# Patient Record
Sex: FEMALE | Race: WHITE | Employment: OTHER | URBAN - NONMETROPOLITAN AREA
[De-identification: names, ages, dates, MRNs, and addresses within clinical notes are randomized per-mention and may not be internally consistent; named-entity substitution may affect disease eponyms.]

---

## 2017-01-11 ENCOUNTER — TELEPHONE (OUTPATIENT)
Dept: CARDIOLOGY | Age: 47
End: 2017-01-11

## 2017-08-29 ENCOUNTER — HOSPITAL ENCOUNTER (OUTPATIENT)
Dept: PHYSICAL THERAPY | Age: 47
Setting detail: THERAPIES SERIES
Discharge: HOME | End: 2017-08-29
Payer: MEDICARE

## 2018-10-16 ENCOUNTER — HOSPITAL ENCOUNTER (OUTPATIENT)
Dept: OCCUPATIONAL THERAPY | Age: 48
Setting detail: THERAPIES SERIES
Discharge: HOME | End: 2018-10-16
Payer: MEDICARE

## 2018-10-23 ENCOUNTER — HOSPITAL ENCOUNTER (OUTPATIENT)
Dept: OCCUPATIONAL THERAPY | Age: 48
Setting detail: THERAPIES SERIES
Discharge: HOME | End: 2018-10-23
Payer: MEDICARE

## 2018-10-23 PROCEDURE — 97165 OT EVAL LOW COMPLEX 30 MIN: CPT

## 2018-10-23 PROCEDURE — G8978 MOBILITY CURRENT STATUS: HCPCS

## 2018-10-23 PROCEDURE — G8979 MOBILITY GOAL STATUS: HCPCS

## 2018-10-23 PROCEDURE — G8980 MOBILITY D/C STATUS: HCPCS

## 2018-10-23 PROCEDURE — 97542 WHEELCHAIR MNGMENT TRAINING: CPT

## 2018-10-23 ASSESSMENT — PAIN DESCRIPTION - LOCATION: LOCATION: BACK;KNEE

## 2018-10-23 ASSESSMENT — PAIN DESCRIPTION - PAIN TYPE: TYPE: CHRONIC PAIN

## 2018-10-23 ASSESSMENT — PAIN SCALES - GENERAL: PAINLEVEL_OUTOF10: 7

## 2018-10-23 ASSESSMENT — PAIN DESCRIPTION - ORIENTATION: ORIENTATION: RIGHT

## 2018-10-23 NOTE — PROGRESS NOTES
limit the users ability to safely participate in one or more MRADL's?   no    Can the patient be accommodated / compensated for to allow use of a mobility assistive device to participate in [address]? Yes - pt. Gianni's functional perception, reasoning, and safety    Does the user demonstrate the ability or potential ability and willingness to safely use the mobility assistive device? yes  - pt. Wants to be more independent with mobility in her home to complete self care and cooking and laundry    Can the mobility deficit be sufficiently resolved with only the use of a cane or walker?        no - pt. Has not been able to tolerate wearing a left LE prosthesis and demo's fair to poor UE strength and endurance    Does the user's environment support the use of the type of wheelchair being recommended? SEE VENDOR'S NOTE. If a manual wheelchair is recommended, does the user have sufficient function/abilities to use the recommended equipment?   n/a    If a POV is recommended, does the user have sufficient stability and upper extremity function to operate it? N'/a - Pt's home is not accessible indoors for a POV    If a power wheelchair is recommended, does the user have sufficient function/abilities to use the recommended equipment? Yes - pt. Gianni's functional hand/dexterity skills to operate a joystick and functional cognition       RECOMMENDATION/GOALS     TYPE OF WHEELCHAIR RECOMMENDED:  Power Wheelchair - Group 2  POSITIONING SYSTEMS RECOMMENDED: None    SEATING RECOMMENDED:    Standard captain chair    OT G-codes  Functional Assessment Tool Used: Patient specific functional scale  Score: ability to move around in manual wheelchair - 3  Functional Limitation: Mobility: Walking and moving around  Mobility: Walking and Moving Around Current Status (): At least 60 percent but less than 80 percent impaired, limited or restricted  Mobility: Walking and Moving Around Goal Status ():  At least 60 percent

## 2021-12-23 ENCOUNTER — HOSPITAL ENCOUNTER (EMERGENCY)
Age: 51
Discharge: HOME | End: 2021-12-23
Payer: MEDICARE

## 2021-12-23 VITALS
RESPIRATION RATE: 18 BRPM | HEART RATE: 90 BPM | DIASTOLIC BLOOD PRESSURE: 78 MMHG | BODY MASS INDEX: 33.04 KG/M2 | HEIGHT: 61 IN | WEIGHT: 175 LBS | TEMPERATURE: 98.6 F | SYSTOLIC BLOOD PRESSURE: 130 MMHG | OXYGEN SATURATION: 98 %

## 2021-12-23 DIAGNOSIS — J20.8 ACUTE BRONCHITIS DUE TO OTHER SPECIFIED ORGANISMS: Primary | ICD-10-CM

## 2021-12-23 DIAGNOSIS — Z20.822 LAB TEST NEGATIVE FOR COVID-19 VIRUS: ICD-10-CM

## 2021-12-23 DIAGNOSIS — R51.9 NONINTRACTABLE EPISODIC HEADACHE, UNSPECIFIED HEADACHE TYPE: ICD-10-CM

## 2021-12-23 LAB — SARS-COV-2, NAA: NOT  DETECTED

## 2021-12-23 PROCEDURE — 87635 SARS-COV-2 COVID-19 AMP PRB: CPT

## 2021-12-23 PROCEDURE — 99202 OFFICE O/P NEW SF 15 MIN: CPT | Performed by: NURSE PRACTITIONER

## 2021-12-23 PROCEDURE — 99203 OFFICE O/P NEW LOW 30 MIN: CPT

## 2021-12-23 RX ORDER — PREDNISONE 20 MG/1
20 TABLET ORAL 2 TIMES DAILY
Qty: 10 TABLET | Refills: 0 | Status: SHIPPED | OUTPATIENT
Start: 2021-12-23 | End: 2021-12-28

## 2021-12-23 RX ORDER — AZITHROMYCIN 250 MG/1
TABLET, FILM COATED ORAL
Qty: 6 TABLET | Refills: 0 | Status: SHIPPED | OUTPATIENT
Start: 2021-12-23 | End: 2022-10-21

## 2021-12-23 ASSESSMENT — ENCOUNTER SYMPTOMS
TROUBLE SWALLOWING: 0
SORE THROAT: 1
VOMITING: 0
NAUSEA: 0
COUGH: 1
SINUS PRESSURE: 0
RHINORRHEA: 0
DIARRHEA: 0
SHORTNESS OF BREATH: 1
BACK PAIN: 0

## 2021-12-23 NOTE — ED PROVIDER NOTES
[place]  Urgent Care Encounter       CHIEF COMPLAINT       Chief Complaint   Patient presents with    Cough    Headache    Covid Testing       Nurses Notes reviewed and I agree except as noted in the HPI. HISTORY OF PRESENT ILLNESS   Nelson Truner is a 49 y.o. female who presents to the urgent care center complaining of headache nonproductive cough for the past 5 days. Patient was exposed to Covid and she stated that she wanted to make sure she did not have Covid before the holidays. Due to her headache. The patient has had the vaccine. She stated they were going to be people coming over for the holidays. At present time patient is awake alert does not appear to be in any acute distress and denies any pain. (See HPI template)    The history is provided by the patient. No  was used. Cough  Cough characteristics:  Non-productive  Severity:  Mild  Onset quality:  Gradual  Duration:  5 days  Timing:  Intermittent  Progression:  Unchanged  Chronicity:  New  Smoker: no    Relieved by:  None tried  Worsened by:  Nothing  Ineffective treatments:  None tried  Associated symptoms: chills, headaches, shortness of breath and sore throat    Associated symptoms: no chest pain, no ear pain, no fever, no rash and no rhinorrhea    Headaches:     Severity:  Moderate    Onset quality:  Sudden    Duration:  5 days    Timing:  Constant    Progression:  Unchanged    Chronicity:  New  Shortness of breath:     Severity:  Mild    Onset quality:  Sudden    Duration:  1 day    Timing:  Intermittent    Progression:  Waxing and waning  Sore throat:     Severity:  Mild    Onset quality:  Sudden    Duration:  5 days    Timing:  Intermittent    Progression:  Waxing and waning      REVIEW OF SYSTEMS     Review of Systems   Constitutional: Positive for chills and fatigue. Negative for activity change, appetite change and fever. HENT: Positive for sore throat.  Negative for congestion, ear pain, rhinorrhea, sinus pressure and trouble swallowing. Respiratory: Positive for cough and shortness of breath. Cardiovascular: Negative for chest pain. Gastrointestinal: Negative for diarrhea, nausea and vomiting. Musculoskeletal: Negative for back pain and neck stiffness. Skin: Negative for rash. Allergic/Immunologic: Negative for environmental allergies. Neurological: Positive for headaches. Negative for dizziness and light-headedness. Hematological: Negative for adenopathy. PAST MEDICAL HISTORY         Diagnosis Date    Above knee amputation of left lower extremity ([facility] [address]     Blood circulation, collateral     CAD (coronary artery disease)     Diabetes mellitus ([facility] [address]     Hyperlipidemia     Hypertension     MDRO (multiple drug resistant organisms) resistance     MRSA wound     MI, old     Obesity (BMI 30-39. 9)     Status post skin graft 01/15/2014    [facility] - Dr Love Jefferson     Patient  has a past surgical history that includes  section (1998); Coronary angioplasty with stent (); Coronary angioplasty with stent; angioplasty (Left, 2014); vascular surgery; other surgical history (2015); other surgical history (Left, 2016); and Coronary angioplasty with stent (2016). CURRENT MEDICATIONS       Previous Medications    ALBUTEROL (PROVENTIL HFA) 108 (90 BASE) MCG/ACT INHALER    Inhale 2 puffs into the lungs every 4 hours as needed for Wheezing or Shortness of Breath (Space out to every 6 hours as symptoms improve). Space out to every 6 hours as symptoms improve.     ALPRAZOLAM (XANAX) 0.5 MG TABLET    Take 0.5 mg by mouth 2 times daily as needed    AMITRIPTYLINE (ELAVIL) 100 MG TABLET    Take 100 mg by mouth nightly    AMLODIPINE (NORVASC) 5 MG TABLET    Take 5 mg by mouth daily    ASPIRIN 81 MG TABLET    Take 81 mg by mouth daily    ATORVASTATIN (LIPITOR) 20 MG TABLET    Take 2 tablets by mouth nightly BUDESONIDE-FORMOTEROL (SYMBICORT) 160-4.5 MCG/ACT AERO    Inhale 2 puffs into the lungs 2 times daily    ISOSORBIDE DINITRATE (ISORDIL) 20 MG TABLET    Take 30 mg by mouth daily    LISINOPRIL (PRINIVIL;ZESTRIL) 2.5 MG TABLET    Take 1 tablet by mouth daily    METFORMIN (GLUCOPHAGE) 500 MG TABLET    Take 2 tablets by mouth 2 times daily Resume 8/30    METOPROLOL TARTRATE (LOPRESSOR) 25 MG TABLET    Take 1 tablet by mouth 2 times daily    NITROGLYCERIN (NITROSTAT) 0.4 MG SL TABLET    Place 1 tablet under the tongue every 5 minutes as needed for Chest pain Take 1 tablet for chest pain and repeat after 5 min if no relief, call 911 and take another dose 5 min later. Max of 3 doses in 15 min. OXYCODONE-ACETAMINOPHEN (PERCOCET) 5-325 MG PER TABLET    Take 2 tablets by mouth every 6 hours as needed for Pain    PIOGLITAZONE (ACTOS) 30 MG TABLET    Take 1 tablet by mouth daily    PREGABALIN (LYRICA) 150 MG CAPSULE    Take 150 mg by mouth 2 times daily       ALLERGIES     Patient is is allergic to shellfish-derived products and shrimp flavor. Patients   There is no immunization history on file for this patient. FAMILY HISTORY     Patient's family history includes Heart Disease in her maternal grandfather; High Blood Pressure in her maternal grandfather. SOCIAL HISTORY     Patient  reports that she quit smoking about 7 years ago. She has a 13.50 pack-year smoking history. She has never used smokeless tobacco. She reports that she does not drink alcohol and does not use drugs. PHYSICAL EXAM     ED TRIAGE VITALS  BP: 130/78, Temp: 98.6 °F (37 °C), Pulse: 90, Resp: 18, SpO2: 98 %,Estimated body mass index is 33.07 kg/m² as calculated from the following:    Height as of this encounter: 5' 1\" (1.549 m). Weight as of this encounter: 175 lb (79.4 kg). ,Patient's last menstrual period was 10/23/2021. Physical Exam  Vitals and nursing note reviewed. Constitutional:       General: She is not in acute distress. Appearance: Normal appearance. She is well-developed and well-groomed. She is not ill-appearing, toxic-appearing or diaphoretic. HENT:      Head: Normocephalic. Right Ear: Hearing, tympanic membrane, ear canal and external ear normal. No drainage, swelling or tenderness. No mastoid tenderness. Tympanic membrane is not erythematous. Left Ear: Hearing, tympanic membrane, ear canal and external ear normal. No drainage, swelling or tenderness. No mastoid tenderness. Tympanic membrane is not erythematous. Nose: Congestion and rhinorrhea present. Rhinorrhea is clear. Right Sinus: No maxillary sinus tenderness or frontal sinus tenderness. Left Sinus: No maxillary sinus tenderness or frontal sinus tenderness. Mouth/Throat:      Lips: Pink. Mouth: Mucous membranes are moist.      Pharynx: Uvula midline. No posterior oropharyngeal erythema or uvula swelling. Tonsils: No tonsillar exudate or tonsillar abscesses. Eyes:      Conjunctiva/sclera: Conjunctivae normal.      Pupils: Pupils are equal, round, and reactive to light. Cardiovascular:      Rate and Rhythm: Normal rate and regular rhythm. Heart sounds: Normal heart sounds. Pulmonary:      Effort: Pulmonary effort is normal. No accessory muscle usage. Breath sounds: Normal breath sounds. No decreased breath sounds, wheezing, rhonchi or rales. Chest:   Breasts:      Right: No supraclavicular adenopathy. Left: No supraclavicular adenopathy. Abdominal:      General: Bowel sounds are normal.      Palpations: Abdomen is soft. Tenderness: There is no abdominal tenderness. There is no right CVA tenderness, left CVA tenderness or guarding. Negative signs include Ayala's sign. Musculoskeletal:      Cervical back: Full passive range of motion without pain and normal range of motion. No rigidity. No muscular tenderness. Normal range of motion.    Lymphadenopathy:      Head:      Right side of head: No submental, submandibular, tonsillar, preauricular, posterior auricular or occipital adenopathy. Left side of head: No submental, submandibular, tonsillar, preauricular, posterior auricular or occipital adenopathy. Cervical: No cervical adenopathy. Right cervical: No superficial, deep or posterior cervical adenopathy. Left cervical: No superficial, deep or posterior cervical adenopathy. Upper Body:      Right upper body: No supraclavicular adenopathy. Left upper body: No supraclavicular adenopathy. Skin:     General: Skin is warm and dry. Capillary Refill: Capillary refill takes less than 2 seconds. Findings: No rash. Neurological:      Mental Status: She is alert and oriented to person, place, and time. Psychiatric:         Mood and Affect: Mood normal.         Behavior: Behavior normal. Behavior is cooperative. DIAGNOSTIC RESULTS     Labs:  Results for orders placed or performed during the hospital encounter of 12/23/21   COVID-19, Rapid   Result Value Ref Range    SARS-CoV-2, GLENN NOT  DETECTED NOT DETECTED       IMAGING:    No orders to display         EKG:      URGENT CARE COURSE:     Vitals:    12/23/21 1358   BP: 130/78   Pulse: 90   Resp: 18   Temp: 98.6 °F (37 °C)   TempSrc: Temporal   SpO2: 98%   Weight: 175 lb (79.4 kg)   Height: 5' 1\" (1.549 m)       Medications - No data to display         PROCEDURES:  None    FINAL IMPRESSION      1. Acute bronchitis due to other specified organisms    2. Nonintractable episodic headache, unspecified headache type    3. Lab test negative for COVID-19 virus          DISPOSITION/ PLAN      The patient/Patient representative was advised to rest, drink lots of fluids, take Motrin and Tylenol for any fever, chills generalized body aches. The patient was also advised to monitor urine output for signs of dehydration.   If the patient develops any chest pain, shortness of breath, neck pain or stiffness or abdominal pain or any other concerns, the patient is to call 911 or go to the emergency department for further evaluation. If the patient does not experience any of the above symptoms he is to follow-up with his primary care provider in 2-3 days for reevaluation. The patient/Patient representative are agreeable to the treatment plan at this time. The patient left the urgent care center in stable condition. PATIENT REFERRED TO:  Yessi Spring MD  [address] / [facility] [address]      DISCHARGE MEDICATIONS:  New Prescriptions    AZITHROMYCIN (ZITHROMAX Z-ONEL) 250 MG TABLET    2 tablets day 1 then1 tablet days 2 - 5.     PREDNISONE (DELTASONE) 20 MG TABLET    Take 1 tablet by mouth 2 times daily for 5 days       Discontinued Medications    CANAGLIFLOZIN (INVOKANA) 300 MG TABS TABLET    Take 300 mg by mouth daily    CILOSTAZOL (PLETAL) 50 MG TABLET    Take 50 mg by mouth daily     ESCITALOPRAM (LEXAPRO) 20 MG TABLET    Take 20 mg by mouth daily    EZETIMIBE (ZETIA) 10 MG TABLET    Take 10 mg by mouth daily    FUROSEMIDE (LASIX) 40 MG TABLET    Take 1 tablet by mouth daily Resume 8/30    INSULIN GLARGINE (LANTUS) 100 UNIT/ML INJECTION VIAL    Inject 65 Units into the skin 2 times daily Takes at bedtime and in am    TICAGRELOR (BRILINTA) 90 MG TABS TABLET    Take 1 tablet by mouth 2 times daily       Current Discharge Medication List          Raymond Pitcher, APRN - CNP    (Please note that portions of this note were completed with a voice recognition program. Efforts were made to edit the dictations but occasionally words are mis-transcribed.)           EVELIO Hu CNP  12/23/21 4039

## 2021-12-23 NOTE — ED TRIAGE NOTES
Pt to [facility] in [facility] with a cough, headache, and wanting a COVID-19 test.  This started on Saturday.

## 2022-10-17 ENCOUNTER — HOSPITAL ENCOUNTER (OUTPATIENT)
Dept: CT IMAGING | Age: 52
Discharge: HOME | End: 2022-10-17

## 2022-10-17 ENCOUNTER — HOSPITAL ENCOUNTER (OUTPATIENT)
Dept: GENERAL RADIOLOGY | Age: 52
Discharge: HOME | End: 2022-10-17

## 2022-10-17 DIAGNOSIS — Z00.6 ENCOUNTER FOR EXAMINATION FOR NORMAL COMPARISON AND CONTROL IN CLINICAL RESEARCH PROGRAM: ICD-10-CM

## 2022-10-21 ENCOUNTER — CLINICAL DOCUMENTATION (OUTPATIENT)
Dept: CASE MANAGEMENT | Age: 52
End: 2022-10-21

## 2022-10-21 ENCOUNTER — OFFICE VISIT (OUTPATIENT)
Dept: ONCOLOGY | Age: 52
End: 2022-10-21
Payer: MEDICARE

## 2022-10-21 ENCOUNTER — HOSPITAL ENCOUNTER (OUTPATIENT)
Dept: INFUSION THERAPY | Age: 52
Discharge: HOME | End: 2022-10-21
Payer: MEDICARE

## 2022-10-21 VITALS
SYSTOLIC BLOOD PRESSURE: 108 MMHG | DIASTOLIC BLOOD PRESSURE: 70 MMHG | TEMPERATURE: 97.9 F | RESPIRATION RATE: 16 BRPM | HEART RATE: 92 BPM

## 2022-10-21 VITALS
SYSTOLIC BLOOD PRESSURE: 108 MMHG | RESPIRATION RATE: 16 BRPM | TEMPERATURE: 97.9 F | DIASTOLIC BLOOD PRESSURE: 70 MMHG | BODY MASS INDEX: 33.07 KG/M2 | OXYGEN SATURATION: 95 % | HEIGHT: 61 IN | HEART RATE: 92 BPM

## 2022-10-21 DIAGNOSIS — C76.0 HEAD AND NECK CANCER (HCC): Primary | ICD-10-CM

## 2022-10-21 PROCEDURE — 1036F TOBACCO NON-USER: CPT | Performed by: INTERNAL MEDICINE

## 2022-10-21 PROCEDURE — G8417 CALC BMI ABV UP PARAM F/U: HCPCS | Performed by: INTERNAL MEDICINE

## 2022-10-21 PROCEDURE — 3017F COLORECTAL CA SCREEN DOC REV: CPT | Performed by: INTERNAL MEDICINE

## 2022-10-21 PROCEDURE — G8427 DOCREV CUR MEDS BY ELIG CLIN: HCPCS | Performed by: INTERNAL MEDICINE

## 2022-10-21 PROCEDURE — 99211 OFF/OP EST MAY X REQ PHY/QHP: CPT

## 2022-10-21 PROCEDURE — G8484 FLU IMMUNIZE NO ADMIN: HCPCS | Performed by: INTERNAL MEDICINE

## 2022-10-21 PROCEDURE — 99205 OFFICE O/P NEW HI 60 MIN: CPT | Performed by: INTERNAL MEDICINE

## 2022-10-21 RX ORDER — INSULIN LISPRO 100 [IU]/ML
INJECTION, SOLUTION INTRAVENOUS; SUBCUTANEOUS
COMMUNITY
Start: 2022-10-14

## 2022-10-21 RX ORDER — DULAGLUTIDE 1.5 MG/.5ML
1.5 INJECTION, SOLUTION SUBCUTANEOUS WEEKLY
COMMUNITY
Start: 2022-07-31

## 2022-10-21 RX ORDER — CLOPIDOGREL BISULFATE 75 MG/1
1 TABLET ORAL DAILY
COMMUNITY
Start: 2022-07-28

## 2022-10-21 RX ORDER — EMPAGLIFLOZIN 25 MG/1
1 TABLET, FILM COATED ORAL DAILY
COMMUNITY
Start: 2022-10-13

## 2022-10-21 RX ORDER — FENTANYL 37.5 UG/H
1 PATCH, EXTENDED RELEASE TRANSDERMAL
COMMUNITY
Start: 2022-10-06

## 2022-10-21 NOTE — PATIENT INSTRUCTIONS
Reviewed pathology and scan reports. 2.  Has an appt with Radiation on Wednesday 10/26/22  3. Discussed treatment options including chemotherapy to take with radiation for better control of her cancer. 4. MD will discuss chemotherapy options with Dr. Rivera Vegas  5.  Return to see MD 2 weeks

## 2022-10-21 NOTE — PROGRESS NOTES
Name: Willi Pennington  : 1970  MRN: [id number]    Oncology Navigation- Initial Note:    Intake-  Contact Type: Medical Oncology    Diagnosis:  Squamous Cell cancer of Supraglottis    Home Disposition: Lives with other who is able to assist  -Pt amputee- Hx poorly controlled DM  - Uses motorized W/C  -Sig Other helps as needed  -Reduced ETOH intake  -Dysphagia , throat pain, +nausea started approx 1 year ago.    + Fatigue started approx 2 months ago.  -100# weight loss over past 1 year. ENT consult completed with Dr Amie Elise in [facility]. DX approx 1 week ago. -Pt nervous about dx. Patient needs and barriers to care: Coordination of Care, Knowledge deficit, and Symptom Management     Referral Source: Outpatient    Receptive to Advanced Care Planning/ Palliative Care:  deferred    Interventions-   General Interventions: [facility] program discussed; welcome folder reviewed, including contact info. Education/Screenings:  yes -   ONC POC:  -Discussed pathology & guidelines for tx  - Staging needing completed. -PET scheduled 11/3  -Rad ONC appt on 10/26  -Dental appt on 10/27 for dental clearance; will require some extractions. Pt will request dentist office to call [facility] to assist with Dental Clinic coordination.  -Encouraged to control Bld Sugars  -ONC discussed Feeding tube; pt refusing at this time, stating she wants to lose more weight. ONC discussed XRT being the tx pt needs, with possible chemotherapy as radio-sensitizer. -ONC approved of steroid therapy IF pt develops any breathing difficulty, but educated pt of need to check BS 3-4x per day to monitor & cover sugars accordingly. -ONC will confer with Rad ONC about best POC for pt  - RETURN in 2 weeks     Referrals: Supportive Therapies +     Continuum of Care: Diagnosis/Active Treatment    Notes: [facility] following to assist & support as needed.     Electronically signed by Asim Beth RN on 10/21/2022 at 2:30 PM

## 2022-10-21 NOTE — PROGRESS NOTES
[address] CANCER [address] [place] [address]  Dept: [phone]  Loc: [facility]  1970   MD Cee Laws MD Vondahlia Andrews is a 47 y.o. 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 CHIEF COMPLAINT    She is here today to establish care with medical oncology. HISTORY OF PRESENT ILLNESS    1/31/2022. CT scan of soft tissues of the neck without contrast done for right-sided neck pain showed no discrete neck mass fluid collection or acute phonatory change there are multiple nonspecific cervical lymph nodes at levels 1 through 5 measuring less than 1 cm in short axis no pathologically enlarged lymph nodes were seen. She initially presented with sore throat, difficulty swallowing and progressive weight loss. She was treated with multiple courses of antibiotics, steroids and antifungal medication with limited improvement. 2/10/2022. Chest x-ray done for shortness of breath and sore throat showed no focal acute lung lesions or significant chronic lung findings. 9/20/2022. Referred  to ENT and seen by Heena Farah PA-C and had flexible laryngoscopy demonstrating an edematous epiglottis. She was given another round of oral steroids. He said that she was here for thrush and mouth and ear soreness for a year. The pain will get to be 10 out of 10. The pain was constant but would fluctuate in severity. There is no drainage or history of hearing loss or tinnitus. She said that she had a constant sore throat making it difficult for her to swallow and she has lost a lot of weight. She says that the pain is worse on the right and would extend into her ear. She is also finding it hard to breathe. Weight was estimated to be 65 kg. Examination showed multiple broken teeth and cavities.   Salivary gland exam was normal.  There is mild lymphadenopathy with tender lymph nodes on the right side. Flexible laryngoscopy showed abnormally enlarged epiglottis obstructing the view of the larynx worse on the right than on the left. She increased her steroids that she was already on and had her come back in a week and told the patient to go to the ED if her airway became compromised. CT scan of the soft tissues of the neck with contrast was ordered. 9/21/2022. CAT scan of the neck showed nodular thickening of the area epiglottic folds associated with abnormal soft tissue thickening of the right side of the hypopharynx extending into the right posterolateral wall of the hypopharynx and flattening deformity of the right vallecula and piriform sinus raising the question of squamous cell carcinoma. There was no significant lymphadenopathy seen. A significant thyroid mass was not identified. The upper esophagus was unremarkable and a foreign body was not identified. There was heavy bilateral carotid bulb calcification seen and multiple dental caries were noted. PET CT scan was recommended. 9/28/2022. Follow-up in the ENT office. Patient said that her throat felt better but she had a bump on the roof of her mouth that was painful and she still complained of trouble swallowing and a sore throat. Flexible laryngoscopy was performed again hypopharynx was markedly abnormal with white speckles but most notably significant thickening of the epiglottis extending into the arytenoid space and false cords worse on the right. There was a possible necrotic area in the posterior face of the epiglottis. The larynx was abnormal with white speckles indicative of fungal infection. Dr. Varun Gibbons reviewed the case and discussed with the patient the benefit of biopsy. The possibility of tracheostomy was discussed to protect her airway. She was treated with fluconazole for her thrush. Steroids were held. 10/3/2022.   Chest x-ray that the cardiac silhouette was within normal limits. Coronary stent was present. There is strandy interstitial opacities in the perihilar distribution along with peribronchial thickening and no focal areas of consolidation, no pneumothorax or pleural effusion. No acute bony changes were seen. 10/6/2022. 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 The epiglottis was edematous and had granular tissue involving the laryngeal surface of the left epiglottis. There was a separate lesion involving the right false vocal cord with a papillomatous appearance. Both true vocal cords seem to be spared. There is no involvement of the arytenoid complex or postcricoid region. The lingual surface of the epiglottis did not have any mucosal erosions but was edematous. The vallecula and base the tongue was unremarkable. The entire cervical esophagus was normal.  She is a former smoker. The tentative stage is T3, N1 versus T3, N2c    Pathology report showed squamous cell carcinoma, poorly differentiated, of the left epiglottis, p16 negative. 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 10/13/2022. Follow-up in the office. Since her biopsy her sore throat had been somewhat tolerable but she continued to have pain with swallowing. She denied choking and coughing episodes with swallowing. She had no problems with shortness of breath. She did complain of facial pain postnasal drip sore throat dysphagia cough with sputum production. He said that she had a 1.5 cm right level 3 lymph node. PET CT scan has been ordered and will be performed on November 3, 2022. The possible need for a gastrostomy tube for nutrition was discussed.   Given that she already had baseline epiglottic edema which might worsen during treatment resulting in airway obstruction, the possibility of requiring a tracheostomy tube was also reviewed. Options for treatment were discussed including primary surgery versus chemo rads. She was not felt to be an optimal surgical candidate. The patient opted for chemoradiation therapy and was referred to radiation therapy. She is here today to establish with medical oncology. Good control of her diabetes was stressed as well. She was also to have dental caries and was encouraged to go see a dentist.    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 Her above-the-knee amputation on the left side was on 2/26/2016. Has undergone cardiac catheterization with percutaneous stenting in August 2016 and an September 2016. Her diabetes requires insulin. She is on a fentanyl patch. INTERVAL NOTE    She is here today to establish medical oncology follow-up. She has an appointment to see Dr. Leda Mosley on 10/26. She says that she does not have much of an appetite and she continues to lose weight. She used to weigh 240 pounds and has lost 100 pounds in the past year and a half. Continues to have throat pain, dysphagia and odynophagia. She had been a smoker of a pack of cigarettes a day from age 13 through 2014 and then tried to cut down her smoking. She says that she is vaping and smoking as little as possible. She says that she does have a cough but no hemoptysis. She has been a diabetic for many years. She had gestational diabetes and did not check her sugars for a long time. She felt bad and went to a local ED and was found to have a sugar of 590. She says that she checks her sugars once a day. She says that her body tells her when her sugar is high. She usually checks her sugar sometime between 3 and 6 PM.  He has had an above-the-knee amputation of her left leg. Says that she has had no problems with her stump.   She says that she has a prosthesis but it does not fit anymore because she has lost so much weight. She says that she has no neuropathy. She uses a scooter. She has not had a recent eye examination. She said that she \"just did not go\" feels that her renal function is fine. She says that she has no known anemia. The only bleeding that she has experienced was a little bit after she had her biopsy. She did have the above-mentioned thrush and that was sore. She knows she has bad teeth and she is supposed to go to the dentist on October 27. We discussed this today in detail and gave her some assistance in dealing with this. She has fallen in the past fracturing her nose and breaking her finger and a tooth fell out which she has remedied herself. She has had myocardial infarction in 2007 and in 2016. She has had 4 stents placed. She has been on Repatha. She also says that she has had a valvular heart problem. She says that she has had nausea and occasional vomiting. She denies diarrhea and constipation. She says that she has never had a stroke. She has not had skin cancer. MONITORING PARAMETERS    ENT examination, MRI, PET CT scan    PAST MEDICAL HISTORY  Past Medical History:   Diagnosis Date    Above knee amputation of left lower extremity (HCC)     Blood circulation, collateral     CAD (coronary artery disease)     Diabetes mellitus ([address]     Hyperlipidemia     Hypertension     MDRO (multiple drug resistant organisms) resistance     MRSA wound 2013    MI, old 01/01/2007    Obesity (BMI 30-39. 9)     Status post skin graft 01/15/2014    Amniox - Dr Keyonna Martini cancer [facility]) 10/2022    squamous cell carcinoma of the epiglottis        REVIEW OF SYSTEMS  Review of Systems   Constitutional:  Positive for appetite change and fatigue. Negative for chills, diaphoresis and fever. HENT:  Positive for dental problem, mouth sores and trouble swallowing. Eyes:  Positive for visual disturbance. Respiratory:  Negative for cough (occasional) and shortness of breath. Cardiovascular:  Negative for chest pain and leg swelling. Gastrointestinal:  Negative for abdominal pain, blood in stool, constipation, diarrhea, nausea and vomiting. Genitourinary:  Negative for dysuria, hematuria, urgency and vaginal discharge. Musculoskeletal:  Positive for arthralgias, myalgias and neck pain. Skin:  Positive for pallor. Neurological:  Positive for weakness. Negative for seizures, syncope, light-headedness, numbness and headaches. Hematological:  Negative for adenopathy. Does not bruise/bleed easily. Psychiatric/Behavioral:  Negative for agitation, dysphoric mood, hallucinations, self-injury, sleep disturbance and suicidal ideas. The patient is nervous/anxious. FAMILY HISTORY  Family History   Problem Relation Age of Onset    Heart Disease Maternal Grandfather     High Blood Pressure Maternal Grandfather     Asthma Neg Hx     Cancer Neg Hx     Diabetes Neg Hx     Kidney Disease Neg Hx     Stroke Neg Hx         SOCIAL HISTORY  Social History     Socioeconomic History    Marital status:       Spouse name: Not on file    Number of children: 1    Years of education: Not on file    Highest education level: Not on file   Occupational History    Not on file   Tobacco Use    Smoking status: Former     Packs/day: 0.50     Years: 27.00     Pack years: 13.50     Types: Cigarettes     Quit date: 2014     Years since quittin.0    Smokeless tobacco: Never    Tobacco comments:     e cigs daily use   Vaping Use    Vaping Use: Some days   Substance and Sexual Activity    Alcohol use: No    Drug use: No    Sexual activity: Not on file   Other Topics Concern    Not on file   Social History Narrative    Not on file     Social Determinants of Health     Financial Resource Strain: Not on file   Food Insecurity: Not on file   Transportation Needs: Not on file   Physical Activity: Not on file   Stress: Not on file   Social Connections: Not on file   Intimate Partner Violence: Not on file   Housing Stability: Not on file        CURRENT MEDICATIONS  Current Outpatient Medications   Medication Sig Dispense Refill    clopidogrel (PLAVIX) 75 MG tablet Take 1 tablet by mouth daily      TRULICITY 1.5 UB/6.4MS SC injection Inject 1.5 mg into the skin once a week      HUMALOG KWIKPEN 100 UNIT/ML SOPN       fentaNYL 37.5 MCG/HR PT72 Apply 1 patch topically. JARDIANCE 25 MG tablet Take 1 tablet by mouth daily      metFORMIN (GLUCOPHAGE) 500 MG tablet Take 2 tablets by mouth 2 times daily Resume 8/30 60 tablet 3    pioglitazone (ACTOS) 30 MG tablet Take 1 tablet by mouth daily 30 tablet 3    atorvastatin (LIPITOR) 20 MG tablet Take 2 tablets by mouth nightly 30 tablet 3    lisinopril (PRINIVIL;ZESTRIL) 2.5 MG tablet Take 1 tablet by mouth daily 30 tablet 3    metoprolol tartrate (LOPRESSOR) 25 MG tablet Take 1 tablet by mouth 2 times daily 60 tablet 3    isosorbide dinitrate (ISORDIL) 20 MG tablet Take 30 mg by mouth daily      amitriptyline (ELAVIL) 100 MG tablet Take 100 mg by mouth nightly      amLODIPine (NORVASC) 5 MG tablet Take 5 mg by mouth daily      budesonide-formoterol (SYMBICORT) 160-4.5 MCG/ACT AERO Inhale 2 puffs into the lungs 2 times daily      pregabalin (LYRICA) 150 MG capsule Take 150 mg by mouth 2 times daily      ALPRAZolam (XANAX) 0.5 MG tablet Take 0.5 mg by mouth 2 times daily as needed      aspirin 81 MG tablet Take 81 mg by mouth daily      albuterol (PROVENTIL HFA) 108 (90 BASE) MCG/ACT inhaler Inhale 2 puffs into the lungs every 4 hours as needed for Wheezing or Shortness of Breath (Space out to every 6 hours as symptoms improve). Space out to every 6 hours as symptoms improve.  1 Inhaler 10    nitroGLYCERIN (NITROSTAT) 0.4 MG SL tablet Place 1 tablet under the tongue every 5 minutes as needed for Chest pain Take 1 tablet for chest pain and repeat after 5 min if no relief, call 911 and take another dose 5 min later. Max of 3 doses in 15 min. (Patient not taking: Reported on 10/21/2022) 25 tablet 3     No current facility-administered medications for this visit. OARRS    PDMP Monitoring: Review of this document shows that she is on fentanyl, zolpidem, Percocet, alprazolam and had been on Phentermine in July of 2022. Last PDMP Shannan Samayoa as Reviewed [facility]):    Last PDMP Myrnadenise Yao as Reviewed:  Review User Review Instant Review Result   Keny Echavarria 10/23/2022  7:08 PM Reviewed PDMP [1]       Urine Drug Screenings (1 yr)    No resulted procedures found. Medication Contract and Consent for Opioid Use Documents Filed        No documents found                    PHYSICAL EXAM    Physical Exam  Vitals and nursing note reviewed. Exam conducted with a chaperone present. Constitutional:       General: She is not in acute distress. Appearance: Normal appearance. She is normal weight. She is ill-appearing. She is not toxic-appearing or diaphoretic. Comments: Corrie Oswald is a chronically ill-appearing somewhat anxious, depressed  female who is here with her significant other whose name is Ladarius. HENT:      Head: Normocephalic and atraumatic. Nose: Nose normal.      Mouth/Throat:      Pharynx: Oropharyngeal exudate present. No posterior oropharyngeal erythema. Comments: Multiple carious teeth. I see no evidence of thrush at this time. Her tongue is slightly coated. I cannot see any lesions. Eyes:      General: No scleral icterus. Extraocular Movements: Extraocular movements intact. Conjunctiva/sclera: Conjunctivae normal.      Pupils: Pupils are equal, round, and reactive to light. Cardiovascular:      Rate and Rhythm: Normal rate and regular rhythm. Heart sounds: Murmur (2 out of 6 ejection murmur) heard. Pulmonary:      Effort: Pulmonary effort is normal. No respiratory distress. Breath sounds: Normal breath sounds. No stridor. No wheezing, rhonchi or rales. Chest:      Chest wall: No tenderness. Abdominal:      General: Abdomen is flat. Bowel sounds are normal.      Tenderness: There is no abdominal tenderness. There is no guarding. Hernia: No hernia is present. Comments: Examined in the seated position with no gross organomegaly masses or ascites. Musculoskeletal:         General: Normal range of motion. Cervical back: Normal range of motion and neck supple. Tenderness present. No rigidity. Right lower leg: No edema. Left lower leg: Edema (left lower leg is absent) present. Comments: Amputation of the left leg above the knee. I did not visualize the stump. Lymphadenopathy:      Cervical: No cervical adenopathy. Skin:     General: Skin is warm and dry. Coloration: Skin is not jaundiced or pale. Findings: No bruising, erythema or lesion. Neurological:      General: No focal deficit present. Mental Status: She is alert and oriented to person, place, and time. Mental status is at baseline. Cranial Nerves: No cranial nerve deficit. Gait: Gait abnormal.   Psychiatric:         Mood and Affect: Mood normal.         Behavior: Behavior normal.         Thought Content: Thought content normal.         Judgment: Judgment normal.   Using an electric scooter    LABS/IMAGING    No laboratory data was obtained today    PATHOLOGY/GENETICS    Squamous cell carcinoma of the epiglottis p16 negative    ASSESSMENT and PLAN    1. Squamous cell carcinoma of the epiglottis. She is p16 negative giving her a worse prognosis. She is not felt to be a good surgical candidate. Chemotherapy and radiation therapy concurrently have been discussed with her. She is resistant to the thought of a tracheostomy and feeding tube. She has an appointment to see Dr. Leda Mosley next week. She will have a PET CT scan. She has a dental appointment coming up. She will need a lot of support and help.   She knows that should she develop worsening trouble breathing or swallowing she needs to get to the emergency department. 2.  Uncontrolled diabetes. She has already had problems with thrush from steroids. She is resistant to checking her sugars more than once a day. She says that her body tells her when her sugar is high. Good control of her diabetes will aid in her therapy and healing. 3.  Pain control. She is currently on fentanyl and has Percocet for breakthrough pain. Pain management is necessary. 4.  Anxiety and depression. She is on Xanax, Elavil and Lyrica. 5.  Coronary artery disease, hyperlipidemia and atherosclerosis. She has had several myocardial infarctions. She will continue follow-up with her radiology and primary care teams. Her blood pressure today was 108/70. She is on Prinivil, Lopressor and Isordil. We will need to monitor her blood pressure closely. She knows that she can call for any change in her status, questions or concerns. She will come back to see us in 2 weeks time.     Francisco Johnson MD 10/23/2022 7:08 PM

## 2022-10-22 ASSESSMENT — ENCOUNTER SYMPTOMS
DIARRHEA: 0
ABDOMINAL PAIN: 0
CONSTIPATION: 0
NAUSEA: 0
COUGH: 0
VOMITING: 0
BLOOD IN STOOL: 0
SHORTNESS OF BREATH: 0
TROUBLE SWALLOWING: 1

## 2022-10-24 ENCOUNTER — TELEPHONE (OUTPATIENT)
Dept: ONCOLOGY | Age: 52
End: 2022-10-24

## 2022-10-24 PROBLEM — C32.1 MALIGNANT NEOPLASM OF EPIGLOTTIS (HCC): Status: ACTIVE | Noted: 2022-10-24

## 2022-10-24 NOTE — TELEPHONE ENCOUNTER
Pt left voicemail stating her dentist cancelled her appt and is going to be on medical leave. Called pt back and left voicemail giving [address] [place] [address] dental clinic phone number for dental needs. Our office number was given if she has any questions or concerns.

## 2022-10-25 ENCOUNTER — TELEPHONE (OUTPATIENT)
Dept: CASE MANAGEMENT | Age: 52
End: 2022-10-25

## 2022-10-25 NOTE — TELEPHONE ENCOUNTER
Natanael received call from pt. Her dentist appt that was scheduled for Thurs 10/27, has been cancelled, the dentist is off on medical leave. Pt requesting Natanael assistance with coordinating a dental appt. Natanael phoned [phone] Dental Clinic: [phone]. Pt scheduled for dental evaluation tomorrow at 2pm.  Staff requested pt be notified to bring medical card, photo ID & list of medications. Natanael explained urgent need for any dental extractions needed to be coordinated urgently so tx can be initiated for pt's cancer diagnosis. Natanael also requested dentist phone Natanael with an update after the evaluation to provide info on what pt needs for dental clearance to proceed with cancer tx. Natanael phoned pt & updated her on the above.

## 2022-10-26 ENCOUNTER — HOSPITAL ENCOUNTER (OUTPATIENT)
Dept: RADIATION ONCOLOGY | Age: 52
Discharge: HOME | End: 2022-10-26
Payer: MEDICARE

## 2022-10-26 VITALS
HEART RATE: 76 BPM | HEIGHT: 61 IN | SYSTOLIC BLOOD PRESSURE: 130 MMHG | TEMPERATURE: 97.6 F | DIASTOLIC BLOOD PRESSURE: 73 MMHG | BODY MASS INDEX: 33.08 KG/M2 | RESPIRATION RATE: 18 BRPM | OXYGEN SATURATION: 95 %

## 2022-10-26 DIAGNOSIS — C32.1 MALIGNANT NEOPLASM OF EPIGLOTTIS (HCC): Primary | ICD-10-CM

## 2022-10-26 DIAGNOSIS — C32.1 MALIGNANT NEOPLASM OF EPIGLOTTIS (HCC): ICD-10-CM

## 2022-10-26 PROCEDURE — 99205 OFFICE O/P NEW HI 60 MIN: CPT | Performed by: RADIOLOGY

## 2022-10-26 PROCEDURE — 99202 OFFICE O/P NEW SF 15 MIN: CPT | Performed by: RADIOLOGY

## 2022-10-26 RX ORDER — ALPRAZOLAM 1 MG/1
1 TABLET ORAL PRN
COMMUNITY
Start: 2022-10-17

## 2022-10-26 RX ORDER — OXYCODONE HYDROCHLORIDE AND ACETAMINOPHEN 5; 325 MG/1; MG/1
2 TABLET ORAL EVERY 8 HOURS PRN
COMMUNITY

## 2022-10-26 ASSESSMENT — ENCOUNTER SYMPTOMS
SHORTNESS OF BREATH: 0
SORE THROAT: 1
COUGH: 1
SINUS PAIN: 0
RECTAL PAIN: 0
BACK PAIN: 1
FACIAL SWELLING: 0
TROUBLE SWALLOWING: 1
ABDOMINAL PAIN: 0
SINUS PRESSURE: 0
VOICE CHANGE: 1
BLOOD IN STOOL: 0
NAUSEA: 1

## 2022-10-26 ASSESSMENT — PAIN DESCRIPTION - LOCATION: LOCATION: THROAT

## 2022-10-26 ASSESSMENT — PAIN SCALES - GENERAL: PAINLEVEL_OUTOF10: 10

## 2022-10-26 NOTE — PROGRESS NOTES
[address] [facility]           [facility], [address]        [id number] [facility]        Amber Juner: [phone]        F: [phone]       [email or web address]            INITIAL CONSULTATION    Date of Service: 10/26/2022  Patient ID: Ori Aguirre   : 1970  MRN: [id number]   Acct Number: [de-identified]         Requesting Provider:  Dr. Karlie Gentile (ENT)  Reason for request: Evaluation for the potential role of definitive radiation therapy. CONSULTANT: Mihaela Steward MD MS    CHIEF COMPLAINT: Evaluation for the potential role of definitive radiation therapy. 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 Possible long-term side effects discussed included hyperpigmentation of the skin, increased firmness of the tissues of the neck, permanent dryness of the mouth, permanent change in taste, damage to the nerves (causing numbness, weakness, or paralysis), damage to the brain, damage to the bone (causing necrosis), decreased thyroid function, and scarring of the lung (causing shortness of breath/cough. 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 Today we discussed her treatment options which include definitive surgical management versus definitive chemoradiation therapy, pros and cons to both approaches as well as potential acute and long-term side effects. Patient has recently been seen by medical oncology with recommendation to move forward with organ preservation definitive chemoradiation therapy.   We discussed in detail the above-mentioned potential acute and long-term side effects. Patient is agreeable to move forward with definitive chemoradiation therapy at this time. Patient still requires dental evaluation as well as PET/CT imaging to complete staging. Patient will likely also require port placement for chemotherapy administration, as per [facility]. We will also refer the patient to be evaluated by speech-language pathology prior to initiation of treatment. Patient will be seen by nutrition services after she begins therapy. Of note patient has been maintained on prescription pain medicine for chronic pain for quite some time. We will have to consider pain as she goes through treatment and appropriate medical management going forward. We will plan to see her in clinic for brief follow-up for consent prior to CT simulation for treatment planning after PET/CT imaging has been performed as well as dental exam performed and Chemo-Port placement. The patient will return to clinic in 2-3 weeks following PET/CT, Dental evaluation and chemoport placement as per [facility] or sooner if clinically indicated. They have our clinic number to call with any questions or concerns if needed. Thank you for allowing us to be a part of their care. Thank you for allowing my assistance in the care of your patient. HISTORY OF PRESENT ILLNESS:  Oncology History Overview Note   Nickie Real is a 47 y.o. female      HISTORY:    10/21/22 As per Dr. Ivette Bundy (medical oncology) note,                  IMAGIN/31/22 CT ST NECK WO CON        22 CT ST NECK W CON        PET/CT PENDING         Malignant neoplasm of epiglottis ([facility] [address]   10/6/2022 Surgery             10/24/2022 Initial Diagnosis    Malignant neoplasm of epiglottis ([facility] [address]         Ms. Nickie Real is a 47 y.o. female with above mentioned oncologic history presenting today for initial consultation regarding the potential role for radiation therapy.     Review of Systems Constitutional:  Positive for appetite change and fatigue. Negative for activity change and unexpected weight change. HENT:  Positive for ear pain, sore throat, trouble swallowing and voice change. Negative for congestion, facial swelling (per pt report), hearing loss, nosebleeds, sinus pressure, sinus pain and tinnitus. Eyes:  Negative for visual disturbance. Respiratory:  Positive for cough. Negative for shortness of breath. Cardiovascular:  Negative for chest pain. Gastrointestinal:  Positive for nausea. Negative for abdominal pain, blood in stool and rectal pain. Genitourinary:  Negative for dysuria, frequency and urgency. Musculoskeletal:  Positive for back pain. Negative for arthralgias, neck pain and neck stiffness. Neurological:  Positive for numbness (legs, especially stump) and headaches. Negative for dizziness, facial asymmetry, speech difficulty, weakness and light-headedness. Hematological:  Negative for adenopathy. Psychiatric/Behavioral:  Negative for confusion. A complete review of systems was performed and found to be negative except as presented above. Advance Directives       Power of  Living Will ACP-Advance Directive ACP-Power of     Not on File Not on File Not on File Not on File            Chaperone: No    Mediport: no    Pacemaker/ICD: no    Previous XRT: no    PAIN: 10/10    ADDITIONAL COMMENTS: Above the knee amputee. PHYSICAL EXAMINATION:     VITAL SIGNS: /73   Pulse 76   Temp 97.6 °F (36.4 °C) (Infrared)   Resp 18   Ht 5' 0.98\" (1.549 m)   SpO2 95%   BMI 33.08 kg/m²     ECO - Symptomatic, <50% in bed during the day (Ambulatory and capable of all self care but unable to carry out any work activities. Up and about more than 50% of waking hours)    Physical Exam  Constitutional:       General: She is not in acute distress. Appearance: Normal appearance. HENT:      Head: Normocephalic and atraumatic.       Mouth/Throat: Mouth: Mucous membranes are moist.      Dentition: Abnormal dentition. Tongue: No lesions. Palate: No mass and lesions. Cardiovascular:      Rate and Rhythm: Normal rate and regular rhythm. Pulmonary:      Effort: Pulmonary effort is normal. No respiratory distress. Abdominal:      General: Abdomen is flat. There is no distension. Palpations: Abdomen is soft. Musculoskeletal:      Right lower leg: No edema. Comments: Left leg amputation   Lymphadenopathy:      Cervical: No cervical adenopathy. Neurological:      General: No focal deficit present. Mental Status: She is alert and oriented to person, place, and time. Past Medical History:   Diagnosis Date    Above knee amputation of left lower extremity (HCC)     Blood circulation, collateral     CAD (coronary artery disease)     Diabetes mellitus ([facility] [address]     Hyperlipidemia     Hypertension     MDRO (multiple drug resistant organisms) resistance     MRSA wound     MI, old 2007    Obesity (BMI 30-39. 9)     Status post skin graft 01/15/2014    [facility] - [facility]     Throat cancer [facility]) 10/2022    squamous cell carcinoma of the epiglottis       Past Surgical History:   Procedure Laterality Date    ANGIOPLASTY Left 2014    left leg     SECTION  1998    CORONARY ANGIOPLASTY WITH STENT PLACEMENT      [facility]    CORONARY ANGIOPLASTY WITH STENT PLACEMENT      3 stents in [facility]  2016    Durga Joseph, 1 stent to RCA    ESOPHAGOGASTRODUODENOSCOPY  10/06/2022    [facility]    OTHER SURGICAL HISTORY  2015    Left Femoral to Peroneal Bypass with Composite Vein Graft and Propaten Graft     OTHER SURGICAL HISTORY Left 2016    Left Leg Above Knee Amputation    VASCULAR SURGERY         Family History   Problem Relation Age of Onset    Heart Disease Maternal Grandfather     High Blood Pressure Maternal Grandfather     Asthma Neg Hx     Cancer Neg Hx Diabetes Neg Hx     Kidney Disease Neg Hx     Stroke Neg Hx        Social History     Socioeconomic History    Marital status:      Spouse name: Not on file    Number of children: 1    Years of education: Not on file    Highest education level: Not on file   Occupational History    Not on file   Tobacco Use    Smoking status: Former     Packs/day: 0.50     Years: 27.00     Pack years: 13.50     Types: Cigarettes     Quit date: 2014     Years since quittin.1    Smokeless tobacco: Never    Tobacco comments:     e cigs daily use   Vaping Use    Vaping Use: Some days    Substances: Nicotine   Substance and Sexual Activity    Alcohol use: No    Drug use: No    Sexual activity: Not on file   Other Topics Concern    Not on file   Social History Narrative    Not on file     Social Determinants of Health     Financial Resource Strain: Not on file   Food Insecurity: Not on file   Transportation Needs: Not on file   Physical Activity: Not on file   Stress: Not on file   Social Connections: Not on file   Intimate Partner Violence: Not on file   Housing Stability: Not on file       Allergies   Allergen Reactions    Shellfish-Derived Products Anaphylaxis     \"Throat closes up\"    Shrimp Flavor Swelling        Current Outpatient Medications   Medication Sig Dispense Refill    oxyCODONE-acetaminophen (PERCOCET) 5-325 MG per tablet Take 2 tablets by mouth every 8 hours as needed for Pain. ALPRAZolam (XANAX) 1 MG tablet Take 1 mg by mouth as needed. clopidogrel (PLAVIX) 75 MG tablet Take 1 tablet by mouth daily      TRULICITY 1.5 CS/6.2MD SC injection Inject 1.5 mg into the skin once a week      HUMALOG KWIKPEN 100 UNIT/ML SOPN       fentaNYL 37.5 MCG/HR PT72 Apply 1 patch topically.       JARDIANCE 25 MG tablet Take 1 tablet by mouth daily      nitroGLYCERIN (NITROSTAT) 0.4 MG SL tablet Place 1 tablet under the tongue every 5 minutes as needed for Chest pain Take 1 tablet for chest pain and repeat after 5 min if no relief, call 911 and take another dose 5 min later. Max of 3 doses in 15 min. (Patient not taking: Reported on 10/21/2022) 25 tablet 3    metFORMIN (GLUCOPHAGE) 500 MG tablet Take 2 tablets by mouth 2 times daily Resume 8/30 60 tablet 3    pioglitazone (ACTOS) 30 MG tablet Take 1 tablet by mouth daily 30 tablet 3    atorvastatin (LIPITOR) 20 MG tablet Take 2 tablets by mouth nightly 30 tablet 3    lisinopril (PRINIVIL;ZESTRIL) 2.5 MG tablet Take 1 tablet by mouth daily 30 tablet 3    metoprolol tartrate (LOPRESSOR) 25 MG tablet Take 1 tablet by mouth 2 times daily 60 tablet 3    isosorbide dinitrate (ISORDIL) 20 MG tablet Take 30 mg by mouth daily      amitriptyline (ELAVIL) 100 MG tablet Take 100 mg by mouth nightly      amLODIPine (NORVASC) 5 MG tablet Take 5 mg by mouth daily      budesonide-formoterol (SYMBICORT) 160-4.5 MCG/ACT AERO Inhale 2 puffs into the lungs 2 times daily      pregabalin (LYRICA) 150 MG capsule Take 150 mg by mouth 2 times daily      ALPRAZolam (XANAX) 0.5 MG tablet Take 0.5 mg by mouth 2 times daily as needed      aspirin 81 MG tablet Take 81 mg by mouth daily      albuterol (PROVENTIL HFA) 108 (90 BASE) MCG/ACT inhaler Inhale 2 puffs into the lungs every 4 hours as needed for Wheezing or Shortness of Breath (Space out to every 6 hours as symptoms improve). Space out to every 6 hours as symptoms improve. 1 Inhaler 10     No current facility-administered medications for this encounter. Outpatient Medications Marked as Taking for the 10/26/22 encounter [facility] Encounter) with Emily Nash MD   Medication Sig Dispense Refill    oxyCODONE-acetaminophen (PERCOCET) 5-325 MG per tablet Take 2 tablets by mouth every 8 hours as needed for Pain.          LABORATORY STUDIES:   Onc labs: No results found for: PSA, CEA, LDH, AFP    Lab Results   Component Value Date    CREATININE 0.7 09/06/2016     Lab Results   Component Value Date    BUN 16 09/06/2016       PATHOLOGY: As per HPI above. RADIOLOGIC STUDIES: As per HPI above. Electronically signed by Tristan Bone MD MS on 10/26/22 at 9:29 AM EDT     ATTESTATION: 60 minutes (52741) minutes were spent with the patient at today's visit. reviewing pertinent information related to their oncologic diagnosis, including any recent labs, imaging, follow ups and plan of care going forward.     CC:Dr. Flora Braxton [facility])    Dr. Iram Rojo (ENT), Dr. Ce Butts (PCP)  ACC:[facility]

## 2022-10-27 ENCOUNTER — TELEPHONE (OUTPATIENT)
Dept: CASE MANAGEMENT | Age: 52
End: 2022-10-27

## 2022-10-27 NOTE — TELEPHONE ENCOUNTER
Natanael received phone call from pt sharing she went to [facility] [address] for the dental consultation. Xray revealed pt needs full mouth extraction. Pt has phone calls out to Jesus Dickey to her her insurance for locations in network with pt's insurance to proceed with the teeth extractions. Natanael updated JOELLE Dobbins RN, radiation oncology to update Rad Onc accordingly.

## 2022-10-28 ENCOUNTER — TELEPHONE (OUTPATIENT)
Dept: CASE MANAGEMENT | Age: 52
End: 2022-10-28

## 2022-10-28 ENCOUNTER — TELEPHONE (OUTPATIENT)
Dept: ONCOLOGY | Age: 52
End: 2022-10-28

## 2022-10-28 NOTE — TELEPHONE ENCOUNTER
Natanael received call from pt today. She is scheduled at [facility] on 11-1 at 245p. She is needing full mouth extraction before cancer tx can be initiated. Pt shares financial worry about OOP costs after her insurance. Natanael updated [address], [address] of the above. She was going to connect with pt to determine what assistance could be obtained. Natanael also updated, Richardson Gaytan ONC RN on the above. She planned to contact [facility] to communicate need to expediate the dental extraction, & fax the dental clearance form so cancer tx could be initiated.

## 2022-10-28 NOTE — ONCOLOGY
Oncology Social Work    Date: 10/28/2022  Time: 12:15PM  Name: Martir Fuchs  MRN: [id number]     Contact Type: Telephone    Note:    made phone call to the patient to discuss financial assistance concerns regarding her possible bill for dental work. Patient stated that she heard from someone through [facility] that her tooth extraction should be covered at 100% but she isn't sure if that is just for one tooth or for all of her teeth that she needs extracted. Jayla Metz stated that she does have Medicaid, but [facility] does not take Medicaid and the places that do could not get in her soon enough, as her provider would like her to start treatment before the end of November. Patient to call [facility] to get a price quote in order for  to research potential financial assistance options based on the cost. Patient will call back once she has more information.  will continue to follow up as needed.       TAMIKA Rossi, [place]  Oncology Social Worker

## 2022-10-28 NOTE — TELEPHONE ENCOUNTER
Oncology Social Work     Date: 10/28/2022  Time: 12:15PM  Name: Jim Toro  MRN: [id number]      Contact Type: Telephone     Note:    made phone call to the patient to discuss financial assistance concerns regarding her possible bill for dental work. Patient stated that she heard from someone through [facility] that her tooth extraction should be covered at 100% but she isn't sure if that is just for one tooth or for all of her teeth that she needs extracted. Samantha Kanilor stated that she does have Medicaid, but [facility]  does not take Medicaid and the places that do could not get in her soon enough, as her provider would like her to start treatment before the end of November. Patient to call [place] [phone] to get a price quote in order for  to research potential financial assistance options based on the cost. Patient will call back once she has more information.  will continue to follow up as needed.        TAMIKA Mcleod, [facility]  Oncology Social Worker

## 2022-10-31 ENCOUNTER — TELEPHONE (OUTPATIENT)
Dept: RADIATION ONCOLOGY | Age: 52
End: 2022-10-31

## 2022-10-31 NOTE — TELEPHONE ENCOUNTER
Oncology Social Work    Date: 10/31/2022  Time: 9:29 AM  Name: Stephanie Haji  MRN: [id number]     Contact Type: Telephone    Note:    attempting to reach patient to discuss estimate for dental extractions and to see if patient received it. There was no answer,  left a brief message requesting a phone call back. Date: 10/31/22  Time: 9:32AM     contacted [facility] ([phone]) to ask about an estimate for patient's tooth extractions. The worker stated that Ed Kimball is scheduled just for an exam and x-rays on 11/1/22 and then they would schedule her out for her tooth extractions for a different date. At patient's first appointment, they should give her an estimate and detailed treatment plan that should let patient know how much she will owe. The worker stated that BEFORE insurance, the extraction price per tooth can vary between $269-$618.  will continue to follow up as needed.       TAMIKA Ontiveros, [facility]  Oncology Social Worker

## 2022-11-01 ENCOUNTER — TELEPHONE (OUTPATIENT)
Dept: RADIATION ONCOLOGY | Age: 52
End: 2022-11-01

## 2022-11-01 NOTE — TELEPHONE ENCOUNTER
Oncology Social Work    Date: 11/1/2022  Name: Silvia Armenta  MRN: [id number]     Contact Type: Telephone    Note:    spoke with [facility] Office Staff. Office Staff stated that Liban Mccormick was still back with the doctor but that once the DrSunshine Is done they will be able to see what her options are and whether she needs to see an oral surgeon or if the general dentist can perform the extractions. They stated that the surgeon is booking out by a month, as he is only in the office one day a month, but they could reach out to the surgeon to see if he'd be willing to come into the office an extra day to assist with her case. [facility] was explained the urgency of the patient's dental work needing to be completed for her cancer tx. They stated that the patient could get in sooner depending on if the general dentist can assist as well. [place] [phone] to call SW once the patient has seen the doctor with options, and will fax over the cost estimates if the patient gives permission.     ------------------------------------------------------------------------------------------------------------------------------------------------------------------------------------     received call from [facility].     Patients out of pocket cost for oral surgery would be approximately $5,417 and out of pocket costs for the general surgeon to do the extractions would be $2,573. They stated that the patient would get in around November 14th with the general surgeon, but the balance must be paid before the surgery can happen. [facility] stated that the patient could look into reimbursement forms through Medicaid, that may allow her to be reimbursed by Medicaid for the costs. Patient would need to speak to Medicaid about this.      [facility] also stated that they can help the patient locate local options that accept Medicaid, and they will give the patient an oral surgeon referral that she could bring to one of those options. Patient will need financial assistance for either surgical option provided by [facility].  to continue to look for financial assistance options, and local Medicaid Oral Surgeon Options.  will continue to follow up.     TAMIKA Raya, [facility]  Oncology Social Worker

## 2022-11-02 ENCOUNTER — TELEPHONE (OUTPATIENT)
Dept: RADIATION ONCOLOGY | Age: 52
End: 2022-11-02

## 2022-11-02 ENCOUNTER — TELEPHONE (OUTPATIENT)
Dept: CASE MANAGEMENT | Age: 52
End: 2022-11-02

## 2022-11-02 NOTE — TELEPHONE ENCOUNTER
Oncology Social Work    Date: 11/2/2022  Time: 9:58AM  Name: Su Newman  MRN: [id number]     Contact Type: Telephone    Note:    attempted to reach the patient to discuss Social Workers phone call with Medicaid and to discuss what patient was told by [facility].     There was no answer.  will attempt to reach patient again to continue assisting with locating affordable dental care in a timely manner.      TAMIKA Dugan, [place]  Oncology Social Worker

## 2022-11-02 NOTE — TELEPHONE ENCOUNTER
Natanael notified by SW that [facility] informed no openings until next April. Call placed to [facility], spoke with Samreen Godinez, interim manager. Natanael explained pt's cancer dx & urgent need for full mouth extraction so pt can proceed with chemo/xrt for cancer treatment. Progress notes from [address]  faxed to: [phone], attn Dale Klein. Samreen Godinez informs she will speak with Dr Fabricio Kate, dentist re: urgent dental need for this cancer patient & update Natanael accordingly. John Lantigua looking for financial support options for the dental extractions. Natanael phoned pt today. Pt jina contreras in conversation. Natanael updated pt on the above. Natanael shared info would be communicated when a plan was defined. Pt voiced understanding & appreciation.

## 2022-11-02 NOTE — TELEPHONE ENCOUNTER
Oncology Social Work    Date: 11/2/2022  Name: Sarah Beth Grove  MRN: [id number]     Contact Type: Telephone     Note:     2:17PM-  attempted phone call to the patient at [phone] (home) . There was no answer. 3:27PM-  attempted phone call to the patient at [phone] (home) . There was no answer, Voicemail Left. 3:30PM-  received phone call from Dameon Stephens.  asked if the patient is interested in  applying for the [address] [place] and patient is interested. Pt stated that she her annual income is approximately $11,000/yr, ($937/ month SSDI) and her household size is 1.  asked if patient was willing to travel outside of [facility] if another office had an opening. Patient stated that she does not have anyone to take her and she does not do well driving so she would prefer to stay in [facility] if at all possible. Patient also stated that she would prefer if at all possible to be put under for the tooth extractions.  will continue to follow up as needed and continue to assist with finding assistance.      TAMIKA Barksdale, [facility]  Oncology Social Worker

## 2022-11-02 NOTE — TELEPHONE ENCOUNTER
Oncology Social Work    Date: 11/2/2022  Time: 9:42AM  Name: Lottie Ruiz  MRN: [id number]     Contact Type: Telephone    Note:    attempted to speak with [place] [employer] regarding patient's coverage, and to discuss the potential of the patient being reimbursed for dental coverage's. The representative of [place] Medicaid would not share any information as  is not an \"authorized representative\" and the patient was not present to provide permission for information to be granted. The representative suggested that the patient call [place] Medicaid themself, to verify dental coverage and to discuss in more detail about offices that accept Medicaid. The representative was not aware of the patient being able to be reimbursed for any payments or \"self-billing\" and recommended that the patient go to a Medicaid Provider only.  discussed the circumstances and the urgency and why the patient may need to use a non-medicaid provider. The representative stated that there was still nothing that she could do.  will continue to help assist the patient with locating a Medicaid Provider.      TAMIKA Mcfarlane, [place]  Oncology Social Worker

## 2022-11-03 ENCOUNTER — HOSPITAL ENCOUNTER (OUTPATIENT)
Dept: PET IMAGING | Age: 52
Discharge: HOME | End: 2022-11-03
Payer: MEDICARE

## 2022-11-03 DIAGNOSIS — C32.1 SQUAMOUS CELL CARCINOMA OF SUPRAGLOTTIS (HCC): ICD-10-CM

## 2022-11-03 PROCEDURE — 78815 PET IMAGE W/CT SKULL-THIGH: CPT

## 2022-11-03 PROCEDURE — A9552 F18 FDG: HCPCS | Performed by: STUDENT IN AN ORGANIZED HEALTH CARE EDUCATION/TRAINING PROGRAM

## 2022-11-03 PROCEDURE — 3430000000 HC RX DIAGNOSTIC RADIOPHARMACEUTICAL: Performed by: STUDENT IN AN ORGANIZED HEALTH CARE EDUCATION/TRAINING PROGRAM

## 2022-11-03 RX ORDER — FLUDEOXYGLUCOSE F 18 200 MCI/ML
13.8 INJECTION, SOLUTION INTRAVENOUS
Status: COMPLETED | OUTPATIENT
Start: 2022-11-03 | End: 2022-11-03

## 2022-11-03 RX ADMIN — FLUDEOXYGLUCOSE F 18 13.8 MILLICURIE: 200 INJECTION, SOLUTION INTRAVENOUS at 12:26

## 2022-11-04 ENCOUNTER — TELEPHONE (OUTPATIENT)
Dept: CASE MANAGEMENT | Age: 52
End: 2022-11-04

## 2022-11-04 NOTE — TELEPHONE ENCOUNTER
Natanael received VM message from Abbie Hyatt at [facility] requesting Natanael return call to their office to speak with Abbie Hyatt or Francisco Ruth, to discuss pt's case. Multiple calls placed to: [phone] & [phone], without success; repeated busy signal.      Natanael finally faxed note to the office with request to call [facility], in lieu of the difficulty encountered trying to reach them. No call to present.

## 2022-11-07 ENCOUNTER — CLINICAL DOCUMENTATION (OUTPATIENT)
Dept: CASE MANAGEMENT | Age: 52
End: 2022-11-07

## 2022-11-07 NOTE — PROGRESS NOTES
Name: Ori Aguirre  : 1970  MRN: [id number]    Oncology Navigation Follow-Up Note    Contact Type:  Telephone    Notes:   Covering for Digna, RN Navigator  Received a call from [address] [place] [address] call. They do not feel they can accommodate Curvin Ly due to complicated medical history. They would not be able to get her in for extractions-any they only due a couple at a time-until  or 2nd week of December. They only have one dentist at moment. The feel she would be better served at [facility], if able. Spoke with Armida Johnson. They cannot take her insurance. [facility] has been working diligently on a stanley which could off set cost of extraction. Giuliana Murphy has a dentist willing to come in on day off to do extractions-but they don't take her insurance either. Therefore, [facility] is working on Buffer. They should be meeting to discuss Curvin Ly this week. [facility] will keep me posted in Digna's absence. [facility] already said no-read Maryse's note-and she also gave the scenario of urgency. Digna called them also after [facility] already did, and they have not returned call. Digna's phone is forwarded to me. Updated Dr. Adelaida Davis. Willing to wait this week if we hear re: Clayton Leyva and since [employer] has agreed if we get the stanley and that Dentist would come in the month of November. If we can't get coverage for extractions, Dr. Adelaida Davis plans on going ahead with chemotherapy as it is urgent in her case.     Electronically signed by Verena Swain RN on 2022 at 3:43 PM

## 2022-11-09 ENCOUNTER — HOSPITAL ENCOUNTER (OUTPATIENT)
Dept: INFUSION THERAPY | Age: 52
Discharge: HOME | End: 2022-11-09
Payer: MEDICARE

## 2022-11-09 ENCOUNTER — OFFICE VISIT (OUTPATIENT)
Dept: ONCOLOGY | Age: 52
End: 2022-11-09
Payer: MEDICARE

## 2022-11-09 VITALS
HEIGHT: 61 IN | TEMPERATURE: 98.1 F | SYSTOLIC BLOOD PRESSURE: 117 MMHG | RESPIRATION RATE: 18 BRPM | OXYGEN SATURATION: 96 % | BODY MASS INDEX: 27.38 KG/M2 | HEART RATE: 82 BPM | DIASTOLIC BLOOD PRESSURE: 70 MMHG | WEIGHT: 145 LBS

## 2022-11-09 VITALS
OXYGEN SATURATION: 96 % | DIASTOLIC BLOOD PRESSURE: 70 MMHG | TEMPERATURE: 98.1 F | WEIGHT: 145 LBS | RESPIRATION RATE: 18 BRPM | SYSTOLIC BLOOD PRESSURE: 117 MMHG | HEIGHT: 61 IN | HEART RATE: 82 BPM | BODY MASS INDEX: 27.38 KG/M2

## 2022-11-09 DIAGNOSIS — C32.1 MALIGNANT NEOPLASM OF EPIGLOTTIS (HCC): Primary | ICD-10-CM

## 2022-11-09 DIAGNOSIS — R91.8 LUNG MASS: ICD-10-CM

## 2022-11-09 PROCEDURE — 99215 OFFICE O/P EST HI 40 MIN: CPT | Performed by: INTERNAL MEDICINE

## 2022-11-09 PROCEDURE — G8427 DOCREV CUR MEDS BY ELIG CLIN: HCPCS | Performed by: INTERNAL MEDICINE

## 2022-11-09 PROCEDURE — 1036F TOBACCO NON-USER: CPT | Performed by: INTERNAL MEDICINE

## 2022-11-09 PROCEDURE — G8417 CALC BMI ABV UP PARAM F/U: HCPCS | Performed by: INTERNAL MEDICINE

## 2022-11-09 PROCEDURE — 3074F SYST BP LT 130 MM HG: CPT | Performed by: INTERNAL MEDICINE

## 2022-11-09 PROCEDURE — 99211 OFF/OP EST MAY X REQ PHY/QHP: CPT

## 2022-11-09 PROCEDURE — 3078F DIAST BP <80 MM HG: CPT | Performed by: INTERNAL MEDICINE

## 2022-11-09 PROCEDURE — G8484 FLU IMMUNIZE NO ADMIN: HCPCS | Performed by: INTERNAL MEDICINE

## 2022-11-09 PROCEDURE — 3017F COLORECTAL CA SCREEN DOC REV: CPT | Performed by: INTERNAL MEDICINE

## 2022-11-09 NOTE — PROGRESS NOTES
[address] CANCER [address] [place] [address]  Dept: [phone]  Loc: Ronal Valderrama  1970   No ref. provider found   Tootie Amanda MD       Nickie Real is a 47 y.o.  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 CHIEF COMPLAINT  Chief Complaint   Patient presents with    Follow-up     Head and neck cancer ([address]          HISTORY OF PRESENT ILLNESS    1/31/2022. CT scan of soft tissues of the neck without contrast done for right-sided neck pain showed no discrete neck mass fluid collection or acute phonatory change there are multiple nonspecific cervical lymph nodes at levels 1 through 5 measuring less than 1 cm in short axis no pathologically enlarged lymph nodes were seen. She initially presented with sore throat, difficulty swallowing and progressive weight loss. She was treated with multiple courses of antibiotics, steroids and antifungal medication with limited improvement. 2/10/2022. Chest x-ray done for shortness of breath and sore throat showed no focal acute lung lesions or significant chronic lung findings. 9/20/2022. Referred  to ENT and seen by Jaren Cotton PA-C and had flexible laryngoscopy demonstrating an edematous epiglottis. She was given another round of oral steroids. He said that she was here for thrush and mouth and ear soreness for a year. The pain will get to be 10 out of 10. The pain was constant but would fluctuate in severity. There is no drainage or history of hearing loss or tinnitus. She said that she had a constant sore throat making it difficult for her to swallow and she has lost a lot of weight. She says that the pain is worse on the right and would extend into her ear. She is also finding it hard to breathe. Weight was estimated to be 65 kg. Examination showed multiple broken teeth and cavities.   Salivary gland exam was normal.  There is mild lymphadenopathy with tender lymph nodes on the right side. Flexible laryngoscopy showed abnormally enlarged epiglottis obstructing the view of the larynx worse on the right than on the left. She increased her steroids that she was already on and had her come back in a week and told the patient to go to the ED if her airway became compromised. CT scan of the soft tissues of the neck with contrast was ordered. 9/21/2022. CAT scan of the neck showed nodular thickening of the area epiglottic folds associated with abnormal soft tissue thickening of the right side of the hypopharynx extending into the right posterolateral wall of the hypopharynx and flattening deformity of the right vallecula and piriform sinus raising the question of squamous cell carcinoma. There was no significant lymphadenopathy seen. A significant thyroid mass was not identified. The upper esophagus was unremarkable and a foreign body was not identified. There was heavy bilateral carotid bulb calcification seen and multiple dental caries were noted. PET CT scan was recommended. 9/28/2022. Follow-up in the ENT office. Patient said that her throat felt better but she had a bump on the roof of her mouth that was painful and she still complained of trouble swallowing and a sore throat. Flexible laryngoscopy was performed again hypopharynx was markedly abnormal with white speckles but most notably significant thickening of the epiglottis extending into the arytenoid space and false cords worse on the right. There was a possible necrotic area in the posterior face of the epiglottis. The larynx was abnormal with white speckles indicative of fungal infection. Dr. Tomás Marie reviewed the case and discussed with the patient the benefit of biopsy. The possibility of tracheostomy was discussed to protect her airway. She was treated with fluconazole for her thrush. Steroids were held. 10/3/2022.   Chest x-ray that the cardiac silhouette was within normal limits. Coronary stent was present. There is strandy interstitial opacities in the perihilar distribution along with peribronchial thickening and no focal areas of consolidation, no pneumothorax or pleural effusion. No acute bony changes were seen. 10/6/2022. 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 The epiglottis was edematous and had granular tissue involving the laryngeal surface of the left epiglottis. There was a separate lesion involving the right false vocal cord with a papillomatous appearance. Both true vocal cords seem to be spared. There is no involvement of the arytenoid complex or postcricoid region. The lingual surface of the epiglottis did not have any mucosal erosions but was edematous. The vallecula and base the tongue was unremarkable. The entire cervical esophagus was normal.  She is a former smoker. The tentative stage is T3, N1 versus T3, N2c    Pathology report showed squamous cell carcinoma, poorly differentiated, of the left epiglottis, p16 negative. 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 10/13/2022. Follow-up in the office. Since her biopsy her sore throat had been somewhat tolerable but she continued to have pain with swallowing. She denied choking and coughing episodes with swallowing. She had no problems with shortness of breath. She did complain of facial pain postnasal drip sore throat dysphagia cough with sputum production. He said that she had a 1.5 cm right level 3 lymph node. PET CT scan has been ordered and will be performed on November 3, 2022. The possible need for a gastrostomy tube for nutrition was discussed.   Given that she already had baseline epiglottic edema which might worsen during treatment resulting in airway obstruction, the possibility of requiring a tracheostomy tube was also reviewed. Options for treatment were discussed including primary surgery versus chemo rads. She was not felt to be an optimal surgical candidate. The patient opted for chemoradiation therapy and was referred to radiation therapy. She is here today to establish with medical oncology. Good control of her diabetes was stressed as well. She was also to have dental caries and was encouraged to go see a dentist.    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 Her above-the-knee amputation on the left side was on 2/26/2016. Has undergone cardiac catheterization with percutaneous stenting in August 2016 and an September 2016. Her diabetes requires insulin. She is on a fentanyl patch. She is here today to establish medical oncology follow-up. She has an appointment to see Dr. Steven Marsh on 10/26. She says that she does not have much of an appetite and she continues to lose weight. She used to weigh 240 pounds and has lost 100 pounds in the past year and a half. Continues to have throat pain, dysphagia and odynophagia. She had been a smoker of a pack of cigarettes a day from age 13 through 2014 and then tried to cut down her smoking. She says that she is vaping and smoking as little as possible. She says that she does have a cough but no hemoptysis. She has been a diabetic for many years. She had gestational diabetes and did not check her sugars for a long time. She felt bad and went to a local ED and was found to have a sugar of 590. She says that she checks her sugars once a day. She says that her body tells her when her sugar is high. She usually checks her sugar sometime between 3 and 6 PM.  He has had an above-the-knee amputation of her left leg.   Says that she has had no problems with her stump. She says that she has a prosthesis but it does not fit anymore because she has lost so much weight. She says that she has no neuropathy. She uses a scooter. She has not had a recent eye examination. She said that she \"just did not go\" feels that her renal function is fine. She says that she has no known anemia. The only bleeding that she has experienced was a little bit after she had her biopsy. She did have the above-mentioned thrush and that was sore. She knows she has bad teeth and she is supposed to go to the dentist on October 27. We discussed this today in detail and gave her some assistance in dealing with this. She has fallen in the past fracturing her nose and breaking her finger and a tooth fell out which she has remedied herself. She has had myocardial infarction in 2007 and in 2016. She has had 4 stents placed. She has been on Repatha. She also says that she has had a valvular heart problem. She says that she has had nausea and occasional vomiting. She denies diarrhea and constipation. She says that she has never had a stroke. She has not had skin cancer. INTERVAL NOTE    11/9/2022. Mrs. Shelby Cardenas is here today to discuss the results of her PET scan. Unfortunately this shows a lesion in the periphery of the lung and hilar adenopathy which looks very much like a new primary lung cancer. I have discussed this with her and have asked her if we can go forward with a biopsy. The lesion is very peripheral and may be able to be biopsied by interventional radiology. That would circumvent the need to possibly pass an endoscope past her epiglottic tumor. I have discussed her case with Dr. Ryan Beebe. We think that a combination chemotherapy/ radiation therapy approach with her epiglottic and also her lung tumor is the best and I think that highly emetogenic chemotherapy is probably not in her best interest since it may compromise her airway if she vomits.   We will give her weekly CarboTaxol along with her radiation therapy. She will need to have a port placed. She has decided to go forward with a PEG tube and trach if needed. She will contact her ENT doctor. MONITORING PARAMETERS    CAT scans PET scans, physical examination    PAST MEDICAL HISTORY  Past Medical History:   Diagnosis Date    Above knee amputation of left lower extremity (HCC)     Blood circulation, collateral     CAD (coronary artery disease)     Diabetes mellitus ([address]     Hyperlipidemia     Hypertension     MDRO (multiple drug resistant organisms) resistance     MRSA wound 2013    MI, old 01/01/2007    Obesity (BMI 30-39. 9)     Status post skin graft 01/15/2014    Amniox - Dr Elen Rubio cancer [facility]) 10/2022    squamous cell carcinoma of the epiglottis        REVIEW OF SYSTEMS  Review of Systems   Constitutional:  Positive for appetite change and fatigue. Negative for chills, diaphoresis and fever. HENT:  Positive for dental problem, sore throat and trouble swallowing. Negative for sinus pressure and sinus pain. Eyes:  Negative for visual disturbance. Respiratory:  Positive for cough. Negative for shortness of breath. Cardiovascular:  Negative for chest pain and leg swelling. Gastrointestinal:  Positive for constipation. Negative for abdominal pain, blood in stool, diarrhea, nausea and vomiting. Endocrine: Negative for polyuria. Genitourinary:  Negative for dysuria, hematuria and urgency. Musculoskeletal:  Positive for arthralgias, myalgias and neck pain. Skin:  Negative for pallor and rash. Neurological:  Positive for headaches (not new). Negative for tremors, seizures, syncope and light-headedness. Hematological:  Negative for adenopathy. Bruises/bleeds easily. Psychiatric/Behavioral:  Negative for dysphoric mood, self-injury, sleep disturbance and suicidal ideas. The patient is not nervous/anxious and is not hyperactive.        FAMILY HISTORY  Family History   Problem Relation Age of Onset    Heart Disease Maternal Grandfather     High Blood Pressure Maternal Grandfather     Asthma Neg Hx     Cancer Neg Hx     Diabetes Neg Hx     Kidney Disease Neg Hx     Stroke Neg Hx         SOCIAL HISTORY  Social History     Socioeconomic History    Marital status:      Spouse name: Not on file    Number of children: 1    Years of education: Not on file    Highest education level: Not on file   Occupational History    Not on file   Tobacco Use    Smoking status: Former     Packs/day: 0.50     Years: 27.00     Pack years: 13.50     Types: Cigarettes     Quit date: 2014     Years since quittin.1    Smokeless tobacco: Never    Tobacco comments:     e cigs daily use   Vaping Use    Vaping Use: Some days    Substances: Nicotine   Substance and Sexual Activity    Alcohol use: No    Drug use: No    Sexual activity: Not on file   Other Topics Concern    Not on file   Social History Narrative    Not on file     Social Determinants of Health     Financial Resource Strain: Not on file   Food Insecurity: Not on file   Transportation Needs: Not on file   Physical Activity: Not on file   Stress: Not on file   Social Connections: Not on file   Intimate Partner Violence: Not on file   Housing Stability: Not on file        CURRENT MEDICATIONS  Current Outpatient Medications   Medication Sig Dispense Refill    ALPRAZolam (XANAX) 1 MG tablet Take 1 mg by mouth as needed. oxyCODONE-acetaminophen (PERCOCET) 5-325 MG per tablet Take 2 tablets by mouth every 8 hours as needed for Pain. clopidogrel (PLAVIX) 75 MG tablet Take 1 tablet by mouth daily      TRULICITY 1.5 JM/1.4NI SC injection Inject 1.5 mg into the skin once a week      HUMALOG KWIKPEN 100 UNIT/ML SOPN       fentaNYL 37.5 MCG/HR PT72 Apply 1 patch topically.       JARDIANCE 25 MG tablet Take 1 tablet by mouth daily      metFORMIN (GLUCOPHAGE) 500 MG tablet Take 2 tablets by mouth 2 times daily Resume  60 tablet 3 pioglitazone (ACTOS) 30 MG tablet Take 1 tablet by mouth daily 30 tablet 3    atorvastatin (LIPITOR) 20 MG tablet Take 2 tablets by mouth nightly 30 tablet 3    lisinopril (PRINIVIL;ZESTRIL) 2.5 MG tablet Take 1 tablet by mouth daily 30 tablet 3    metoprolol tartrate (LOPRESSOR) 25 MG tablet Take 1 tablet by mouth 2 times daily 60 tablet 3    isosorbide dinitrate (ISORDIL) 20 MG tablet Take 30 mg by mouth daily      amitriptyline (ELAVIL) 100 MG tablet Take 100 mg by mouth nightly      amLODIPine (NORVASC) 5 MG tablet Take 5 mg by mouth daily      budesonide-formoterol (SYMBICORT) 160-4.5 MCG/ACT AERO Inhale 2 puffs into the lungs 2 times daily      pregabalin (LYRICA) 150 MG capsule Take 150 mg by mouth 2 times daily      ALPRAZolam (XANAX) 0.5 MG tablet Take 0.5 mg by mouth 2 times daily as needed      aspirin 81 MG tablet Take 81 mg by mouth daily      albuterol (PROVENTIL HFA) 108 (90 BASE) MCG/ACT inhaler Inhale 2 puffs into the lungs every 4 hours as needed for Wheezing or Shortness of Breath (Space out to every 6 hours as symptoms improve). Space out to every 6 hours as symptoms improve. 1 Inhaler 10    nitroGLYCERIN (NITROSTAT) 0.4 MG SL tablet Place 1 tablet under the tongue every 5 minutes as needed for Chest pain Take 1 tablet for chest pain and repeat after 5 min if no relief, call 911 and take another dose 5 min later. Max of 3 doses in 15 min. (Patient not taking: No sig reported) 25 tablet 3     No current facility-administered medications for this visit. OARRS    PDMP Monitoring:    Last PDMP Bautista Serrato as Reviewed [facility]):  Review User Review Instant Review Result   Laynekrys Dunne 10/23/2022  7:08 PM Reviewed PDMP [1]       Urine Drug Screenings (1 yr)    No resulted procedures found. Medication Contract and Consent for Opioid Use Documents Filed        No documents found                     PHYSICAL EXAM    Physical Exam  Vitals and nursing note reviewed. Exam conducted with a chaperone present. Constitutional:       General: She is not in acute distress. Appearance: She is ill-appearing. She is not toxic-appearing or diaphoretic. Comments: Joy Esposito is a well-developed well-nourished somewhat chronically ill  female who is in a wheelchair status post AKA amputation of her left leg. She is accompanied by her son. HENT:      Head: Normocephalic and atraumatic. Nose: Nose normal.   Eyes:      General: No scleral icterus. Extraocular Movements: Extraocular movements intact. Pupils: Pupils are equal, round, and reactive to light. Pulmonary:      Effort: Pulmonary effort is normal. No respiratory distress. Skin:     Coloration: Skin is not jaundiced. Neurological:      Mental Status: She is alert. Psychiatric:         Mood and Affect: Mood normal.         Behavior: Behavior normal.         Thought Content: Thought content normal.         Judgment: Judgment normal.      Comments: Appropriately saddened with this news        LABS/IMAGING  PET CT SKULL BASE TO MID THIGH    Result Date: 11/3/2022  1. FDG avid right hypopharyngeal lesion corresponding to known malignancy. 2. FDG avid left lung densities and nodules highly suspicious for malignancy. 3. FDG avid left hilar lymphadenopathy highly suspicious for metastatic disease. Final report electronically signed by Dr. Stefan Mejia on 11/3/2022 2:31 PM         PATHOLOGY/GENETICS    Squamous cell carcinoma of the epiglottis p16 negative and possibly a second primary lung cancer pending biopsy. ASSESSMENT and PLAN    1. Squamous cell carcinoma of the epiglottis, p16 negative. CAT scan showed not only her epiglottic tumor but also FDG avid left lung densities and nodules highly suspicious for malignancy with left hilar adenopathy we had a long layton discussion today about whether this was yet a new primary or metastatic disease from her epiglottic tumor.   She understands that the treatment is going to be a combined chemotherapy radiation therapy approach. We would like to get a biopsy of the lesion in her lung and I think interventional radiology is the best way to go since it would not require her to be intubated. She has agreed to talk to her ENT physician  about a tracheostomy. She will probably need to have a direct placement of the tube into her stomach and not by the PEG route she will also need to have a port. Referrals have been made. She has already seen Dr. Dipti Ochoa and the patient does not have funds to have her multiple carious teeth removed. We were waiting for a stanley approval to have an oral surgeon perform this at one setting. We do not know if this will be approved or when it might happen. We do not want this to delay her treatment any longer. Dr. Dipti Ochoa noted that the patient has been on medication for chronic pain for some time. With this treatment she will probably not be able to swallow very well. This was discussed with her as an important reason for her to get access to her GI tract through the tube placement. She will thus be seen by ENT, general surgery for eventual radiology and radiation oncology. She needs laboratory data. She might benefit from a hospitalization to do all these things at once. 2.  Diabetes mellitus. 3.  Pain control. 4.  Anxiety and depression    5. Coronary artery disease hyperlipidemia and atherosclerosis. The patient has had several myocardial infarctions. Many medications. Having a G-tube can facilitate keeping these other comorbidities under control while she goes through treatment. She will return to the office in 2 weeks or sooner. She knows to call for any change in her status, questions or concerns.         Roz Alas MD 11/9/2022 3:51 PM

## 2022-11-09 NOTE — PATIENT INSTRUCTIONS
Reviewed recent PET/CT scans and the new findings. Discussed treatment options including Radiation and chemotherapy including Taxol/Carboplatin weekly. Discussed the possibility of a tracheostomy and a Gastric feeding tube to help patient with treatment. Discussed a port for IV access for chemotherapy. Also needs a gastric feeding tube placed percutaneous. Referral to general surgeon placed. She will call to ENT, Dr. Sherrie Lemons about placing a tracheostomy  Chemotherapy plan placed  Will need chemotherapy teaching. Return to see MD in 2 weeks to start treatment.

## 2022-11-10 ENCOUNTER — TELEPHONE (OUTPATIENT)
Dept: SURGERY | Age: 52
End: 2022-11-10

## 2022-11-10 ENCOUNTER — TELEPHONE (OUTPATIENT)
Dept: RADIATION ONCOLOGY | Age: 52
End: 2022-11-10

## 2022-11-10 NOTE — TELEPHONE ENCOUNTER
Attempted to reach pt again, no answer, left another message asking her to contact office to schedule appointment

## 2022-11-10 NOTE — TELEPHONE ENCOUNTER
Received referral from DR. Colleen Villeda, pt in need of Mediport right side and G-tube insertion. Chemo potentially starting 11/23.     Called pt, LM asking her to return my call to schedule appointment with Dr. Aniket Judd 11/15

## 2022-11-10 NOTE — TELEPHONE ENCOUNTER
DENTAL DARLENE DEER [facility]) UPDATES:     11/3/2022: The oral surgeon from [place] [address] has agreed to come in an extra day in the month of November if funding is secured. The appointment cannot be scheduled until a deposit is made. 11/07/2022: [employer] waiting to hear back from funding source about the darlene. Would like to explore patient's Medicaid as an option before approving the darlene. The darlene was approved by [employer], but they cannot move forward unless the funding source ([address] [place], [place] [address] [[facility]]) agrees to provide the funding. Currently waiting on [facility] to move forward. 11/09/2022- Update from Jonn Rapp ([facility]) - [facility] has approved the darlene, PENDING a background check on the oral surgeon (Dr. Nevaeh Gleason, [facility]AL[facility]). 11/10/2022:The oral surgeon from [place] [address], Dr. Nevaeh Gleason DDS, has requested patient's most recent H&P/Labs so they have the information if the patient is moving forward with the procedure there. JUDY faxed and emailed this to [place] [address] ([phone]) on 11/10/22.     11/11/2022: Faby Sykes. Waiting on a response from [facility] on what date would be available for the oral surgeon. Point Of Contact is [email or web address]. [phone], opt. 2.     11/11/22: Per Dr. Allan Collado [employer] can get patient's surgery completed within the next two weeks, then he wants to continue with that option. If not, have patient move forward with Chemotherapy. (Dr. Lo Lorenzo in agreement with moving forward with Chemotherapy).  did not hear back from [employer] by End of Day 11/11/22, will reach out again Monday 11/14/22.     11/14/22- Spoke with Carmel Schirmer is awaiting a phone call back from the Oral Surgeon on what day he can come in to do the oral surgery, she is hoping it can be within the next week. She will call back once she hears back. foundation would expect that the patient use that time to schedule with a Medicaid approved provider that way the insurance would cover the costs; as their organization does not normally provide stanley funding for services covered by insurance. Héctor Delgado stated that the patient could try to re-apply but more than likely it would not be approved as she would then have time to schedule with a dental office that accepts Medicaid. Héctor Delgado stated that the patient could reapply for assistance for other areas, if this funding is not utilized. 11/18/22:  Patient is all set for Oral Surgery 11/23/22 at 10am with Dr. Nj Russell, [address] [place] [address] ([address]), with [address] [employer] paying for the surgery. Patient aware of appointment. [address] - payment will be made 11/18/22 or 11/21/22 but will be paid before Wednesday for sure. [address] and Patient Aware.

## 2022-11-13 ASSESSMENT — ENCOUNTER SYMPTOMS
SINUS PAIN: 0
VOMITING: 0
SORE THROAT: 1
BLOOD IN STOOL: 0
COUGH: 1
CONSTIPATION: 1
SHORTNESS OF BREATH: 0
TROUBLE SWALLOWING: 1
ABDOMINAL PAIN: 0
NAUSEA: 0
SINUS PRESSURE: 0
DIARRHEA: 0

## 2022-11-14 ENCOUNTER — TELEPHONE (OUTPATIENT)
Dept: CASE MANAGEMENT | Age: 52
End: 2022-11-14

## 2022-11-14 DIAGNOSIS — C32.1 MALIGNANT NEOPLASM OF EPIGLOTTIS (HCC): Primary | ICD-10-CM

## 2022-11-14 NOTE — TELEPHONE ENCOUNTER
Updated Dr. Elana Martin that pt was qualified for a stanley for teeth extraction at [facility], but unclear when Priya Bruno can get her scheduled. ONC informs tx delay is not best for pt. Pt is scheduled for port & feeding tube placement tomorrow 11/15. Chemo teaching 11/17. Pt is scheduled for IR bx lung lesion on 11/22. Natanael phoned IR & left message with inquiry about BX being done sooner; await return phone call.

## 2022-11-14 NOTE — PROGRESS NOTES
New chemotherapy validation note:    Diagnosis for chemotherapy: Squamous Cell Carcinoma of epiglottis (p16 negative)     Regimen ordered: Taxol/Carbo w/ XRT       Reference or literature used for validation: NCCN      Date literature or guideline last updated 11/2020     Deviation from literature or guideline used: N/A    Summary of any verbal or telephone information obtained: LORENZO Chamberlain [facility], PharmD, BCPS  Clinical Pharmacist   11/14/2022 9:01 AM

## 2022-11-15 ENCOUNTER — TELEPHONE (OUTPATIENT)
Dept: SURGERY | Age: 52
End: 2022-11-15

## 2022-11-15 ENCOUNTER — TELEPHONE (OUTPATIENT)
Dept: CASE MANAGEMENT | Age: 52
End: 2022-11-15

## 2022-11-15 NOTE — TELEPHONE ENCOUNTER
Left a message for the patient to return my call. We need to see if we can get her in to the office on 11- with Dr. Erik Gutierrez.

## 2022-11-15 NOTE — TELEPHONE ENCOUNTER
Patient scheduled for surgery with Dr. Arlette Caballero on 11- at 1:30pm with an arrival of 12:00pm.  Preop instructions given to patient. Patient advised that she had EKG and lab work done through [facility] in [facility]. Surgery consent signed. Antibacterial soap given.

## 2022-11-15 NOTE — TELEPHONE ENCOUNTER
Natanael phoned pt multiple times to inquire if pt receptive to dental extraction. Avril Joey spoke with pt & pt verbalized she is receptive to the dental extraction. Ishan Boone informs, Elizabeth advised physician phone Dr Torres Montalvo @ [phone] to discuss urgent need for the dental extractions for pt to proceed with her cancer tx with chemo/XRT. [address] had informed Edgardomarvin Quiñones that the dentist may be able to work pt in for the extraction on Thursday 11/17, pending the conversation with physician. Message to both Mj Scott re: the above. Rad ONC, Dr Lisa Delong verbalized he would phone Dr Torres Montalvo.

## 2022-11-15 NOTE — TELEPHONE ENCOUNTER
[address] [facility]    Phone * [phone]   Surgical Scheduling Direct line Phone * [phone]  Fax * Monica      1970    female    [address]   Marital Status:      Home Phone: [phone]   Cell Phone:   Telephone Information:   Mobile [phone]              Surgeon: Dr. Filiberto Ambrocio  Surgery Date:11-   Time: 1:30pm    Procedure: Insertion single lumen mediport and EGD with peg tube placement with possible G-tube placement Outpatient   Diagnosis: Squamous cell carcinoma of Epiglottis    Important Medical History: In Epic    Special Inst/Equip:     CPT Codes: 65834, 86155    Latex Allergy:   no Cardiac Device:  no    Anesthesia Type: General    Case Location:  Main OR     Preadmission Testing: Phone Call      PAT Date and Time: ________________________________    PAT Confirmation #: _________________________________    Post Op Visit:  ______________________________________    Need Preop Cardiac Clearance:   no    Does patient have Cardiologist/physician?  No      Surgery Conformation #:  ______________________________________________    : __________________________________ Date:____________________        [employer] Name:  [facility]

## 2022-11-16 ENCOUNTER — OFFICE VISIT (OUTPATIENT)
Dept: SURGERY | Age: 52
End: 2022-11-16
Payer: MEDICARE

## 2022-11-16 ENCOUNTER — TELEPHONE (OUTPATIENT)
Dept: SURGERY | Age: 52
End: 2022-11-16

## 2022-11-16 ENCOUNTER — CLINICAL DOCUMENTATION (OUTPATIENT)
Dept: NUTRITION | Age: 52
End: 2022-11-16

## 2022-11-16 ENCOUNTER — TELEPHONE (OUTPATIENT)
Dept: RADIATION ONCOLOGY | Age: 52
End: 2022-11-16

## 2022-11-16 VITALS
HEIGHT: 61 IN | DIASTOLIC BLOOD PRESSURE: 80 MMHG | HEART RATE: 75 BPM | RESPIRATION RATE: 18 BRPM | SYSTOLIC BLOOD PRESSURE: 120 MMHG | OXYGEN SATURATION: 98 % | BODY MASS INDEX: 27.38 KG/M2 | WEIGHT: 145 LBS | TEMPERATURE: 97.5 F

## 2022-11-16 DIAGNOSIS — C32.1 SQUAMOUS CELL CANCER OF EPIGLOTTIS (HCC): Primary | ICD-10-CM

## 2022-11-16 PROCEDURE — 3017F COLORECTAL CA SCREEN DOC REV: CPT | Performed by: SURGERY

## 2022-11-16 PROCEDURE — G8417 CALC BMI ABV UP PARAM F/U: HCPCS | Performed by: SURGERY

## 2022-11-16 PROCEDURE — 99202 OFFICE O/P NEW SF 15 MIN: CPT | Performed by: SURGERY

## 2022-11-16 PROCEDURE — 1036F TOBACCO NON-USER: CPT | Performed by: SURGERY

## 2022-11-16 PROCEDURE — 3074F SYST BP LT 130 MM HG: CPT | Performed by: SURGERY

## 2022-11-16 PROCEDURE — G8427 DOCREV CUR MEDS BY ELIG CLIN: HCPCS | Performed by: SURGERY

## 2022-11-16 PROCEDURE — G8484 FLU IMMUNIZE NO ADMIN: HCPCS | Performed by: SURGERY

## 2022-11-16 PROCEDURE — 3078F DIAST BP <80 MM HG: CPT | Performed by: SURGERY

## 2022-11-16 NOTE — PROGRESS NOTES
Nutrition Assessment    Reason for Visit:   11/16/22 - referral from Maia Maravilla, Nurse Navigator regarding patient getting PEG placement due to cancer of epiglottis. Nutrition Recommendations:   *Once PEG placed and when OK to use recommend a TF goal of Glucerna 1.5, bolus of 6 cartons per day to provide: 1422ml, 2136 kcals, 189 grams CHO, 118 grams protein, and 1080 ml water. *Recommend an additional 920ml water (~31oz) daily. *Diet per SLP or as tolerated to maintain swallow function. Malnutrition Assessment:  Malnutrition Status: unable to assess at this time: pt not a cancer center today, pt did not answer phone call  Context:  Findings of the 6 clinical characteristics of malnutrition (minimum of 2 out of 6 clinical characteristics is required to make the dx of moderate or severe Protein Calorie Malnutrition based on AND/ASPEN Guidelines):   1. Energy Intake:   2. Weight Loss:   3. Fat Loss:   4. Muscle Loss:   5. Fluid Accumulation:   6.  Strength:    Nutrition Diagnosis:   Problem: suspected altered GI function  Etiology: cancer to epiglottis with planned treatment  Signs and Symptoms: PEG placement planned for nutrition support    Nutrition Assessment:   History: CAD, PVD, DM, gangrene of foot, lower leg wounds. Subjective:   11/16/22 - referral received from Maia Maravilla, nurse navigator stating that patient is to have PEG placed next week (possibly on Mon 11/21). Note patient has appointment with surgery today for consultation. Also note that teeth extraction is being planned and per  this may happen next week (Wed 11/23). Attempted to reach patient today via phone. No answer to call, a voice message was left including reason for call and RD return number.   Current Nutrition:   Oral Diet:    await call back from patient to verify   Oral Diet Intake:    n/a    Oral Nutrition Supplement (ONS): n/a   ONS intake:    n/a  Anthropometric Measures:   Ht:   5'1\"   Current Weight:   145# (11/16/22 per EMR)    Usual Weight:   175# (12/23/21 per EMR)   Ideal Weight:   ~105#   BMI: 27.40  Comparative Standards:      Estimated calorie needs:   ~2000 kcals (~30 kcals/kg current weight of 66kg)   Estimated protein needs:    ~99 grams (~1.5 grams/kg current weight of 66kg)   Estimated fluids needs: ~2000 ml (~1ml/kcal)    Nutrition Interventions:     *Attempted to reach patient via phone today; no answer;voice message left with RD return call number and encouraged patient to call back.  *When PEG placed and OK to use recommend start Glucerna 1.5, 1 carton and increase as able/needed to a goal of 6 cartons per day  *Recommend an additional 920ml water per day when at TF goal.  *Recommend diet as per SLP or as tolerated to maintain swallow function.  *RD contact number left on patient voice mail and encouraged to call RD back.    Nutrition Evaluation:          Evaluation: Goals set       Goals: Patient will tolerate 80% or greater of estimated nutritional needs and maintain weight throughout treatment.         Monitoring: Oral intake, diet tolerance, swallowing function, weight, PEG placement and when OK to use start TF regimen, hydration    Signed: Electronically signed by Laurence Soliz RD, LD on 11/16/2022 at 1:20 PM    Contact number: [phone]

## 2022-11-16 NOTE — TELEPHONE ENCOUNTER
Per [facility]    Authorization/Referral Not Required  Member ID  [id number]  [address] [place] [address]  Date of Service  2022-11-21  Message  Referrals are not required for Participating Providers. Procedure Code 1  Jancie Cole [address] Record Number  [id number]  [address] has determined that for this request, no submission to [facility] is required. This code will not be included on the submission to [address] [place] [address]    Procedure Code 2  [address] Record Number  [id number]  [address] has determined that for this request, no submission to [facility] is required.  This code will not be included on the submission to [address] [place] [address] Muscle Hinge Flap Text: The defect edges were debeveled with a #15 scalpel blade.  Given the size, depth and location of the defect and the proximity to free margins a muscle hinge flap was deemed most appropriate.  Using a sterile surgical marker, an appropriate hinge flap was drawn incorporating the defect. The area thus outlined was incised with a #15 scalpel blade.  The skin margins were undermined to an appropriate distance in all directions utilizing iris scissors.

## 2022-11-16 NOTE — TELEPHONE ENCOUNTER
Oncology Social Work    Date: 11/16/2022  Time: 3:33PM    Contact Type: Telephone    **UPDATE**     - called [address] to discuss stanley funding and if there is a certain time-limit that the money must be used, just so the information is known for if the patient moves forward with chemotherapy before having the extractions done-     spoke with Pawan Bernstein, ([address] Joshua Trammell stated that the dental stanley funding was only approved because of the emergent nature of the patient's situation. If it will be a couple of months until the extractions can be done (due to potential of patient starting chemo first), then the foundation would expect that the patient use that time to schedule with a Medicaid approved provider that way the insurance would cover the costs; as their organization does not normally provide stanley funding for services covered by insurance. Joshua Trammell stated that the patient could try to re-apply but more than likely it would not be approved as she would then have time to schedule with a dental office that accepts Medicaid. Joshua Trammell stated that the patient could reapply for assistance for other areas, if this funding is not utilized. TAMIKA Lima, LSW  Oncology Social Worker    Electronically signed by Juany Ferreira.  TAMIKA Vyas, [facility] on 11/16/2022 at 4:27 PM

## 2022-11-17 ENCOUNTER — HOSPITAL ENCOUNTER (OUTPATIENT)
Dept: INFUSION THERAPY | Age: 52
Discharge: HOME | End: 2022-11-17
Payer: MEDICARE

## 2022-11-17 ENCOUNTER — TELEPHONE (OUTPATIENT)
Dept: CASE MANAGEMENT | Age: 52
End: 2022-11-17

## 2022-11-17 ENCOUNTER — PREP FOR PROCEDURE (OUTPATIENT)
Dept: SURGERY | Age: 52
End: 2022-11-17

## 2022-11-17 PROCEDURE — 99212 OFFICE O/P EST SF 10 MIN: CPT

## 2022-11-17 RX ORDER — SODIUM CHLORIDE 9 MG/ML
INJECTION, SOLUTION INTRAVENOUS CONTINUOUS
Status: CANCELLED | OUTPATIENT
Start: 2022-11-17

## 2022-11-17 NOTE — TELEPHONE ENCOUNTER
[employer] phoned asking if a plan is defined re: teeth extraction. Natanael updated both Dr Krysten Aguirre of the inquiry. Natanael spoke with Della Varela, she shares that [facility] currently has pt tentatively scheduled for teeth extraction on 11/23. JUDY informs the stanley money for the extraction will be released to [facility] once it is confirmed pt will be proceeding with the dental extraction on 11/23. The above info was shared with Rad ONC who will discuss with Med Onc, & then update pt accordingly for the defined POC. Pt scheduled for chemo teaching today. Pt is aware of the appt at 3p & will be keeping the appt. Natanael updated Ladarius Garcia the defined POC will be communicated by a provider today. Ladarius voiced understanding.

## 2022-11-17 NOTE — PLAN OF CARE
Care plan reviewed with patient. Patient  verbalized understanding of the plan of care and contribute to goal setting. Problem: Chronic Conditions and Co-morbidities  Goal: Patient's chronic conditions and co-morbidity symptoms are monitored and maintained or improved  Outcome: Adequate for Discharge  Flowsheets (Taken 11/17/2022 1712)  Care Plan - Patient's Chronic Conditions and Co-Morbidity Symptoms are Monitored and Maintained or Improved:   Monitor and assess patient's chronic conditions and comorbid symptoms for stability, deterioration, or improvement   Collaborate with multidisciplinary team to address chronic and comorbid conditions and prevent exacerbation or deterioration  Note: Patient verbalizes understanding to verbal information given on taxol and carboplatin,action and possible side effects. Aware to call MD if develop complications. Problem: Safety - Adult  Goal: Free from fall injury  Outcome: Adequate for Discharge  Flowsheets (Taken 11/17/2022 1712)  Free From Fall Injury:   Instruct family/caregiver on patient safety   Based on caregiver fall risk screen, instruct family/caregiver to ask for assistance with transferring infant if caregiver noted to have fall risk factors  Note: Free from falls while in infusion center.  Verbalized understanding of fall prevention and to ask for assistance with ambulation      Problem: Discharge Planning  Goal: Discharge to home or other facility with appropriate resources  Outcome: Adequate for Discharge  Flowsheets (Taken 11/17/2022 1712)  Discharge to home or other facility with appropriate resources:   Identify barriers to discharge with patient and caregiver   Identify discharge learning needs (meds, wound care, etc)   Arrange for needed discharge resources and transportation as appropriate  Note: Verbalized understanding of discharge instructions, follow ups and when to call doctor

## 2022-11-17 NOTE — DISCHARGE INSTRUCTIONS
Please contact your Oncologist if you have any questions regarding the chemotherapy teaching that you received today.

## 2022-11-17 NOTE — PROGRESS NOTES
Chemotherapy/Immunotherapy Teaching Checklist    Treatment Plan: taxol carboplatin with RT  Frequency: weekly  Patient accompanied by  Chalino Chamberlain son      Day of chemo instructions:  [x] Must have    [x] Eat light breakfast  [x] Bring pain medications/other routine medications scheduled during appointment time  [] Hold blood pressure medications morning of treatment    Treatment process:  [x] Flow of appointment  [x] IV access Peripheral start  or  PORT access process [x] EMLA cream  [x] Premedication/ Hydration  [x] Length of treatment  [x] Tour of clinic    Diet and hydration:  [x] Discuss [place] diet    [x] Eating Hints book provided  [x] Importance of hydration 48- 64 ounces daily   [x] Hydration handout provided     Side Effects:  [x] Chemotherapy and you book provided  [x] Side effects and management discussed   [x] Diarrhea[x]Nausea/Vomiting []home antiemetic [x]hair loss[x]Neuropathy[x] Constipation[x] Fatigue[x]Mouth sores[x] Dehydration[x] Skin/Nail Changes []Pneumonitis []Colitis [] Hepatitis []Thyroid changes  []Fertility issues (birth control, sperm/egg banking issues)    Labs:  [x]BMP- renal function and electrolytes discussed  [x] CBC- RBC, WBC with ANC, platelet counts discussed  [x]Understanding your Blood hand out given   [x]Neutropenia handout/Reduce infection handout [x]Thrombocytopenia handout   [x]Aguilar discussed    Complications that require immediate attention from physician:  [x]Fever 100.3 [x]signs of infection- chills, fever, burning with urination, worsening cough   [x]Uncontrolled vomiting [x]Uncontrolled diarrhea/constipation   [x]Unable to drink 48 ounces [x]Uncontrolled pain  [x] When to notify provider handout given  [x] After hours contact process discussed    Support Services:  [] Financial navigator   []RN navigator explained  []Local cancer society  []     Patient and family verbalized understanding and all  questions answered. Encouraged to call for any concerns. Approximately 60 minutes spent with patient and family. Discharged in satisfactory condition. Off unit per self in motorized wheelchair. Sent to checkout to change appt date.

## 2022-11-18 ENCOUNTER — CLINICAL DOCUMENTATION (OUTPATIENT)
Dept: NUTRITION | Age: 52
End: 2022-11-18

## 2022-11-18 ENCOUNTER — TELEPHONE (OUTPATIENT)
Dept: RADIATION ONCOLOGY | Age: 52
End: 2022-11-18

## 2022-11-18 NOTE — PROGRESS NOTES
Nutrition Assessment    Reason for Call:   11/18/22 - pt returned my call from earlier today. PEG planned for Monday 11/21/22, patient with cancer of epiglottis. Teeth extraction planned for Wednesday 11/23/22. Nutrition Recommendations:   Continue diet as tolerated. Trial ONS   Soft, moist protein foods post-teeth extraction   High calorie, high protein easy to chew and swallow foods post-teeth extraction. Start EN once PEG place and OK to use, and as patient unable to tolerant solids. Malnutrition Assessment:   Malnutrition Status: unable to assess, phone conversation. Nutrition Diagnosis:   Problem: suspected altered GI function  Etiology: cancer to epiglottis with planned treatment  Signs and Symptoms: PEG placement planned for nutrition support    Nutrition Assessment:   History: CAD, PVD, DM, gangrene of foot, lower leg wounds. Subjective: Patient returned call from earlier today. Patient states that she is tolerating liquids and solids at this time. Patient does admit to weight loss over the last year due to \"starving\" herself to loss weight and because her throat hurt really bad. Patient says that she is maintaining her weight at this time. Patient admits that she refused PEG before and she has agreed to have one placed now. Patient will have PEG placed on Monday 11/21/11. Patient says that she is stubborn and will do her best not to have to use it. Patient will have teeth extracted on Wednesday 11/23/22.  Patient aware she will need time to heal.   Current Nutrition:   Oral Diet:    general, pt reports tolerating solids and liquids at this time   Oral Diet Intake:    normal    Oral Nutrition Supplement (ONS): none yet   ONS intake:    pt agrees to try  Anthropometric Measures:   Ht:   5'1\"   Current Weight:   145# (11/16/22 per EMR)      Usual Weight:   175# (12/23/21 per EMR)   Ideal Weight:   ~105#   BMI: 27.40  Comparative Standards:        Estimated calorie needs:   ~2000 kcals (~30 kcals/kg current weight of 66kg)              Estimated protein needs:    ~99 grams (~1.5 grams/kg current weight of 66kg)              Estimated fluids needs: ~2000 ml (~1ml/kcal)      Nutrition Interventions:     *Encouraged PO at best efforts. *Encouraged protein for healing (post-extraction). *Soft and Moist Protein Food list left at  for patient to . *Encourage ONS use   *Boost High Protein and Ensure Complete samples and coupons left at  for patient to . *Acknowledged patient's wishes regarding TF, provided support and encouragement that it can be used as needed when swallowing becomes more difficult or with noted weight loss/undernutrition. *Patient aware this RD is available prn and will provide TF instruction when needed. *Once PEG placed and when OK to use recommend a TF goal of Glucerna 1.5, bolus of 6 cartons per day to provide: 1422ml, 2136 kcals, 189 grams CHO, 118 grams protein, and 1080 ml water. *Recommend an additional 920ml water (~31oz) daily. *Diet per SLP or as tolerated to maintain swallow function. *RD contact information left at  for patient to     Nutrition Evaluation:          Evaluation: Goals set       Goals: Patient will consume 75% or greater of normal intake and maintain weight throughout treatment. Monitoring: Oral intake, diet tolerance, weight, swallowing function, PEG placement, healing from teeth extraction, hydration.      Signed: Electronically signed by David Swift RD, DEMETRIUS on 11/18/2022 at 2:08 PM    Contact number: [phone]

## 2022-11-18 NOTE — PROGRESS NOTES
Nutrition Note:  Second attempt to reach patient today via phone: no answer to call, voice message left with reason for call (PEG placement planned to need to set up a time to discuss TF formula and provide instruction on use). RD return number left and encouraged patient to call back. Nutrition assessment and TF recommendations are available in RD note from 11/16/22.   Electronically signed by Elida Ledesma RD, LD on 11/18/2022 at 9:33 AM

## 2022-11-18 NOTE — TELEPHONE ENCOUNTER
Oncology Social Work    Date: 11/18/2022  Time: 11:36AM  Name: Stephanie Haji  MRN: [id number]     Contact Type: Telephone    Note:    called and spoke with Ladarius to make sure Ed Kimball is aware of Dental Appointment on 11/23/22. Ladarius stated that Ed Kimball is aware of the appointment.  explained that the stanley will be taken care of by Wednesday so Ed Kimball is all set for the appointment. Ladarius thanked  for all of the help with everything. Ladarius stated that Ed Kimball may need transportation help for getting to chemotherapy appointments.  explained [employer] as that may be an option, and also since Ed Kimball has Medicaid she could call the Medicaid Number to ask if they will provide transportation as well.  provided the consumer hotline number for Ladarius to call to see if Medicaid transportation is an option.  reminded Ladarius to have Ed Edgars let  or nurse navigator know at least the day before if a ride is needed.  will continue to follow up as needed. TAMIKA Ontiveros, W  Oncology Social Worker      Electronically signed by Faviola Burk.  TAMIKA Vyas, [place] on 11/18/2022 at 12:54 PM

## 2022-11-21 ENCOUNTER — ANESTHESIA (OUTPATIENT)
Dept: OPERATING ROOM | Age: 52
End: 2022-11-21
Payer: MEDICARE

## 2022-11-21 ENCOUNTER — APPOINTMENT (OUTPATIENT)
Dept: GENERAL RADIOLOGY | Age: 52
End: 2022-11-21
Attending: SURGERY
Payer: MEDICARE

## 2022-11-21 ENCOUNTER — HOSPITAL ENCOUNTER (OUTPATIENT)
Age: 52
Setting detail: OUTPATIENT SURGERY
Discharge: HOME | End: 2022-11-21
Attending: SURGERY | Admitting: SURGERY
Payer: MEDICARE

## 2022-11-21 ENCOUNTER — TELEPHONE (OUTPATIENT)
Dept: CASE MANAGEMENT | Age: 52
End: 2022-11-21

## 2022-11-21 ENCOUNTER — ANESTHESIA EVENT (OUTPATIENT)
Dept: OPERATING ROOM | Age: 52
End: 2022-11-21
Payer: MEDICARE

## 2022-11-21 ENCOUNTER — TELEPHONE (OUTPATIENT)
Dept: RADIATION ONCOLOGY | Age: 52
End: 2022-11-21

## 2022-11-21 VITALS
SYSTOLIC BLOOD PRESSURE: 135 MMHG | DIASTOLIC BLOOD PRESSURE: 78 MMHG | WEIGHT: 145 LBS | RESPIRATION RATE: 18 BRPM | BODY MASS INDEX: 27.38 KG/M2 | HEIGHT: 61 IN | TEMPERATURE: 97.3 F | OXYGEN SATURATION: 95 % | HEART RATE: 85 BPM

## 2022-11-21 DIAGNOSIS — C32.1 SQUAMOUS CELL CANCER OF EPIGLOTTIS (HCC): Primary | ICD-10-CM

## 2022-11-21 DIAGNOSIS — C32.1 CANCER, EPIGLOTTIS (HCC): Primary | ICD-10-CM

## 2022-11-21 LAB — POTASSIUM SERPL-SCNC: 5 MEQ/L (ref 3.5–5.2)

## 2022-11-21 PROCEDURE — 6360000002 HC RX W HCPCS: Performed by: SURGERY

## 2022-11-21 PROCEDURE — 7100000000 HC PACU RECOVERY - FIRST 15 MIN: Performed by: SURGERY

## 2022-11-21 PROCEDURE — 7100000011 HC PHASE II RECOVERY - ADDTL 15 MIN: Performed by: SURGERY

## 2022-11-21 PROCEDURE — 84132 ASSAY OF SERUM POTASSIUM: CPT

## 2022-11-21 PROCEDURE — 43246 EGD PLACE GASTROSTOMY TUBE: CPT | Performed by: SURGERY

## 2022-11-21 PROCEDURE — 6360000002 HC RX W HCPCS

## 2022-11-21 PROCEDURE — 3700000000 HC ANESTHESIA ATTENDED CARE: Performed by: SURGERY

## 2022-11-21 PROCEDURE — 3600000002 HC SURGERY LEVEL 2 BASE: Performed by: SURGERY

## 2022-11-21 PROCEDURE — 2500000003 HC RX 250 WO HCPCS: Performed by: ANESTHESIOLOGY

## 2022-11-21 PROCEDURE — 6360000002 HC RX W HCPCS: Performed by: ANESTHESIOLOGY

## 2022-11-21 PROCEDURE — 2709999900 HC NON-CHARGEABLE SUPPLY: Performed by: SURGERY

## 2022-11-21 PROCEDURE — 2580000003 HC RX 258

## 2022-11-21 PROCEDURE — 77001 FLUOROGUIDE FOR VEIN DEVICE: CPT | Performed by: SURGERY

## 2022-11-21 PROCEDURE — 71045 X-RAY EXAM CHEST 1 VIEW: CPT

## 2022-11-21 PROCEDURE — 3600000012 HC SURGERY LEVEL 2 ADDTL 15MIN: Performed by: SURGERY

## 2022-11-21 PROCEDURE — 3700000001 HC ADD 15 MINUTES (ANESTHESIA): Performed by: SURGERY

## 2022-11-21 PROCEDURE — 3209999900 FLUORO FOR SURGICAL PROCEDURES

## 2022-11-21 PROCEDURE — 7100000010 HC PHASE II RECOVERY - FIRST 15 MIN: Performed by: SURGERY

## 2022-11-21 PROCEDURE — 36415 COLL VENOUS BLD VENIPUNCTURE: CPT

## 2022-11-21 PROCEDURE — 36561 INSERT TUNNELED CV CATH: CPT | Performed by: SURGERY

## 2022-11-21 PROCEDURE — 7100000001 HC PACU RECOVERY - ADDTL 15 MIN: Performed by: SURGERY

## 2022-11-21 PROCEDURE — C1788 PORT, INDWELLING, IMP: HCPCS | Performed by: SURGERY

## 2022-11-21 DEVICE — PORT INFUS SGL LUMN ATTCH POLYUR OPN END CATH 8FR POWERPRT: Type: IMPLANTABLE DEVICE | Site: CHEST | Status: FUNCTIONAL

## 2022-11-21 RX ORDER — FENTANYL CITRATE 50 UG/ML
25 INJECTION, SOLUTION INTRAMUSCULAR; INTRAVENOUS EVERY 5 MIN PRN
Status: DISCONTINUED | OUTPATIENT
Start: 2022-11-21 | End: 2022-11-21 | Stop reason: HOSPADM

## 2022-11-21 RX ORDER — PROPOFOL 10 MG/ML
INJECTION, EMULSION INTRAVENOUS PRN
Status: DISCONTINUED | OUTPATIENT
Start: 2022-11-21 | End: 2022-11-21 | Stop reason: SDUPTHER

## 2022-11-21 RX ORDER — FENTANYL CITRATE 50 UG/ML
INJECTION, SOLUTION INTRAMUSCULAR; INTRAVENOUS PRN
Status: DISCONTINUED | OUTPATIENT
Start: 2022-11-21 | End: 2022-11-21 | Stop reason: SDUPTHER

## 2022-11-21 RX ORDER — OXYCODONE HYDROCHLORIDE AND ACETAMINOPHEN 5; 325 MG/1; MG/1
1 TABLET ORAL EVERY 6 HOURS PRN
Qty: 12 TABLET | Refills: 0 | Status: SHIPPED | OUTPATIENT
Start: 2022-11-21 | End: 2022-12-03

## 2022-11-21 RX ORDER — SUCCINYLCHOLINE/SOD CL,ISO/PF 200MG/10ML
SYRINGE (ML) INTRAVENOUS PRN
Status: DISCONTINUED | OUTPATIENT
Start: 2022-11-21 | End: 2022-11-21 | Stop reason: SDUPTHER

## 2022-11-21 RX ORDER — ONDANSETRON 2 MG/ML
INJECTION INTRAMUSCULAR; INTRAVENOUS PRN
Status: DISCONTINUED | OUTPATIENT
Start: 2022-11-21 | End: 2022-11-21 | Stop reason: SDUPTHER

## 2022-11-21 RX ORDER — SODIUM CHLORIDE 0.9 % (FLUSH) 0.9 %
5-40 SYRINGE (ML) INJECTION EVERY 12 HOURS SCHEDULED
Status: DISCONTINUED | OUTPATIENT
Start: 2022-11-21 | End: 2022-11-21 | Stop reason: HOSPADM

## 2022-11-21 RX ORDER — DROPERIDOL 2.5 MG/ML
0.62 INJECTION, SOLUTION INTRAMUSCULAR; INTRAVENOUS
Status: DISCONTINUED | OUTPATIENT
Start: 2022-11-21 | End: 2022-11-21 | Stop reason: HOSPADM

## 2022-11-21 RX ORDER — FENTANYL CITRATE 50 UG/ML
INJECTION, SOLUTION INTRAMUSCULAR; INTRAVENOUS
Status: COMPLETED
Start: 2022-11-21 | End: 2022-11-21

## 2022-11-21 RX ORDER — OXYCODONE HYDROCHLORIDE 5 MG/1
5 TABLET ORAL PRN
Status: DISCONTINUED | OUTPATIENT
Start: 2022-11-21 | End: 2022-11-21 | Stop reason: HOSPADM

## 2022-11-21 RX ORDER — ONDANSETRON 2 MG/ML
4 INJECTION INTRAMUSCULAR; INTRAVENOUS
Status: DISCONTINUED | OUTPATIENT
Start: 2022-11-21 | End: 2022-11-21 | Stop reason: HOSPADM

## 2022-11-21 RX ORDER — DIPHENHYDRAMINE HYDROCHLORIDE 50 MG/ML
12.5 INJECTION INTRAMUSCULAR; INTRAVENOUS
Status: DISCONTINUED | OUTPATIENT
Start: 2022-11-21 | End: 2022-11-21 | Stop reason: HOSPADM

## 2022-11-21 RX ORDER — IPRATROPIUM BROMIDE AND ALBUTEROL SULFATE 2.5; .5 MG/3ML; MG/3ML
1 SOLUTION RESPIRATORY (INHALATION)
Status: DISCONTINUED | OUTPATIENT
Start: 2022-11-21 | End: 2022-11-21 | Stop reason: HOSPADM

## 2022-11-21 RX ORDER — HEPARIN SODIUM (PORCINE) LOCK FLUSH IV SOLN 100 UNIT/ML 100 UNIT/ML
SOLUTION INTRAVENOUS PRN
Status: DISCONTINUED | OUTPATIENT
Start: 2022-11-21 | End: 2022-11-21 | Stop reason: ALTCHOICE

## 2022-11-21 RX ORDER — KETOROLAC TROMETHAMINE 30 MG/ML
INJECTION, SOLUTION INTRAMUSCULAR; INTRAVENOUS PRN
Status: DISCONTINUED | OUTPATIENT
Start: 2022-11-21 | End: 2022-11-21 | Stop reason: SDUPTHER

## 2022-11-21 RX ORDER — LABETALOL HYDROCHLORIDE 5 MG/ML
10 INJECTION, SOLUTION INTRAVENOUS
Status: DISCONTINUED | OUTPATIENT
Start: 2022-11-21 | End: 2022-11-21 | Stop reason: HOSPADM

## 2022-11-21 RX ORDER — DEXAMETHASONE SODIUM PHOSPHATE 10 MG/ML
INJECTION, EMULSION INTRAMUSCULAR; INTRAVENOUS PRN
Status: DISCONTINUED | OUTPATIENT
Start: 2022-11-21 | End: 2022-11-21 | Stop reason: SDUPTHER

## 2022-11-21 RX ORDER — ACETAMINOPHEN 325 MG/1
650 TABLET ORAL ONCE
Status: DISCONTINUED | OUTPATIENT
Start: 2022-11-21 | End: 2022-11-21 | Stop reason: HOSPADM

## 2022-11-21 RX ORDER — FENTANYL CITRATE 50 UG/ML
50 INJECTION, SOLUTION INTRAMUSCULAR; INTRAVENOUS EVERY 5 MIN PRN
Status: COMPLETED | OUTPATIENT
Start: 2022-11-21 | End: 2022-11-21

## 2022-11-21 RX ORDER — SODIUM CHLORIDE 9 MG/ML
INJECTION, SOLUTION INTRAVENOUS CONTINUOUS
Status: DISCONTINUED | OUTPATIENT
Start: 2022-11-21 | End: 2022-11-21 | Stop reason: HOSPADM

## 2022-11-21 RX ORDER — OXYCODONE HYDROCHLORIDE 5 MG/1
10 TABLET ORAL PRN
Status: DISCONTINUED | OUTPATIENT
Start: 2022-11-21 | End: 2022-11-21 | Stop reason: HOSPADM

## 2022-11-21 RX ORDER — SODIUM CHLORIDE 9 MG/ML
INJECTION, SOLUTION INTRAVENOUS PRN
Status: DISCONTINUED | OUTPATIENT
Start: 2022-11-21 | End: 2022-11-21 | Stop reason: HOSPADM

## 2022-11-21 RX ORDER — LORAZEPAM 2 MG/ML
0.5 INJECTION INTRAMUSCULAR
Status: DISCONTINUED | OUTPATIENT
Start: 2022-11-21 | End: 2022-11-21 | Stop reason: HOSPADM

## 2022-11-21 RX ORDER — SODIUM CHLORIDE 0.9 % (FLUSH) 0.9 %
5-40 SYRINGE (ML) INJECTION PRN
Status: DISCONTINUED | OUTPATIENT
Start: 2022-11-21 | End: 2022-11-21 | Stop reason: HOSPADM

## 2022-11-21 RX ORDER — LIDOCAINE HYDROCHLORIDE 20 MG/ML
INJECTION, SOLUTION EPIDURAL; INFILTRATION; INTRACAUDAL; PERINEURAL PRN
Status: DISCONTINUED | OUTPATIENT
Start: 2022-11-21 | End: 2022-11-21 | Stop reason: SDUPTHER

## 2022-11-21 RX ADMIN — SODIUM CHLORIDE: 9 INJECTION, SOLUTION INTRAVENOUS at 13:12

## 2022-11-21 RX ADMIN — FENTANYL CITRATE 100 MCG: 50 INJECTION, SOLUTION INTRAMUSCULAR; INTRAVENOUS at 14:06

## 2022-11-21 RX ADMIN — DEXAMETHASONE SODIUM PHOSPHATE 10 MG: 10 INJECTION, EMULSION INTRAMUSCULAR; INTRAVENOUS at 14:12

## 2022-11-21 RX ADMIN — Medication 100 MG: at 14:06

## 2022-11-21 RX ADMIN — FENTANYL CITRATE 50 MCG: 0.05 INJECTION, SOLUTION INTRAMUSCULAR; INTRAVENOUS at 15:20

## 2022-11-21 RX ADMIN — FENTANYL CITRATE 50 MCG: 0.05 INJECTION, SOLUTION INTRAMUSCULAR; INTRAVENOUS at 15:25

## 2022-11-21 RX ADMIN — HYDROMORPHONE HYDROCHLORIDE 0.5 MG: 1 INJECTION, SOLUTION INTRAMUSCULAR; INTRAVENOUS; SUBCUTANEOUS at 15:40

## 2022-11-21 RX ADMIN — HYDROMORPHONE HYDROCHLORIDE 0.5 MG: 1 INJECTION, SOLUTION INTRAMUSCULAR; INTRAVENOUS; SUBCUTANEOUS at 15:45

## 2022-11-21 RX ADMIN — FENTANYL CITRATE 50 MCG: 0.05 INJECTION, SOLUTION INTRAMUSCULAR; INTRAVENOUS at 15:30

## 2022-11-21 RX ADMIN — ONDANSETRON 4 MG: 2 INJECTION INTRAMUSCULAR; INTRAVENOUS at 14:12

## 2022-11-21 RX ADMIN — HYDROMORPHONE HYDROCHLORIDE 0.5 MG: 1 INJECTION, SOLUTION INTRAMUSCULAR; INTRAVENOUS; SUBCUTANEOUS at 15:50

## 2022-11-21 RX ADMIN — LIDOCAINE HYDROCHLORIDE 5 ML: 20 INJECTION, SOLUTION EPIDURAL; INFILTRATION; INTRACAUDAL; PERINEURAL at 14:06

## 2022-11-21 RX ADMIN — CEFAZOLIN 2000 MG: 10 INJECTION, POWDER, FOR SOLUTION INTRAVENOUS at 14:07

## 2022-11-21 RX ADMIN — KETOROLAC TROMETHAMINE 30 MG: 30 INJECTION, SOLUTION INTRAMUSCULAR; INTRAVENOUS at 15:02

## 2022-11-21 RX ADMIN — PROPOFOL 150 MG: 10 INJECTION, EMULSION INTRAVENOUS at 14:06

## 2022-11-21 RX ADMIN — HYDROMORPHONE HYDROCHLORIDE 0.5 MG: 1 INJECTION, SOLUTION INTRAMUSCULAR; INTRAVENOUS; SUBCUTANEOUS at 15:55

## 2022-11-21 RX ADMIN — FENTANYL CITRATE 50 MCG: 0.05 INJECTION, SOLUTION INTRAMUSCULAR; INTRAVENOUS at 15:35

## 2022-11-21 ASSESSMENT — PAIN SCALES - GENERAL
PAINLEVEL_OUTOF10: 5
PAINLEVEL_OUTOF10: 8
PAINLEVEL_OUTOF10: 6
PAINLEVEL_OUTOF10: 7
PAINLEVEL_OUTOF10: 5
PAINLEVEL_OUTOF10: 10
PAINLEVEL_OUTOF10: 8
PAINLEVEL_OUTOF10: 9

## 2022-11-21 ASSESSMENT — PAIN DESCRIPTION - PAIN TYPE: TYPE: SURGICAL PAIN

## 2022-11-21 ASSESSMENT — PAIN DESCRIPTION - FREQUENCY: FREQUENCY: CONTINUOUS

## 2022-11-21 ASSESSMENT — PAIN - FUNCTIONAL ASSESSMENT: PAIN_FUNCTIONAL_ASSESSMENT: 0-10

## 2022-11-21 ASSESSMENT — PAIN DESCRIPTION - DESCRIPTORS
DESCRIPTORS: ACHING;DISCOMFORT
DESCRIPTORS: ACHING;DISCOMFORT

## 2022-11-21 ASSESSMENT — PAIN DESCRIPTION - LOCATION
LOCATION: ABDOMEN;CHEST
LOCATION: ABDOMEN;CHEST

## 2022-11-21 ASSESSMENT — PAIN DESCRIPTION - ONSET: ONSET: ON-GOING

## 2022-11-21 ASSESSMENT — PAIN DESCRIPTION - ORIENTATION
ORIENTATION: MID
ORIENTATION: MID

## 2022-11-21 NOTE — PROGRESS NOTES
Pt returned to [facility] room 10. Vitals and assessment as charted. 0.9 infusing  from PACU. Pt has crackers and water. Family at the bedside. Pt and family verbalized understanding of discharge criteria and call light use. Call light in reach.

## 2022-11-21 NOTE — TELEPHONE ENCOUNTER
Oncology Social Work    Date: 11/20/22  Time: 5:55PM  Name: Sarah Beth Grove  MRN: [id number]     Contact Type: Telephone    Note:   Dameon Stephens contacted  and left a voicemail. Dameon Stephens stated that she does not know if she will have transportation for her cancer treatments, as her friends work first shift, and her partner Gene, she stated has numbness in his legs and \"can't see very well\" when driving. Dameon Stephens stated that her son gets off work at [facility] [employer] [facility] so the earliest she could have her treatments would be 2pm. Dameon Stephens asked \"can we please work something out so I can get up there for my cancer treatments. \"     Dameon Stephens also stated that she is scheduled to get her feeding tube Monday, and she is not sure how she is supposed to use it and to eat with once her teeth are removed. Dameon Stephens stated that she knows that the dietitian is out of all of this week so she isn't sure how to get her supplies and how to use her feeding tube.

## 2022-11-21 NOTE — ANESTHESIA PRE PROCEDURE
Department of Anesthesiology  Preprocedure Note       Name:  Zhang Mayberry   Age:  47 y.o.  :  1970                                          MRN:  [id number]         Date:  2022      Surgeon: Dasia Helton):  Stephie Sarmiento MD    Procedure: Procedure(s): Insertion Single Lumen Mediport  EGD Peg Tube Placement possible Open G Tube    Medications prior to admission:   Prior to Admission medications    Medication Sig Start Date End Date Taking? Authorizing Provider   ALPRAZolam Terrea Batsheva) 1 MG tablet Take 1 mg by mouth as needed. 10/17/22   Historical Provider, MD   oxyCODONE-acetaminophen (PERCOCET) 5-325 MG per tablet Take 2 tablets by mouth every 8 hours as needed for Pain. Historical Provider, MD   clopidogrel (PLAVIX) 75 MG tablet Take 1 tablet by mouth daily 22   Historical Provider, MD ABDIITY 1.5 GN/2.8QL SC injection Inject 1.5 mg into the skin once a week 22   Historical Provider, MD   HUMALOG KWIKPEN 100 UNIT/ML SOPN  10/14/22   Historical Provider, MD   fentaNYL 37.5 MCG/HR PT72 Apply 1 patch topically. 10/6/22   Historical Provider, MD   JARDIANCE 25 MG tablet Take 1 tablet by mouth daily 10/13/22   Historical Provider, MD   nitroGLYCERIN (NITROSTAT) 0.4 MG SL tablet Place 1 tablet under the tongue every 5 minutes as needed for Chest pain Take 1 tablet for chest pain and repeat after 5 min if no relief, call 911 and take another dose 5 min later. Max of 3 doses in 15 min.  16   Kary Scott MD   metFORMIN (GLUCOPHAGE) 500 MG tablet Take 2 tablets by mouth 2 times daily Resume 16   Dorothy Closs, MD   pioglitazone (ACTOS) 30 MG tablet Take 1 tablet by mouth daily 16   Dorothy Closs, MD   atorvastatin (LIPITOR) 20 MG tablet Take 2 tablets by mouth nightly 16   Dorothy Closs, MD   lisinopril (PRINIVIL;ZESTRIL) 2.5 MG tablet Take 1 tablet by mouth daily 16   Dorothy Closs, MD   metoprolol tartrate (LOPRESSOR) 25 MG tablet Take 1 tablet by mouth 2 times daily 8/29/16   Durga Lemus MD   isosorbide dinitrate (ISORDIL) 20 MG tablet Take 30 mg by mouth daily    Historical Provider, MD   amitriptyline (ELAVIL) 100 MG tablet Take 100 mg by mouth nightly    Historical Provider, MD   amLODIPine (NORVASC) 5 MG tablet Take 5 mg by mouth daily    Historical Provider, MD   budesonide-formoterol (SYMBICORT) 160-4.5 MCG/ACT AERO Inhale 2 puffs into the lungs 2 times daily    Historical Provider, MD   pregabalin (LYRICA) 150 MG capsule Take 150 mg by mouth 2 times daily    Historical Provider, MD   ALPRAZolam (XANAX) 0.5 MG tablet Take 0.5 mg by mouth 2 times daily as needed    Historical Provider, MD   aspirin 81 MG tablet Take 81 mg by mouth daily    Historical Provider, MD   albuterol (PROVENTIL HFA) 108 (90 BASE) MCG/ACT inhaler Inhale 2 puffs into the lungs every 4 hours as needed for Wheezing or Shortness of Breath (Space out to every 6 hours as symptoms improve). Space out to every 6 hours as symptoms improve. 7/29/14   Dayday Lovelace MD       Current medications:    Current Facility-Administered Medications   Medication Dose Route Frequency Provider Last Rate Last Admin    0.9 % sodium chloride infusion   IntraVENous Continuous Kristina Goddard LPN 10 mL/hr at 51/72/54 1312 New Bag at 11/21/22 1312    ceFAZolin (ANCEF) 2000 mg in dextrose 5 % 50 mL IVPB  2,000 mg IntraVENous 30 Min Pre-Op Kristina Goddard LPN           Allergies: Allergies   Allergen Reactions    Shellfish-Derived Products Anaphylaxis     \"Throat closes up\"    Shrimp Flavor Swelling       Problem List:    Patient Active Problem List   Diagnosis Code    Cellulitis of leg, left L03. 116    Gangrene of foot ([facility] I96    Heel ulcer due to DM ([facility] E11.621, L97.409    DM type 2 (diabetes mellitus, type 2) ([facility]) E11.9    Cellulitis and abscess of leg, except foot L03.119, L02.419    Coronary artery disease involving native coronary artery of native heart with angina pectoris ([facility] [facility] I25.119  Peripheral vascular disease ([facility] - planned above knee amputation of left leg I73.9    Essential hypertension I10    Multiple open wounds of lower leg  left lower leg S81.809A    Cellulitis of left lower extremity  seroma or abscess left medial thigh L03. 80    Coronary artery disease involving native coronary artery without angina pectoris  triple vessel severe  stenosis I25.10    Obesity (BMI 30-39. 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       Past Surgical History:        Procedure Laterality Date    ANGIOPLASTY Left 2014    left leg     SECTION  1998    CORONARY ANGIOPLASTY WITH STENT PLACEMENT      [facility]    CORONARY ANGIOPLASTY WITH STENT PLACEMENT      3 stents in [address]  2016    Dalton Cyr, 1 stent to RCA    ESOPHAGOGASTRODUODENOSCOPY  10/06/2022    [facility]    OTHER SURGICAL HISTORY  2015    Left Femoral to Peroneal Bypass with Composite Vein Graft and Propaten Graft     OTHER SURGICAL HISTORY Left 2016    Left Leg Above Knee Amputation    VASCULAR SURGERY         Social History:    Social History     Tobacco Use    Smoking status: Former     Packs/day: 0.50     Years: 27.00     Pack years: 13.50     Types: Cigarettes     Quit date: 2014     Years since quittin.1    Smokeless tobacco: Never    Tobacco comments:     e cigs daily use 11/17/22   Substance Use Topics    Alcohol use: No                                Counseling given: Not Answered  Tobacco comments: e cigs daily use 11/17/22      Vital Signs (Current):   Vitals:    11/21/22 1251 11/21/22 1253   BP: 113/67    Pulse: 83    Resp: 20    Temp: (!) 96.7 °F (35.9 °C)    TempSrc: Temporal    SpO2: 96%    Weight:  145 lb (65.8 kg)   Height:  5' 1\" (1.549 m)                                              BP Readings from Last 3 Encounters:   11/21/22 113/67   11/16/22 120/80   11/09/22 117/70       NPO Status: Time of last liquid consumption: 0830                        Time of last solid consumption: 1930                        Date of last liquid consumption: 11/20/22                        Date of last solid food consumption: 11/20/22    BMI:   Wt Readings from Last 3 Encounters:   11/21/22 145 lb (65.8 kg)   11/16/22 145 lb (65.8 kg)   11/09/22 145 lb (65.8 kg)     Body mass index is 27.4 kg/m². CBC:   Lab Results   Component Value Date/Time    WBC 13.7 09/06/2016 07:14 PM    RBC 4.78 09/06/2016 07:14 PM    HGB 13.8 09/06/2016 07:14 PM    HCT 42.3 09/06/2016 07:14 PM    MCV 88.4 09/06/2016 07:14 PM    RDW 17.6 09/06/2016 07:14 PM     09/06/2016 07:14 PM       CMP:   Lab Results   Component Value Date/Time     09/06/2016 12:28 PM    K 4.0 09/06/2016 12:28 PM    CL 99 09/06/2016 12:28 PM    CO2 20 09/06/2016 12:28 PM    BUN 16 09/06/2016 12:28 PM    CREATININE 0.7 09/06/2016 12:28 PM    LABGLOM >90 09/06/2016 12:28 PM    GLUCOSE 417 09/06/2016 12:28 PM    PROT 7.3 02/24/2016 08:33 PM    CALCIUM 8.3 09/06/2016 12:28 PM    BILITOT 0.2 02/24/2016 08:33 PM    ALKPHOS 114 02/24/2016 08:33 PM    AST 11 02/24/2016 08:33 PM    ALT 11 02/24/2016 08:33 PM       POC Tests: No results for input(s): POCGLU, POCNA, POCK, POCCL, POCBUN, POCHEMO, POCHCT in the last 72 hours.     Coags:   Lab Results   Component Value Date/Time    INR 1.03 09/06/2016 12:29 PM    APTT 30.3 09/06/2016 12:29 PM       HCG (If Applicable):   Lab Results   Component Value Date    PREGTESTUR NEGATIVE 11/19/2015    PREGSERUM NEGATIVE 09/06/2016        ABGs: No results found for: PHART, PO2ART, ATI5ZEU, YRA7RMC, BEART, D6JHTIVC     Type & Screen (If Applicable):  Lab Results   Component Value Date    LABRH NEG 09/06/2016       Drug/Infectious Status (If Applicable):  Lab Results   Component Value Date/Time    HEPCAB Negative 11/06/2014 11:15 AM       COVID-19 Screening (If Applicable):   Lab Results   Component Value Date/Time    COVID19 NOT  DETECTED 12/23/2021 02:09 PM           Anesthesia Evaluation  Patient summary reviewed and Nursing notes reviewed no history of anesthetic complications:   Airway: Mallampati: II          Dental:          Pulmonary: breath sounds clear to auscultation                             Cardiovascular:    (+) hypertension:, angina:, past MI:, CAD:,         Rhythm: regular  Rate: normal                    Neuro/Psych:               GI/Hepatic/Renal:             Endo/Other:    (+) Diabetes, . Abdominal:             Vascular:   + PVD, aortic or cerebral, . Other Findings:           Anesthesia Plan      general     ASA 3       Induction: intravenous. MIPS: Postoperative opioids intended and Prophylactic antiemetics administered. Anesthetic plan and risks discussed with patient. Plan discussed with CRNA.                     Genny Osorio DO   11/21/2022

## 2022-11-21 NOTE — TELEPHONE ENCOUNTER
Natanael phoned Dr. De Souza Screen office to update that the nutritional eval was completed last week by our dietician at the [facility], so the TF recommendations are in the EMR. Natanael spoke with GENTRY Acevedo & she will coordinate the Home [facility] referral for TF supplies/formula.

## 2022-11-21 NOTE — ANESTHESIA POSTPROCEDURE EVALUATION
Department of Anesthesiology  Postprocedure Note    Patient: Heather Form  MRN: [id number]  YOB: 1970  Date of evaluation: 11/21/2022      Procedure Summary     Date: 11/21/22 Room / Location: [address] AGUST[facility]    Anesthesia Start: 1401 Anesthesia Stop: 5302    Procedures:       Insertion Single Lumen Mediport (Chest)      EGD Peg Tube Placement possible Open G Tube Diagnosis:       Squamous cell carcinoma of epiglottis (HCC)      (Squamous cell carcinoma of epiglottis ([facility] [address] [C32.1])    Surgeons: Tanja Mcclellan MD Responsible Provider: Ricardo Ayala DO    Anesthesia Type: general ASA Status: 3          Anesthesia Type: No value filed.     Sena Phase I: Sena Score: 9    Sena Phase II:        Anesthesia Post Evaluation    Patient location during evaluation: PACU  Patient participation: complete - patient participated  Level of consciousness: awake  Airway patency: patent  Nausea & Vomiting: no nausea  Complications: no  Cardiovascular status: hemodynamically stable  Respiratory status: acceptable  Hydration status: stable

## 2022-11-21 NOTE — BRIEF OP NOTE
Brief Postoperative Note      Patient: Clara Hodgkins  YOB: 1970  MRN: [id number]    Date of Procedure: 11/21/2022    Pre-Op Diagnosis: Squamous cell carcinoma of epiglottis     Post-Op Diagnosis: same       Procedure  R SCV Mediport  EGD/Peg    Surgeon(s):  Daniel Collazo MD    Assistant:      Anesthesia: General    Estimated Blood Loss (mL): 5 ml    Complications: none    Specimens:       Implants:  Implant Name Type Inv.  Item Serial No.  Lot No. LRB No. Used Action   PORT INFUS SGL LUMN ATTCH POLYUR OPN END CATH 8FR POWERPRT - [id number]  PORT INFUS SGL LUMN ATTCH POLYUR OPN END CATH 8FR POWERPRT  [employer] [id number] N/A 1 Implanted         Drains:   Gastrostomy/Enterostomy/Jejunostomy Tube Percutaneous Endoscopic Gastrostomy (PEG) LUQ (Active)   Site Description Clean, dry & intact 11/21/22 1514   G Port Status Clamped 11/21/22 1514   Surrounding Skin Clean, dry & intact 11/21/22 1514   Dressing Status Clean, dry & intact 11/21/22 1514       Findings: see op note    Electronically signed by Daniel Collazo MD on 11/21/2022 at 3:26 PM

## 2022-11-21 NOTE — PROGRESS NOTES
[address] [facility] [address]  Dept: [phone]  Dept Fax: [phone]  Loc: [phone]    Visit Date: 2022    Beti Herring is a 47 y.o. female who presentstoday for:  Chief Complaint   Patient presents with    Surgical Consult     New patient- referred by Dr. Zacarias Foster - Squamous cell carcinoma of the epiglottis in need of Mediport and feeding tube       HPI:     HPI 43-year-old female who has a history of squamous cell cancer of the epiglottis along with other multiple medical issues. He has had a previous left above-knee amputation. She has been referred for placement of Mediport and placement of a PEG tube. Her only abdominal surgeries was a  section in the past    Past Medical History:   Diagnosis Date    Above knee amputation of left lower extremity (HCC)     Blood circulation, collateral     CAD (coronary artery disease)     Diabetes mellitus ([address]     Hyperlipidemia     Hypertension     MDRO (multiple drug resistant organisms) resistance     MRSA wound     MI, old 2007    Obesity (BMI 30-39. 9)     Status post skin graft 01/15/2014    [facility] - Dr Grisel Gomes     Throat cancer [facility]) 10/2022    squamous cell carcinoma of the epiglottis      Past Surgical History:   Procedure Laterality Date    ANGIOPLASTY Left 2014    left leg     SECTION  1998    CORONARY ANGIOPLASTY WITH STENT PLACEMENT      [facility]    CORONARY ANGIOPLASTY WITH STENT PLACEMENT      3 stents in [facility]  2016    debbi, 1 stent to RCA    ESOPHAGOGASTRODUODENOSCOPY  10/06/2022    [facility]    OTHER SURGICAL HISTORY  2015    Left Femoral to Peroneal Bypass with Composite Vein Graft and Propaten Graft     OTHER SURGICAL HISTORY Left 2016    Left Leg Above Knee Amputation    VASCULAR SURGERY          Family History   Problem Relation Age of Onset Heart Disease Maternal Grandfather     High Blood Pressure Maternal Grandfather     Asthma Neg Hx     Cancer Neg Hx     Diabetes Neg Hx     Kidney Disease Neg Hx     Stroke Neg Hx         Social History     Tobacco Use    Smoking status: Former     Packs/day: 0.50     Years: 27.00     Pack years: 13.50     Types: Cigarettes     Quit date: 2014     Years since quittin.1    Smokeless tobacco: Never    Tobacco comments:     e cigs daily use 22   Substance Use Topics    Alcohol use: No          Current Outpatient Medications   Medication Sig Dispense Refill    ALPRAZolam (XANAX) 1 MG tablet Take 1 mg by mouth as needed. oxyCODONE-acetaminophen (PERCOCET) 5-325 MG per tablet Take 2 tablets by mouth every 8 hours as needed for Pain. clopidogrel (PLAVIX) 75 MG tablet Take 1 tablet by mouth daily      TRULICITY 1.5 GA/6.0FL SC injection Inject 1.5 mg into the skin once a week      HUMALOG KWIKPEN 100 UNIT/ML SOPN       fentaNYL 37.5 MCG/HR PT72 Apply 1 patch topically. JARDIANCE 25 MG tablet Take 1 tablet by mouth daily      nitroGLYCERIN (NITROSTAT) 0.4 MG SL tablet Place 1 tablet under the tongue every 5 minutes as needed for Chest pain Take 1 tablet for chest pain and repeat after 5 min if no relief, call 911 and take another dose 5 min later. Max of 3 doses in 15 min.  25 tablet 3    metFORMIN (GLUCOPHAGE) 500 MG tablet Take 2 tablets by mouth 2 times daily Resume  60 tablet 3    pioglitazone (ACTOS) 30 MG tablet Take 1 tablet by mouth daily 30 tablet 3    atorvastatin (LIPITOR) 20 MG tablet Take 2 tablets by mouth nightly 30 tablet 3    lisinopril (PRINIVIL;ZESTRIL) 2.5 MG tablet Take 1 tablet by mouth daily 30 tablet 3    metoprolol tartrate (LOPRESSOR) 25 MG tablet Take 1 tablet by mouth 2 times daily 60 tablet 3    isosorbide dinitrate (ISORDIL) 20 MG tablet Take 30 mg by mouth daily      amitriptyline (ELAVIL) 100 MG tablet Take 100 mg by mouth nightly      amLODIPine (NORVASC) 5 MG tablet Take 5 mg by mouth daily      budesonide-formoterol (SYMBICORT) 160-4.5 MCG/ACT AERO Inhale 2 puffs into the lungs 2 times daily      pregabalin (LYRICA) 150 MG capsule Take 150 mg by mouth 2 times daily      ALPRAZolam (XANAX) 0.5 MG tablet Take 0.5 mg by mouth 2 times daily as needed      aspirin 81 MG tablet Take 81 mg by mouth daily      albuterol (PROVENTIL HFA) 108 (90 BASE) MCG/ACT inhaler Inhale 2 puffs into the lungs every 4 hours as needed for Wheezing or Shortness of Breath (Space out to every 6 hours as symptoms improve). Space out to every 6 hours as symptoms improve. 1 Inhaler 10     No current facility-administered medications for this visit. Allergies   Allergen Reactions    Shellfish-Derived Products Anaphylaxis     \"Throat closes up\"    Shrimp Flavor Swelling       Subjective:      Review of Systems   Constitutional:  Positive for chills, diaphoresis, fatigue and unexpected weight change. Negative for activity change, appetite change and fever. HENT:  Positive for dental problem and facial swelling. Negative for congestion, drooling, ear discharge, ear pain, hearing loss, mouth sores, nosebleeds, postnasal drip, rhinorrhea, sinus pressure, sinus pain, sneezing, sore throat, tinnitus, trouble swallowing and voice change. Eyes: Negative. Negative for photophobia, pain, discharge, redness, itching and visual disturbance. Respiratory:  Positive for cough. Negative for apnea, choking, chest tightness, shortness of breath, wheezing and stridor. Cardiovascular: Negative. Negative for chest pain, palpitations and leg swelling. Gastrointestinal:  Positive for constipation, diarrhea and vomiting. Endocrine: Negative. Negative for cold intolerance, heat intolerance, polydipsia, polyphagia and polyuria. Genitourinary: Negative.   Negative for decreased urine volume, difficulty urinating, dyspareunia, dysuria, enuresis, flank pain, frequency, genital sores, hematuria, menstrual problem, pelvic pain, urgency, vaginal bleeding, vaginal discharge and vaginal pain. Musculoskeletal:  Positive for back pain and neck pain. Negative for arthralgias, gait problem, joint swelling, myalgias and neck stiffness. Skin:  Negative for color change, pallor, rash and wound. Allergic/Immunologic: Negative. Negative for environmental allergies, food allergies and immunocompromised state. Neurological:  Positive for dizziness, tremors and headaches. Negative for seizures, syncope, facial asymmetry, speech difficulty, weakness, light-headedness and numbness. Hematological:  Negative for adenopathy. Does not bruise/bleed easily. Psychiatric/Behavioral:  Positive for decreased concentration and sleep disturbance. Negative for agitation, behavioral problems, confusion, dysphoric mood, hallucinations, self-injury and suicidal ideas. The patient is nervous/anxious. The patient is not hyperactive. Objective:   /80 (Site: Right Upper Arm, Position: Sitting, Cuff Size: Medium Adult)   Pulse 75   Temp 97.5 °F (36.4 °C) (Temporal)   Resp 18   Ht 5' 1\" (1.549 m)   Wt 145 lb (65.8 kg)   SpO2 98%   BMI 27.40 kg/m²     Physical Exam  Constitutional:       Appearance: She is well-developed. Comments: That is post left above-knee amputation   HENT:      Head: Normocephalic and atraumatic. Eyes:      General: No scleral icterus. Right eye: No discharge. Left eye: No discharge. Conjunctiva/sclera: Conjunctivae normal.      Pupils: Pupils are equal, round, and reactive to light. Neck:      Thyroid: No thyromegaly. Vascular: No JVD. Cardiovascular:      Rate and Rhythm: Normal rate and regular rhythm. Heart sounds: No murmur heard. No friction rub. No gallop. Pulmonary:      Effort: Pulmonary effort is normal. No respiratory distress. Breath sounds: Normal breath sounds. No stridor. No wheezing. Chest:      Chest wall: No tenderness. Abdominal:      Tenderness: There is abdominal tenderness. Musculoskeletal:         General: Normal range of motion. Cervical back: Normal range of motion and neck supple. Skin:     General: Skin is warm and dry. Coloration: Skin is not pale. Findings: No erythema or rash. Neurological:      Mental Status: She is alert and oriented to person, place, and time. Psychiatric:         Behavior: Behavior normal.         Thought Content: Thought content normal.         Judgment: Judgment normal.          Results for orders placed or performed during the hospital encounter of 12/23/21   COVID-19, Rapid   Result Value Ref Range    SARS-CoV-2, GLENN NOT  DETECTED NOT DETECTED       Assessment:     Glottis cancer in need of Mediport and PEG tube placement. Discussed procedure with patient including risk of pneumothorax with the Mediport possible bowel injury with the PEG tube we discussed the endoscopic placement of same to proceed    Plan:     1. Schedule [facility] for EGD and Mediport  2. The risks, benefits and alternatives were discussed with [facility]. She understands and wishes to proceed with surgical intervention. 3. Restrictions discussed with [facility] and she expresses understanding. 4. She is advised to call back directly if there are further questions/concerns, or if her symptoms worsen prior to surgery.           Electronicallysigned by Ladarius Corrales MD on 11/20/2022 at 10:54 PM

## 2022-11-21 NOTE — PROGRESS NOTES

## 2022-11-21 NOTE — PROGRESS NOTES
1514 - pt arrives to pacu, respirations easy and unlabored on room air, pt states pain 9 out of 10, VSS    1520 - 50 mcg of fentanyl given    1525 -  50 mcg of fentanyl given    1530 -  50 mcg of fentanyl given    1535 -  50 mcg of fentanyl given    1540 - 0.5 mg of dilaudid given    1545 - 0.5 mg of dilaudid given    1550 -   0.5 mg of dilaudid given    1555 -  0.5 mg of dilaudid given    1605 - pt still complaining of pain, but it is improving, patient also has chronic pain, this RN informed that pt has received maximum doses of pain medications I am able to give, pt verbalized understanding    1610 - pt offered ice water, tolerated well, pt states she feels a lot better now    1615 - pt meets criteria for discharge from pacu at this time    1618 - pt transported to [facility] in stable condition

## 2022-11-21 NOTE — DISCHARGE INSTRUCTIONS
GENERAL ANESTHESIA OR SEDATION  1. Do not drive or operate hazardous machinery for 24 hours. 2. Do not make important business or personal decisions for 24 hours. 3. Do not drink alcoholic beverages or use tobacco for 24 hours. ACTIVITY INSTRUCTIONS:  [] Rest today. Resume light to normal activity tomorrow.   [] You may resume normal activity tomorrow. Do not engage in strenuous activity that may place stress on your incision. [x] Do not drive for 3-5 days and avoid heavy lifting, tugging, pullings greater than 10-20 lbs until seen in the office. DIET INSTRUCTIONS:  []Begin with clear liquids. If not nauseated, may increase to a low-fat diet when you desire. Greasy and spicy foods are not advised. [x]Regular diet as tolerated. []Other:     MEDICATIONS  [x]Prescription sent with you to be used as directed. []Lortab   []Vicodin   [x]Percocet   []Tylenol #3   []Oxycontin   Do not drink alcohol or drive while taking these medications. You may experience dizziness or drowsiness with these medications. You may also experience constipation which can be relieved with stool softners or laxatives. [x]You may resume your daily prescription medication schedule unless otherwise specified. [x]Do not take 325mg Aspirin or other blood thinners such as Coumadin or Plavix for 5 days. WOUND/DRESSING INSTRUCTIONS:  Always ensure you and your care giver clean hands before and after caring for the wound. [x] Keep dressing clean and dry for 48 hours. Change when soiled or wet. [x] Allow steri-strips to fall off on their own. [x] Ice operative site for 20 minutes 4 times a day. [x] May wash over incision in shower in 48 hours, but do not soak in a bath.  [] Take sitz bath for 20 minutes twice daily and after bowel movements. [] Keep the abdominal binder in place during the day. May remove to shower and at night.  [] Remove packing from wound in 24 hours and replace with AMD dressings daily.   [] Empty EFRAIN drain daily and record the amounts. ABDOMINAL/LAPAROSCOPIC SURGERY  [x]You are encouraged to get up and move around as this helps with the circulation and speeds up the healing process. [x]Breath deeply and cough from time to time. This helps to clear your lungs and helps prevent pneumonia. [x]Supporting your incision with a pillow or your hand helps to minimize discomfort and pain. [x]Laparoscopic patients may develop shoulder pain in the first 48 hours from the gas used during the procedure. FOLLOW-UP CARE. SPECIFICALLY WATCH FOR:   Fever over 101 degrees by mouth   Increased redness, warmth, hardness at operative site. Blood soaked dressing (small amounts of oozing may be normal.)   Increased or progressive drainage from the surgical area   Inability to urinate or blood in the urine   Pain not relieved by the medications ordered   Persistent nausea and/or vomiting, unable to retain fluids. FOLLOW-UP APPOINTMENT   []1 week   []2 weeks   []Other    Call my office if you have any problem that concerns you . After hours, you can reach the answering service via the office phone number. IF YOU NEED IMMEDIATE ATTENTION, GO TO THE EMERGENCY ROOM AND YOUR DOCTOR WILL BE CONTACTED.

## 2022-11-22 ENCOUNTER — TELEPHONE (OUTPATIENT)
Dept: SURGERY | Age: 52
End: 2022-11-22

## 2022-11-22 ENCOUNTER — HOSPITAL ENCOUNTER (OUTPATIENT)
Dept: GENERAL RADIOLOGY | Age: 52
Discharge: HOME | End: 2022-11-22
Payer: MEDICARE

## 2022-11-22 ENCOUNTER — HOSPITAL ENCOUNTER (OUTPATIENT)
Dept: CT IMAGING | Age: 52
Discharge: HOME | End: 2022-11-22
Payer: MEDICARE

## 2022-11-22 VITALS
HEART RATE: 84 BPM | OXYGEN SATURATION: 96 % | WEIGHT: 145 LBS | BODY MASS INDEX: 27.4 KG/M2 | TEMPERATURE: 96.9 F | RESPIRATION RATE: 16 BRPM | DIASTOLIC BLOOD PRESSURE: 74 MMHG | SYSTOLIC BLOOD PRESSURE: 129 MMHG

## 2022-11-22 DIAGNOSIS — R91.8 LUNG MASS: ICD-10-CM

## 2022-11-22 LAB
CREAT SERPL-MCNC: 0.7 MG/DL (ref 0.4–1.2)
ERYTHROCYTE [DISTWIDTH] IN BLOOD BY AUTOMATED COUNT: 13.1 % (ref 11.5–14.5)
ERYTHROCYTE [DISTWIDTH] IN BLOOD BY AUTOMATED COUNT: 49.7 FL (ref 35–45)
GFR SERPL CREATININE-BSD FRML MDRD: > 60 ML/MIN/1.73M2
HCT VFR BLD CALC: 46.5 % (ref 37–47)
HEMOGLOBIN: 15.1 GM/DL (ref 12–16)
MCH RBC QN AUTO: 33.4 PG (ref 26–33)
MCHC RBC AUTO-ENTMCNC: 32.5 GM/DL (ref 32.2–35.5)
MCV RBC AUTO: 102.9 FL (ref 81–99)
PLATELET # BLD: 392 THOU/MM3 (ref 130–400)
PMV BLD AUTO: 11.2 FL (ref 9.4–12.4)
RBC # BLD: 4.52 MILL/MM3 (ref 4.2–5.4)
WBC # BLD: 19.3 THOU/MM3 (ref 4.8–10.8)

## 2022-11-22 PROCEDURE — 36415 COLL VENOUS BLD VENIPUNCTURE: CPT

## 2022-11-22 PROCEDURE — 88334 PATH CONSLTJ SURG CYTO XM EA: CPT

## 2022-11-22 PROCEDURE — 71045 X-RAY EXAM CHEST 1 VIEW: CPT

## 2022-11-22 PROCEDURE — 6360000002 HC RX W HCPCS: Performed by: RADIOLOGY

## 2022-11-22 PROCEDURE — 88342 IMHCHEM/IMCYTCHM 1ST ANTB: CPT

## 2022-11-22 PROCEDURE — 82565 ASSAY OF CREATININE: CPT

## 2022-11-22 PROCEDURE — 6370000000 HC RX 637 (ALT 250 FOR IP): Performed by: RADIOLOGY

## 2022-11-22 PROCEDURE — 77012 CT SCAN FOR NEEDLE BIOPSY: CPT

## 2022-11-22 PROCEDURE — 2580000003 HC RX 258: Performed by: RADIOLOGY

## 2022-11-22 PROCEDURE — 88333 PATH CONSLTJ SURG CYTO XM 1: CPT

## 2022-11-22 PROCEDURE — 88312 SPECIAL STAINS GROUP 1: CPT

## 2022-11-22 PROCEDURE — 85027 COMPLETE CBC AUTOMATED: CPT

## 2022-11-22 PROCEDURE — 88305 TISSUE EXAM BY PATHOLOGIST: CPT

## 2022-11-22 PROCEDURE — 99211 OFF/OP EST MAY X REQ PHY/QHP: CPT

## 2022-11-22 PROCEDURE — 32408 CORE NDL BX LNG/MED PERQ: CPT

## 2022-11-22 RX ORDER — FENTANYL CITRATE 50 UG/ML
50 INJECTION, SOLUTION INTRAMUSCULAR; INTRAVENOUS ONCE
Status: COMPLETED | OUTPATIENT
Start: 2022-11-22 | End: 2022-11-22

## 2022-11-22 RX ORDER — FENTANYL CITRATE 50 UG/ML
100 INJECTION, SOLUTION INTRAMUSCULAR; INTRAVENOUS ONCE
Status: COMPLETED | OUTPATIENT
Start: 2022-11-22 | End: 2022-11-22

## 2022-11-22 RX ORDER — MIDAZOLAM HYDROCHLORIDE 1 MG/ML
1 INJECTION INTRAMUSCULAR; INTRAVENOUS ONCE
Status: COMPLETED | OUTPATIENT
Start: 2022-11-22 | End: 2022-11-22

## 2022-11-22 RX ORDER — SODIUM CHLORIDE 450 MG/100ML
INJECTION, SOLUTION INTRAVENOUS CONTINUOUS
Status: DISCONTINUED | OUTPATIENT
Start: 2022-11-22 | End: 2022-11-23 | Stop reason: HOSPADM

## 2022-11-22 RX ORDER — OXYCODONE HYDROCHLORIDE AND ACETAMINOPHEN 5; 325 MG/1; MG/1
1 TABLET ORAL
Status: COMPLETED | OUTPATIENT
Start: 2022-11-22 | End: 2022-11-22

## 2022-11-22 RX ADMIN — SODIUM CHLORIDE: 4.5 INJECTION, SOLUTION INTRAVENOUS at 08:18

## 2022-11-22 RX ADMIN — FENTANYL CITRATE 50 MCG: 0.05 INJECTION, SOLUTION INTRAMUSCULAR; INTRAVENOUS at 08:53

## 2022-11-22 RX ADMIN — FENTANYL CITRATE 100 MCG: 0.05 INJECTION, SOLUTION INTRAMUSCULAR; INTRAVENOUS at 09:05

## 2022-11-22 RX ADMIN — OXYCODONE AND ACETAMINOPHEN 1 TABLET: 5; 325 TABLET ORAL at 11:59

## 2022-11-22 RX ADMIN — MIDAZOLAM 1 MG: 1 INJECTION INTRAMUSCULAR; INTRAVENOUS at 08:57

## 2022-11-22 RX ADMIN — MIDAZOLAM 1 MG: 1 INJECTION INTRAMUSCULAR; INTRAVENOUS at 08:53

## 2022-11-22 RX ADMIN — FENTANYL CITRATE 50 MCG: 0.05 INJECTION, SOLUTION INTRAMUSCULAR; INTRAVENOUS at 08:58

## 2022-11-22 ASSESSMENT — PAIN - FUNCTIONAL ASSESSMENT: PAIN_FUNCTIONAL_ASSESSMENT: 0-10

## 2022-11-22 ASSESSMENT — PAIN SCALES - GENERAL
PAINLEVEL_OUTOF10: 10
PAINLEVEL_OUTOF10: 8

## 2022-11-22 ASSESSMENT — PAIN DESCRIPTION - LOCATION
LOCATION: THROAT;CHEST;ABDOMEN
LOCATION: ABDOMEN

## 2022-11-22 ASSESSMENT — PAIN DESCRIPTION - DESCRIPTORS: DESCRIPTORS: ACHING

## 2022-11-22 ASSESSMENT — PAIN DESCRIPTION - ORIENTATION: ORIENTATION: LEFT

## 2022-11-22 NOTE — PROGRESS NOTES
0830 Patient received in CT scanning for pre-procedure assessment with family at bedside. Monitor applied. 0521 Dr. Adair Weiss here; spoke to patient. This procedure has been fully reviewed with the patient and written informed consent has been obtained. 9151 Patient assisted to CT table and pre scan started. 9227 Pathology called to CT area for procedure. 9231 Procedure started with Dr. Adair Weiss. 0669 Pathology arrived to CT.   0914 Samples collected and given to pathologist for further review. 8321 Procedure completed; patient tolerated well. Post scan obtained. 7200 Bacitracin oint, band aid to site; no bleeding noted. 2503 Patient on cart; comfort ensured. 0930 Report called to OPN and patient taken to OPN via cart with family at bedside.

## 2022-11-22 NOTE — DISCHARGE INSTRUCTIONS
POST BIOPSY DISCHARGE INSTRUCTION SHEET    DIET:  As tolerated    ACTIVITY:  Rest at home on sofa, bed or recliner today. Bathroom privileges only today. Limit any exertion (pushing or pulling) today. No lifting for 3 days. No driving today. Check biopsy site frequently today. Resume any blood thinners in 24 hours. Keep band aid clean & dry - replace as needed, may remove in 48 hours. RETURN TO NEAREST EMERGENCY ROOM IF YOU HAVE ANY OF THE FOLLOWING:  Sign of bleeding, swelling, drainage from biopsy site or severe pain (slight discomfort to be expected) around biopsy site. Repeated nausea/vomiting/abdominal pain. Elevated temperature above 101 degrees. Shortness of breath. Chest pain. Keep scheduled appointment with your physician. If sedation given, follow post sedation instruction sheet.      _ SEDATION/ANALGESIA INFORMATION HOME GOING ADVICE    Review the following information with the patient prior to the procedure. Sedation/agalgesia is used during short medical procedures under controlled supervision. The medication will produce a strong relaxation. You will be able to hear, speak and follow instructions, but you memory and alertness will be decreased. You will be able to swallow and breathe on your own. During sedation/analgesia you blood pressure, hear and breathing will be watched closely. After the procedure, you may not remember what was said or done. Procedure: LUNG BIOPSY      Date:  11/22/22  You may have the following effects from the medication. Drowsiness, dizziness, sleepiness or confusion. Difficulty remembering or delayed reaction times. Loss of fine muscle control or difficulty with your balance especially while walking. Difficulty focusing or blurred vision. You may not be aware of slight changes in your behavior and/or your reaction time because of the medication used during the procedure. Therefore you should follow these instructions.   Have someone responsible help you with your care. Do not drive for 24 hours. Do not operate equipment for 24 hours (lawnmowers, power tools, kitchen accessories, stove). Do not drink any alcoholic beverages for a minimum of 24 hours. Do not make important personal, legal or business decisions for 24 hours. You may experience dizziness or lightheadedness. Move slowly and carefully, do not make sudden position changes. Drink extra amounts of fluids today. Increase your diet as tolerated (unless you have received specific instructions from your doctor). If you feel nauseated, continue with liquids until nausea is gone  Notify your physician if you have not urinated within 8 hours after the procedure. Resume your medications unless otherwise instructed. contact your physician if you have any questions or concerns. I have been informed and understand how to care for myself/the patient at home. i know who to call if Frankie Walsh question or concerns.        The adult responsible for my discharge care is: [address] have received the sedation/analgesia medications/s:  VERSED, FENTANYL     IF YOU REPORT TO AN EMERGENCY ROOM, DOCTOR'S OFFICE OR HOSPITAL WITHIN 24 HRS AFTER PROCEDURE, BRING THIS SHEET WITH YOU AND GIVE IT THE PHYSICIAN OR NURSE ATTENDING YOU.

## 2022-11-22 NOTE — OP NOTE
Department of Radiology  Post Procedure Progress Note      Pre-Procedure Diagnosis:  Left lung nodule    Procedure Performed:  CT guided lung biopsy    Anesthesia: local / versed and fentanyl    Findings: successful    Immediate Complications:  None    Estimated Blood Loss: minimal    SEE DICTATED PROCEDURE NOTE FOR COMPLETE DETAILS.     Electronically signed by Tim Burkett MD on 11/22/2022 at 9:44 AM

## 2022-11-22 NOTE — OP NOTE
[address] [place] [address]                                OPERATIVE REPORT    PATIENT NAME: Nicol Quinones                       :        1970  MED REC NO:   [id number]                           ROOM:  ACCOUNT NO:   [de-identified]                           ADMIT DATE: 2022  PROVIDER:     Traci Woodward. Deep Lemos MD    DATE OF PROCEDURE:  2022    PREOPERATIVE DIAGNOSIS:   Cancer of the epiglottis. POSTOPERATIVE DIAGNOSIS:  Cancer of the epiglottis. OPERATIONS PERFORMED:  1. EGD. 2.  Percutaneous endoscopic gastrostomy tube placement. 3.  Right subclavian vein MediPort placement. 4.  Intraoperative fluoroscopy. SURGEON:  Traci Woodward. MD Kamryn    ANESTHESIA:  General.    COMPLICATIONS:  None. ESTIMATED BLOOD LOSS:  10 mL. INDICATIONS FOR PROCEDURE:  The patient is a 36-year-old white female  who has cancer of the epiglottis, here for PEG tube and MediPort  placement. FINDINGS:  The patient had retained food products in the stomach, but  the antrum was widely patent and the duodenum was normal.  Standard PEG  was placed. A right subclavian vein MediPort was placed in the usual  fashion. OPERATIVE PROCEDURE:  The patient was brought to the operating suite,  placed supine on the operating room table. After adequate inhalational  anesthesia was administered, the patient was kept supine on the  operating bed and the Olympus endoscope was inserted in the back of the  throat and slid down easily into the esophagus to the length of the  esophagus and divided the stomach. Once divided the stomach, however,  there were clearly retained food products. We passed the scope down  into the antrum. We could see the pylorus and cannulated the pylorus  into the duodenum and it was normal.  No evidence of obstruction.   Scope  was withdrawn into the body of the stomach where air was insufflated  into the body of the stomach and then we could see our finger indenting  into the stomach lumen. A stab wound was made in the abdominal wall. A  needle was placed into the gastric lumen. Guidewire was passed through  the needle, a snare through the scope, grabbed the wire and then the  scope, wire, snare assembly was removed out through the mouth. At this  point in time, we inserted the PEG tube over the guidewire, pulled it  back through the abdominal wall, cut it to the appropriate length, added  the T-bar. I did go back down with the scope to verify the disc sitting  in the stomach and the disc was present. The scope was withdrawn. At  this point in time, the chest wall was prepped and draped in the usual  sterile fashion. The patient was placed in Trendelenburg position. A  needle was placed under the right clavicle into the subclavian vein and  fluoroscopy verified good placement of the wire. A point just below the  entrance of the wire was chosen and an incision was made and the  subcutaneous pocket was formed. The catheter was slid from the  guidewire to the MediPort pocket, cut to the appropriate length,  attached to the MediPort and then the introducer and peel-away sheath  were peeled over the guidewire. The guidewire introducer was removed  and the catheter was slid through the peel-away sheath as it was peeled  away. Fluoroscopy again verified good placement. Interrupted 3-0  Vicryl was used to close the subcutaneous and running 4-0 Vicryl was  used to close the skin. Steri-Strips were applied. SANTOS VALERO [facility], MD    D: 11/21/2022 16:39:47       T: 11/21/2022 16:43:32     /[id number]  Job#: [id number]     Doc#: [id number]    CC:

## 2022-11-22 NOTE — PROGRESS NOTES
0730 PT ADMITTED TO [facility] PER WHEELCHAIR FOR A LUNG BIOPSY WITH FAMILY AT BEDSIDE. PT INFORM THAT SHE WILL BE HERE FOR  7 HOURS APPROXIMATELY. PT STATES SHE HAS BEEN OFF BLOOD THINNERS, ASA AND PLAVIX FOR 5 DAYS. NPO SINCE YESTERDAY. SIP OF WATER WITH PERCOCET THIS AM AT 6 30 AM . PT RIGHTS AND RESPONSIBILITIES OFFERED TO PT. PTNOTED WITH LEFT AKA   0800 PT NOTED WITH MEDIPORT DRESSING TO RIGHT UPPER CHEST AND G TUBE TO LOWER ABDOMEN AREA. PT STATES THAT G TUBE AREA HAS BEEN SORE ALONG WITH PAIN TO THROAT WHICH HAS BEEN GOING ON FOR SOME TIME.   0820 PT TAKEN PER CART TO CT SCAN FOR LUNG BIOPSY   0825 REPORT CALLED TO MERARI SALAZAR. AWARE OF PAIN PILL TAKEN THIS AM   0932 PT RETURN TO OPN PER CART POST BIOPSY . LEFT LATERAL BANDAID DRY AND INTACT. NO BLEEDING, SWELLING NOTED. RESP EVEN AND EASY . Pt states pain unchanged from before procedure to the throat area. Some discomfort to the lung biopsy site and g tube insertion   0945 taking ice chips in.   1000 silvina mist taken. Denies nausea or sob at present. Bandaid remains dry and intact. 1030 resting quietly in bed. No complaints noted. 1045 assist up to bathroom. Nadir well. Pt did well with transfer self to wheelchair   [facility]   1120 PT TAKEN PER CART TO MAIN XRAY FOR CXR.   2907 PT RETURN FROM CXR. DR Michaelle Cordero CALLED AND INFORM PT STATES HER G TUBE AREA JUST   HURTS\"    ORDER RECEIVED.    1200 PAIN PILL GIVEN .   [facility] TO SEE PATIENT. NO FURTHER ORDERS. [address] FALLS ASLEEP EASILY. STATES HER PAIN IS DOWN TO A 8 , WHICH IT NEVER GOES BELOW THAT. 1250 SON AND BOYFRIEND AT BEDSIDE. DISCHARGE INSTRUCTIONS REVIEWED WITH PATIENT AND HER FAMILY. THEY VERBALIZE UNDERSTANDING OF HOME GOING INSTRUCTIONS. PT STATE SHE HAS TEETH ALL PULLED TOMORROW.    1300 PT TAKEN PER CART TO MAIN XRAY. PT DOING WELL TAKING POP IN .   1315 PT RETURN PER CART. PT HAD CLOTHES CHANGED. EAGER FOR DISCHARGE.    1320  [address]. NO FOLLOW UP XRAYS NEEDED. OKAY FOR DISCHARGE . PT IN [facility]. BANDAID DRY AND INTACT. NO BLEEDING, SWELLING NOTED. RESP EVEN AND EASY. PAIN IS BETTER. NO OTHER CONCERNS NOTED.    1325 PT ASSIST TO MAIN ENTRANCE PER HER OWN CHAIR.  SON WITH CAR.                                ___M_ Safety:       (Environmental)  [place] to environment  Ensure ID band is correct and in place/ allergy band as needed  Assess for fall risk  Initiate fall precautions as applicable (fall band, side rails, etc.)  Call light within reach  Bed in low position/ wheels locked    ____ Pain:       Assess pain level and characteristics  Administer analgesics as ordered  Assess effectiveness of pain management and report to MD as needed    ___M_ Knowledge Deficit:  Assess baseline knowledge  Provide teaching at level of understanding  Provide teaching via preferred learning method  Evaluate teaching effectiveness    __M__ Hemodynamic/Respiratory Status:       (Pre and Post Procedure Monitoring)  Assess/Monitor vital signs and LOC  Assess Baseline SpO2 prior to any sedation  Obtain weight/height  Assess vital signs/ LOC until patient meets discharge criteria  Monitor procedure site and notify MD of any issues    __M__ Infection-Risk of Central Venous Catheter:  Monitor for infection signs and symptoms (catheter site redness, temperature elevation, etc)  Assess for infection risks  Educate regarding infection prevention  Manage central venous catheter (flushes/ dressing changes per protocol)

## 2022-11-22 NOTE — H&P
[address] [address]  Sedation/Analgesia History & Physical    Pt Name: Martha Rudd  MRN: [id number]  YOB: 1970  Provider Performing Procedure: Erica Gibbs MD, MD  Primary Care Physician: Hunter Bernal MD    Formulation and discussion of sedation / procedure plans, risks, benefits, side effects and alternatives with patient and/or responsible adult completed. PRE-PROCEDURE   DNR-CCA/DNR-CC []Yes [x]No  Brief History/Pre-Procedure Diagnosis: Lung nodule          MEDICAL HISTORY  []CAD/Valve  []Liver Disease  []Lung Disease []Diabetes  []Hypertension []Renal Disease  [x]Additional information:       has a past medical history of Above knee amputation of left lower extremity ([facility] [address] Blood circulation, collateral, CAD (coronary artery disease), Diabetes mellitus ([facility] [address] Hyperlipidemia, Hypertension, MDRO (multiple drug resistant organisms) resistance, MI, old, Obesity (BMI 30-39.9), Status post skin graft, and Throat cancer ([facility] [address] SURGICAL HISTORY   has a past surgical history that includes  section (1998); Coronary angioplasty with stent (); Coronary angioplasty with stent; angioplasty (Left, 2014); vascular surgery; other surgical history (2015); other surgical history (Left, 2016); Coronary angioplasty with stent (2016); and Esophagogastroduodenoscopy (10/06/2022). Additional information:       ALLERGIES   Allergies as of 2022 - Fully Reviewed 2022   Allergen Reaction Noted    Shellfish-derived products Anaphylaxis 02/15/2016    Shrimp flavor Swelling 2015     Additional information:       MEDICATIONS   Coumadin Use Last 5 Days [x]No []Yes  Antiplatelet drug therapy use last 5 days  [x]No []Yes  Other anticoagulant use last 5 days  No [x]Yes  []  Current Outpatient Medications:     oxyCODONE-acetaminophen (PERCOCET) 5-325 MG per tablet, Take 1 tablet by mouth every 6 hours as needed for Pain for up to 12 days.  Intended supply: 7 days. Take lowest dose possible to manage pain, Disp: 12 tablet, Rfl: 0    ALPRAZolam (XANAX) 1 MG tablet, Take 1 mg by mouth as needed. , Disp: , Rfl:     oxyCODONE-acetaminophen (PERCOCET) 5-325 MG per tablet, Take 2 tablets by mouth every 8 hours as needed for Pain., Disp: , Rfl:     clopidogrel (PLAVIX) 75 MG tablet, Take 1 tablet by mouth daily, Disp: , Rfl:     TRULICITY 1.5 ZU/1.9KV SC injection, Inject 1.5 mg into the skin once a week, Disp: , Rfl:     HUMALOG KWIKPEN 100 UNIT/ML SOPN, , Disp: , Rfl:     fentaNYL 37.5 MCG/HR PT72, Apply 1 patch topically. (Patient not taking: Reported on 11/22/2022), Disp: , Rfl:     JARDIANCE 25 MG tablet, Take 1 tablet by mouth daily, Disp: , Rfl:     nitroGLYCERIN (NITROSTAT) 0.4 MG SL tablet, Place 1 tablet under the tongue every 5 minutes as needed for Chest pain Take 1 tablet for chest pain and repeat after 5 min if no relief, call 911 and take another dose 5 min later.  Max of 3 doses in 15 min., Disp: 25 tablet, Rfl: 3    metFORMIN (GLUCOPHAGE) 500 MG tablet, Take 2 tablets by mouth 2 times daily Resume 8/30, Disp: 60 tablet, Rfl: 3    pioglitazone (ACTOS) 30 MG tablet, Take 1 tablet by mouth daily, Disp: 30 tablet, Rfl: 3    atorvastatin (LIPITOR) 20 MG tablet, Take 2 tablets by mouth nightly, Disp: 30 tablet, Rfl: 3    lisinopril (PRINIVIL;ZESTRIL) 2.5 MG tablet, Take 1 tablet by mouth daily, Disp: 30 tablet, Rfl: 3    metoprolol tartrate (LOPRESSOR) 25 MG tablet, Take 1 tablet by mouth 2 times daily, Disp: 60 tablet, Rfl: 3    isosorbide dinitrate (ISORDIL) 20 MG tablet, Take 30 mg by mouth daily, Disp: , Rfl:     amLODIPine (NORVASC) 5 MG tablet, Take 5 mg by mouth daily, Disp: , Rfl:     budesonide-formoterol (SYMBICORT) 160-4.5 MCG/ACT AERO, Inhale 2 puffs into the lungs 2 times daily, Disp: , Rfl:     pregabalin (LYRICA) 150 MG capsule, Take 150 mg by mouth 2 times daily, Disp: , Rfl:     ALPRAZolam (XANAX) 0.5 MG tablet, Take 0.5 mg by mouth 2 times daily as needed, Disp: , Rfl:     aspirin 81 MG tablet, Take 81 mg by mouth daily, Disp: , Rfl:     albuterol (PROVENTIL HFA) 108 (90 BASE) MCG/ACT inhaler, Inhale 2 puffs into the lungs every 4 hours as needed for Wheezing or Shortness of Breath (Space out to every 6 hours as symptoms improve). Space out to every 6 hours as symptoms improve., Disp: 1 Inhaler, Rfl: 10    Current Facility-Administered Medications:     0.45 % sodium chloride infusion, , IntraVENous, Continuous, Zaid Toledo MD, Last Rate: 20 mL/hr at 11/22/22 0818, New Bag at 11/22/22 0818  Prior to Admission medications    Medication Sig Start Date End Date Taking? Authorizing Provider   oxyCODONE-acetaminophen (PERCOCET) 5-325 MG per tablet Take 1 tablet by mouth every 6 hours as needed for Pain for up to 12 days. Intended supply: 7 days. Take lowest dose possible to manage pain 11/21/22 12/3/22  Nimco Lunsford MD   ALPRAZolam [place]) 1 MG tablet Take 1 mg by mouth as needed. 10/17/22   Historical Provider, MD   oxyCODONE-acetaminophen (PERCOCET) 5-325 MG per tablet Take 2 tablets by mouth every 8 hours as needed for Pain. Historical Provider, MD   clopidogrel (PLAVIX) 75 MG tablet Take 1 tablet by mouth daily 7/28/22   Historical Provider, MD   TRULICITY 1.5 EK/6.8NB SC injection Inject 1.5 mg into the skin once a week 7/31/22   Historical Provider, MD   HUMALOG KWIKPEN 100 UNIT/ML SOPN  10/14/22   Historical Provider, MD   fentaNYL 37.5 MCG/HR PT72 Apply 1 patch topically. Patient not taking: Reported on 11/22/2022 10/6/22   Historical Provider, MD   JARDIANCE 25 MG tablet Take 1 tablet by mouth daily 10/13/22   Historical Provider, MD   nitroGLYCERIN (NITROSTAT) 0.4 MG SL tablet Place 1 tablet under the tongue every 5 minutes as needed for Chest pain Take 1 tablet for chest pain and repeat after 5 min if no relief, call 911 and take another dose 5 min later. Max of 3 doses in 15 min.  9/6/16   Annmarie Coughlin MD   metFORMIN (GLUCOPHAGE) 500 MG tablet Take 2 tablets by mouth 2 times daily Resume 8/30 8/29/16   Vlad Walton MD   pioglitazone (ACTOS) 30 MG tablet Take 1 tablet by mouth daily 8/29/16   Vlad Walton MD   atorvastatin (LIPITOR) 20 MG tablet Take 2 tablets by mouth nightly 8/29/16   Vlad Walton MD   lisinopril (PRINIVIL;ZESTRIL) 2.5 MG tablet Take 1 tablet by mouth daily 8/29/16   Vlad Walton MD   metoprolol tartrate (LOPRESSOR) 25 MG tablet Take 1 tablet by mouth 2 times daily 8/29/16   Vlad Walton MD   isosorbide dinitrate (ISORDIL) 20 MG tablet Take 30 mg by mouth daily    Historical Provider, MD   amLODIPine (NORVASC) 5 MG tablet Take 5 mg by mouth daily    Historical Provider, MD   budesonide-formoterol (SYMBICORT) 160-4.5 MCG/ACT AERO Inhale 2 puffs into the lungs 2 times daily    Historical Provider, MD   pregabalin (LYRICA) 150 MG capsule Take 150 mg by mouth 2 times daily    Historical Provider, MD   ALPRAZolam (XANAX) 0.5 MG tablet Take 0.5 mg by mouth 2 times daily as needed    Historical Provider, MD   aspirin 81 MG tablet Take 81 mg by mouth daily    Historical Provider, MD   albuterol (PROVENTIL HFA) 108 (90 BASE) MCG/ACT inhaler Inhale 2 puffs into the lungs every 4 hours as needed for Wheezing or Shortness of Breath (Space out to every 6 hours as symptoms improve). Space out to every 6 hours as symptoms improve.  7/29/14   Jefe Irvin MD     Additional information:       VITAL SIGNS   Vitals:    11/22/22 0936   BP: 125/66   Pulse: 88   Resp: 16   Temp:    SpO2: 95%       PHYSICAL:   Heart:  [x]Regular rate and rhythm  []Other:    Lungs:  [x]Clear    []Other:    Abdomen: [x]Soft    []Other:    Mental Status: [x]Alert & Oriented  []Other:      PLANNED PROCEDURE   [x]Biospy []Arteriogram              []Drainage   []Mediport Insertion  []Fistulogram []IV access       []Vertebroplasty / Augmentation  []IVC filter []Dialysis catheter []Biliary drainage  []Other: []CAPD Catheter []Nephrostomy Tube / Stent  SEDATION  Planned agent:[x]Midazolam []Meperidine [x]Sublimaze []Dilaudid []Morphine     []Diazepam  []Other:     ASA Classification:  []1 [x]2 []3 []4 []5  Class 1: A normal healthy patient  Class 2: Pt with mild to moderate systemic disease  Class 3: Severe systemic disease or disturbance  Class 4: Severe systemic disorders that are already life threatening. Class 5: Moribund pt with little chances of survival, for more than 24 hours. Mallampati I Airway Classification:   []1 [x]2 []3 []4    [x]Pre-procedure diagnostic studies complete and results available. Comment:    [x]Previous sedation/anesthesia experiences assessed. Comment:    [x]The patient is an appropriate candidate to undergo the planned procedure sedation and anesthesia. (Refer to nursing sedation/analgesia documentation record)  [x]Formulation and discussion of sedation/procedure plan, risks, and expectations with patient and/or responsible adult completed. [x]Patient examined immediately prior to the procedure.  (Refer to nursing sedation/analgesia documentation record)    Jen Orantes MD, MD  Electronically signed 11/22/2022 at 9:42 AM

## 2022-11-25 ENCOUNTER — HOSPITAL ENCOUNTER (EMERGENCY)
Age: 52
Discharge: HOME | End: 2022-11-26
Attending: EMERGENCY MEDICINE
Payer: MEDICARE

## 2022-11-25 ENCOUNTER — APPOINTMENT (OUTPATIENT)
Dept: CT IMAGING | Age: 52
End: 2022-11-25
Payer: MEDICARE

## 2022-11-25 DIAGNOSIS — R10.9 ABDOMINAL PAIN, UNSPECIFIED ABDOMINAL LOCATION: ICD-10-CM

## 2022-11-25 DIAGNOSIS — E08.10 DIABETIC KETOACIDOSIS WITHOUT COMA ASSOCIATED WITH DIABETES MELLITUS DUE TO UNDERLYING CONDITION (HCC): Primary | ICD-10-CM

## 2022-11-25 DIAGNOSIS — N39.0 URINARY TRACT INFECTION WITHOUT HEMATURIA, SITE UNSPECIFIED: ICD-10-CM

## 2022-11-25 LAB
ALBUMIN SERPL-MCNC: 3.3 G/DL (ref 3.5–5.1)
ALP BLD-CCNC: 379 U/L (ref 38–126)
ALT SERPL-CCNC: 25 U/L (ref 11–66)
ANION GAP SERPL CALCULATED.3IONS-SCNC: 20 MEQ/L (ref 8–16)
AST SERPL-CCNC: 25 U/L (ref 5–40)
BASOPHILS # BLD: 0.3 %
BASOPHILS ABSOLUTE: 0 THOU/MM3 (ref 0–0.1)
BILIRUB SERPL-MCNC: 0.2 MG/DL (ref 0.3–1.2)
BUN BLDV-MCNC: 18 MG/DL (ref 7–22)
CALCIUM SERPL-MCNC: 9.1 MG/DL (ref 8.5–10.5)
CHLORIDE BLD-SCNC: 96 MEQ/L (ref 98–111)
CO2: 19 MEQ/L (ref 23–33)
CREAT SERPL-MCNC: 0.5 MG/DL (ref 0.4–1.2)
EOSINOPHIL # BLD: 2.1 %
EOSINOPHILS ABSOLUTE: 0.2 THOU/MM3 (ref 0–0.4)
ERYTHROCYTE [DISTWIDTH] IN BLOOD BY AUTOMATED COUNT: 12.6 % (ref 11.5–14.5)
ERYTHROCYTE [DISTWIDTH] IN BLOOD BY AUTOMATED COUNT: 46.1 FL (ref 35–45)
GFR SERPL CREATININE-BSD FRML MDRD: > 60 ML/MIN/1.73M2
GLUCOSE BLD-MCNC: 191 MG/DL (ref 70–108)
HCT VFR BLD CALC: 46.5 % (ref 37–47)
HEMOGLOBIN: 15.4 GM/DL (ref 12–16)
IMMATURE GRANS (ABS): 0.02 THOU/MM3 (ref 0–0.07)
IMMATURE GRANULOCYTES: 0.2 %
LIPASE: 19.3 U/L (ref 5.6–51.3)
LYMPHOCYTES # BLD: 14 %
LYMPHOCYTES ABSOLUTE: 1.4 THOU/MM3 (ref 1–4.8)
MCH RBC QN AUTO: 32.8 PG (ref 26–33)
MCHC RBC AUTO-ENTMCNC: 33.1 GM/DL (ref 32.2–35.5)
MCV RBC AUTO: 99.1 FL (ref 81–99)
MONOCYTES # BLD: 7.7 %
MONOCYTES ABSOLUTE: 0.8 THOU/MM3 (ref 0.4–1.3)
NUCLEATED RED BLOOD CELLS: 0 /100 WBC
OSMOLALITY CALCULATION: 277.1 MOSMOL/KG (ref 275–300)
PLATELET # BLD: 413 THOU/MM3 (ref 130–400)
PMV BLD AUTO: 10.8 FL (ref 9.4–12.4)
POTASSIUM SERPL-SCNC: 4.4 MEQ/L (ref 3.5–5.2)
RBC # BLD: 4.69 MILL/MM3 (ref 4.2–5.4)
SEG NEUTROPHILS: 75.7 %
SEGMENTED NEUTROPHILS ABSOLUTE COUNT: 7.5 THOU/MM3 (ref 1.8–7.7)
SODIUM BLD-SCNC: 135 MEQ/L (ref 135–145)
TOTAL PROTEIN: 6.6 G/DL (ref 6.1–8)
WBC # BLD: 9.9 THOU/MM3 (ref 4.8–10.8)

## 2022-11-25 PROCEDURE — 83690 ASSAY OF LIPASE: CPT

## 2022-11-25 PROCEDURE — 80053 COMPREHEN METABOLIC PANEL: CPT

## 2022-11-25 PROCEDURE — 2580000003 HC RX 258: Performed by: EMERGENCY MEDICINE

## 2022-11-25 PROCEDURE — 96375 TX/PRO/DX INJ NEW DRUG ADDON: CPT

## 2022-11-25 PROCEDURE — 99285 EMERGENCY DEPT VISIT HI MDM: CPT

## 2022-11-25 PROCEDURE — 6360000002 HC RX W HCPCS: Performed by: EMERGENCY MEDICINE

## 2022-11-25 PROCEDURE — 74177 CT ABD & PELVIS W/CONTRAST: CPT

## 2022-11-25 PROCEDURE — 36415 COLL VENOUS BLD VENIPUNCTURE: CPT

## 2022-11-25 PROCEDURE — 81001 URINALYSIS AUTO W/SCOPE: CPT

## 2022-11-25 PROCEDURE — 85025 COMPLETE CBC W/AUTO DIFF WBC: CPT

## 2022-11-25 PROCEDURE — 6360000004 HC RX CONTRAST MEDICATION: Performed by: EMERGENCY MEDICINE

## 2022-11-25 PROCEDURE — 96374 THER/PROPH/DIAG INJ IV PUSH: CPT

## 2022-11-25 RX ORDER — METHYLPREDNISOLONE SODIUM SUCCINATE 125 MG/2ML
125 INJECTION, POWDER, LYOPHILIZED, FOR SOLUTION INTRAMUSCULAR; INTRAVENOUS ONCE
Status: COMPLETED | OUTPATIENT
Start: 2022-11-25 | End: 2022-11-25

## 2022-11-25 RX ORDER — 0.9 % SODIUM CHLORIDE 0.9 %
500 INTRAVENOUS SOLUTION INTRAVENOUS ONCE
Status: COMPLETED | OUTPATIENT
Start: 2022-11-25 | End: 2022-11-26

## 2022-11-25 RX ORDER — DIPHENHYDRAMINE HYDROCHLORIDE 50 MG/ML
25 INJECTION INTRAMUSCULAR; INTRAVENOUS ONCE
Status: COMPLETED | OUTPATIENT
Start: 2022-11-25 | End: 2022-11-25

## 2022-11-25 RX ORDER — FENTANYL CITRATE 50 UG/ML
50 INJECTION, SOLUTION INTRAMUSCULAR; INTRAVENOUS ONCE
Status: COMPLETED | OUTPATIENT
Start: 2022-11-25 | End: 2022-11-25

## 2022-11-25 RX ORDER — ONDANSETRON 2 MG/ML
4 INJECTION INTRAMUSCULAR; INTRAVENOUS ONCE
Status: COMPLETED | OUTPATIENT
Start: 2022-11-25 | End: 2022-11-25

## 2022-11-25 RX ADMIN — FENTANYL CITRATE 50 MCG: 0.05 INJECTION, SOLUTION INTRAMUSCULAR; INTRAVENOUS at 23:30

## 2022-11-25 RX ADMIN — SODIUM CHLORIDE 500 ML: 9 INJECTION, SOLUTION INTRAVENOUS at 23:29

## 2022-11-25 RX ADMIN — IOPAMIDOL 80 ML: 755 INJECTION, SOLUTION INTRAVENOUS at 23:20

## 2022-11-25 RX ADMIN — ONDANSETRON 4 MG: 2 INJECTION INTRAMUSCULAR; INTRAVENOUS at 23:30

## 2022-11-25 RX ADMIN — DIPHENHYDRAMINE HYDROCHLORIDE 25 MG: 50 INJECTION, SOLUTION INTRAMUSCULAR; INTRAVENOUS at 23:30

## 2022-11-25 RX ADMIN — METHYLPREDNISOLONE SODIUM SUCCINATE 125 MG: 125 INJECTION, POWDER, FOR SOLUTION INTRAMUSCULAR; INTRAVENOUS at 23:30

## 2022-11-25 ASSESSMENT — ENCOUNTER SYMPTOMS
COUGH: 0
ABDOMINAL PAIN: 1
TROUBLE SWALLOWING: 0
NAUSEA: 1
VOICE CHANGE: 0
CHEST TIGHTNESS: 0
VOMITING: 1
SHORTNESS OF BREATH: 0
DIARRHEA: 0
BLOOD IN STOOL: 0
BACK PAIN: 0

## 2022-11-25 ASSESSMENT — PAIN DESCRIPTION - DESCRIPTORS: DESCRIPTORS: BURNING

## 2022-11-25 ASSESSMENT — PAIN DESCRIPTION - PAIN TYPE: TYPE: ACUTE PAIN

## 2022-11-25 ASSESSMENT — PAIN SCALES - GENERAL: PAINLEVEL_OUTOF10: 10

## 2022-11-25 ASSESSMENT — PAIN - FUNCTIONAL ASSESSMENT: PAIN_FUNCTIONAL_ASSESSMENT: 0-10

## 2022-11-25 ASSESSMENT — PAIN DESCRIPTION - LOCATION: LOCATION: ABDOMEN

## 2022-11-25 ASSESSMENT — PAIN DESCRIPTION - FREQUENCY: FREQUENCY: INTERMITTENT

## 2022-11-25 ASSESSMENT — PAIN DESCRIPTION - ORIENTATION: ORIENTATION: MID

## 2022-11-26 VITALS
HEART RATE: 99 BPM | RESPIRATION RATE: 18 BRPM | TEMPERATURE: 98.9 F | DIASTOLIC BLOOD PRESSURE: 69 MMHG | SYSTOLIC BLOOD PRESSURE: 126 MMHG | BODY MASS INDEX: 27.38 KG/M2 | HEIGHT: 61 IN | WEIGHT: 145 LBS | OXYGEN SATURATION: 96 %

## 2022-11-26 LAB
AMORPHOUS: ABNORMAL
ANION GAP SERPL CALCULATED.3IONS-SCNC: 18 MEQ/L (ref 8–16)
ANION GAP SERPL CALCULATED.3IONS-SCNC: 18 MEQ/L (ref 8–16)
BACTERIA: ABNORMAL
BASE EXCESS MIXED: -8 MMOL/L (ref -2–3)
BETA-HYDROXYBUTYRATE: 38.09 MG/DL (ref 0.2–2.81)
BILIRUBIN URINE: NEGATIVE
BLOOD, URINE: ABNORMAL
BUN BLDV-MCNC: 17 MG/DL (ref 7–22)
BUN BLDV-MCNC: 20 MG/DL (ref 7–22)
CALCIUM SERPL-MCNC: 8.4 MG/DL (ref 8.5–10.5)
CALCIUM SERPL-MCNC: 9 MG/DL (ref 8.5–10.5)
CASTS: ABNORMAL /LPF
CHARACTER, URINE: ABNORMAL
CHLORIDE BLD-SCNC: 100 MEQ/L (ref 98–111)
CHLORIDE BLD-SCNC: 103 MEQ/L (ref 98–111)
CO2: 15 MEQ/L (ref 23–33)
CO2: 16 MEQ/L (ref 23–33)
COLLECTED BY:: ABNORMAL
COLOR: YELLOW
CREAT SERPL-MCNC: 0.5 MG/DL (ref 0.4–1.2)
CREAT SERPL-MCNC: 0.5 MG/DL (ref 0.4–1.2)
CRYSTALS: ABNORMAL
DEVICE: ABNORMAL
EPITHELIAL CELLS, UA: ABNORMAL /HPF
GFR SERPL CREATININE-BSD FRML MDRD: > 60 ML/MIN/1.73M2
GFR SERPL CREATININE-BSD FRML MDRD: > 60 ML/MIN/1.73M2
GLUCOSE BLD-MCNC: 166 MG/DL (ref 70–108)
GLUCOSE BLD-MCNC: 210 MG/DL (ref 70–108)
GLUCOSE BLD-MCNC: 220 MG/DL (ref 70–108)
GLUCOSE, URINE: >= 1000 MG/DL
HCO3, MIXED: 17 MMOL/L (ref 23–28)
KETONES, URINE: >= 160
LACTIC ACID: 0.7 MMOL/L (ref 0.5–2)
LEUKOCYTE ESTERASE, URINE: NEGATIVE
MUCUS: ABNORMAL
NITRITE, URINE: NEGATIVE
O2 SAT, MIXED: 94 %
OSMOLALITY CALCULATION: 273.5 MOSMOL/KG (ref 275–300)
OSMOLALITY CALCULATION: 280.8 MOSMOL/KG (ref 275–300)
PCO2, MIXED VENOUS: 31 MMHG (ref 41–51)
PH UA: 5.5 (ref 5–9)
PH, MIXED: 7.33 (ref 7.31–7.41)
PO2 MIXED: 76 MMHG (ref 25–40)
POTASSIUM REFLEX MAGNESIUM: 4.8 MEQ/L (ref 3.5–5.2)
POTASSIUM SERPL-SCNC: 4.1 MEQ/L (ref 3.5–5.2)
PROTEIN UA: 300 MG/DL
RBC URINE: ABNORMAL /HPF
RENAL EPITHELIAL, UA: ABNORMAL
SODIUM BLD-SCNC: 134 MEQ/L (ref 135–145)
SODIUM BLD-SCNC: 136 MEQ/L (ref 135–145)
SPECIFIC GRAVITY UA: > 1.03 (ref 1–1.03)
UROBILINOGEN, URINE: 1 EU/DL (ref 0–1)
WBC UA: ABNORMAL /HPF
YEAST: ABNORMAL

## 2022-11-26 PROCEDURE — 83605 ASSAY OF LACTIC ACID: CPT

## 2022-11-26 PROCEDURE — 2580000003 HC RX 258: Performed by: EMERGENCY MEDICINE

## 2022-11-26 PROCEDURE — 82948 REAGENT STRIP/BLOOD GLUCOSE: CPT

## 2022-11-26 PROCEDURE — 6370000000 HC RX 637 (ALT 250 FOR IP): Performed by: EMERGENCY MEDICINE

## 2022-11-26 PROCEDURE — 36415 COLL VENOUS BLD VENIPUNCTURE: CPT

## 2022-11-26 PROCEDURE — 80048 BASIC METABOLIC PNL TOTAL CA: CPT

## 2022-11-26 PROCEDURE — 82803 BLOOD GASES ANY COMBINATION: CPT

## 2022-11-26 PROCEDURE — 82010 KETONE BODYS QUAN: CPT

## 2022-11-26 RX ORDER — 0.9 % SODIUM CHLORIDE 0.9 %
1000 INTRAVENOUS SOLUTION INTRAVENOUS ONCE
Status: COMPLETED | OUTPATIENT
Start: 2022-11-26 | End: 2022-11-26

## 2022-11-26 RX ORDER — FLUCONAZOLE 200 MG/1
200 TABLET ORAL ONCE
Status: COMPLETED | OUTPATIENT
Start: 2022-11-26 | End: 2022-11-26

## 2022-11-26 RX ORDER — FLUCONAZOLE 100 MG/1
100 TABLET ORAL DAILY
Qty: 3 TABLET | Refills: 0 | Status: SHIPPED | OUTPATIENT
Start: 2022-11-26 | End: 2022-11-29

## 2022-11-26 RX ADMIN — SODIUM CHLORIDE 1000 ML: 9 INJECTION, SOLUTION INTRAVENOUS at 01:18

## 2022-11-26 RX ADMIN — DEXTROSE AND SODIUM CHLORIDE: 5; 450 INJECTION, SOLUTION INTRAVENOUS at 05:47

## 2022-11-26 RX ADMIN — FLUCONAZOLE 200 MG: 200 TABLET ORAL at 01:18

## 2022-11-26 RX ADMIN — INSULIN HUMAN 7 UNITS: 100 INJECTION, SOLUTION PARENTERAL at 05:53

## 2022-11-26 ASSESSMENT — PAIN DESCRIPTION - ORIENTATION
ORIENTATION: UPPER
ORIENTATION: MID

## 2022-11-26 ASSESSMENT — PAIN DESCRIPTION - PAIN TYPE
TYPE: ACUTE PAIN
TYPE: ACUTE PAIN

## 2022-11-26 ASSESSMENT — PAIN SCALES - GENERAL
PAINLEVEL_OUTOF10: 4
PAINLEVEL_OUTOF10: 6

## 2022-11-26 ASSESSMENT — PAIN DESCRIPTION - DESCRIPTORS
DESCRIPTORS: CRAMPING
DESCRIPTORS: BURNING

## 2022-11-26 ASSESSMENT — PAIN DESCRIPTION - FREQUENCY
FREQUENCY: INTERMITTENT
FREQUENCY: INTERMITTENT

## 2022-11-26 ASSESSMENT — PAIN DESCRIPTION - LOCATION
LOCATION: ABDOMEN
LOCATION: ABDOMEN

## 2022-11-26 NOTE — ED TRIAGE NOTES
Pt had a G-tube placed on 11/21/22. Pt states she is having ABD pain that radiates from the surgical site to lower ABD pain that also radiates up to chest area.

## 2022-11-26 NOTE — ED NOTES
Pt medicated per MAR. Denies further needs at this time. Call light within reach.      Amy Oneil RN  11/26/22 1946

## 2022-11-26 NOTE — DISCHARGE INSTRUCTIONS
Monitor your blood sugars every 4 hours at home today. Take your insulin as prescribed. Continue taking your home medication as prescribed. Follow-up with your primary care physician.

## 2022-11-26 NOTE — ED PROVIDER NOTES
Transfer of Care Note:   Physician Signing out: Ras Ball DO  Receiving Physician: Stacy Worley DO  Sign out time: 0700      Brief history:  Abdominal pain. There was concern about glycemic DKA. Patient received insulin and IV fluids prior to signout. Items pending that need to be checked:  Repeat BMP      Tentative Impression of patient:  euglycemic DKA  Acute cystitis secondary to    Expected disposition of patient:  Pending results, discharged. Additional Assessment and results:   I have personally performed a face to face diagnostic evaluation on this patient. The patient's initial evaluation and plan have been discussed with the prior physician who initially evaluated the patient. Nursing Notes, Past Medical Hx, Past Surgical Hx, Social Hx, Allergies, vital signs and Family Hx were all reviewed.       Vitals:    11/26/22 0750   BP: 126/69   Pulse: 99   Resp: 18   Temp:    SpO2: 96%     Physical Exam      Labs Reviewed   CBC WITH AUTO DIFFERENTIAL - Abnormal; Notable for the following components:       Result Value    MCV 99.1 (*)     RDW-SD 46.1 (*)     Platelets 276 (*)     All other components within normal limits   COMPREHENSIVE METABOLIC PANEL - Abnormal; Notable for the following components:    Glucose 191 (*)     Chloride 96 (*)     CO2 19 (*)     Alkaline Phosphatase 379 (*)     Albumin 3.3 (*)     Total Bilirubin 0.2 (*)     All other components within normal limits   URINALYSIS WITH MICROSCOPIC - Abnormal; Notable for the following components:    Glucose, Urine >= 1000 (*)     Ketones, Urine >= 160 (*)     Specific Gravity, UA >1.030 (*)     Blood, Urine SMALL (*)     Protein,  (*)     Character, Urine CLOUDY (*)     All other components within normal limits   ANION GAP - Abnormal; Notable for the following components:    Anion Gap 20.0 (*)     All other components within normal limits   BETA-HYDROXYBUTYRATE - Abnormal; Notable for the following components: Beta-Hydroxybutyrate 38.09 (*)     All other components within normal limits   BLOOD GAS, VENOUS - Abnormal; Notable for the following components:    PCO2, MIXED VENOUS 31 (*)     PO2, Mixed 76 (*)     HCO3, Mixed 17 (*)     Base Exc, Mixed -8.0 (*)     All other components within normal limits   BASIC METABOLIC PANEL W/ REFLEX TO MG FOR LOW K - Abnormal; Notable for the following components:    Sodium 134 (*)     CO2 16 (*)     Glucose 166 (*)     Calcium 8.4 (*)     All other components within normal limits   ANION GAP - Abnormal; Notable for the following components:    Anion Gap 18.0 (*)     All other components within normal limits   OSMOLALITY - Abnormal; Notable for the following components:    Osmolality Calc 273.5 (*)     All other components within normal limits   BASIC METABOLIC PANEL - Abnormal; Notable for the following components:    CO2 15 (*)     Glucose 210 (*)     All other components within normal limits   ANION GAP - Abnormal; Notable for the following components:    Anion Gap 18.0 (*)     All other components within normal limits   POCT GLUCOSE - Abnormal; Notable for the following components:    POC Glucose 220 (*)     All other components within normal limits   LIPASE   GLOMERULAR FILTRATION RATE, ESTIMATED   OSMOLALITY   LACTIC ACID   GLOMERULAR FILTRATION RATE, ESTIMATED   GLOMERULAR FILTRATION RATE, ESTIMATED   OSMOLALITY   POCT GLUCOSE   POCT GLUCOSE   POCT GLUCOSE         Medications   dextrose 5 % and 0.45 % NaCl 500 mL infusion ( IntraVENous New Bag 11/26/22 1612)   methylPREDNISolone sodium (SOLU-MEDROL) injection 125 mg (125 mg IntraVENous Given 11/25/22 2330)   diphenhydrAMINE (BENADRYL) injection 25 mg (25 mg IntraVENous Given 11/25/22 2330)   fentaNYL (SUBLIMAZE) injection 50 mcg (50 mcg IntraVENous Given 11/25/22 2330)   ondansetron (ZOFRAN) injection 4 mg (4 mg IntraVENous Given 11/25/22 2330)   0.9 % sodium chloride bolus (0 mLs IntraVENous Stopped 11/26/22 0118)   iopamidol (ISOVUE-370) 76 % injection 80 mL (80 mLs IntraVENous Given 11/25/22 2320)   fluconazole (DIFLUCAN) tablet 200 mg (200 mg Oral Given 11/26/22 0118)   0.9 % sodium chloride bolus (0 mLs IntraVENous Stopped 11/26/22 0218)   insulin regular (HUMULIN R;NOVOLIN R) injection 7 Units (7 Units IntraVENous Given 11/26/22 0553)         CT ABDOMEN PELVIS W IV CONTRAST Additional Contrast? None   Final Result   Impression:   1. PEG tube balloon appears to be within the gastric lumen. Mild soft    tissue edema seen along the PEG tube catheter likely related to recent    placement. No focal soft tissue fluid collection. 2. Gallstones. No gallbladder wall thickening to suggest acute    cholecystitis. 3. Small fat-containing anterior abdominal wall hernia   4. Normal appendix      This document has been electronically signed by: Isabela Wing MD on    11/26/2022 12:00 AM      All CTs at this facility use dose modulation techniques and iterative    reconstructions, and/or weight-based dosing   when appropriate to reduce radiation to a low as reasonably achievable. ED Course as of 11/26/22 0816   Sat Nov 26, 2022   0533 Case was discussed with the hospitalist, Dr. Olvin Carr as DKA is not severe who recommended that we close the gap with a bolus of insulin and D5 half NS and recheck [AB]      ED Course User Index  [AB] Allison Grullon, DO         Further MDM and disposition:   Assessment:   Euglycemic diabetic ketoacidosis. Plan:   Repeat BMP. Repeat BMP showed stable anion gap at 18. I have reevaluated the patient. She stated that she wanted to go home. I discussed the patient's findings with her. She stated that she was willing to take her diabetes medication at home and keep an eye on her glucose. She felt that she really needed to get home at this time. Since the patient's Had not worsened, I felt it was reasonable the patient could be discharged home.   Patient will have her urinary tract infection treated with fluconazole since it speciated out as Candida species. Patient verbalized understands medals plan. Patient discharged home this time. Final diagnoses:   Euglycemic Diabetic ketoacidosis without coma    Abdominal pain, unspecified abdominal location   Urinary tract infection without hematuria, site unspecified     New Prescriptions    FLUCONAZOLE (DIFLUCAN) 100 MG TABLET    Take 1 tablet by mouth daily for 3 days         Condition: condition: stable  Dispo: Discharge to home    This transcription was electronically signed. It was dictated by use of voice recognition software and electronically transcribed. The transcription may contain errors not detected in proofreading.         Lia Carpenter DO  11/26/22 1449

## 2022-11-26 NOTE — ED NOTES
ED nurse-to-nurse bedside report    Chief Complaint   Patient presents with    G Tube Complications      LOC: alert and orientated to name, place, date  Vital signs   Vitals:    11/26/22 0604 11/26/22 0615 11/26/22 0649 11/26/22 0705   BP: 132/89 122/68 132/75 121/73   Pulse: 98 99 (!) 101    Resp: 15 17 16    Temp:       TempSrc:       SpO2: 96%  97% 95%   Weight:       Height:          Pain:    Pain Interventions: See MAR  Pain Goal: 5  Oxygen: No    Current needs required Pain control   Telemetry: Yes  LDAs:   Peripheral IV 11/25/22 Left Forearm (Active)   Site Assessment Clean, dry & intact 11/25/22 2257   Line Status Blood return noted;Specimen collected 11/25/22 2257   Phlebitis Assessment No symptoms 11/25/22 2257   Infiltration Assessment 0 11/25/22 2257   Dressing Status Clean, dry & intact; New dressing applied 11/25/22 2257   Dressing Type Transparent 11/25/22 2257     Continuous Infusions:    IV infusion builder 250 mL/hr at 11/26/22 0547     Mobility: Requires assistance * 1  [place] Fall Risk Score: No flowsheet data found.   Fall Interventions: Side rails up, call light within reach  Report given to: Bran Pace RN  11/26/22 4574

## 2022-11-26 NOTE — ED PROVIDER NOTES
[facility] [address] [address] EMERGENCY DEPT    EMERGENCY MEDICINE     Pt Name: Sarah Beth Grove  MRN: [id number]  [place] 1970  Date of evaluation: 2022  Provider: Yinka Jackson DO, [address]       Chief Complaint   Patient presents with    G Tube Complications       HISTORY OF PRESENT ILLNESS    Sarah Beth Grove is a pleasant 47 y.o. female   Presents to the emergency department from home   Who presents with abdominal pain secondary to PEG tube placement. Patient states she has history of throat cancer, underwent an EGD 3 days ago and upon awakening found that a PEG tube was placed along with a right Mediport. She states she has had pain both at the site in throughout her abdomen ever since the PEG tube placement. She did have 3 episodes of vomiting over the last several days. Has fairly significant discomfort and pain with any attempts to use the bathroom, both bowel movement as well as urination. She has also had dysuria and urgency as well. Denies any fever or chills. PEG tube is not to be used at this point given that it is fresh. The patient is able to take p.o. Triage notes and Nursing notes were reviewed by myself. Any discrepancies are addressed above. 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 9)     Status post skin graft 01/15/2014    Amniox - Dr Letha Fregoso cancer [facility]) 10/2022    squamous cell carcinoma of the epiglottis       SURGICAL HISTORY       Past Surgical History:   Procedure Laterality Date    ANGIOPLASTY Left 2014    left leg     SECTION  1998    CORONARY ANGIOPLASTY WITH STENT PLACEMENT      [facility]    CORONARY ANGIOPLASTY WITH STENT PLACEMENT      3 stents in [place]  08/28/2016    debbi, 1 stent to RCA    CT NEEDLE BIOPSY LUNG PERCUTANEOUS  11/22/2022    CT NEEDLE BIOPSY LUNG PERCUTANEOUS 11/22/2022 [facility] CT SCAN    ESOPHAGOGASTRODUODENOSCOPY  10/06/2022    [facility]    GASTROSTOMY TUBE PLACEMENT N/A 11/21/2022    EGD Peg Tube Placement possible Open G Tube performed by Man Thomas MD at [address] [facility]  11/19/2015    Left Femoral to Peroneal Bypass with Composite Vein Graft and Propaten Graft     OTHER SURGICAL HISTORY Left 02/25/2016    Left Leg Above Knee Amputation    PORT SURGERY N/A 11/21/2022    Insertion Single Lumen Mediport performed by Man Thomas MD at [facility]       Previous Medications    ALBUTEROL (PROVENTIL HFA) 108 (90 BASE) MCG/ACT INHALER    Inhale 2 puffs into the lungs every 4 hours as needed for Wheezing or Shortness of Breath (Space out to every 6 hours as symptoms improve). Space out to every 6 hours as symptoms improve. ALPRAZOLAM (XANAX) 0.5 MG TABLET    Take 0.5 mg by mouth 2 times daily as needed    ALPRAZOLAM (XANAX) 1 MG TABLET    Take 1 mg by mouth as needed. AMLODIPINE (NORVASC) 5 MG TABLET    Take 5 mg by mouth daily    ASPIRIN 81 MG TABLET    Take 81 mg by mouth daily    ATORVASTATIN (LIPITOR) 20 MG TABLET    Take 2 tablets by mouth nightly    BUDESONIDE-FORMOTEROL (SYMBICORT) 160-4.5 MCG/ACT AERO    Inhale 2 puffs into the lungs 2 times daily    CLOPIDOGREL (PLAVIX) 75 MG TABLET    Take 1 tablet by mouth daily    FENTANYL 37.5 MCG/HR PT72    Apply 1 patch topically.     HUMALOG KWIKPEN 100 UNIT/ML SOPN        ISOSORBIDE DINITRATE (ISORDIL) 20 MG TABLET    Take 30 mg by mouth daily    JARDIANCE 25 MG TABLET    Take 1 tablet by mouth daily    LISINOPRIL (PRINIVIL;ZESTRIL) 2.5 MG TABLET    Take 1 tablet by mouth daily    METFORMIN (GLUCOPHAGE) 500 MG TABLET    Take 2 tablets by mouth 2 times daily Resume     METOPROLOL TARTRATE (LOPRESSOR) 25 MG TABLET    Take 1 tablet by mouth 2 times daily    NITROGLYCERIN (NITROSTAT) 0.4 MG SL TABLET    Place 1 tablet under the tongue every 5 minutes as needed for Chest pain Take 1 tablet for chest pain and repeat after 5 min if no relief, call 911 and take another dose 5 min later. Max of 3 doses in 15 min. OXYCODONE-ACETAMINOPHEN (PERCOCET) 5-325 MG PER TABLET    Take 2 tablets by mouth every 8 hours as needed for Pain. OXYCODONE-ACETAMINOPHEN (PERCOCET) 5-325 MG PER TABLET    Take 1 tablet by mouth every 6 hours as needed for Pain for up to 12 days. Intended supply: 7 days. Take lowest dose possible to manage pain    PIOGLITAZONE (ACTOS) 30 MG TABLET    Take 1 tablet by mouth daily    PREGABALIN (LYRICA) 150 MG CAPSULE    Take 150 mg by mouth 2 times daily    TRULICITY 1.5 HG/0.5MV SC INJECTION    Inject 1.5 mg into the skin once a week       ALLERGIES     Shellfish-derived products and Shrimp flavor    FAMILY HISTORY       Family History   Problem Relation Age of Onset    Heart Disease Maternal Grandfather     High Blood Pressure Maternal Grandfather     Asthma Neg Hx     Cancer Neg Hx     Diabetes Neg Hx     Kidney Disease Neg Hx     Stroke Neg Hx         SOCIAL HISTORY       Social History     Socioeconomic History    Marital status:     Number of children: 1   Tobacco Use    Smoking status: Former     Packs/day: 0.50     Years: 27.00     Pack years: 13.50     Types: Cigarettes     Quit date: 2014     Years since quittin.1    Smokeless tobacco: Never    Tobacco comments:     e cigs daily use 22   Vaping Use    Vaping Use: Some days    Substances: Nicotine   Substance and Sexual Activity    Alcohol use: No    Drug use: No       REVIEW OF SYSTEMS     Review of Systems   Constitutional:  Negative for chills, diaphoresis and fever. HENT:  Negative for trouble swallowing and voice change.     Eyes: Negative for visual disturbance. Respiratory:  Negative for cough, chest tightness and shortness of breath. Cardiovascular:  Negative for chest pain and leg swelling. Gastrointestinal:  Positive for abdominal pain, nausea and vomiting. Negative for blood in stool and diarrhea. Genitourinary:  Positive for dysuria and urgency. Negative for frequency and hematuria. Musculoskeletal:  Negative for back pain and neck pain. Skin:  Negative for rash and wound. Neurological:  Negative for speech difficulty, weakness, numbness and headaches. Psychiatric/Behavioral:  Negative for confusion. Except as noted above the remainder of the review of systems was reviewed and is. PHYSICAL EXAM    (up to 7 for level 4, 8 or more for level 5)     ED Triage Vitals [11/25/22 2234]   BP Temp Temp Source Heart Rate Resp SpO2 Height Weight   (!) 139/101 98.9 °F (37.2 °C) Oral (!) 102 17 97 % 5' 1\" (1.549 m) 145 lb (65.8 kg)       Physical Exam  Vitals and nursing note reviewed. Constitutional:       General: She is not in acute distress. Appearance: She is well-developed. She is not ill-appearing, toxic-appearing or diaphoretic. HENT:      Head: Normocephalic and atraumatic. Eyes:      General: No scleral icterus. Conjunctiva/sclera: Conjunctivae normal.      Right eye: Right conjunctiva is not injected. Left eye: Left conjunctiva is not injected. Pupils: Pupils are equal, round, and reactive to light. Neck:      Thyroid: No thyromegaly. Trachea: No tracheal deviation. Cardiovascular:      Rate and Rhythm: Normal rate and regular rhythm. Heart sounds: Normal heart sounds. No murmur heard. No friction rub. No gallop. Comments: R subclavian mediport in place without erythema or infection signs  Pulmonary:      Effort: Pulmonary effort is normal. No respiratory distress. Breath sounds: Normal breath sounds. No stridor. No wheezing or rales.    Abdominal:      General: Bowel sounds are normal. There is no distension. Palpations: Abdomen is soft. There is no mass. Tenderness: There is abdominal tenderness (TTP LLQ and around the PEG. PEG site is nonerythematous, tube is in place). There is no guarding or rebound. Comments: Negative Ayala's sign  Nontender McBurney's Point  Negative Rovsig's sign  No bruising or echymosis of abdomen   Musculoskeletal:         General: No tenderness. Cervical back: Normal range of motion and neck supple. Comments: Negative Aurelio's Sign bilaterally   Lymphadenopathy:      Cervical: No cervical adenopathy. Skin:     General: Skin is warm and dry. Coloration: Skin is not pale. Findings: No erythema or rash. Neurological:      Mental Status: She is alert and oriented to person, place, and time. Cranial Nerves: No cranial nerve deficit. Motor: No abnormal muscle tone. Coordination: Coordination normal.      Comments: No nystagmus   Psychiatric:         Behavior: Behavior normal.         Thought Content: Thought content normal.       DIAGNOSTIC RESULTS     EKG:(none if blank)  All [facility]'s [facility] who either signs or Co-signs this chart in the absence of a cardiologist.        RADIOLOGY: (none if blank)   Interpretation per the Radiologistbelow, if available at the time of this note:    CT ABDOMEN PELVIS W IV CONTRAST Additional Contrast? None    (Results Pending)       LABS:  Labs Reviewed   CBC WITH AUTO DIFFERENTIAL   COMPREHENSIVE METABOLIC PANEL   LIPASE       All other labs were within normal range or not returned as of this dictation. Please note, any cultures that may have been sent were not resulted at the time of this patient visit.     EMERGENCY DEPARTMENT COURSE andMedical Decision Making:     MDM  /  ED Course as of 11/26/22 0802   Sat Nov 26, 2022   0530 Case was discussed with the hospitalist, Dr. Kimberly Smith as DKA is not severe who recommended that we close the gap with a bolus of insulin and D5 half NS and recheck [AB]      ED Course User Index  [AB] Mark Sharma, DO       I question a likely euglycemic DKA, ? Januvia? Yeast vaginitis vs UTI (given the symptoms)  Hydraton  Closing gap; pt prefers not to be admitted  D5 1/2NS to keep blood sugar up while on insulin. case signed out to Dr Flaquita Rocha for recheck of gap/re-eval of pt       ED Medications administered this visit:    Medications   methylPREDNISolone sodium (SOLU-MEDROL) injection 125 mg (has no administration in time range)   diphenhydrAMINE (BENADRYL) injection 25 mg (has no administration in time range)         Procedures: (None if blank)         CLINICAL IMPRESSION     1. Euglycemic Diabetic ketoacidosis without coma     2. Abdominal pain, unspecified abdominal location    3. Urinary tract infection without hematuria, site unspecified          DISPOSITION/PLAN   DISPOSITION        PATIENT REFERRED TO:  No follow-up provider specified.     DISCHARGE MEDICATIONS:  New Prescriptions    No medications on file           (Please note that portions of this note were completed with a voice recognition program.  Efforts were made to edit the dictations but occasionallywords are mis-transcribed.)      Lora Cavazos DO,FACEP (electronically signed)  Attending Physician, Emergency [address], DO  11/26/22 3821

## 2022-11-28 ENCOUNTER — APPOINTMENT (OUTPATIENT)
Dept: GENERAL RADIOLOGY | Age: 52
End: 2022-11-28
Payer: MEDICARE

## 2022-11-28 ENCOUNTER — HOSPITAL ENCOUNTER (EMERGENCY)
Age: 52
Discharge: HOME | End: 2022-11-28
Payer: MEDICARE

## 2022-11-28 VITALS
OXYGEN SATURATION: 97 % | HEIGHT: 61 IN | HEART RATE: 83 BPM | DIASTOLIC BLOOD PRESSURE: 87 MMHG | RESPIRATION RATE: 17 BRPM | TEMPERATURE: 98.4 F | WEIGHT: 138 LBS | BODY MASS INDEX: 26.06 KG/M2 | SYSTOLIC BLOOD PRESSURE: 123 MMHG

## 2022-11-28 DIAGNOSIS — T85.848S PAIN AROUND PEG TUBE SITE, SEQUELA: Primary | ICD-10-CM

## 2022-11-28 DIAGNOSIS — C32.1 CANCER, EPIGLOTTIS (HCC): ICD-10-CM

## 2022-11-28 DIAGNOSIS — L24.A9 WOUND DRAINAGE: ICD-10-CM

## 2022-11-28 LAB
ANION GAP SERPL CALCULATED.3IONS-SCNC: 15 MEQ/L (ref 8–16)
BASOPHILS # BLD: 0.2 %
BASOPHILS ABSOLUTE: 0 THOU/MM3 (ref 0–0.1)
BUN BLDV-MCNC: 19 MG/DL (ref 7–22)
CALCIUM SERPL-MCNC: 9.4 MG/DL (ref 8.5–10.5)
CHLORIDE BLD-SCNC: 101 MEQ/L (ref 98–111)
CO2: 23 MEQ/L (ref 23–33)
CREAT SERPL-MCNC: 0.6 MG/DL (ref 0.4–1.2)
EOSINOPHIL # BLD: 0.6 %
EOSINOPHILS ABSOLUTE: 0.1 THOU/MM3 (ref 0–0.4)
ERYTHROCYTE [DISTWIDTH] IN BLOOD BY AUTOMATED COUNT: 12.8 % (ref 11.5–14.5)
ERYTHROCYTE [DISTWIDTH] IN BLOOD BY AUTOMATED COUNT: 46.4 FL (ref 35–45)
GFR SERPL CREATININE-BSD FRML MDRD: > 60 ML/MIN/1.73M2
GLUCOSE BLD-MCNC: 325 MG/DL (ref 70–108)
HCT VFR BLD CALC: 42.9 % (ref 37–47)
HEMOGLOBIN: 14.3 GM/DL (ref 12–16)
IMMATURE GRANS (ABS): 0.04 THOU/MM3 (ref 0–0.07)
IMMATURE GRANULOCYTES: 0.4 %
LYMPHOCYTES # BLD: 20.4 %
LYMPHOCYTES ABSOLUTE: 2.2 THOU/MM3 (ref 1–4.8)
MCH RBC QN AUTO: 32.7 PG (ref 26–33)
MCHC RBC AUTO-ENTMCNC: 33.3 GM/DL (ref 32.2–35.5)
MCV RBC AUTO: 98.2 FL (ref 81–99)
MONOCYTES # BLD: 7.6 %
MONOCYTES ABSOLUTE: 0.8 THOU/MM3 (ref 0.4–1.3)
NUCLEATED RED BLOOD CELLS: 0 /100 WBC
OSMOLALITY CALCULATION: 292.4 MOSMOL/KG (ref 275–300)
PLATELET # BLD: 362 THOU/MM3 (ref 130–400)
PMV BLD AUTO: 10.5 FL (ref 9.4–12.4)
POTASSIUM SERPL-SCNC: 4.6 MEQ/L (ref 3.5–5.2)
RBC # BLD: 4.37 MILL/MM3 (ref 4.2–5.4)
SEG NEUTROPHILS: 70.8 %
SEGMENTED NEUTROPHILS ABSOLUTE COUNT: 7.8 THOU/MM3 (ref 1.8–7.7)
SODIUM BLD-SCNC: 139 MEQ/L (ref 135–145)
WBC # BLD: 11 THOU/MM3 (ref 4.8–10.8)

## 2022-11-28 PROCEDURE — 87075 CULTR BACTERIA EXCEPT BLOOD: CPT

## 2022-11-28 PROCEDURE — 87205 SMEAR GRAM STAIN: CPT

## 2022-11-28 PROCEDURE — 36415 COLL VENOUS BLD VENIPUNCTURE: CPT

## 2022-11-28 PROCEDURE — 87147 CULTURE TYPE IMMUNOLOGIC: CPT

## 2022-11-28 PROCEDURE — 6360000004 HC RX CONTRAST MEDICATION: Performed by: NURSE PRACTITIONER

## 2022-11-28 PROCEDURE — 87040 BLOOD CULTURE FOR BACTERIA: CPT

## 2022-11-28 PROCEDURE — 96365 THER/PROPH/DIAG IV INF INIT: CPT

## 2022-11-28 PROCEDURE — 85025 COMPLETE CBC W/AUTO DIFF WBC: CPT

## 2022-11-28 PROCEDURE — 49465 FLUORO EXAM OF G/COLON TUBE: CPT

## 2022-11-28 PROCEDURE — 80048 BASIC METABOLIC PNL TOTAL CA: CPT

## 2022-11-28 PROCEDURE — 87070 CULTURE OTHR SPECIMN AEROBIC: CPT

## 2022-11-28 PROCEDURE — 87106 FUNGI IDENTIFICATION YEAST: CPT

## 2022-11-28 PROCEDURE — 96375 TX/PRO/DX INJ NEW DRUG ADDON: CPT

## 2022-11-28 PROCEDURE — 99284 EMERGENCY DEPT VISIT MOD MDM: CPT

## 2022-11-28 PROCEDURE — 6360000002 HC RX W HCPCS: Performed by: NURSE PRACTITIONER

## 2022-11-28 PROCEDURE — 2580000003 HC RX 258: Performed by: NURSE PRACTITIONER

## 2022-11-28 RX ORDER — ONDANSETRON 2 MG/ML
4 INJECTION INTRAMUSCULAR; INTRAVENOUS ONCE
Status: COMPLETED | OUTPATIENT
Start: 2022-11-28 | End: 2022-11-28

## 2022-11-28 RX ORDER — CLINDAMYCIN HYDROCHLORIDE 150 MG/1
450 CAPSULE ORAL 4 TIMES DAILY
Qty: 84 CAPSULE | Refills: 0 | Status: SHIPPED | OUTPATIENT
Start: 2022-11-28 | End: 2022-12-05

## 2022-11-28 RX ADMIN — VANCOMYCIN HYDROCHLORIDE 1000 MG: 1 INJECTION, POWDER, LYOPHILIZED, FOR SOLUTION INTRAVENOUS at 10:43

## 2022-11-28 RX ADMIN — DIATRIZOATE MEGLUMINE AND DIATRIZOATE SODIUM 30 ML: 600; 100 SOLUTION ORAL; RECTAL at 10:11

## 2022-11-28 RX ADMIN — HYDROMORPHONE HYDROCHLORIDE 1 MG: 1 INJECTION, SOLUTION INTRAMUSCULAR; INTRAVENOUS; SUBCUTANEOUS at 10:40

## 2022-11-28 RX ADMIN — ONDANSETRON 4 MG: 2 INJECTION INTRAMUSCULAR; INTRAVENOUS at 10:39

## 2022-11-28 ASSESSMENT — ENCOUNTER SYMPTOMS
CHEST TIGHTNESS: 0
VOMITING: 1
COUGH: 0
BACK PAIN: 0
RHINORRHEA: 0
EYE REDNESS: 0
NAUSEA: 1
ABDOMINAL PAIN: 1

## 2022-11-28 ASSESSMENT — PAIN SCALES - GENERAL
PAINLEVEL_OUTOF10: 10
PAINLEVEL_OUTOF10: 9

## 2022-11-28 ASSESSMENT — PAIN DESCRIPTION - LOCATION: LOCATION: ABDOMEN

## 2022-11-28 ASSESSMENT — PAIN - FUNCTIONAL ASSESSMENT: PAIN_FUNCTIONAL_ASSESSMENT: 0-10

## 2022-11-28 ASSESSMENT — PAIN DESCRIPTION - PAIN TYPE: TYPE: SURGICAL PAIN

## 2022-11-28 NOTE — ED NOTES
Pt to ER due to surgical pain from peg tube placement. She states that this was placed a week ago today and she is still having pain from it. Pt states she has been to the ER twice in the past few days and she can't take it anymore. She wants the peg tube removed.       Giselle Souza RN  11/28/22 1354

## 2022-11-28 NOTE — DISCHARGE INSTRUCTIONS
Keep area clean and dry. Follow up with Dr. Janessa Macias office as directed. Stop amoxicillin. Start Clindamycin. I spoke to Dr. Maria nurse today. He is out for a family emergency. She is seeing what she can do about getting your meds switched.

## 2022-11-28 NOTE — ED PROVIDER NOTES
[facility] Emergency Department    CHIEF COMPLAINT       Chief Complaint   Patient presents with    Feeding Tube Problem     Pt wants it removed       Nurses Notes reviewed and I agree except as noted in the HPI. HISTORY OF PRESENT ILLNESS    [place] Hipolito ru 47 y.o. female who presents to the ED for evaluation of problems with her PEG tube. She had the tube placed on 11/21/22 by Dr. Vandana Antunez. Has had pain since. Had CT scan done on 11/25/22 in this dept that was reassuring. She then saw Dr. Gunnar Bangura at [facility] yesterday. Was treated for pain and dc. Patient denies contacting Dr. Vandana Antunez. States her oncologist told her to come here and have us call him. The patient reports that they are having trouble getting fluid into the tube and that she is having so much pain in her muscles around her abdomen that she cannot have a BM. The patient reports fever and chills (VS are normal here). HPI was provided by the patient    REVIEW OF SYSTEMS     Review of Systems   Constitutional:  Negative for chills, fatigue and fever. HENT:  Negative for congestion, ear discharge, ear pain, postnasal drip and rhinorrhea. Eyes:  Negative for redness. Respiratory:  Negative for cough and chest tightness. Cardiovascular:  Negative for chest pain and leg swelling. Gastrointestinal:  Positive for abdominal pain, nausea (Chronic) and vomiting (chronic). Genitourinary:  Negative for difficulty urinating, dysuria, enuresis, flank pain and hematuria. Musculoskeletal:  Negative for back pain and joint swelling. Skin:  Negative for rash. Neurological:  Negative for dizziness, light-headedness, numbness and headaches. Psychiatric/Behavioral:  Negative for agitation, behavioral problems and confusion. All other systems negative except as noted.       PAST MEDICAL HISTORY     Past Medical History:   Diagnosis Date    Above knee amputation of left lower extremity ([address]     Blood circulation, collateral     CAD (coronary artery disease)     Diabetes mellitus ([facility]     Hyperlipidemia     Hypertension     MDRO (multiple drug resistant organisms) resistance     MRSA wound 2013    MI, old 2007    Obesity (BMI 30-39. 9)     Status post skin graft 01/15/2014    Amniox - Dr Rodrigo Moreno cancer [facility]) 10/2022    squamous cell carcinoma of the epiglottis       SURGICALHISTORY      has a past surgical history that includes  section (1998); Coronary angioplasty with stent (); Coronary angioplasty with stent; angioplasty (Left, 2014); vascular surgery; other surgical history (2015); other surgical history (Left, 2016); Coronary angioplasty with stent (2016); Esophagogastroduodenoscopy (10/06/2022); [address] Surgery (N/A, 2022); Gastrostomy tube placement (N/A, 2022); and CT NEEDLE BIOPSY LUNG PERCUTANEOUS (2022). CURRENT MEDICATIONS       Discharge Medication List as of 2022  1:59 PM        CONTINUE these medications which have NOT CHANGED    Details   fluconazole (DIFLUCAN) 100 MG tablet Take 1 tablet by mouth daily for 3 days, Disp-3 tablet, R-0Normal      !! oxyCODONE-acetaminophen (PERCOCET) 5-325 MG per tablet Take 1 tablet by mouth every 6 hours as needed for Pain for up to 12 days. Intended supply: 7 days. Take lowest dose possible to manage pain, Disp-12 tablet, R-0Print      !! ALPRAZolam (XANAX) 1 MG tablet Take 1 mg by mouth as needed. Historical Med      !! oxyCODONE-acetaminophen (PERCOCET) 5-325 MG per tablet Take 2 tablets by mouth every 8 hours as needed for Pain. Historical Med      clopidogrel (PLAVIX) 75 MG tablet Take 1 tablet by mouth dailyHistorical Med      TRULICITY 1.5 KE/4.9WX SC injection Inject 1.5 mg into the skin once a week, DAWHistorical Med      HUMALOG KWIKPEN 100 UNIT/ML SOPN DAWHistorical Med      fentaNYL 37.5 MCG/HR PT72 Apply 1 patch topically. Historical Med      JARDIANCE 25 MG tablet Take 1 tablet by mouth daily, DAWHistorical Med      nitroGLYCERIN (NITROSTAT) 0.4 MG SL tablet Place 1 tablet under the tongue every 5 minutes as needed for Chest pain Take 1 tablet for chest pain and repeat after 5 min if no relief, call 911 and take another dose 5 min later. Max of 3 doses in 15 min., Disp-25 tablet, R-3      metFORMIN (GLUCOPHAGE) 500 MG tablet Take 2 tablets by mouth 2 times daily Resume , Disp-60 tablet, R-3      pioglitazone (ACTOS) 30 MG tablet Take 1 tablet by mouth daily, Disp-30 tablet, R-3      atorvastatin (LIPITOR) 20 MG tablet Take 2 tablets by mouth nightly, Disp-30 tablet, R-3      lisinopril (PRINIVIL;ZESTRIL) 2.5 MG tablet Take 1 tablet by mouth daily, Disp-30 tablet, R-3      metoprolol tartrate (LOPRESSOR) 25 MG tablet Take 1 tablet by mouth 2 times daily, Disp-60 tablet, R-3      isosorbide dinitrate (ISORDIL) 20 MG tablet Take 30 mg by mouth daily      amLODIPine (NORVASC) 5 MG tablet Take 5 mg by mouth daily      budesonide-formoterol (SYMBICORT) 160-4.5 MCG/ACT AERO Inhale 2 puffs into the lungs 2 times dailyHistorical Med      pregabalin (LYRICA) 150 MG capsule Take 150 mg by mouth 2 times daily      !! ALPRAZolam (XANAX) 0.5 MG tablet Take 0.5 mg by mouth 2 times daily as needed      aspirin 81 MG tablet Take 81 mg by mouth daily      albuterol (PROVENTIL HFA) 108 (90 BASE) MCG/ACT inhaler Inhale 2 puffs into the lungs every 4 hours as needed for Wheezing or Shortness of Breath (Space out to every 6 hours as symptoms improve). Space out to every 6 hours as symptoms improve., Disp-1 Inhaler, R-10       !! - Potential duplicate medications found. Please discuss with provider. ALLERGIES     is allergic to shellfish-derived products and shrimp flavor. FAMILY HISTORY     She indicated that her mother is alive. She indicated that her father is . She indicated that the status of her maternal grandfather is unknown.  She indicated that the status of her neg hx is unknown.   family history includes Heart Disease in her maternal grandfather; High Blood Pressure in her maternal grandfather. SOCIAL HISTORY       Social History     Socioeconomic History    Marital status:      Spouse name: Not on file    Number of children: 1    Years of education: Not on file    Highest education level: Not on file   Occupational History    Not on file   Tobacco Use    Smoking status: Former     Packs/day: 0.50     Years: 27.00     Pack years: 13.50     Types: Cigarettes     Quit date: 2014     Years since quittin.1    Smokeless tobacco: Never    Tobacco comments:     e cigs daily use 22   Vaping Use    Vaping Use: Some days    Substances: Nicotine   Substance and Sexual Activity    Alcohol use: No    Drug use: No    Sexual activity: Not on file   Other Topics Concern    Not on file   Social History Narrative    Not on file     Social Determinants of Health     Financial Resource Strain: Not on file   Food Insecurity: Not on file   Transportation Needs: Not on file   Physical Activity: Not on file   Stress: Not on file   Social Connections: Not on file   Intimate Partner Violence: Not on file   Housing Stability: Not on file       PHYSICAL EXAM     INITIAL VITALS:  height is 5' 1\" (1.549 m) and weight is 138 lb (62.6 kg). Her oral temperature is 98.4 °F (36.9 °C). Her blood pressure is 123/87 and her pulse is 83. Her respiration is 17 and oxygen saturation is 97%. Physical Exam  Vitals and nursing note reviewed. Constitutional:       General: She is not in acute distress. Appearance: She is well-developed. She is not diaphoretic. HENT:      Head: Normocephalic and atraumatic. Nose: Nose normal.      Mouth/Throat:      Mouth: Mucous membranes are moist.      Pharynx: Oropharynx is clear. Eyes:      General:         Right eye: No discharge. Left eye: No discharge.       Conjunctiva/sclera: Conjunctivae normal.   Neck:      Trachea: No tracheal deviation. Cardiovascular:      Rate and Rhythm: Normal rate and regular rhythm. Pulses: Normal pulses. Heart sounds: Normal heart sounds. No murmur heard. No gallop. Comments: Normal capillary refill  Pulmonary:      Effort: Pulmonary effort is normal. No respiratory distress. Breath sounds: Normal breath sounds. No stridor. Abdominal:      General: Bowel sounds are normal.      Palpations: Abdomen is soft. Tenderness: There is generalized abdominal tenderness. Musculoskeletal:         General: No tenderness or deformity. Normal range of motion. Cervical back: Normal range of motion. Skin:     General: Skin is warm and dry. Capillary Refill: Capillary refill takes less than 2 seconds. Coloration: Skin is not pale. Findings: No erythema or rash. Neurological:      General: No focal deficit present. Mental Status: She is alert and oriented to person, place, and time. Cranial Nerves: No cranial nerve deficit. Psychiatric:         Mood and Affect: Mood normal.         Behavior: Behavior normal.       DIFFERENTIAL DIAGNOSIS:   PEG tube dysfunction, constipation    DIAGNOSTIC RESULTS     EKG: All EKG's are interpreted by the Emergency Department Physician who eithersigns or Co-signs this chart in the absence of a cardiologist.        RADIOLOGY: non-plainfilm images(s) such as CT, Ultrasound and MRI are read by the radiologist.  Plain radiographic images are visualized and preliminarily interpreted by the emergency physician unless otherwise stated below. XR INJ CONTRAST GASTRIC TUBE PERC   Final Result   PEG tube is in the gastric antrum and there is no evidence of leak or obstruction of the tube. **This report has been created using voice recognition software. It may contain minor errors which are inherent in voice recognition technology. **      Final report electronically signed by Dr. Neva Benson on 11/28/2022 10:21 AM LABS:   Labs Reviewed   CBC WITH AUTO DIFFERENTIAL - Abnormal; Notable for the following components:       Result Value    WBC 11.0 (*)     RDW-SD 46.4 (*)     Segs Absolute 7.8 (*)     All other components within normal limits   BASIC METABOLIC PANEL - Abnormal; Notable for the following components:    Glucose 325 (*)     All other components within normal limits   CULTURE, ANAEROBIC AND AEROBIC    Narrative:     Source: PEG site       Site: PEG tube site swab          Current Antibiotics: not stated   CULTURE, BLOOD 1   CULTURE, BLOOD 2   GLOMERULAR FILTRATION RATE, ESTIMATED   ANION GAP   OSMOLALITY       EMERGENCY DEPARTMENT COURSE:   Vitals:    Vitals:    11/28/22 0901 11/28/22 1044   BP: 139/86 123/87   Pulse: 90 83   Resp: 18 17   Temp: 98.4 °F (36.9 °C)    TempSrc: Oral    SpO2: 97% 97%   Weight: 138 lb (62.6 kg)    Height: 5' 1\" (1.549 m)                          ED Course as of 11/28/22 2223   Mon Nov 28, 2022   0920 Staffed with DR. Victorino Calvin. [KJ]   8386 D/W Parker Faustin CNP for general surgery. Recommends gastrograffin abd films and she will stop and see the patient. Mallory Webb CNP to bedside. Noted drainage under the flange when it was moved. Culture obtained. Advised that as long as placement is good, no CT is necessary. Will tx with IV abx x 1. She will call after she staffs with a surgeon. [XD]   0616 D/W Lacie Perez. She spoke to Dr. Chrissie Stephen. Tony Oven   3922 Spoke to Dr. Gricel Coffey [facility] nurse and she will put in a request to change patient's pain meds to liquid and a possible dose adjustment. [KJ]      ED Course User Index  [KJ] EVELIO Craig - CNP        Internal Administration   First Dose      Second Dose           Last COVID Lab SARS-CoV-2, GLENN (no units)   Date Value   12/23/2021 NOT  DETECTED            MDM    Patient was seen evaluate in the emergency room for PEG tube problem. Appropriate testing is ordered and reviewed.   I consulted with Lisset Patino CNP for general surgery. She came to the ER and saw the patient. Patient was found to have some purulent drainage under the flange of the PEG tube. That was cultured and sent to the lab. Gastrografin abdominal series was completed and was normal.  PEG tube was found to be in appropriate placement. Per general surgery's recommendations, patient was treated with a dose of vancomycin in the ER. Patient is currently on amoxicillin. She switched to clindamycin due to the infection in her PEG tube. Patient has requested a change to her pain medication. I reviewed her OARRS report and due to the high dosage of pain medications, I cannot do any changes. I did reach out to her PCPs office and spoke with his nurse. I explained the patient's desire to have her medication switched to liquid medications that can be put in her PEG tube and that the patient is requesting an increase that she has suboptimal pain control. The nurse advised that the doctor is out today but they would work on getting the patient to better pain control. Is updated and she is discharged home with follow-up as directed. The results of pertinent diagnostic studies and exam findings were discussed. The patients provisional diagnosis and plan of care were discussed with the patient and present family. The patient and/or present family expressed understanding of the diagnosis and plan. The nurse was instructed to provide written instructions and appropriate follow-up information. The patient understands their need and responsibility to obtain additional follow-up as instructed. The patient is comfortable with the plan and discharge. The risks of medications administered and prescribed were discussed with the patient and family present. No notes of EC Admission Criteria type on file.       Medications   vancomycin (VANCOCIN) 1,000 mg in sodium chloride 0.9 % 250 mL IVPB ([id number]) (0 mg IntraVENous Stopped 11/28/22 1150)   HYDROmorphone (DILAUDID) injection 1 mg (1 mg IntraVENous Given 11/28/22 1040)   ondansetron (ZOFRAN) injection 4 mg (4 mg IntraVENous Given 11/28/22 1039)       Please note that the patient was evaluated during a pandemic. All efforts were made for HIPPA compliance as well as provision of appropriate care. Patient was seen independently by myself. The patient's final impression and disposition and plan was determined by myself. Strict return precautions and follow up instructions were discussed with the patient prior to discharge, with which the patient agrees. Physical assessment findings, diagnostic testing(s) if applicable, and vital signs reviewed with patient/patient representative. Questions answered. Medications asdirected, including OTC medications for supportive care. Education provided on medications. Differential diagnosis(s) discussed with patient/patient representative. Home care/self care instructions reviewed withpatient/patient representative. Patient is to follow-up with family care provider in 2-3 days if no improvement. Patient is to go to the emergency department if symptoms worsen. Patient/patient representative isaware of care plan, questions answered, verbalizes understanding and is in agreement. CRITICAL CARE:   None    CONSULTS:  General Surgery and OTHER    PROCEDURES:  None    FINAL IMPRESSION     1. Pain around PEG tube site, sequela    2. Wound drainage    3.  Cancer, epiglottis [facility])          DISPOSITION/PLAN   DISPOSITION Decision To Discharge 11/28/2022 12:51:58 PM      PATIENT REFERREDTO:  Ashlee Vyas MD  [address]  [address] [facility]  [phone]    Call in 1 day  For follow up    Garrett Benito, Rob Kincaid [facility]  [address] [address]  [phone]    Call in 1 day  For follow up    John Terrell APRN - CNP  [address] [facility]  [phone]    Go to   For follow up    DISCHARGE MEDICATIONS:  Discharge Medication List as of 11/28/2022  1:59 PM        START taking these medications    Details   clindamycin (CLEOCIN) 150 MG capsule Take 3 capsules by mouth 4 times daily for 7 days, Disp-84 capsule, R-0Normal             (Please note that portions of this note were completed with a voice recognition program.  Efforts were made to edit the dictations but occasionally words are mis-transcribed.)         EVELIO Herrera CNP, APRN - CNP  11/28/22 7201

## 2022-11-28 NOTE — ED NOTES
Patient resting in bed. Respirations easy and unlabored. No distress noted. Call light within reach.      Coby Finley RN  11/28/22 8522

## 2022-11-29 ENCOUNTER — APPOINTMENT (OUTPATIENT)
Dept: CT IMAGING | Age: 52
End: 2022-11-29
Payer: MEDICARE

## 2022-11-29 ENCOUNTER — HOSPITAL ENCOUNTER (EMERGENCY)
Age: 52
Discharge: HOME | End: 2022-11-29
Attending: EMERGENCY MEDICINE
Payer: MEDICARE

## 2022-11-29 ENCOUNTER — CLINICAL DOCUMENTATION (OUTPATIENT)
Dept: CASE MANAGEMENT | Age: 52
End: 2022-11-29

## 2022-11-29 ENCOUNTER — TELEPHONE (OUTPATIENT)
Dept: ONCOLOGY | Age: 52
End: 2022-11-29

## 2022-11-29 ENCOUNTER — TELEPHONE (OUTPATIENT)
Dept: SURGERY | Age: 52
End: 2022-11-29

## 2022-11-29 VITALS
RESPIRATION RATE: 16 BRPM | HEIGHT: 61 IN | SYSTOLIC BLOOD PRESSURE: 132 MMHG | DIASTOLIC BLOOD PRESSURE: 71 MMHG | BODY MASS INDEX: 26.06 KG/M2 | TEMPERATURE: 98.5 F | WEIGHT: 138 LBS | OXYGEN SATURATION: 98 % | HEART RATE: 82 BPM

## 2022-11-29 DIAGNOSIS — T85.848A PAIN AROUND PEG TUBE SITE, INITIAL ENCOUNTER: Primary | ICD-10-CM

## 2022-11-29 LAB
ALBUMIN SERPL-MCNC: 3 G/DL (ref 3.5–5.1)
ALP BLD-CCNC: 195 U/L (ref 38–126)
ALT SERPL-CCNC: 12 U/L (ref 11–66)
AMPHETAMINE+METHAMPHETAMINE URINE SCREEN: NEGATIVE
ANION GAP SERPL CALCULATED.3IONS-SCNC: 13 MEQ/L (ref 8–16)
AST SERPL-CCNC: 10 U/L (ref 5–40)
BACTERIA: ABNORMAL /HPF
BARBITURATE QUANTITATIVE URINE: NEGATIVE
BASOPHILS # BLD: 0.2 %
BASOPHILS ABSOLUTE: 0 THOU/MM3 (ref 0–0.1)
BENZODIAZEPINE QUANTITATIVE URINE: NEGATIVE
BILIRUB SERPL-MCNC: 0.5 MG/DL (ref 0.3–1.2)
BILIRUBIN DIRECT: < 0.2 MG/DL (ref 0–0.3)
BILIRUBIN URINE: NEGATIVE
BLOOD, URINE: ABNORMAL
BUN BLDV-MCNC: 12 MG/DL (ref 7–22)
CALCIUM SERPL-MCNC: 8.4 MG/DL (ref 8.5–10.5)
CANNABINOID QUANTITATIVE URINE: NEGATIVE
CASTS 2: ABNORMAL /LPF
CASTS UA: ABNORMAL /LPF
CHARACTER, URINE: CLEAR
CHLORIDE BLD-SCNC: 102 MEQ/L (ref 98–111)
CO2: 24 MEQ/L (ref 23–33)
COCAINE METABOLITE QUANTITATIVE URINE: NEGATIVE
COLOR: YELLOW
CREAT SERPL-MCNC: 0.5 MG/DL (ref 0.4–1.2)
CRYSTALS, UA: ABNORMAL
EOSINOPHIL # BLD: 0.6 %
EOSINOPHILS ABSOLUTE: 0.1 THOU/MM3 (ref 0–0.4)
EPITHELIAL CELLS, UA: ABNORMAL /HPF
ERYTHROCYTE [DISTWIDTH] IN BLOOD BY AUTOMATED COUNT: 12.9 % (ref 11.5–14.5)
ERYTHROCYTE [DISTWIDTH] IN BLOOD BY AUTOMATED COUNT: 47.3 FL (ref 35–45)
FENTANYL: POSITIVE
GFR SERPL CREATININE-BSD FRML MDRD: > 60 ML/MIN/1.73M2
GLUCOSE BLD-MCNC: 289 MG/DL (ref 70–108)
GLUCOSE URINE: >= 1000 MG/DL
HCT VFR BLD CALC: 45.9 % (ref 37–47)
HEMOGLOBIN: 15.2 GM/DL (ref 12–16)
IMMATURE GRANS (ABS): 0.06 THOU/MM3 (ref 0–0.07)
IMMATURE GRANULOCYTES: 0.5 %
KETONES, URINE: 40
LEUKOCYTE ESTERASE, URINE: NEGATIVE
LIPASE: 43.6 U/L (ref 5.6–51.3)
LYMPHOCYTES # BLD: 16.8 %
LYMPHOCYTES ABSOLUTE: 2.1 THOU/MM3 (ref 1–4.8)
MAGNESIUM: 1.6 MG/DL (ref 1.6–2.4)
MCH RBC QN AUTO: 33.1 PG (ref 26–33)
MCHC RBC AUTO-ENTMCNC: 33.1 GM/DL (ref 32.2–35.5)
MCV RBC AUTO: 100 FL (ref 81–99)
MISCELLANEOUS 2: ABNORMAL
MONOCYTES # BLD: 5.4 %
MONOCYTES ABSOLUTE: 0.7 THOU/MM3 (ref 0.4–1.3)
NITRITE, URINE: NEGATIVE
NUCLEATED RED BLOOD CELLS: 0 /100 WBC
OPIATES, URINE: NEGATIVE
OSMOLALITY CALCULATION: 287.9 MOSMOL/KG (ref 275–300)
OXYCODONE: POSITIVE
PH UA: 6 (ref 5–9)
PHENCYCLIDINE QUANTITATIVE URINE: NEGATIVE
PLATELET # BLD: 430 THOU/MM3 (ref 130–400)
PMV BLD AUTO: 10.8 FL (ref 9.4–12.4)
POTASSIUM SERPL-SCNC: 4 MEQ/L (ref 3.5–5.2)
PROTEIN UA: 300
RBC # BLD: 4.59 MILL/MM3 (ref 4.2–5.4)
RBC URINE: ABNORMAL /HPF
REASON FOR REJECTION: NORMAL
REJECTED TEST: NORMAL
RENAL EPITHELIAL, UA: ABNORMAL
SEG NEUTROPHILS: 76.5 %
SEGMENTED NEUTROPHILS ABSOLUTE COUNT: 9.6 THOU/MM3 (ref 1.8–7.7)
SODIUM BLD-SCNC: 139 MEQ/L (ref 135–145)
SPECIFIC GRAVITY, URINE: > 1.03 (ref 1–1.03)
TOTAL PROTEIN: 6 G/DL (ref 6.1–8)
TROPONIN T: < 0.01 NG/ML
UROBILINOGEN, URINE: 1 EU/DL (ref 0–1)
WBC # BLD: 12.5 THOU/MM3 (ref 4.8–10.8)
WBC UA: ABNORMAL /HPF
YEAST: ABNORMAL

## 2022-11-29 PROCEDURE — 36415 COLL VENOUS BLD VENIPUNCTURE: CPT

## 2022-11-29 PROCEDURE — 96375 TX/PRO/DX INJ NEW DRUG ADDON: CPT

## 2022-11-29 PROCEDURE — 87106 FUNGI IDENTIFICATION YEAST: CPT

## 2022-11-29 PROCEDURE — A4216 STERILE WATER/SALINE, 10 ML: HCPCS | Performed by: EMERGENCY MEDICINE

## 2022-11-29 PROCEDURE — 2500000003 HC RX 250 WO HCPCS: Performed by: EMERGENCY MEDICINE

## 2022-11-29 PROCEDURE — 87086 URINE CULTURE/COLONY COUNT: CPT

## 2022-11-29 PROCEDURE — 6360000002 HC RX W HCPCS: Performed by: EMERGENCY MEDICINE

## 2022-11-29 PROCEDURE — 84484 ASSAY OF TROPONIN QUANT: CPT

## 2022-11-29 PROCEDURE — 2580000003 HC RX 258: Performed by: EMERGENCY MEDICINE

## 2022-11-29 PROCEDURE — 80053 COMPREHEN METABOLIC PANEL: CPT

## 2022-11-29 PROCEDURE — 80307 DRUG TEST PRSMV CHEM ANLYZR: CPT

## 2022-11-29 PROCEDURE — 96374 THER/PROPH/DIAG INJ IV PUSH: CPT

## 2022-11-29 PROCEDURE — 85025 COMPLETE CBC W/AUTO DIFF WBC: CPT

## 2022-11-29 PROCEDURE — 82248 BILIRUBIN DIRECT: CPT

## 2022-11-29 PROCEDURE — 74177 CT ABD & PELVIS W/CONTRAST: CPT

## 2022-11-29 PROCEDURE — 6360000004 HC RX CONTRAST MEDICATION: Performed by: EMERGENCY MEDICINE

## 2022-11-29 PROCEDURE — 81001 URINALYSIS AUTO W/SCOPE: CPT

## 2022-11-29 PROCEDURE — 83735 ASSAY OF MAGNESIUM: CPT

## 2022-11-29 PROCEDURE — 99285 EMERGENCY DEPT VISIT HI MDM: CPT | Performed by: EMERGENCY MEDICINE

## 2022-11-29 PROCEDURE — 87040 BLOOD CULTURE FOR BACTERIA: CPT

## 2022-11-29 PROCEDURE — 83690 ASSAY OF LIPASE: CPT

## 2022-11-29 RX ORDER — METHYLPREDNISOLONE SODIUM SUCCINATE 125 MG/2ML
125 INJECTION, POWDER, LYOPHILIZED, FOR SOLUTION INTRAMUSCULAR; INTRAVENOUS ONCE
Status: COMPLETED | OUTPATIENT
Start: 2022-11-29 | End: 2022-11-29

## 2022-11-29 RX ORDER — 0.9 % SODIUM CHLORIDE 0.9 %
1000 INTRAVENOUS SOLUTION INTRAVENOUS ONCE
Status: COMPLETED | OUTPATIENT
Start: 2022-11-29 | End: 2022-11-29

## 2022-11-29 RX ADMIN — METHYLPREDNISOLONE SODIUM SUCCINATE 125 MG: 125 INJECTION, POWDER, FOR SOLUTION INTRAMUSCULAR; INTRAVENOUS at 14:11

## 2022-11-29 RX ADMIN — FAMOTIDINE 20 MG: 10 INJECTION INTRAVENOUS at 14:13

## 2022-11-29 RX ADMIN — SODIUM CHLORIDE 1000 ML: 9 INJECTION, SOLUTION INTRAVENOUS at 13:17

## 2022-11-29 RX ADMIN — HYDROMORPHONE HYDROCHLORIDE 1 MG: 1 INJECTION, SOLUTION INTRAMUSCULAR; INTRAVENOUS; SUBCUTANEOUS at 13:17

## 2022-11-29 RX ADMIN — IOPAMIDOL 80 ML: 755 INJECTION, SOLUTION INTRAVENOUS at 13:50

## 2022-11-29 ASSESSMENT — ENCOUNTER SYMPTOMS
CHEST TIGHTNESS: 0
RHINORRHEA: 0
EYE ITCHING: 0
CHOKING: 0
BACK PAIN: 0
ABDOMINAL DISTENTION: 0
EYE REDNESS: 0
TROUBLE SWALLOWING: 0
EYE DISCHARGE: 0
SORE THROAT: 0
SHORTNESS OF BREATH: 0
DIARRHEA: 0
EYE PAIN: 0
PHOTOPHOBIA: 0
VOMITING: 0
WHEEZING: 0
BLOOD IN STOOL: 0
CONSTIPATION: 0
SINUS PRESSURE: 0
ABDOMINAL PAIN: 1
VOICE CHANGE: 0
NAUSEA: 0
COUGH: 0

## 2022-11-29 ASSESSMENT — PAIN - FUNCTIONAL ASSESSMENT
PAIN_FUNCTIONAL_ASSESSMENT: 0-10
PAIN_FUNCTIONAL_ASSESSMENT: 0-10

## 2022-11-29 ASSESSMENT — PAIN DESCRIPTION - LOCATION: LOCATION: ABDOMEN

## 2022-11-29 ASSESSMENT — PAIN SCALES - GENERAL
PAINLEVEL_OUTOF10: 6
PAINLEVEL_OUTOF10: 10

## 2022-11-29 NOTE — DISCHARGE INSTRUCTIONS
Patient has what appears to be PEG to pain, patient is strict follow-up with both the primary care physician and the general surgeon call for an appointment within the next 1 to 2 days. Patient is instructed to return to the nearest emergency room immediately for any new or worsening complaints.   Patient instructed to continue to take all antibiotics as prescribed

## 2022-11-29 NOTE — TELEPHONE ENCOUNTER
Oncology Social Work    Name: Sarah Beth Grove  MRN: [id number]     Contact Type: Telephone    Note:    received a VM from Dameon Stephens 11/25/22 at 8:15am stating that she is in a lot of pain and that her feeding tube needs to be removed.  received another VM from Dameon Stephens 11/28/22 at 7:04am. Dameon Stephens left a voice message stating that she would like her feeding tube to be removed.  was able to listen to the VM 11/28/22 at approximately 8:30am. At the time of listening,  notes that the patient was in the [facility]'s ED for her feeding tube related pain. Nurse Navigator, Cornelia Patel RN also received a VM from patient regarding the feeding tube pain. Guru Kellerton was informed of the messages, who informed Dr. Gallito Merrill of this information.     -SW notes that the voicemail's were left over a holiday weekend and the messages were left on an unmonitored voicemail, which is why they were not listened to immediately. Patient aware of on-call number to call as it is in [employer] greeting as well. TAMIKA Barksdale, [facility]  Oncology Social Worker    Electronically signed by Shon Natarajan.  TAMIKA Vyas, [place]

## 2022-11-29 NOTE — ED TRIAGE NOTES
Presents to ER with complaints of pain the site site of her feeding tube. Pt states it is infected and \"nobody will help me. \" Pt state she has percocet at home but states she has been taking it for 16 years with no relief. States she cannot get into her doctors until Dec. 8th.

## 2022-11-29 NOTE — ED PROVIDER NOTES
[facility]  eMERGENCY dEPARTMENT eNCOUnter          CHIEF COMPLAINT       Chief Complaint   Patient presents with    Feeding Tube Problem       Nurses Notes reviewed and I agree except as noted in the HPI. HISTORY OF PRESENT ILLNESS    Arie Ochoa is a 47 y.o. female who presents for pain around PEG tube. Patient has a PEG tube placed. She has esophageal cancer. She is undergoing radiation. The biggest complaint is that the patient states that she needs the PEG tube removed. She cannot have it removed because she is going to undergo this radiation and she will have problems with swallowing after that. This is been carefully explained to her in the past.  Patient is here today because there is minor seepage around the PEG tube. Patient had a CAT scan on the 27th which showed no acute intra-abdominal processes. Patient is very anxious about this. She states she is called her primary care physician but he was out of town for a family emergency. She has not followed up with her surgeon. Patient is otherwise resting comfortably on the cot no apparent distress no other physical complaints at this time. Patient states that she was placed on antibiotics for this drainage. She states she has been taking them. She got a bag of antibiotics here 2 days ago. She has taken 1 full day of the prescribed antibiotics. REVIEW OF SYSTEMS     Review of Systems   Constitutional:  Negative for activity change, appetite change, diaphoresis, fatigue and unexpected weight change. HENT:  Negative for congestion, ear discharge, ear pain, hearing loss, rhinorrhea, sinus pressure, sore throat, trouble swallowing and voice change. Eyes:  Negative for photophobia, pain, discharge, redness and itching. Respiratory:  Negative for cough, choking, chest tightness, shortness of breath and wheezing. Cardiovascular:  Negative for chest pain, palpitations and leg swelling.    Gastrointestinal:  Positive for abdominal pain. Negative for abdominal distention, blood in stool, constipation, diarrhea, nausea and vomiting. Endocrine: Negative for polydipsia, polyphagia and polyuria. Genitourinary:  Negative for decreased urine volume, difficulty urinating, dysuria, enuresis, frequency, hematuria and urgency. Musculoskeletal:  Negative for arthralgias, back pain, gait problem, myalgias, neck pain and neck stiffness. Skin:  Negative for pallor and rash. Allergic/Immunologic: Negative for immunocompromised state. Neurological:  Negative for dizziness, tremors, seizures, syncope, facial asymmetry, weakness, light-headedness, numbness and headaches. Hematological:  Negative for adenopathy. Does not bruise/bleed easily. Psychiatric/Behavioral:  Negative for agitation, hallucinations and suicidal ideas. The patient is not nervous/anxious. PAST MEDICAL HISTORY    has a past medical history of Above knee amputation of left lower extremity ([facility] [address] Blood circulation, collateral, CAD (coronary artery disease), Diabetes mellitus ([facility] [address] Hyperlipidemia, Hypertension, MDRO (multiple drug resistant organisms) resistance, MI, old, Obesity (BMI 30-39.9), Status post skin graft, and Throat cancer ([facility] [address] SURGICAL HISTORY      has a past surgical history that includes  section (1998); Coronary angioplasty with stent (); Coronary angioplasty with stent; angioplasty (Left, 2014); vascular surgery; other surgical history (2015); other surgical history (Left, 2016); Coronary angioplasty with stent (2016); Esophagogastroduodenoscopy (10/06/2022);  [address] Surgery (N/A, 2022); Gastrostomy tube placement (N/A, 2022); and CT NEEDLE BIOPSY LUNG PERCUTANEOUS (2022).     CURRENT MEDICATIONS       Discharge Medication List as of 2022  4:06 PM        CONTINUE these medications which have NOT CHANGED    Details   clindamycin (CLEOCIN) 150 MG capsule Take 3 capsules by mouth 4 times daily for 7 days, Disp-84 capsule, R-0Normal      fluconazole (DIFLUCAN) 100 MG tablet Take 1 tablet by mouth daily for 3 days, Disp-3 tablet, R-0Normal      !! oxyCODONE-acetaminophen (PERCOCET) 5-325 MG per tablet Take 1 tablet by mouth every 6 hours as needed for Pain for up to 12 days. Intended supply: 7 days. Take lowest dose possible to manage pain, Disp-12 tablet, R-0Print      !! ALPRAZolam (XANAX) 1 MG tablet Take 1 mg by mouth as needed. Historical Med      !! oxyCODONE-acetaminophen (PERCOCET) 5-325 MG per tablet Take 2 tablets by mouth every 8 hours as needed for Pain. Historical Med      clopidogrel (PLAVIX) 75 MG tablet Take 1 tablet by mouth dailyHistorical Med      TRULICITY 1.5 CY/2.2UV SC injection Inject 1.5 mg into the skin once a week, DAWHistorical Med      HUMALOG KWIKPEN 100 UNIT/ML SOPN DAWHistorical Med      fentaNYL 37.5 MCG/HR PT72 Apply 1 patch topically. Historical Med      JARDIANCE 25 MG tablet Take 1 tablet by mouth daily, DAWHistorical Med      nitroGLYCERIN (NITROSTAT) 0.4 MG SL tablet Place 1 tablet under the tongue every 5 minutes as needed for Chest pain Take 1 tablet for chest pain and repeat after 5 min if no relief, call 911 and take another dose 5 min later.  Max of 3 doses in 15 min., Disp-25 tablet, R-3      metFORMIN (GLUCOPHAGE) 500 MG tablet Take 2 tablets by mouth 2 times daily Resume 8/30, Disp-60 tablet, R-3      pioglitazone (ACTOS) 30 MG tablet Take 1 tablet by mouth daily, Disp-30 tablet, R-3      atorvastatin (LIPITOR) 20 MG tablet Take 2 tablets by mouth nightly, Disp-30 tablet, R-3      lisinopril (PRINIVIL;ZESTRIL) 2.5 MG tablet Take 1 tablet by mouth daily, Disp-30 tablet, R-3      metoprolol tartrate (LOPRESSOR) 25 MG tablet Take 1 tablet by mouth 2 times daily, Disp-60 tablet, R-3      isosorbide dinitrate (ISORDIL) 20 MG tablet Take 30 mg by mouth daily      amLODIPine (NORVASC) 5 MG tablet Take 5 mg by mouth daily      budesonide-formoterol (SYMBICORT) 160-4.5 MCG/ACT AERO Inhale 2 puffs into the lungs 2 times dailyHistorical Med      pregabalin (LYRICA) 150 MG capsule Take 150 mg by mouth 2 times daily      !! ALPRAZolam (XANAX) 0.5 MG tablet Take 0.5 mg by mouth 2 times daily as needed      aspirin 81 MG tablet Take 81 mg by mouth daily      albuterol (PROVENTIL HFA) 108 (90 BASE) MCG/ACT inhaler Inhale 2 puffs into the lungs every 4 hours as needed for Wheezing or Shortness of Breath (Space out to every 6 hours as symptoms improve). Space out to every 6 hours as symptoms improve., Disp-1 Inhaler, R-10       !! - Potential duplicate medications found. Please discuss with provider. ALLERGIES     is allergic to shellfish-derived products and shrimp flavor. FAMILY HISTORY     She indicated that her mother is alive. She indicated that her father is . She indicated that the status of her maternal grandfather is unknown. She indicated that the status of her neg hx is unknown.   family history includes Heart Disease in her maternal grandfather; High Blood Pressure in her maternal grandfather. SOCIAL HISTORY      reports that she quit smoking about 8 years ago. Her smoking use included cigarettes. She has a 13.50 pack-year smoking history. She has never used smokeless tobacco. She reports that she does not drink alcohol and does not use drugs. PHYSICAL EXAM     INITIAL VITALS:  height is 5' 1\" (1.549 m) and weight is 138 lb (62.6 kg). Her oral temperature is 98.5 °F (36.9 °C). Her blood pressure is 132/71 and her pulse is 82. Her respiration is 16 and oxygen saturation is 98%. Physical Exam  Vitals and nursing note reviewed. Constitutional:       General: She is not in acute distress. Appearance: She is well-developed. She is not diaphoretic. HENT:      Head: Normocephalic and atraumatic.       Right Ear: External ear normal.      Left Ear: External ear normal.      Nose: Nose normal.      Mouth/Throat:      Pharynx: No oropharyngeal exudate. Eyes:      General: No scleral icterus. Right eye: No discharge. Left eye: No discharge. Conjunctiva/sclera: Conjunctivae normal.      Pupils: Pupils are equal, round, and reactive to light. Neck:      Thyroid: No thyromegaly. Vascular: No JVD. Trachea: No tracheal deviation. Cardiovascular:      Rate and Rhythm: Normal rate and regular rhythm. Heart sounds: Normal heart sounds, S1 normal and S2 normal. No murmur heard. No friction rub. No gallop. Pulmonary:      Effort: Pulmonary effort is normal.      Breath sounds: Normal breath sounds. No stridor. No wheezing, rhonchi or rales. Chest:      Chest wall: No tenderness. Abdominal:      General: Bowel sounds are normal. There is no distension. Palpations: Abdomen is soft. There is no mass. Tenderness: There is generalized abdominal tenderness. There is no guarding or rebound. Hernia: No hernia is present. Comments: Tube in place Minor discharge coming from ostomy, this looks to be a combination of tube feeds and gastric juices. No blood, no signs of erythema edema or swelling. Musculoskeletal:         General: No tenderness. Normal range of motion. Cervical back: Normal range of motion and neck supple. Lymphadenopathy:      Cervical: No cervical adenopathy. Skin:     General: Skin is warm and dry. Capillary Refill: Capillary refill takes less than 2 seconds. Findings: No bruising, ecchymosis, lesion or rash. Neurological:      Mental Status: She is alert and oriented to person, place, and time. Cranial Nerves: No cranial nerve deficit. Sensory: No sensory deficit. Motor: No weakness. Coordination: Coordination normal.      Gait: Gait normal.      Deep Tendon Reflexes: Reflexes are normal and symmetric.  Reflexes normal.   Psychiatric:         Mood and Affect: Mood normal.         Speech: Speech normal.         Behavior: Behavior normal.         Thought Content: Thought content normal.         Judgment: Judgment normal.         DIFFERENTIAL DIAGNOSIS:   Feared complaint, leaking PEG tube, drug-seeking behavior    DIAGNOSTIC RESULTS     EKG: All EKG's are interpreted by the Emergency Department Physician who either signs or Co-signs this chart in the absence of a cardiologist.  None    RADIOLOGY: non-plain film images(s) such as CT, Ultrasound and MRI are read by the radiologist.  CT ABDOMEN PELVIS W IV CONTRAST Additional Contrast? None   Final Result   1. There is gas tracking along the patient's PEG tube of uncertain significance since the previous exam possibly related to manipulation of the catheter for yesterday's examination. Persistent mild edema adjacent to the tube from insertion without    evidence of enhancing collection. **This report has been created using voice recognition software. It may contain minor errors which are inherent in voice recognition technology. **      Final report electronically signed by Dr. Jorgito Jackson on 11/29/2022 2:17 PM            LABS:   Labs Reviewed   CBC WITH AUTO DIFFERENTIAL - Abnormal; Notable for the following components:       Result Value    WBC 12.5 (*)     .0 (*)     MCH 33.1 (*)     RDW-SD 47.3 (*)     Platelets 238 (*)     Segs Absolute 9.6 (*)     All other components within normal limits   BASIC METABOLIC PANEL - Abnormal; Notable for the following components:    Glucose 289 (*)     Calcium 8.4 (*)     All other components within normal limits   HEPATIC FUNCTION PANEL - Abnormal; Notable for the following components:    Albumin 3.0 (*)     Alkaline Phosphatase 195 (*)     Total Protein 6.0 (*)     All other components within normal limits   URINE WITH REFLEXED MICRO - Abnormal; Notable for the following components:    Glucose, Ur >= 1000 (*)     Ketones, Urine 40 (*)     Specific Gravity, Urine > 1.030 (*)     Blood, Urine TRACE (*)     Protein,  (*)     All other components within normal limits   CULTURE, BLOOD 1   CULTURE, BLOOD 2   CULTURE, REFLEXED, URINE    Narrative:     Source: urine, clean catch       Site:           Current Antibiotics: not stated   URINE DRUG SCREEN   SPECIMEN REJECTION   LIPASE   TROPONIN   MAGNESIUM   GLOMERULAR FILTRATION RATE, ESTIMATED   ANION GAP   OSMOLALITY       EMERGENCY DEPARTMENT COURSE:   Vitals:    Vitals:    11/29/22 1231 11/29/22 1318 11/29/22 1415   BP: 114/85 (!) 158/83 132/71   Pulse: 89 67 82   Resp: 17 16 16   Temp: 98.5 °F (36.9 °C)     TempSrc: Oral     SpO2: 98% 96% 98%   Weight: 138 lb (62.6 kg)     Height: 5' 1\" (1.549 m)       Patient was assessed at bedside labs and imaging were ordered. Here today I evaluated the patient. She is not in any acute distress. She is hemodynamically stable. She is on antibiotics. CT examination did not show any abscess. At this point I feel the patient is to be discharged home. Patient is instructed to follow-up with both primary care physician and the general surgeon. This was discussed with the patient at bedside who understood and agreed with the plan. Patient is subsequently discharged home in stable condition. Patient has what appears to be PEG to pain, patient is strict follow-up with both the primary care physician and the general surgeon call for an appointment within the next 1 to 2 days. Patient is instructed to return to the nearest emergency room immediately for any new or worsening complaints. Patient instructed to continue to take all antibiotics as prescribed    CRITICAL CARE:   None    CONSULTS:  None    PROCEDURES:  None    FINAL IMPRESSION      1.  Pain around PEG tube site, initial encounter          DISPOSITION/PLAN   Discharge    PATIENT REFERRED TO:  Ines Gordon MD  [address] [facility]  [phone]    Call in 1 day      Lilliana Vu MD  [place] [address]  [address] [address] [phone]    Call in 1 day      DISCHARGE MEDICATIONS:  Discharge Medication List as of 11/29/2022  4:06 PM          (Please note that portions of this note were completed with a voice recognition program.  Efforts were made to edit the dictations but occasionally words are mis-transcribed.)    DO Shravan Goetz DO  11/29/22 4537

## 2022-11-29 NOTE — ED NOTES
Pt medicated per MAR. Pt assisted to use bedpan. Urine specimen collected. Pt denies needs at this time.      Rao Castillo RN  11/29/22 5434

## 2022-11-29 NOTE — ED NOTES
Patient resting in bed. Respirations easy and unlabored. No distress noted. Call light within reach.        Cesar Ortega RN  11/29/22 4093

## 2022-11-29 NOTE — TELEPHONE ENCOUNTER
Oncology Social Work    Date: 11/29/2022  Time: 11:55am  Name: Heather Lopez  MRN: [id number]     Contact Type: Telephone     received phone call from Blaise. Blaise asked for confirmation if she has an appointment today or tomorrow with Dr. Uzair Khan.  confirmed that her appointment is tomorrow (11/30/22) with Dr. Uzair Khan at 8:45am.     Blaise then stated that she is planning on heading back to the ER right now. Camden stated that she is in a lot of pain. She stated her pain is a 10/10. Blaise explained that she is having chest pain, abdominal pain, chills, pain at the feeding tube site and fever. Blaise stated that \"no one is helping her\" and that her feeding tube is covered in puss, and she just changed it out and it is filled with puss again. Blaise stated that she would like to have the feeding tube removed because she can still take food orally. Blaise stated that she would be willing to get the feeding tube again at a later time but she is so uncomfortable that she would like it to be removed at this time. Blaise stated that Moises Patricia are not doing anything for her\" because the surgeon is currently on vacation. Camden stated that the surgeons office was supposed to call to fit her in today for an appointment, and they did not which is why she wants to go back to the ER. Patient requested that she would like Dr. Uzair Khan to be aware of what is going on.  informed Dr. Lesly Goodman NP, and Nurse Navigator, Suresh Donaldson RN.  notes that the patient did go to the ED for help with her feeding tube, and was in the ED at the time of 's conversation with the Dr. Fabrizio Ignacio NP stated that her and Dr. Uzair Khan will talk with Balise tomorrow at her appointment (11/30/22) about her feeding tube issues, as she is currently in ED. Blaise has a F/U with the surgeon's office 12/8/22.  will continue to follow up as needed. Nurse Navigator to follow as well. TAMIKA Myers, [facility]  Oncology Social Worker    Electronically signed by Jessica Moore.  TAMIKA Vyas, [place] on 11/29/2022 at 1:49 PM

## 2022-11-29 NOTE — TELEPHONE ENCOUNTER
Spoke with Ulises Renteria saw patient in ER yesterday. Patient was started on Clindamycin yesterday and will take a few days for drainage to decrease.

## 2022-11-29 NOTE — PROGRESS NOTES
Name: Caren Oakes  : 1970  MRN: [id number]    Oncology Navigation Follow-Up Note    Contact Type:  Medical Oncology    Notes: Following for Digna RINALDI Navigator:  Over the holiday weekend, Carlton Kumar, left message regarding pain around recent insertion of PEG tube-last Monday, and wanting it out. Wanted me to talk to Chiara Begum. [place] she had been to ED twice, once to  and other to [place]-neither time did they notify surgeon who placed tube. She also left message for . My phone message was explicit as what number to call regarding what situation, but must not have listened and comprehended message prior to leaving message. Spoke to her on phone yesterday (Monday) while she was in ER. Also having continued increased pain. Now they have notified surgeon. Dye study ordered. Dr. Chiara Begum notified. Dye study showed infection, placed on ATB. Phone message left for me regarding she needed someone to call in Fentanyl or liquid Dilaudid for pain. Message relayed to Dr. Carmelina Mensah is in ED and they should handle this since she is seen there. She still wants PEG tube out until she really needs it. Can swallow now, no need for liquid pain med until having difficulty which may occur once treatments get underway. Today she called and left a message with SW.  Pain still an issue and, again, wants tube out until needed. Sandi Stephenson spoke with Maile Chatterjee NP and Dr. Chiara Begum. Also learned she went to ED again. She has an appointment with Chiara Begum tomorrow [address] [facility] office will see . Dr. Chiara Begum letting ED handle today, will see in am. PEG tube is under care of surgeon. Also should be noted she did have teeth extraction as scheduled . Will follow tomorrow.     Electronically signed by Tunde Brown RN on 2022 at 1:27 PM

## 2022-11-30 ENCOUNTER — HOSPITAL ENCOUNTER (OUTPATIENT)
Dept: INFUSION THERAPY | Age: 52
Discharge: HOME | End: 2022-11-30
Payer: MEDICARE

## 2022-11-30 ENCOUNTER — OFFICE VISIT (OUTPATIENT)
Dept: ONCOLOGY | Age: 52
End: 2022-11-30
Payer: MEDICARE

## 2022-11-30 VITALS
HEART RATE: 88 BPM | TEMPERATURE: 97.9 F | WEIGHT: 142 LBS | BODY MASS INDEX: 26.81 KG/M2 | DIASTOLIC BLOOD PRESSURE: 57 MMHG | SYSTOLIC BLOOD PRESSURE: 98 MMHG | HEIGHT: 61 IN | RESPIRATION RATE: 16 BRPM | OXYGEN SATURATION: 98 %

## 2022-11-30 VITALS
SYSTOLIC BLOOD PRESSURE: 98 MMHG | HEIGHT: 61 IN | BODY MASS INDEX: 26.81 KG/M2 | OXYGEN SATURATION: 98 % | DIASTOLIC BLOOD PRESSURE: 57 MMHG | TEMPERATURE: 97.9 F | RESPIRATION RATE: 16 BRPM | HEART RATE: 88 BPM | WEIGHT: 142 LBS

## 2022-11-30 DIAGNOSIS — C32.1 MALIGNANT NEOPLASM OF EPIGLOTTIS (HCC): Primary | ICD-10-CM

## 2022-11-30 DIAGNOSIS — R10.12 LEFT UPPER QUADRANT ABDOMINAL PAIN: ICD-10-CM

## 2022-11-30 DIAGNOSIS — C32.1 CANCER, EPIGLOTTIS (HCC): ICD-10-CM

## 2022-11-30 LAB
ABSOLUTE IMMATURE GRANULOCYTE: 0.04 THOU/MM3 (ref 0–0.07)
ALBUMIN SERPL-MCNC: 3.3 G/DL (ref 3.5–5.1)
ALP BLD-CCNC: 187 U/L (ref 38–126)
ALT SERPL-CCNC: 12 U/L (ref 11–66)
AST SERPL-CCNC: 10 U/L (ref 5–40)
BASINOPHIL, AUTOMATED: 0 % (ref 0–3)
BASOPHILS ABSOLUTE: 0 THOU/MM3 (ref 0–0.1)
BILIRUB SERPL-MCNC: 0.3 MG/DL (ref 0.3–1.2)
BILIRUBIN DIRECT: < 0.2 MG/DL (ref 0–0.3)
BUN, WHOLE BLOOD: 17 MG/DL (ref 8–26)
CHLORIDE, WHOLE BLOOD: 103 MEQ/L (ref 98–109)
CREATININE, WHOLE BLOOD: 0.7 MG/DL (ref 0.5–1.2)
EOSINOPHILS ABSOLUTE: 0 THOU/MM3 (ref 0–0.4)
EOSINOPHILS RELATIVE PERCENT: 0 % (ref 0–4)
GFR SERPL CREATININE-BSD FRML MDRD: > 60 ML/MIN/1.73M2
GLUCOSE, WHOLE BLOOD: 405 MG/DL (ref 70–108)
HCT VFR BLD CALC: 38.8 % (ref 37–47)
HEMOGLOBIN: 12.9 GM/DL (ref 12–16)
IMMATURE GRANULOCYTES: 0 %
IONIZED CALCIUM, WHOLE BLOOD: 1.11 MMOL/L (ref 1.12–1.32)
LYMPHOCYTES # BLD: 28 % (ref 15–47)
LYMPHOCYTES ABSOLUTE: 3.1 THOU/MM3 (ref 1–4.8)
MCH RBC QN AUTO: 32.2 PG (ref 26–33)
MCHC RBC AUTO-ENTMCNC: 33.2 GM/DL (ref 32.2–35.5)
MCV RBC AUTO: 97 FL (ref 81–99)
MONOCYTES ABSOLUTE: 0.8 THOU/MM3 (ref 0.4–1.3)
MONOCYTES: 7 % (ref 0–12)
PDW BLD-RTO: 12.5 % (ref 11.5–14.5)
PLATELET # BLD: 495 THOU/MM3 (ref 130–400)
PMV BLD AUTO: 10.6 FL (ref 9.4–12.4)
POTASSIUM, WHOLE BLOOD: 3.6 MEQ/L (ref 3.5–4.9)
RBC # BLD: 4.01 MILL/MM3 (ref 4.2–5.4)
SEG NEUTROPHILS: 64 % (ref 43–75)
SEGMENTED NEUTROPHILS ABSOLUTE COUNT: 7.1 THOU/MM3 (ref 1.8–7.7)
SODIUM, WHOLE BLOOD: 136 MEQ/L (ref 138–146)
TOTAL CO2, WHOLE BLOOD: 22 MEQ/L (ref 23–33)
TOTAL PROTEIN: 6.3 G/DL (ref 6.1–8)
WBC # BLD: 11 THOU/MM3 (ref 4.8–10.8)

## 2022-11-30 PROCEDURE — 80076 HEPATIC FUNCTION PANEL: CPT

## 2022-11-30 PROCEDURE — 1036F TOBACCO NON-USER: CPT | Performed by: INTERNAL MEDICINE

## 2022-11-30 PROCEDURE — 6360000002 HC RX W HCPCS: Performed by: INTERNAL MEDICINE

## 2022-11-30 PROCEDURE — G8484 FLU IMMUNIZE NO ADMIN: HCPCS | Performed by: INTERNAL MEDICINE

## 2022-11-30 PROCEDURE — 2580000003 HC RX 258: Performed by: INTERNAL MEDICINE

## 2022-11-30 PROCEDURE — 99211 OFF/OP EST MAY X REQ PHY/QHP: CPT

## 2022-11-30 PROCEDURE — 3078F DIAST BP <80 MM HG: CPT | Performed by: INTERNAL MEDICINE

## 2022-11-30 PROCEDURE — 3017F COLORECTAL CA SCREEN DOC REV: CPT | Performed by: INTERNAL MEDICINE

## 2022-11-30 PROCEDURE — 80047 BASIC METABLC PNL IONIZED CA: CPT

## 2022-11-30 PROCEDURE — G8417 CALC BMI ABV UP PARAM F/U: HCPCS | Performed by: INTERNAL MEDICINE

## 2022-11-30 PROCEDURE — 3074F SYST BP LT 130 MM HG: CPT | Performed by: INTERNAL MEDICINE

## 2022-11-30 PROCEDURE — 36591 DRAW BLOOD OFF VENOUS DEVICE: CPT

## 2022-11-30 PROCEDURE — 99215 OFFICE O/P EST HI 40 MIN: CPT | Performed by: INTERNAL MEDICINE

## 2022-11-30 PROCEDURE — G8427 DOCREV CUR MEDS BY ELIG CLIN: HCPCS | Performed by: INTERNAL MEDICINE

## 2022-11-30 PROCEDURE — 85025 COMPLETE CBC W/AUTO DIFF WBC: CPT

## 2022-11-30 RX ORDER — HEPARIN SODIUM (PORCINE) LOCK FLUSH IV SOLN 100 UNIT/ML 100 UNIT/ML
500 SOLUTION INTRAVENOUS PRN
Status: DISCONTINUED | OUTPATIENT
Start: 2022-11-30 | End: 2022-12-01 | Stop reason: HOSPADM

## 2022-11-30 RX ORDER — SODIUM CHLORIDE 9 MG/ML
25 INJECTION, SOLUTION INTRAVENOUS PRN
Status: CANCELLED | OUTPATIENT
Start: 2022-11-30

## 2022-11-30 RX ORDER — SODIUM CHLORIDE 0.9 % (FLUSH) 0.9 %
5-40 SYRINGE (ML) INJECTION PRN
OUTPATIENT
Start: 2022-11-30

## 2022-11-30 RX ORDER — SODIUM CHLORIDE 0.9 % (FLUSH) 0.9 %
5-40 SYRINGE (ML) INJECTION PRN
Status: DISCONTINUED | OUTPATIENT
Start: 2022-11-30 | End: 2022-12-01 | Stop reason: HOSPADM

## 2022-11-30 RX ORDER — OXYCODONE HCL 5 MG/5 ML
10 SOLUTION, ORAL ORAL EVERY 4 HOURS PRN
Qty: 500 ML | Refills: 0 | Status: SHIPPED | OUTPATIENT
Start: 2022-11-30 | End: 2022-12-14

## 2022-11-30 RX ORDER — ZOLPIDEM TARTRATE 10 MG/1
TABLET ORAL
COMMUNITY
Start: 2022-11-08

## 2022-11-30 RX ORDER — PANTOPRAZOLE SODIUM 40 MG/1
40 TABLET, DELAYED RELEASE ORAL
Qty: 90 TABLET | Refills: 1 | Status: SHIPPED | OUTPATIENT
Start: 2022-11-30

## 2022-11-30 RX ORDER — SODIUM CHLORIDE 9 MG/ML
25 INJECTION, SOLUTION INTRAVENOUS PRN
OUTPATIENT
Start: 2022-11-30

## 2022-11-30 RX ORDER — PANTOPRAZOLE SODIUM 40 MG/1
1 TABLET, DELAYED RELEASE ORAL DAILY
COMMUNITY
Start: 2022-09-16

## 2022-11-30 RX ORDER — HEPARIN SODIUM (PORCINE) LOCK FLUSH IV SOLN 100 UNIT/ML 100 UNIT/ML
500 SOLUTION INTRAVENOUS PRN
OUTPATIENT
Start: 2022-11-30

## 2022-11-30 RX ADMIN — Medication 500 UNITS: at 10:21

## 2022-11-30 RX ADMIN — SODIUM CHLORIDE, PRESERVATIVE FREE 20 ML: 5 INJECTION INTRAVENOUS at 09:06

## 2022-11-30 RX ADMIN — SODIUM CHLORIDE, PRESERVATIVE FREE 10 ML: 5 INJECTION INTRAVENOUS at 09:05

## 2022-11-30 RX ADMIN — SODIUM CHLORIDE, PRESERVATIVE FREE 10 ML: 5 INJECTION INTRAVENOUS at 10:21

## 2022-11-30 ASSESSMENT — ENCOUNTER SYMPTOMS
VOMITING: 0
DIARRHEA: 0
TROUBLE SWALLOWING: 1
CONSTIPATION: 0
ABDOMINAL PAIN: 1
COUGH: 1
NAUSEA: 1
SORE THROAT: 0
BLOOD IN STOOL: 0

## 2022-11-30 ASSESSMENT — PAIN SCALES - GENERAL: PAINLEVEL_OUTOF10: 6

## 2022-11-30 ASSESSMENT — PAIN DESCRIPTION - LOCATION: LOCATION: ABDOMEN

## 2022-11-30 NOTE — PATIENT INSTRUCTIONS
Discussed her Peg Tube and the her complications she has encountered. Peg tube site is draining, discussed using Protonix to help decrease stomach acid and inflammation around tube site. Will increase her pain medication to every 4 hours. Script for Roxycodone sent to pharmacy to help with pain control. CT Scan of Abdomen ordered.  Needs scheduled for CT Scan on Friday am.  Return to see MD on Friday am after CT Scans (1030am)

## 2022-11-30 NOTE — PLAN OF CARE
Problem: Chronic Conditions and Co-morbidities  Goal: Patient's chronic conditions and co-morbidity symptoms are monitored and maintained or improved  Outcome: Adequate for Discharge  Flowsheets (Taken 11/30/2022 1548)  Care Plan - Patient's Chronic Conditions and Co-Morbidity Symptoms are Monitored and Maintained or Improved: Monitor and assess patient's chronic conditions and comorbid symptoms for stability, deterioration, or improvement     Problem: Pain  Goal: Verbalizes/displays adequate comfort level or baseline comfort level  Outcome: Adequate for Discharge  Flowsheets (Taken 11/30/2022 1548)  Verbalizes/displays adequate comfort level or baseline comfort level: Administer analgesics based on type and severity of pain and evaluate response     Problem: Safety - Adult  Goal: Free from fall injury  Outcome: Adequate for Discharge  Flowsheets (Taken 11/30/2022 1548)  Free From Fall Injury: Instruct family/caregiver on patient safety     Problem: Discharge Planning  Goal: Discharge to home or other facility with appropriate resources  Outcome: Adequate for Discharge  Flowsheets (Taken 11/30/2022 1548)  Discharge to home or other facility with appropriate resources: Identify barriers to discharge with patient and caregiver     Care plan reviewed with patient and family. Patient and family verbalize understanding of the plan of care and contribute to goal setting.

## 2022-11-30 NOTE — PROGRESS NOTES
Patient tolerated port flush with lab draw without any complications. Last vital signs:   BP (!) 98/57   Pulse 88   Temp 97.9 °F (36.6 °C) (Oral)   Resp 16   Ht 5' 1\" (1.549 m)   Wt 142 lb (64.4 kg)   SpO2 98%   BMI 26.83 kg/m²     Physician's instructions:  Discussed her Peg Tube and the her complications she has encountered. Peg tube site is draining, discussed using Protonix to help decrease stomach acid and inflammation around tube site. Will increase her pain medication to every 4 hours. Script for Roxycodone sent to pharmacy to help with pain control. CT Scan of Abdomen ordered. Needs scheduled for CT Scan on Friday am.  Return to see MD on Friday am after CT Scans (1030am)    Patient instructed if experience any symptoms following today's port flush with lab draw, she is to notify MD immediately or go to the emergency department. Discharge instructions given to patient. Verbalizes understanding. Motorized wheelchair off unit per self, accompanied by family, with belongings.

## 2022-11-30 NOTE — DISCHARGE INSTRUCTIONS
Please contact your Oncologist if you have any questions regarding the port flush with lab draw that you received today. Patient instructed if experience any of the symptoms following today's port flush with lab draw to notify MD immediately or go to emergency department. * dizziness/lightheadedness  *acute nausea/vomiting - not relieved with medication  *headache - not relieved from Tylenol/pain medication  *chest pain/pressure  *rash/itching  *shortness of breath        Drink fluids - 48oz fluids daily  Call if develop fever/ chills/ signs or symptoms of infection     Physician's instructions:  Discussed her Peg Tube and the her complications she has encountered. Peg tube site is draining, discussed using Protonix to help decrease stomach acid and inflammation around tube site. Will increase her pain medication to every 4 hours. Script for Roxycodone sent to pharmacy to help with pain control. CT Scan of Abdomen ordered.  Needs scheduled for CT Scan on Friday am.  Return to see MD on Friday am after CT Scans (1030am)

## 2022-12-01 ENCOUNTER — CLINICAL DOCUMENTATION (OUTPATIENT)
Dept: CASE MANAGEMENT | Age: 52
End: 2022-12-01

## 2022-12-01 NOTE — PROGRESS NOTES
[address] CANCER [facility] [address] [facility] [place] [address]  Dept: [phone]  Loc: Ronal Lavelle  1970   No ref. provider found   MD Mark Uprenee Nava is a 47 y.o.  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 CHIEF COMPLAINT  Chief Complaint   Patient presents with    Follow-up     Malignant neoplasm of epiglottis          HISTORY OF PRESENT ILLNESS       1/31/2022. CT scan of soft tissues of the neck without contrast done for right-sided neck pain showed no discrete neck mass fluid collection or acute phonatory change there are multiple nonspecific cervical lymph nodes at levels 1 through 5 measuring less than 1 cm in short axis no pathologically enlarged lymph nodes were seen. She initially presented with sore throat, difficulty swallowing and progressive weight loss. She was treated with multiple courses of antibiotics, steroids and antifungal medication with limited improvement. 2/10/2022. Chest x-ray done for shortness of breath and sore throat showed no focal acute lung lesions or significant chronic lung findings. 9/20/2022. Referred  to ENT and seen by Valeria Abernathy PA-C and had flexible laryngoscopy demonstrating an edematous epiglottis. She was given another round of oral steroids. He said that she was here for thrush and mouth and ear soreness for a year. The pain will get to be 10 out of 10. The pain was constant but would fluctuate in severity. There is no drainage or history of hearing loss or tinnitus. She said that she had a constant sore throat making it difficult for her to swallow and she has lost a lot of weight. She says that the pain is worse on the right and would extend into her ear. She is also finding it hard to breathe. Weight was estimated to be 65 kg. Examination showed multiple broken teeth and cavities.   Salivary gland exam was normal.  There is mild lymphadenopathy with tender lymph nodes on the right side. Flexible laryngoscopy showed abnormally enlarged epiglottis obstructing the view of the larynx worse on the right than on the left. She increased her steroids that she was already on and had her come back in a week and told the patient to go to the ED if her airway became compromised. CT scan of the soft tissues of the neck with contrast was ordered. 9/21/2022. CAT scan of the neck showed nodular thickening of the area epiglottic folds associated with abnormal soft tissue thickening of the right side of the hypopharynx extending into the right posterolateral wall of the hypopharynx and flattening deformity of the right vallecula and piriform sinus raising the question of squamous cell carcinoma. There was no significant lymphadenopathy seen. A significant thyroid mass was not identified. The upper esophagus was unremarkable and a foreign body was not identified. There was heavy bilateral carotid bulb calcification seen and multiple dental caries were noted. PET CT scan was recommended. 9/28/2022. Follow-up in the ENT office. Patient said that her throat felt better but she had a bump on the roof of her mouth that was painful and she still complained of trouble swallowing and a sore throat. Flexible laryngoscopy was performed again hypopharynx was markedly abnormal with white speckles but most notably significant thickening of the epiglottis extending into the arytenoid space and false cords worse on the right. There was a possible necrotic area in the posterior face of the epiglottis. The larynx was abnormal with white speckles indicative of fungal infection. Dr. Live Carlson reviewed the case and discussed with the patient the benefit of biopsy. The possibility of tracheostomy was discussed to protect her airway. She was treated with fluconazole for her thrush. Steroids were held. 10/3/2022.   Chest x-ray that the cardiac silhouette was within normal limits. Coronary stent was present. There is strandy interstitial opacities in the perihilar distribution along with peribronchial thickening and no focal areas of consolidation, no pneumothorax or pleural effusion. No acute bony changes were seen. 10/6/2022. 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 The epiglottis was edematous and had granular tissue involving the laryngeal surface of the left epiglottis. There was a separate lesion involving the right false vocal cord with a papillomatous appearance. Both true vocal cords seem to be spared. There is no involvement of the arytenoid complex or postcricoid region. The lingual surface of the epiglottis did not have any mucosal erosions but was edematous. The vallecula and base the tongue was unremarkable. The entire cervical esophagus was normal.  She is a former smoker. The tentative stage is T3, N1 versus T3, N2c    Pathology report showed squamous cell carcinoma, poorly differentiated, of the left epiglottis, p16 negative. 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 10/13/2022. Follow-up in the office. Since her biopsy her sore throat had been somewhat tolerable but she continued to have pain with swallowing. She denied choking and coughing episodes with swallowing. She had no problems with shortness of breath. She did complain of facial pain postnasal drip sore throat dysphagia cough with sputum production. He said that she had a 1.5 cm right level 3 lymph node. PET CT scan has been ordered and will be performed on November 3, 2022. The possible need for a gastrostomy tube for nutrition was discussed.   Given that she already had baseline epiglottic edema which might worsen during treatment resulting in airway obstruction, the possibility of requiring a tracheostomy tube was also reviewed. Options for treatment were discussed including primary surgery versus chemo rads. She was not felt to be an optimal surgical candidate. The patient opted for chemoradiation therapy and was referred to radiation therapy. She is here today to establish with medical oncology. Good control of her diabetes was stressed as well. She was also to have dental caries and was encouraged to go see a dentist.    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 Her above-the-knee amputation on the left side was on 2/26/2016. Has undergone cardiac catheterization with percutaneous stenting in August 2016 and an September 2016. Her diabetes requires insulin. She is on a fentanyl patch. She is here today to establish medical oncology follow-up. She has an appointment to see Dr. Ryan Beebe on 10/26. She says that she does not have much of an appetite and she continues to lose weight. She used to weigh 240 pounds and has lost 100 pounds in the past year and a half. Continues to have throat pain, dysphagia and odynophagia. She had been a smoker of a pack of cigarettes a day from age 13 through 2014 and then tried to cut down her smoking. She says that she is vaping and smoking as little as possible. She says that she does have a cough but no hemoptysis. She has been a diabetic for many years. She had gestational diabetes and did not check her sugars for a long time. She felt bad and went to a local ED and was found to have a sugar of 590. She says that she checks her sugars once a day. She says that her body tells her when her sugar is high. She usually checks her sugar sometime between 3 and 6 PM.  He has had an above-the-knee amputation of her left leg.   Says that she has had no problems with her stump. She says that she has a prosthesis but it does not fit anymore because she has lost so much weight. She says that she has no neuropathy. She uses a scooter. She has not had a recent eye examination. She said that she \"just did not go\" feels that her renal function is fine. She says that she has no known anemia. The only bleeding that she has experienced was a little bit after she had her biopsy. She did have the above-mentioned thrush and that was sore. She knows she has bad teeth and she is supposed to go to the dentist on October 27. We discussed this today in detail and gave her some assistance in dealing with this. She has fallen in the past fracturing her nose and breaking her finger and a tooth fell out which she has remedied herself. She has had myocardial infarction in 2007 and in 2016. She has had 4 stents placed. She has been on Repatha. She also says that she has had a valvular heart problem. She says that she has had nausea and occasional vomiting. She denies diarrhea and constipation. She says that she has never had a stroke. She has not had skin cancer. 11/9/2022. Mrs. Elena Mensah is here today to discuss the results of her PET scan. Unfortunately this shows a lesion in the periphery of the lung and hilar adenopathy which looks very much like a new primary lung cancer. I have discussed this with her and have asked her if we can go forward with a biopsy. The lesion is very peripheral and may be able to be biopsied by interventional radiology. That would circumvent the need to possibly pass an endoscope past her epiglottic tumor. I have discussed her case with Dr. Torey Bell. We think that a combination chemotherapy/ radiation therapy approach with her epiglottic and also her lung tumor is the best and I think that highly emetogenic chemotherapy is probably not in her best interest since it may compromise her airway if she vomits.   We will give her weekly CarboTaxol along with her radiation therapy. She will need to have a port placed. She has decided to go forward with a PEG tube and trach if needed. She will contact her ENT doctor. INTERVAL NOTE    11/30/2022. There have been multiple interventions since last time Michael Clements was in the office. 11/16/2022. Seen in consultation by Dr. Venice Walton. He arranged for Mediport placement and feeding tube placement. 11/21/2022. Surgery performed with port placement and feeding tube. 11/22/2022. 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 My reading of the data shows that if this is just a nodule no treatment is needed. CBC at that time showed a white count of 19.3 with hemoglobin of 13 hematocrit 46 and a platelet count of 868,925.    11/23/2022. She has had her teeth pulled through [employer] with a stanley pain for the oral surgeon. She tolerated it well. She is healing well. 11/25/2022. Emergency department encounter for severe abdominal pain. She had had nausea with vomiting 3 times over the last several days. She has significant pain in her abdomen when she has a bowel movement and also when she urinates. She was afebrile but tachycardic at a rate of 102. She was tender to palpation in the left lower quadrant and around the PEG tube site. The site itself was not erythematous and the tube seem to be in place. CBC showed white count of 9.9 with a hemoglobin of 15.4 hematocrit 46.5 and a platelet count of 048,583 with 75% polys, 14 lymphs, 7 monos, 2 eos. CAT scan showed mild soft tissue edema along the PEG tube catheter site related to recent PEG tube placement. There is no soft tissue fluid collection. She was discharged home. 11/28/2022. He had seen Dr. Christal Coyle at the [facility] emergency room yesterday. She was treated for pain and then was discharged. That day her vital signs or normal with a pulse rate of 83.   She was afebrile. She had generalized abdominal tenderness. Injection into the tube showed that the tube was at the level of the gastric antrum and contrast opacified the gastric lumen with no extravasation of contrast to suggest a leak. She was treated with clindamycin. 11/29. She called the surgeon's office saying that her PEG tube site was red and there was thick white drainage around the tube. They told her it would take a few days for the antibiotics to work. Today the patient is still having a lot of pain. There is a lot of clear drainage with some thickened dark discharge it appears to be old blood but it is not tested. There is no red to it. There is some bright pink fluid in the PEG tube line which appears to have been from Snapple ingested the day before. She has rebound tenderness throughout the left side of her abdomen. Even though the study did not show any leaking, I fear that it had occurred and has caused irritation of the peritoneum causing this rebound. I have encouraged the patient to lie flat as much as possible letting the fluids drain internally rather than into her peritoneum. We will continue on her antibiotic. We have another CAT scan scheduled for Friday morning and we will see her on Friday after that. Her wound was inspected and redressed. We increased her pain medication. Her concerns about her treatment in the emergency room were also addressed. I urged her son to talk to the [facility] about her concerns about her treatment.     MONITORING PARAMETERS    Physical examination, CAT scans and PET scans MRIs    PAST MEDICAL HISTORY  Past Medical History:   Diagnosis Date    Above knee amputation of left lower extremity (HCC)     Blood circulation, collateral     CAD (coronary artery disease)     Diabetes mellitus ([address]     Hyperlipidemia     Hypertension     MDRO (multiple drug resistant organisms) resistance     MRSA wound 2013    MI, old 01/01/2007    Obesity (BMI 30-39. 9)     Status post skin graft 01/15/2014    Amniox - Dr Jesse Stiles cancer [facility]) 10/2022    squamous cell carcinoma of the epiglottis        REVIEW OF SYSTEMS  Review of Systems   Constitutional:  Positive for appetite change, chills, fatigue and fever. HENT:  Positive for dental problem and trouble swallowing. Negative for nosebleeds and sore throat. Respiratory:  Positive for cough. Cardiovascular:  Negative for chest pain and leg swelling. Gastrointestinal:  Positive for abdominal pain and nausea. Negative for blood in stool, constipation, diarrhea and vomiting. Genitourinary:  Negative for dysuria, hematuria and urgency. Musculoskeletal:  Negative for myalgias. Skin:  Positive for pallor. Neurological:  Negative for syncope, weakness, light-headedness and headaches. Psychiatric/Behavioral:  Negative for behavioral problems, self-injury, sleep disturbance and suicidal ideas. The patient is not nervous/anxious. FAMILY HISTORY  Family History   Problem Relation Age of Onset    Heart Disease Maternal Grandfather     High Blood Pressure Maternal Grandfather     Asthma Neg Hx     Cancer Neg Hx     Diabetes Neg Hx     Kidney Disease Neg Hx     Stroke Neg Hx         SOCIAL HISTORY  Social History     Socioeconomic History    Marital status:       Spouse name: Not on file    Number of children: 1    Years of education: Not on file    Highest education level: Not on file   Occupational History    Not on file   Tobacco Use    Smoking status: Former     Packs/day: 0.50     Years: 27.00     Pack years: 13.50     Types: Cigarettes     Quit date: 2014     Years since quittin.1    Smokeless tobacco: Never    Tobacco comments:     e cigs daily use 22   Vaping Use    Vaping Use: Some days    Substances: Nicotine   Substance and Sexual Activity    Alcohol use: No    Drug use: No    Sexual activity: Not on file   Other Topics Concern    Not on file   Social History Narrative Not on file     Social Determinants of Health     Financial Resource Strain: Not on file   Food Insecurity: Not on file   Transportation Needs: Not on file   Physical Activity: Not on file   Stress: Not on file   Social Connections: Not on file   Intimate Partner Violence: Not on file   Housing Stability: Not on file        CURRENT MEDICATIONS  Current Outpatient Medications   Medication Sig Dispense Refill    oxyCODONE (ROXICODONE) 5 MG/5ML solution Take 10 mLs by mouth every 4 hours as needed for Pain for up to 14 days. 500 mL 0    pantoprazole (PROTONIX) 40 MG tablet Take 1 tablet by mouth every morning (before breakfast) 90 tablet 1    clindamycin (CLEOCIN) 150 MG capsule Take 3 capsules by mouth 4 times daily for 7 days 84 capsule 0    ALPRAZolam (XANAX) 1 MG tablet Take 1 mg by mouth as needed. oxyCODONE-acetaminophen (PERCOCET) 5-325 MG per tablet Take 2 tablets by mouth every 8 hours as needed for Pain. clopidogrel (PLAVIX) 75 MG tablet Take 1 tablet by mouth daily      TRULICITY 1.5 MG/5.8CP SC injection Inject 1.5 mg into the skin once a week      HUMALOG KWIKPEN 100 UNIT/ML SOPN       JARDIANCE 25 MG tablet Take 1 tablet by mouth daily      nitroGLYCERIN (NITROSTAT) 0.4 MG SL tablet Place 1 tablet under the tongue every 5 minutes as needed for Chest pain Take 1 tablet for chest pain and repeat after 5 min if no relief, call 911 and take another dose 5 min later. Max of 3 doses in 15 min.  25 tablet 3    metFORMIN (GLUCOPHAGE) 500 MG tablet Take 2 tablets by mouth 2 times daily Resume 8/30 60 tablet 3    pioglitazone (ACTOS) 30 MG tablet Take 1 tablet by mouth daily 30 tablet 3    atorvastatin (LIPITOR) 20 MG tablet Take 2 tablets by mouth nightly 30 tablet 3    lisinopril (PRINIVIL;ZESTRIL) 2.5 MG tablet Take 1 tablet by mouth daily 30 tablet 3    metoprolol tartrate (LOPRESSOR) 25 MG tablet Take 1 tablet by mouth 2 times daily 60 tablet 3    isosorbide dinitrate (ISORDIL) 20 MG tablet Take 30 mg by mouth daily      amLODIPine (NORVASC) 5 MG tablet Take 5 mg by mouth daily      budesonide-formoterol (SYMBICORT) 160-4.5 MCG/ACT AERO Inhale 2 puffs into the lungs 2 times daily      pregabalin (LYRICA) 150 MG capsule Take 150 mg by mouth 2 times daily      ALPRAZolam (XANAX) 0.5 MG tablet Take 0.5 mg by mouth 2 times daily as needed      aspirin 81 MG tablet Take 81 mg by mouth daily      albuterol (PROVENTIL HFA) 108 (90 BASE) MCG/ACT inhaler Inhale 2 puffs into the lungs every 4 hours as needed for Wheezing or Shortness of Breath (Space out to every 6 hours as symptoms improve). Space out to every 6 hours as symptoms improve. 1 Inhaler 10    zolpidem (AMBIEN) 10 MG tablet TAKE 1 TABLET BY MOUTH AT NIGHT      pantoprazole (PROTONIX) 40 MG tablet Take 1 tablet by mouth daily      oxyCODONE-acetaminophen (PERCOCET) 5-325 MG per tablet Take 1 tablet by mouth every 6 hours as needed for Pain for up to 12 days. Intended supply: 7 days. Take lowest dose possible to manage pain (Patient not taking: Reported on 11/30/2022) 12 tablet 0    fentaNYL 37.5 MCG/HR PT72 Apply 1 patch topically. (Patient not taking: No sig reported)       No current facility-administered medications for this visit.      Facility-Administered Medications Ordered in Other Visits   Medication Dose Route Frequency Provider Last Rate Last Admin    sodium chloride flush 0.9 % injection 5-40 mL  5-40 mL IntraVENous PRN Adriel Johnson MD   10 mL at 11/30/22 1021    heparin flush 100 UNIT/ML injection 500 Units  500 Units IntraCATHeter PRN Adriel Johnson MD   500 Units at 11/30/22 1021        OARRS    PDMP Monitoring:    Last PDMP [place] Reviewed [facility]):  Review User Review Instant Review Result   Montrell Cox 10/23/2022  7:08 PM Reviewed PDMP [1]       Urine Drug Screenings (1 yr)       Urine Drug Screen  Collected: 11/29/2022  2:15 PM (Final result)                  Medication Contract and Consent for Opioid Use Documents Filed        No documents found                     PHYSICAL EXAM    Physical Exam  Vitals (Blood pressure is 98/57) and nursing note reviewed. Exam conducted with a chaperone present. Constitutional:       General: She is in acute distress (pain). Appearance: She is ill-appearing. She is not toxic-appearing or diaphoretic. HENT:      Head: Normocephalic and atraumatic. Nose: Nose normal.      Mouth/Throat:      Mouth: Mucous membranes are moist.      Pharynx: Oropharynx is clear. No oropharyngeal exudate or posterior oropharyngeal erythema. Comments: Has had full mouth extraction, healing well. No signs of infection. Eyes:      General: No scleral icterus. Extraocular Movements: Extraocular movements intact. Conjunctiva/sclera: Conjunctivae normal.      Pupils: Pupils are equal, round, and reactive to light. Cardiovascular:      Rate and Rhythm: Regular rhythm. Tachycardia present. Pulses: Normal pulses. Heart sounds: No murmur heard. No gallop. Pulmonary:      Effort: Pulmonary effort is normal.      Breath sounds: Wheezing present. No rales. Comments: Breath sounds decreased throughout. Abdominal:      General: There is distension (slightly). Tenderness: There is abdominal tenderness. There is guarding and rebound. Comments: Peg tube site is red with drainage that appears to be old blood. Also clear drainage   Musculoskeletal:         General: Normal range of motion. Cervical back: Normal range of motion. No rigidity. Right lower leg: No edema. Comments: Has amputation of left leg to her thigh   Lymphadenopathy:      Cervical: No cervical adenopathy. Skin:     General: Skin is warm and dry. Coloration: Skin is pale. Skin is not jaundiced. Findings: Erythema (around tube site) present. Neurological:      General: No focal deficit present. Mental Status: She is alert and oriented to person, place, and time.       Motor: Weakness present. Psychiatric:         Mood and Affect: Mood normal.         Behavior: Behavior normal.         Thought Content: Thought content normal.         Judgment: Judgment normal.        LABS/IMAGING  CT GUIDED NEEDLE PLACEMENT    Result Date: 11/22/2022  1. Status post successful CT guided lung biopsy. 2. Followup chest radiographs will be obtained. **This report has been created using voice recognition software. It may contain minor errors which are inherent in voice recognition technology. ** Final report electronically signed by Dr Marii Recio on 11/22/2022 12:17 PM    CT ABDOMEN PELVIS W IV CONTRAST Additional Contrast? None    Result Date: 11/29/2022  1. There is gas tracking along the patient's PEG tube of uncertain significance since the previous exam possibly related to manipulation of the catheter for yesterday's examination. Persistent mild edema adjacent to the tube from insertion without evidence of enhancing collection. **This report has been created using voice recognition software. It may contain minor errors which are inherent in voice recognition technology. ** Final report electronically signed by Dr. Merline Spoon on 11/29/2022 2:17 PM    CT ABDOMEN PELVIS W IV CONTRAST Additional Contrast? None    Result Date: 11/26/2022  Impression: 1. PEG tube balloon appears to be within the gastric lumen. Mild soft tissue edema seen along the PEG tube catheter likely related to recent placement. No focal soft tissue fluid collection. 2. Gallstones. No gallbladder wall thickening to suggest acute cholecystitis. 3. Small fat-containing anterior abdominal wall hernia 4. Normal appendix This document has been electronically signed by: Lane Almonte MD on 11/26/2022 12:00 AM All CTs at this facility use dose modulation techniques and iterative reconstructions, and/or weight-based dosing when appropriate to reduce radiation to a low as reasonably achievable. XR CHEST 1 VIEW    Result Date: 11/21/2022  1. Status post surgical MediPort right side, catheter tip at cavoatrial junction. 2. No pneumothorax is seen. ET tube in good position. Final report electronically signed by Dr. Avelino Tavarez on 11/21/2022 4:02 PM    CT NEEDLE BIOPSY LUNG PERCUTANEOUS    Result Date: 11/22/2022  1. Status post successful CT guided lung biopsy. 2. Followup chest radiographs will be obtained. **This report has been created using voice recognition software. It may contain minor errors which are inherent in voice recognition technology. ** Final report electronically signed by Dr Chip Gracia on 11/22/2022 12:17 PM    PET CT SKULL BASE TO MID THIGH    Result Date: 11/3/2022  1. FDG avid right hypopharyngeal lesion corresponding to known malignancy. 2. FDG avid left lung densities and nodules highly suspicious for malignancy. 3. FDG avid left hilar lymphadenopathy highly suspicious for metastatic disease. Final report electronically signed by Dr. Aruna Tony on 11/3/2022 2:31 PM    XR INJ CONTRAST GASTRIC TUBE PERC    Result Date: 11/28/2022  PEG tube is in the gastric antrum and there is no evidence of leak or obstruction of the tube. **This report has been created using voice recognition software. It may contain minor errors which are inherent in voice recognition technology. ** Final report electronically signed by Dr. Lavelle Tavarez on 11/28/2022 10:21 AM    XR CHEST PA INSPIRATION 1 VW    Result Date: 11/22/2022  There is no acute intrathoracic process. **This report has been created using voice recognition software. It may contain minor errors which are inherent in voice recognition technology. ** Final report electronically signed by Dr Chip Gracia on 11/22/2022 1:14 PM    XR CHEST PA INSPIRATION 1 VW    Result Date: 11/22/2022  There is a very small left-sided pneumothorax. This is stable when compared to the images at time of biopsy. Another radiograph will be performed in 2 additional hours.  **This report has been created using voice recognition software. It may contain minor errors which are inherent in voice recognition technology. ** Final report electronically signed by Dr Keenan Begum on 11/22/2022 11:34 AM     Laboratory data showed a sodium of 136, BUN 17 creatinine 0.7. Calcium was slightly low at 1. 11. Sugar was 405. Albumin is low at 3.3. Alkaline phosphatase is elevated at 187. Transaminases and bilirubin are normal.  CBC shows white count of 11,000 with a hemoglobin of 12.9, hematocrit 38.8 and a platelet count of 310,366 with 64% polys, 20 lymphs, 7 monos    PATHOLOGY/GENETICS    Cancer of the epiglottis, p16 negative, squamous cell,    ASSESSMENT and PLAN    1. Squamous cell carcinoma of the epiglottis, p16 negative. Our plan is to give her concurrent radiation therapy and chemotherapy. She has her port in place which is doing well. We have significant PEG tube problems. 2.  Abnormal CT scan of the chest.  Biopsy showed histoplasmosis rather than cancer. 3.  Diabetes mellitus. 4.  Feeding tube difficulties. We will get another CT scan on Friday morning and see her again after that. She is finishing a course of clindamycin. 5.  Pain control. We have increased her pain medicine. 6.  Anxiety and depression. She will continue on her current medications    7. Coronary artery disease hyperlipidemia and atherosclerosis. These are among her comorbidities. When she has significant problems swallowing with her radiation and chemotherapy she will need to have access to her GI tract in order to get the medications she needs to keep these multiple comorbidities under control. She will return to the office on Friday. She will call us in the interim if she has any questions concerns or change in her status.         Osvaldo Akhtar MD 11/30/2022 10:00 PM

## 2022-12-02 ENCOUNTER — TELEPHONE (OUTPATIENT)
Dept: ONCOLOGY | Age: 52
End: 2022-12-02

## 2022-12-02 NOTE — PROGRESS NOTES
Name: Ori Aguirre  : 1970  MRN: [id number]    Oncology Navigation Follow-Up Note    Contact Type:  Telephone    Notes:   After appointment with Dr. Adelaida Davis, seemed more settled. Ordered Liquid Pain med for her and told her to take what she had on had in pills for in between. Follow up . Scan ordered of abdomen to reaccess PEG tube. However, received a phone call from her-yelling and stating no one understands her situation with PEG tube. Wants it out, she is going to have a heart attack if they don't. Asked if she was getting any relief from liquid pain med, and she stated she does not have yet, and no one has it in stock. Informed her that I wished she would have called, we would have found a pharmacy for her or ordered something else. She is not going to have scan done, going to see her regular doctor Friday am. Going to have tube out. Explained it would have to be surgically removed-she needs to call Jacobo Notice again. She said she wont be seeing Adelaida Davis Friday and hung up on me. Later called back and apologized. Said she would try to keep appointment. Asked her if she wanted me to find someone who had pain med, and she said her pharmacy was getting it today. Emotional support given. Dr. Adelaida Davis notified of above.     Electronically signed by Verena Swain RN on 2022 at 5:41 PM

## 2022-12-03 LAB
AEROBIC CULTURE: ABNORMAL
ANAEROBIC CULTURE: ABNORMAL
BLOOD CULTURE, ROUTINE: NORMAL
BLOOD CULTURE, ROUTINE: NORMAL
GRAM STAIN RESULT: ABNORMAL
ORGANISM: ABNORMAL
ORGANISM: ABNORMAL

## 2022-12-04 ENCOUNTER — TELEPHONE (OUTPATIENT)
Dept: PHARMACY | Age: 52
End: 2022-12-04

## 2022-12-04 LAB
BLOOD CULTURE, ROUTINE: NORMAL
BLOOD CULTURE, ROUTINE: NORMAL
ORGANISM: ABNORMAL
URINE CULTURE REFLEX: ABNORMAL
URINE CULTURE REFLEX: ABNORMAL

## 2022-12-04 NOTE — TELEPHONE ENCOUNTER
Pharmacy Note  ED Culture Follow-up    Debra Riddle is a 47 y.o. female. Allergies: Shellfish-derived products and Shrimp flavor     Labs:  Lab Results   Component Value Date    BUN 12 11/29/2022    CREATININE 0.7 11/30/2022    WBC 11.0 (H) 11/30/2022     Estimated Creatinine Clearance: 82 mL/min (based on SCr of 0.7 mg/dL). Current antimicrobials:   Clindamycin 450 mg four times daily x 7 days    ASSESSMENT:  Micro results:   Urine culture: positive for candida glabrata. Wound culture positive for candida krusei and romelia glabrata     PLAN:  Need for intervention: Yes  Discussed with: Ambre Rollins DO  Chosen treatment:    Fluconazole 200 mg daily x 14 days    Patient response:   Call attempt #1, did not reach patient    Called/sent in prescription to: Not applicable    Please call with any questions.  Spring Combs, [facility], PharmD 4:17 PM 12/4/2022

## 2022-12-05 ENCOUNTER — TELEPHONE (OUTPATIENT)
Dept: SURGERY | Age: 52
End: 2022-12-05

## 2022-12-05 ENCOUNTER — TELEPHONE (OUTPATIENT)
Dept: ONCOLOGY | Age: 52
End: 2022-12-05

## 2022-12-05 RX ORDER — FLUCONAZOLE 100 MG/1
100 TABLET ORAL DAILY
Qty: 10 TABLET | Refills: 0 | Status: SHIPPED | OUTPATIENT
Start: 2022-12-05 | End: 2022-12-15

## 2022-12-05 NOTE — TELEPHONE ENCOUNTER
Pt's boyfriend calling the office stating he is very worried about Curvin Ly. She is getting very weak and hardly able to take care of herself. She continues to have issues with her feeding tube, pain and drainage. He is unsure if she is using it regularly for feedings. Attempted to call patient to get more details, left message. Also want to see if she needs home health assistance? She has an appt with Dr. Centeno Notice 12/8. Gene is going to call surgery office to see if she can get in sooner. She did not come here to her appt on Friday.

## 2022-12-05 NOTE — TELEPHONE ENCOUNTER
----- Message from EVELIO Tillman - CNP sent at 12/5/2022 10:11 AM EST -----  Sent in fluconazole, patient cultures grew candida

## 2022-12-05 NOTE — TELEPHONE ENCOUNTER
Boyfriend calling on patient behalf, very anxious that feeding tube is infected and needs to have it remove immediately. Has been to E/R several times asking if she can be seen as soon as possible. What do you Advise?  Has appt 12/08/22

## 2022-12-05 NOTE — TELEPHONE ENCOUNTER
Patient left message stating that her PEG tube has been infected since day 1. She also said that she had cultures taken from the drainage showed more infection and they sent in antibiotics for it. Patient states the tube \"Just needs out. I can't take it anymore. It gives me panic attacks and chest pains. \" She states she has a follow up appointment tomorrow at Dr. Vickki Babinski.

## 2022-12-05 NOTE — TELEPHONE ENCOUNTER
Oncology Social Work    Date: 12/5/2022  Name: Zhang Mayberry  MRN: [id number]      received phone call from patient's boyfriend, Ladarius. Ladarius stated that Jeremias Ingram is still very weak and is losing weight and is still having trouble with her PEG Tube.  provided Gene with the surgeon's office phone number, and then transferred Gene to speak with the nurse regarding this, as  is not a medical professional & cannot provide medical advice or assist with the issue. Gene understood. Call Transferred, see nursing note.      TAMIKA Arroyo, [facility]  Oncology Social Worker

## 2022-12-06 ENCOUNTER — OFFICE VISIT (OUTPATIENT)
Dept: SURGERY | Age: 52
End: 2022-12-06

## 2022-12-06 ENCOUNTER — TELEPHONE (OUTPATIENT)
Dept: CASE MANAGEMENT | Age: 52
End: 2022-12-06

## 2022-12-06 VITALS
WEIGHT: 138 LBS | BODY MASS INDEX: 26.06 KG/M2 | RESPIRATION RATE: 18 BRPM | HEART RATE: 79 BPM | OXYGEN SATURATION: 97 % | DIASTOLIC BLOOD PRESSURE: 62 MMHG | SYSTOLIC BLOOD PRESSURE: 110 MMHG | TEMPERATURE: 97.5 F | HEIGHT: 61 IN

## 2022-12-06 DIAGNOSIS — L08.9 WOUND INFECTION: ICD-10-CM

## 2022-12-06 DIAGNOSIS — Z93.1 S/P PERCUTANEOUS ENDOSCOPIC GASTROSTOMY (PEG) TUBE PLACEMENT (HCC): Primary | ICD-10-CM

## 2022-12-06 DIAGNOSIS — T14.8XXA WOUND INFECTION: ICD-10-CM

## 2022-12-06 NOTE — PROGRESS NOTES
[address] [place] [address] [address] [facility] [address]  Dept: [phone]  Dept Fax: [phone]  Loc: [phone]    Visit Date: 12/6/2022    Laila Florez is a 47 y.o. female who presentstoday for:  Chief Complaint   Patient presents with    Post-Op Check     S/P 1. EGD. 2.  Percutaneous endoscopic gastrostomy tube placement. 3.  Right subclavian vein MediPort placement. 4.  Intraoperative fluoroscopy 11/21/22. Seen in ER multiple times for abdominal pain, drainage around PEG tube site-started on Diflucan 12/5/2022       HPI:     HPI patient had a PEG tube placed and Mediport and since PEG tube placement has been having severe abdominal pain. Patient actually gone to the emergency room x2 and had CT scan done x2 of the abdomen and pelvis first on 26 November second time on 29 November. Both CAT scans were negative for any abnormality. However the patient is here today stating that it has to be removed she is too anxious she is afraid she is going to have a heart attack because of her anxiety over this tube and more or less is demanding it be removed. We discussed with the patient that it has been right at 2 weeks since placement and that removal could cause the stomach to separate from the abdominal cavity necessitating seepage of gastric contents into the abdominal cavity and the need for a operation for closure of gastrotomy. However patient is adamant it needs to come out    Past Medical History:   Diagnosis Date    Above knee amputation of left lower extremity (HCC)     Blood circulation, collateral     CAD (coronary artery disease)     Diabetes mellitus ([address]     Hyperlipidemia     Hypertension     MDRO (multiple drug resistant organisms) resistance     MRSA wound 2013    MI, old 01/01/2007    Obesity (BMI 30-39. 9)     Status post skin graft 01/15/2014    Amniox - Dr Shiloh Jaramillo     Throat cancer [facility]) 10/2022    squamous cell carcinoma of the epiglottis      Past Surgical History:   Procedure Laterality Date    ANGIOPLASTY Left 2014    left leg     SECTION  1998    CORONARY ANGIOPLASTY WITH STENT PLACEMENT      [facility]    CORONARY ANGIOPLASTY WITH STENT PLACEMENT      3 stents in [facility]  2016    debbi, 1 stent to RCA    CT NEEDLE BIOPSY LUNG PERCUTANEOUS  2022    CT NEEDLE BIOPSY LUNG PERCUTANEOUS 2022 [facility] CT SCAN    ESOPHAGOGASTRODUODENOSCOPY  10/06/2022    [facility]    GASTROSTOMY TUBE PLACEMENT N/A 2022    EGD Peg Tube Placement possible Open G Tube performed by Lizett Babin MD at [facility]  2015    Left Femoral to Peroneal Bypass with Composite Vein Graft and Propaten Graft     OTHER SURGICAL HISTORY Left 2016    Left Leg Above Knee Amputation    PORT SURGERY N/A 2022    Insertion Single Lumen Mediport performed by Lizett Babin MD at [address] [place] [address]          Family History   Problem Relation Age of Onset    Heart Disease Maternal Grandfather     High Blood Pressure Maternal Grandfather     Asthma Neg Hx     Cancer Neg Hx     Diabetes Neg Hx     Kidney Disease Neg Hx     Stroke Neg Hx         Social History     Tobacco Use    Smoking status: Former     Packs/day: 0.50     Years: 27.00     Pack years: 13.50     Types: Cigarettes     Quit date: 2014     Years since quittin.2    Smokeless tobacco: Never    Tobacco comments:     e cigs daily use 22   Substance Use Topics    Alcohol use: No          Current Outpatient Medications   Medication Sig Dispense Refill    fluconazole (DIFLUCAN) 100 MG tablet Take 1 tablet by mouth daily for 10 days 10 tablet 0    zolpidem (AMBIEN) 10 MG tablet TAKE 1 TABLET BY MOUTH AT NIGHT      pantoprazole (PROTONIX) 40 MG tablet Take 1 tablet by mouth daily      oxyCODONE (ROXICODONE) 5 MG/5ML solution Take 10 mLs by mouth every 4 hours as needed for Pain for up to 14 days. 500 mL 0    pantoprazole (PROTONIX) 40 MG tablet Take 1 tablet by mouth every morning (before breakfast) 90 tablet 1    ALPRAZolam (XANAX) 1 MG tablet Take 1 mg by mouth as needed. oxyCODONE-acetaminophen (PERCOCET) 5-325 MG per tablet Take 2 tablets by mouth every 8 hours as needed for Pain. clopidogrel (PLAVIX) 75 MG tablet Take 1 tablet by mouth daily      TRULICITY 1.5 SS/5.4KO SC injection Inject 1.5 mg into the skin once a week      HUMALOG KWIKPEN 100 UNIT/ML SOPN       fentaNYL 37.5 MCG/HR PT72 Apply 1 patch topically. JARDIANCE 25 MG tablet Take 1 tablet by mouth daily      nitroGLYCERIN (NITROSTAT) 0.4 MG SL tablet Place 1 tablet under the tongue every 5 minutes as needed for Chest pain Take 1 tablet for chest pain and repeat after 5 min if no relief, call 911 and take another dose 5 min later. Max of 3 doses in 15 min.  25 tablet 3    metFORMIN (GLUCOPHAGE) 500 MG tablet Take 2 tablets by mouth 2 times daily Resume 8/30 60 tablet 3    pioglitazone (ACTOS) 30 MG tablet Take 1 tablet by mouth daily 30 tablet 3    atorvastatin (LIPITOR) 20 MG tablet Take 2 tablets by mouth nightly 30 tablet 3    lisinopril (PRINIVIL;ZESTRIL) 2.5 MG tablet Take 1 tablet by mouth daily 30 tablet 3    metoprolol tartrate (LOPRESSOR) 25 MG tablet Take 1 tablet by mouth 2 times daily 60 tablet 3    isosorbide dinitrate (ISORDIL) 20 MG tablet Take 30 mg by mouth daily      amLODIPine (NORVASC) 5 MG tablet Take 5 mg by mouth daily      budesonide-formoterol (SYMBICORT) 160-4.5 MCG/ACT AERO Inhale 2 puffs into the lungs 2 times daily      pregabalin (LYRICA) 150 MG capsule Take 150 mg by mouth 2 times daily      ALPRAZolam (XANAX) 0.5 MG tablet Take 0.5 mg by mouth 2 times daily as needed      aspirin 81 MG tablet Take 81 mg by mouth daily      albuterol (PROVENTIL HFA) 108 (90 BASE) MCG/ACT inhaler Inhale 2 puffs into the lungs every 4 hours as needed for Wheezing or Shortness of Breath (Space out to every 6 hours as symptoms improve). Space out to every 6 hours as symptoms improve. 1 Inhaler 10     No current facility-administered medications for this visit. Allergies   Allergen Reactions    Shellfish-Derived Products Anaphylaxis     \"Throat closes up\"    Shrimp Flavor Swelling       Subjective:      Review of Systems    Objective:   /62 (Site: Right Upper Arm, Position: Sitting, Cuff Size: Medium Adult)   Pulse 79   Temp 97.5 °F (36.4 °C) (Temporal)   Resp 18   Ht 5' 1\" (1.549 m)   Wt 138 lb (62.6 kg)   SpO2 97%   BMI 26.07 kg/m²     Physical Exam site looks good.   She apparently is growing some bacteria from the cultures done in the emergency room she did show me a picture it has improved with antibiotics but the infection is not causing her pain       Results for orders placed or performed during the hospital encounter of 11/30/22   Hepatic Function Panel   Result Value Ref Range    Albumin 3.3 (L) 3.5 - 5.1 g/dL    Total Bilirubin 0.3 0.3 - 1.2 mg/dL    Bilirubin, Direct <0.2 0.0 - 0.3 mg/dL    Alkaline Phosphatase 187 (H) 38 - 126 U/L    AST 10 5 - 40 U/L    ALT 12 11 - 66 U/L    Total Protein 6.3 6.1 - 8.0 g/dL   CBC with Auto Differential   Result Value Ref Range    WBC 11.0 (H) 4.8 - 10.8 thou/mm3    RBC 4.01 (L) 4.20 - 5.40 mill/mm3    Hemoglobin 12.9 12.0 - 16.0 gm/dl    Hematocrit 38.8 37.0 - 47.0 %    MCV 97 81 - 99 fL    MCH 32.2 26.0 - 33.0 pg    MCHC 33.2 32.2 - 35.5 gm/dl    RDW 12.5 11.5 - 14.5 %    Platelets 388 (H) 582 - 400 thou/mm3    MPV 10.6 9.4 - 12.4 fL    Seg Neutrophils 64 43 - 75 %    Lymphocytes 28 15 - 47 %    Monocytes 7 0 - 12 %    Eosinophils % 0 0 - 4 %    BASINOPHIL, AUTOMATED 0 0 - 3 %    Immature Granulocytes 0 %    Segs Absolute 7.1 1.8 - 7.7 thou/mm3    Lymphocytes Absolute 3.1 1.0 - 4.8 thou/mm3    Monocytes Absolute 0.8 0.4 - 1.3 thou/mm3    Eosinophils Absolute 0.0 0.0 - 0.4 thou/mm3    Basophils Absolute 0.0 0.0 - 0.1 thou/mm3    Absolute Immature Granulocyte 0.04 0.00 - 0.07 thou/mm3   POC PANEL BMP W/IOCA   Result Value Ref Range    Sodium, Whole Blood 136 (L) 138 - 146 meq/l    Potassium, Whole Blood 3.6 3.5 - 4.9 meq/l    Chloride, Whole Blood 103 98 - 109 meq/l    TOTAL CO2, WHOLE BLOOD 22 (L) 23 - 33 meq/l    Glucose, Whole Blood 405 (H) 70 - 108 mg/dl    BUN, WHOLE BLOOD 17 8 - 26 mg/dl    CREATININE, WHOLE BLOOD 0.7 0.5 - 1.2 mg/dl    Ionized Calcium, WB 1.11 (L) 1.12 - 1.32 mmol/L   Glomerular Filtration Rate, Estimated   Result Value Ref Range    Est, Glom Filt Rate >60 >60 ml/min/1.73m2       Assessment:     Severe pain post PEG tube placement. Cultures would indicate an infection at the site but clinically it looks good discussed with patient that this is likely due to just to muscle irritation from the PEG tube placement however she is demanding that it be removed. As indicated above we discussed potential complications of removal at this time with potential need for surgical intervention to close the gastrotomy if the stomach pulls away from the abdominal wall. She is demanding it out    Plan:     Procedure Note:    Preoperative Diagnosis: Painful PEG tube    Postoperative Diagnosis: Same    Operation: Removal of PEG tube    Surgeon:  Dr. Atilio Johnson    Complication:none    Indication for Procedure: As elucidated above patient had terrible pain post PEG tube placement    Procedure: Patient kept in her wheelchair although she was tilted back. The PEG tube was grasped with a firm grasp and with a continuous pulling the PEG tube was easily removed from the gastric lumen through the tract and removed intact.   She was actually draining a lot of clear fluid in ostomy bag was applied      Electronicallysigned by Kvng Diaz MD on 12/6/2022 at 11:57 AM

## 2022-12-06 NOTE — TELEPHONE ENCOUNTER
Pt phoned Natanael early this morning reporting, \"This feeding tube must be removed; I have an appt with Dr Deep Lemos today\". Pt frustrated, & upset, saying, \"I have had so much trouble with this tube, I am having so much anxiety with it. \"      Natanael encouraged pt to permit Dr. Deep Lemos to evaluate the tube today to determine if it needed to be removed. Natanael phoned Surgeon's office, & was informed the feeding tube was removed today. Message to Joni Estevez with the update.

## 2022-12-06 NOTE — TELEPHONE ENCOUNTER
Patient here for office visit and very aggravated-peg tube removed per her request. Patient already has home health set up. We will see her again in the office Thursday. Statement Selected

## 2022-12-07 ENCOUNTER — TELEPHONE (OUTPATIENT)
Dept: CASE MANAGEMENT | Age: 52
End: 2022-12-07

## 2022-12-07 NOTE — TELEPHONE ENCOUNTER
Natanael notified by rad ONC, Manager, with inquiry on behalf of Dr Mayfield Has, if Joni Estevez is planning to proceed with chemo/radiation for pt in lieu of PEG tube being removed. Pt needing Sim completed, if tx plan is confirmed. Natanael messaged ONC;   ONC informs, Rad Onc should proceed with Sim for concomitant chemo/XRT. Wesley updated in radiation. He informs SIM will be on 12/14 at 10a    Natanael phoned Gene, pt's sig other & updated him on the above. Gene repeated back the info communicated.

## 2022-12-08 ENCOUNTER — OFFICE VISIT (OUTPATIENT)
Dept: SURGERY | Age: 52
End: 2022-12-08

## 2022-12-08 VITALS
OXYGEN SATURATION: 97 % | HEART RATE: 84 BPM | WEIGHT: 141 LBS | HEIGHT: 61 IN | BODY MASS INDEX: 26.62 KG/M2 | TEMPERATURE: 97.1 F | DIASTOLIC BLOOD PRESSURE: 62 MMHG | SYSTOLIC BLOOD PRESSURE: 110 MMHG

## 2022-12-08 DIAGNOSIS — Z93.1 S/P PERCUTANEOUS ENDOSCOPIC GASTROSTOMY (PEG) TUBE PLACEMENT (HCC): Primary | ICD-10-CM

## 2022-12-08 RX ORDER — AMOXICILLIN 125 MG/5ML
POWDER, FOR SUSPENSION ORAL 3 TIMES DAILY
COMMUNITY

## 2022-12-12 ENCOUNTER — TELEPHONE (OUTPATIENT)
Dept: CASE MANAGEMENT | Age: 52
End: 2022-12-12

## 2022-12-12 ENCOUNTER — HOSPITAL ENCOUNTER (OUTPATIENT)
Dept: CT IMAGING | Age: 52
Discharge: HOME | End: 2022-12-12
Payer: MEDICARE

## 2022-12-12 DIAGNOSIS — R10.12 LEFT UPPER QUADRANT ABDOMINAL PAIN: ICD-10-CM

## 2022-12-12 DIAGNOSIS — C32.1 MALIGNANT NEOPLASM OF EPIGLOTTIS (HCC): ICD-10-CM

## 2022-12-12 PROCEDURE — 74160 CT ABDOMEN W/CONTRAST: CPT

## 2022-12-12 PROCEDURE — 6360000004 HC RX CONTRAST MEDICATION: Performed by: INTERNAL MEDICINE

## 2022-12-12 RX ADMIN — IOPAMIDOL 85 ML: 755 INJECTION, SOLUTION INTRAVENOUS at 10:29

## 2022-12-12 NOTE — TELEPHONE ENCOUNTER
Natanael received call from pt's sig other requesting pt's missed appt with Dr Nneka Alford be rescheduled to [facility] 12/14 after her CT Sim at [address] [facility] with Dr Dipti Ochoa. Message to Joni Jones 60 re: the above; await reply. Pt's sig other also shared pt will need transportation coordinated for all her appts bc pt's son is not reliable transport for her. Natanael left message with Rad ONC RN & on Med ONC  's vm.

## 2022-12-14 ENCOUNTER — SOCIAL WORK (OUTPATIENT)
Dept: RADIATION ONCOLOGY | Age: 52
End: 2022-12-14

## 2022-12-14 ENCOUNTER — HOSPITAL ENCOUNTER (OUTPATIENT)
Dept: RADIATION ONCOLOGY | Age: 52
End: 2022-12-14
Payer: MEDICARE

## 2022-12-14 DIAGNOSIS — R10.12 LEFT UPPER QUADRANT ABDOMINAL PAIN: ICD-10-CM

## 2022-12-14 DIAGNOSIS — C32.1 MALIGNANT NEOPLASM OF EPIGLOTTIS (HCC): Primary | ICD-10-CM

## 2022-12-14 DIAGNOSIS — C32.1 MALIGNANT NEOPLASM OF EPIGLOTTIS (HCC): ICD-10-CM

## 2022-12-14 RX ORDER — OXYCODONE HCL 5 MG/5 ML
10 SOLUTION, ORAL ORAL EVERY 4 HOURS PRN
Qty: 500 ML | Refills: 0 | Status: SHIPPED | OUTPATIENT
Start: 2022-12-14 | End: 2022-12-28

## 2022-12-14 NOTE — TELEPHONE ENCOUNTER
Pt states she forgot about her appt today, but is rescheduled for 12/21. She is requesting refill on her Oxi, states she will be out today. Thanks .

## 2022-12-21 ENCOUNTER — HOSPITAL ENCOUNTER (OUTPATIENT)
Dept: INFUSION THERAPY | Age: 52
Discharge: HOME | End: 2022-12-21
Payer: MEDICARE

## 2022-12-21 ENCOUNTER — OFFICE VISIT (OUTPATIENT)
Dept: ONCOLOGY | Age: 52
End: 2022-12-21
Payer: MEDICARE

## 2022-12-21 ENCOUNTER — HOSPITAL ENCOUNTER (OUTPATIENT)
Dept: RADIATION ONCOLOGY | Age: 52
Discharge: HOME | End: 2022-12-21
Payer: MEDICARE

## 2022-12-21 ENCOUNTER — HOSPITAL ENCOUNTER (OUTPATIENT)
Dept: CT IMAGING | Age: 52
Discharge: HOME | End: 2022-12-21

## 2022-12-21 VITALS
BODY MASS INDEX: 27.38 KG/M2 | SYSTOLIC BLOOD PRESSURE: 127 MMHG | TEMPERATURE: 98 F | HEART RATE: 71 BPM | RESPIRATION RATE: 18 BRPM | WEIGHT: 145 LBS | HEIGHT: 61 IN | DIASTOLIC BLOOD PRESSURE: 70 MMHG | OXYGEN SATURATION: 94 %

## 2022-12-21 VITALS
SYSTOLIC BLOOD PRESSURE: 127 MMHG | RESPIRATION RATE: 18 BRPM | OXYGEN SATURATION: 94 % | DIASTOLIC BLOOD PRESSURE: 70 MMHG | TEMPERATURE: 98 F | HEART RATE: 71 BPM

## 2022-12-21 DIAGNOSIS — C32.1 MALIGNANT NEOPLASM OF EPIGLOTTIS (HCC): Primary | ICD-10-CM

## 2022-12-21 DIAGNOSIS — C32.1 MALIGNANT NEOPLASM OF SUPRAGLOTTIS (HCC): ICD-10-CM

## 2022-12-21 DIAGNOSIS — C32.1 MALIGNANT NEOPLASM OF EPIGLOTTIS (HCC): ICD-10-CM

## 2022-12-21 LAB
ABSOLUTE IMMATURE GRANULOCYTE: 0.01 THOU/MM3 (ref 0–0.07)
ALBUMIN SERPL-MCNC: 3.6 G/DL (ref 3.5–5.1)
ALP BLD-CCNC: 137 U/L (ref 38–126)
ALT SERPL-CCNC: 25 U/L (ref 11–66)
AST SERPL-CCNC: 17 U/L (ref 5–40)
BASINOPHIL, AUTOMATED: 0 % (ref 0–3)
BASOPHILS ABSOLUTE: 0 THOU/MM3 (ref 0–0.1)
BILIRUB SERPL-MCNC: 0.2 MG/DL (ref 0.3–1.2)
BILIRUBIN DIRECT: < 0.2 MG/DL (ref 0–0.3)
BUN, WHOLE BLOOD: 25 MG/DL (ref 8–26)
CHLORIDE, WHOLE BLOOD: 101 MEQ/L (ref 98–109)
CREATININE, WHOLE BLOOD: 0.6 MG/DL (ref 0.5–1.2)
EOSINOPHILS ABSOLUTE: 0 THOU/MM3 (ref 0–0.4)
EOSINOPHILS RELATIVE PERCENT: 0 % (ref 0–4)
GFR SERPL CREATININE-BSD FRML MDRD: > 60 ML/MIN/1.73M2
GLUCOSE, WHOLE BLOOD: 521 MG/DL (ref 70–108)
HCT VFR BLD CALC: 42.3 % (ref 37–47)
HEMOGLOBIN: 14 GM/DL (ref 12–16)
IMMATURE GRANULOCYTES: 0 %
IONIZED CALCIUM, WHOLE BLOOD: 1.19 MMOL/L (ref 1.12–1.32)
LYMPHOCYTES # BLD: 9 % (ref 15–47)
LYMPHOCYTES ABSOLUTE: 0.8 THOU/MM3 (ref 1–4.8)
MCH RBC QN AUTO: 32.2 PG (ref 26–33)
MCHC RBC AUTO-ENTMCNC: 33.1 GM/DL (ref 32.2–35.5)
MCV RBC AUTO: 97 FL (ref 81–99)
MONOCYTES ABSOLUTE: 0.2 THOU/MM3 (ref 0.4–1.3)
MONOCYTES: 2 % (ref 0–12)
PDW BLD-RTO: 13.4 % (ref 11.5–14.5)
PLATELET # BLD: 307 THOU/MM3 (ref 130–400)
PMV BLD AUTO: 11.4 FL (ref 9.4–12.4)
POTASSIUM, WHOLE BLOOD: 4.5 MEQ/L (ref 3.5–4.9)
RBC # BLD: 4.35 MILL/MM3 (ref 4.2–5.4)
SEG NEUTROPHILS: 89 % (ref 43–75)
SEGMENTED NEUTROPHILS ABSOLUTE COUNT: 7.8 THOU/MM3 (ref 1.8–7.7)
SODIUM, WHOLE BLOOD: 133 MEQ/L (ref 138–146)
TOTAL CO2, WHOLE BLOOD: 24 MEQ/L (ref 23–33)
TOTAL PROTEIN: 7.1 G/DL (ref 6.1–8)
WBC # BLD: 8.8 THOU/MM3 (ref 4.8–10.8)

## 2022-12-21 PROCEDURE — G8417 CALC BMI ABV UP PARAM F/U: HCPCS | Performed by: INTERNAL MEDICINE

## 2022-12-21 PROCEDURE — 80047 BASIC METABLC PNL IONIZED CA: CPT

## 2022-12-21 PROCEDURE — 99211 OFF/OP EST MAY X REQ PHY/QHP: CPT

## 2022-12-21 PROCEDURE — 3074F SYST BP LT 130 MM HG: CPT | Performed by: INTERNAL MEDICINE

## 2022-12-21 PROCEDURE — 80076 HEPATIC FUNCTION PANEL: CPT

## 2022-12-21 PROCEDURE — 3078F DIAST BP <80 MM HG: CPT | Performed by: INTERNAL MEDICINE

## 2022-12-21 PROCEDURE — 77332 RADIATION TREATMENT AID(S): CPT | Performed by: RADIOLOGY

## 2022-12-21 PROCEDURE — G8484 FLU IMMUNIZE NO ADMIN: HCPCS | Performed by: INTERNAL MEDICINE

## 2022-12-21 PROCEDURE — 3017F COLORECTAL CA SCREEN DOC REV: CPT | Performed by: INTERNAL MEDICINE

## 2022-12-21 PROCEDURE — 36415 COLL VENOUS BLD VENIPUNCTURE: CPT

## 2022-12-21 PROCEDURE — 77334 RADIATION TREATMENT AID(S): CPT | Performed by: RADIOLOGY

## 2022-12-21 PROCEDURE — 77470 SPECIAL RADIATION TREATMENT: CPT | Performed by: RADIOLOGY

## 2022-12-21 PROCEDURE — G8427 DOCREV CUR MEDS BY ELIG CLIN: HCPCS | Performed by: INTERNAL MEDICINE

## 2022-12-21 PROCEDURE — 1036F TOBACCO NON-USER: CPT | Performed by: INTERNAL MEDICINE

## 2022-12-21 PROCEDURE — 99214 OFFICE O/P EST MOD 30 MIN: CPT | Performed by: INTERNAL MEDICINE

## 2022-12-21 PROCEDURE — 85025 COMPLETE CBC W/AUTO DIFF WBC: CPT

## 2022-12-21 PROCEDURE — 3209999900 CT GUIDE RADIATION THERAPY NO CHARGE

## 2022-12-21 RX ORDER — LIDOCAINE AND PRILOCAINE 25; 25 MG/G; MG/G
CREAM TOPICAL
Qty: 5 G | Refills: 4 | Status: SHIPPED | OUTPATIENT
Start: 2022-12-21

## 2022-12-21 RX ORDER — ONDANSETRON 4 MG/1
4 TABLET, FILM COATED ORAL EVERY 8 HOURS PRN
Qty: 20 TABLET | Refills: 1 | Status: SHIPPED | OUTPATIENT
Start: 2022-12-21

## 2022-12-21 RX ORDER — PROCHLORPERAZINE MALEATE 10 MG
10 TABLET ORAL EVERY 6 HOURS PRN
Qty: 60 TABLET | Refills: 3 | Status: SHIPPED | OUTPATIENT
Start: 2022-12-21

## 2022-12-21 NOTE — PATIENT INSTRUCTIONS
Reviewed labs and recent medical history. Discussed when her Radiation treatment will start. Chemotherapy will start that week too. Nurse Navigator will call patient about transportation number. Chemotherapy teaching to be completed. NCCN Guidelines for information for treatment.   Scripts for anti-nausea meds and Emla cream sent to her pharmacy  Return to start treatment on Friday 12/30/22

## 2022-12-21 NOTE — PROGRESS NOTES
[address] CANCER [address] [place] [address]  Dept: [phone]  Loc: [facility]  1970   No ref. provider found   Jenaro Mtz MD       Rehan Sheehan is a 47 y.o.  female with head and neck cancer, specifically cancer of the epiglottis, p16 negative. CHIEF COMPLAINT  Chief Complaint   Patient presents with    Follow-up     Malignant neoplasm of epiglottis [facility])    Results     CT Abd/Pelvis          HISTORY OF PRESENT ILLNESS    1/31/2022. CT scan of soft tissues of the neck without contrast done for right-sided neck pain showed no discrete neck mass fluid collection or acute phonatory change there are multiple nonspecific cervical lymph nodes at levels 1 through 5 measuring less than 1 cm in short axis no pathologically enlarged lymph nodes were seen. She initially presented with sore throat, difficulty swallowing and progressive weight loss. She was treated with multiple courses of antibiotics, steroids and antifungal medication with limited improvement. 2/10/2022. Chest x-ray done for shortness of breath and sore throat showed no focal acute lung lesions or significant chronic lung findings. 9/20/2022. Referred  to ENT and seen by Landen Gaytan PA-C and had flexible laryngoscopy demonstrating an edematous epiglottis. She was given another round of oral steroids. He said that she was here for thrush and mouth and ear soreness for a year. The pain will get to be 10 out of 10. The pain was constant but would fluctuate in severity. There is no drainage or history of hearing loss or tinnitus. She said that she had a constant sore throat making it difficult for her to swallow and she has lost a lot of weight. She says that the pain is worse on the right and would extend into her ear. She is also finding it hard to breathe. Weight was estimated to be 65 kg.   Examination showed multiple broken teeth and cavities. Salivary gland exam was normal.  There is mild lymphadenopathy with tender lymph nodes on the right side. Flexible laryngoscopy showed abnormally enlarged epiglottis obstructing the view of the larynx worse on the right than on the left. She increased her steroids that she was already on and had her come back in a week and told the patient to go to the ED if her airway became compromised. CT scan of the soft tissues of the neck with contrast was ordered. 9/21/2022. CAT scan of the neck showed nodular thickening of the area epiglottic folds associated with abnormal soft tissue thickening of the right side of the hypopharynx extending into the right posterolateral wall of the hypopharynx and flattening deformity of the right vallecula and piriform sinus raising the question of squamous cell carcinoma. There was no significant lymphadenopathy seen. A significant thyroid mass was not identified. The upper esophagus was unremarkable and a foreign body was not identified. There was heavy bilateral carotid bulb calcification seen and multiple dental caries were noted. PET CT scan was recommended. 9/28/2022. Follow-up in the ENT office. Patient said that her throat felt better but she had a bump on the roof of her mouth that was painful and she still complained of trouble swallowing and a sore throat. Flexible laryngoscopy was performed again hypopharynx was markedly abnormal with white speckles but most notably significant thickening of the epiglottis extending into the arytenoid space and false cords worse on the right. There was a possible necrotic area in the posterior face of the epiglottis. The larynx was abnormal with white speckles indicative of fungal infection. Dr. Shayna Newby reviewed the case and discussed with the patient the benefit of biopsy. The possibility of tracheostomy was discussed to protect her airway.   She was treated with fluconazole for her thrush. Steroids were held. 10/3/2022. Chest x-ray that the cardiac silhouette was within normal limits. Coronary stent was present. There is strandy interstitial opacities in the perihilar distribution along with peribronchial thickening and no focal areas of consolidation, no pneumothorax or pleural effusion. No acute bony changes were seen. 10/6/2022. 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 The epiglottis was edematous and had granular tissue involving the laryngeal surface of the left epiglottis. There was a separate lesion involving the right false vocal cord with a papillomatous appearance. Both true vocal cords seem to be spared. There is no involvement of the arytenoid complex or postcricoid region. The lingual surface of the epiglottis did not have any mucosal erosions but was edematous. The vallecula and base the tongue was unremarkable. The entire cervical esophagus was normal.  She is a former smoker. The tentative stage is T3, N1 versus T3, N2c    Pathology report showed squamous cell carcinoma, poorly differentiated, of the left epiglottis, p16 negative. 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 10/13/2022. Follow-up in the office. Since her biopsy her sore throat had been somewhat tolerable but she continued to have pain with swallowing. She denied choking and coughing episodes with swallowing. She had no problems with shortness of breath. She did complain of facial pain postnasal drip sore throat dysphagia cough with sputum production. He said that she had a 1.5 cm right level 3 lymph node. PET CT scan has been ordered and will be performed on November 3, 2022. The possible need for a gastrostomy tube for nutrition was discussed.   Given that she already had baseline epiglottic edema which might worsen during treatment resulting in airway obstruction, the possibility of requiring a tracheostomy tube was also reviewed. Options for treatment were discussed including primary surgery versus chemo rads. She was not felt to be an optimal surgical candidate. The patient opted for chemoradiation therapy and was referred to radiation therapy. She is here today to establish with medical oncology. Good control of her diabetes was stressed as well. She was also to have dental caries and was encouraged to go see a dentist.    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 Her above-the-knee amputation on the left side was on 2/26/2016. Has undergone cardiac catheterization with percutaneous stenting in August 2016 and an September 2016. Her diabetes requires insulin. She is on a fentanyl patch. She is here today to establish medical oncology follow-up. She has an appointment to see Dr. Allan Campbell on 10/26. She says that she does not have much of an appetite and she continues to lose weight. She used to weigh 240 pounds and has lost 100 pounds in the past year and a half. Continues to have throat pain, dysphagia and odynophagia. She had been a smoker of a pack of cigarettes a day from age 13 through 2014 and then tried to cut down her smoking. She says that she is vaping and smoking as little as possible. She says that she does have a cough but no hemoptysis. She has been a diabetic for many years. She had gestational diabetes and did not check her sugars for a long time. She felt bad and went to a local ED and was found to have a sugar of 590. She says that she checks her sugars once a day. She says that her body tells her when her sugar is high.   She usually checks her sugar sometime between 3 and 6 PM.  He has had an above-the-knee amputation of her left leg. Says that she has had no problems with her stump. She says that she has a prosthesis but it does not fit anymore because she has lost so much weight. She says that she has no neuropathy. She uses a scooter. She has not had a recent eye examination. She said that she \"just did not go\" feels that her renal function is fine. She says that she has no known anemia. The only bleeding that she has experienced was a little bit after she had her biopsy. She did have the above-mentioned thrush and that was sore. She knows she has bad teeth and she is supposed to go to the dentist on October 27. We discussed this today in detail and gave her some assistance in dealing with this. She has fallen in the past fracturing her nose and breaking her finger and a tooth fell out which she has remedied herself. She has had myocardial infarction in 2007 and in 2016. She has had 4 stents placed. She has been on Repatha. She also says that she has had a valvular heart problem. She says that she has had nausea and occasional vomiting. She denies diarrhea and constipation. She says that she has never had a stroke. She has not had skin cancer. 11/9/2022. Mrs. Jerrod Wells is here today to discuss the results of her PET scan. Unfortunately this shows a lesion in the periphery of the lung and hilar adenopathy which looks very much like a new primary lung cancer. I have discussed this with her and have asked her if we can go forward with a biopsy. The lesion is very peripheral and may be able to be biopsied by interventional radiology. That would circumvent the need to possibly pass an endoscope past her epiglottic tumor. I have discussed her case with Dr. Steve Atkins.   We think that a combination chemotherapy/ radiation therapy approach with her epiglottic and also her lung tumor is the best and I think that highly emetogenic chemotherapy is probably not in her best interest since it may compromise her airway if she vomits. We will give her weekly CarboTaxol along with her radiation therapy. She will need to have a port placed. She has decided to go forward with a PEG tube and trach if needed. She will contact her ENT doctor. 11/30/2022. There have been multiple interventions since last time Wilmar Baltazar was in the office. 11/16/2022. Seen in consultation by Dr. Yolanda Farmer. He arranged for Mediport placement and feeding tube placement. 11/21/2022. Surgery performed with port placement and feeding tube. 11/22/2022. 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 My reading of the data shows that if this is just a nodule no treatment is needed. CBC at that time showed a white count of 19.3 with hemoglobin of 13 hematocrit 46 and a platelet count of 375,725.    11/23/2022. She has had her teeth pulled through [employer] with a stanley pain for the oral surgeon. She tolerated it well. She is healing well. 11/25/2022. Emergency department encounter for severe abdominal pain. She had had nausea with vomiting 3 times over the last several days. She has significant pain in her abdomen when she has a bowel movement and also when she urinates. She was afebrile but tachycardic at a rate of 102. She was tender to palpation in the left lower quadrant and around the PEG tube site. The site itself was not erythematous and the tube seem to be in place. CBC showed white count of 9.9 with a hemoglobin of 15.4 hematocrit 46.5 and a platelet count of 895,996 with 75% polys, 14 lymphs, 7 monos, 2 eos. CAT scan showed mild soft tissue edema along the PEG tube catheter site related to recent PEG tube placement. There is no soft tissue fluid collection. She was discharged home. 11/28/2022. He had seen Dr. Joyce Isaacs at the [facility] emergency room yesterday. She was treated for pain and then was discharged.   That day her vital signs or normal with a pulse rate of 83. She was afebrile. She had generalized abdominal tenderness. Injection into the tube showed that the tube was at the level of the gastric antrum and contrast opacified the gastric lumen with no extravasation of contrast to suggest a leak. She was treated with clindamycin. 11/29. She called the surgeon's office saying that her PEG tube site was red and there was thick white drainage around the tube. They told her it would take a few days for the antibiotics to work. Today the patient is still having a lot of pain. There is a lot of clear drainage with some thickened dark discharge it appears to be old blood but it is not tested. There is no red to it. There is some bright pink fluid in the PEG tube line which appears to have been from Snapple ingested the day before. She has rebound tenderness throughout the left side of her abdomen. Even though the study did not show any leaking, I fear that it had occurred and has caused irritation of the peritoneum causing this rebound. I have encouraged the patient to lie flat as much as possible letting the fluids drain internally rather than into her peritoneum. We will continue on her antibiotic. We have another CAT scan scheduled for Friday morning and we will see her on Friday after that. Her wound was inspected and redressed. We increased her pain medication. Her concerns about her treatment in the emergency room were also addressed. I urged her son to talk to the [facility] about her concerns about her treatment. INTERVAL NOTE    12/21/2022. Elvira Sanchez is here today with her significant other Gene to discuss going forward with her radiation therapy and chemotherapy. It is still not clear to me when radiation will begin. We have a plan in place for carboplatin and Taxol to be given concurrently weekly. She is pleased to have her PEG tube out and has much less pain although she still has pain at the site. Dr. Elmer Logan note said that she drained so much fluid he had to apply an ostomy bag. She also has pain at her port site but there is minimal inflammation and no obvious infection and there is no pus. She was instructed to call if there is any change in this finding. Lauren Sun says that she is swallowing fine at this time. Her current weight is 145 pounds which is up 3 pounds since last time she was weighed here about 2 weeks ago. She denies fevers, chills, sweats, bleeding, constipation or diarrhea. She says that she has her usual nausea but no vomiting. She continues to smoke. The patient had simulation with her mask on today. She was pretreated with Xanax. She is somewhat sleepy. Sugar was markedly elevated. I do not know if she took her insulin today. MONITORING PARAMETERS    Physical examinations, CAT scans, PET scans    PAST MEDICAL HISTORY  Past Medical History:   Diagnosis Date    Above knee amputation of left lower extremity (HCC)     Blood circulation, collateral     CAD (coronary artery disease)     Diabetes mellitus ([facility] [address]     Hyperlipidemia     Hypertension     MDRO (multiple drug resistant organisms) resistance     MRSA wound 2013    MI, old 01/01/2007    Obesity (BMI 30-39. 9)     Status post skin graft 01/15/2014    Amniox - Dr Cort[facility]) 10/2022    squamous cell carcinoma of the epiglottis        REVIEW OF SYSTEMS  Review of Systems   Constitutional:  Positive for appetite change, fatigue. HENT:  Positive for dental problem and trouble swallowing. Negative for nosebleeds and sore throat. Respiratory:  Positive for cough. Cardiovascular:  Negative for chest pain and leg swelling. Gastrointestinal:  Positive for abdominal pain and nausea. Negative for blood in stool, constipation, diarrhea and vomiting. Genitourinary:  Negative for dysuria, hematuria and urgency. Musculoskeletal:  Negative for myalgias. Skin:  Positive for pallor.    Neurological: Negative for syncope, weakness, light-headedness and headaches. Psychiatric/Behavioral:  Negative for behavioral problems, self-injury, sleep disturbance and suicidal ideas. The patient is not nervous/anxious. FAMILY HISTORY  Family History   Problem Relation Age of Onset    No Known Problems Brother     Heart Disease Maternal Grandfather     High Blood Pressure Maternal Grandfather     Asthma Neg Hx     Cancer Neg Hx     Diabetes Neg Hx     Kidney Disease Neg Hx     Stroke Neg Hx         SOCIAL HISTORY  Social History     Socioeconomic History    Marital status:      Spouse name: Not on file    Number of children: 1    Years of education: Not on file    Highest education level: Not on file   Occupational History    Not on file   Tobacco Use    Smoking status: Former     Packs/day: 0.50     Years: 27.00     Pack years: 13.50     Types: Cigarettes     Quit date: 2014     Years since quittin.2    Smokeless tobacco: Never    Tobacco comments:     e cigs daily use 22   Vaping Use    Vaping Use: Some days    Substances: Nicotine   Substance and Sexual Activity    Alcohol use: No    Drug use: No    Sexual activity: Not on file   Other Topics Concern    Not on file   Social History Narrative    Not on file     Social Determinants of Health     Financial Resource Strain: Not on file   Food Insecurity: Not on file   Transportation Needs: Not on file   Physical Activity: Not on file   Stress: Not on file   Social Connections: Not on file   Intimate Partner Violence: Not on file   Housing Stability: Not on file        CURRENT MEDICATIONS  Current Outpatient Medications   Medication Sig Dispense Refill    oxyCODONE (ROXICODONE) 5 MG/5ML solution Take 10 mLs by mouth every 4 hours as needed for Pain for up to 14 days.  500 mL 0    amoxicillin (AMOXIL) 125 MG/5ML suspension Take by mouth 3 times daily Prescribed by family doctor unsure of dose      zolpidem (AMBIEN) 10 MG tablet TAKE 1 TABLET BY MOUTH AT NIGHT      pantoprazole (PROTONIX) 40 MG tablet Take 1 tablet by mouth every morning (before breakfast) 90 tablet 1    ALPRAZolam (XANAX) 1 MG tablet Take 1 mg by mouth as needed. oxyCODONE-acetaminophen (PERCOCET) 5-325 MG per tablet Take 2 tablets by mouth every 8 hours as needed for Pain. clopidogrel (PLAVIX) 75 MG tablet Take 1 tablet by mouth daily      TRULICITY 1.5 NF/9.2UR SC injection Inject 1.5 mg into the skin once a week      HUMALOG KWIKPEN 100 UNIT/ML SOPN       fentaNYL 37.5 MCG/HR PT72 Apply 1 patch topically. JARDIANCE 25 MG tablet Take 1 tablet by mouth daily      metFORMIN (GLUCOPHAGE) 500 MG tablet Take 2 tablets by mouth 2 times daily Resume 8/30 60 tablet 3    pioglitazone (ACTOS) 30 MG tablet Take 1 tablet by mouth daily 30 tablet 3    atorvastatin (LIPITOR) 20 MG tablet Take 2 tablets by mouth nightly 30 tablet 3    lisinopril (PRINIVIL;ZESTRIL) 2.5 MG tablet Take 1 tablet by mouth daily 30 tablet 3    metoprolol tartrate (LOPRESSOR) 25 MG tablet Take 1 tablet by mouth 2 times daily 60 tablet 3    isosorbide dinitrate (ISORDIL) 20 MG tablet Take 30 mg by mouth daily      amLODIPine (NORVASC) 5 MG tablet Take 5 mg by mouth daily      budesonide-formoterol (SYMBICORT) 160-4.5 MCG/ACT AERO Inhale 2 puffs into the lungs 2 times daily      pregabalin (LYRICA) 150 MG capsule Take 150 mg by mouth 2 times daily      aspirin 81 MG tablet Take 81 mg by mouth daily      albuterol (PROVENTIL HFA) 108 (90 BASE) MCG/ACT inhaler Inhale 2 puffs into the lungs every 4 hours as needed for Wheezing or Shortness of Breath (Space out to every 6 hours as symptoms improve). Space out to every 6 hours as symptoms improve. 1 Inhaler 10    nitroGLYCERIN (NITROSTAT) 0.4 MG SL tablet Place 1 tablet under the tongue every 5 minutes as needed for Chest pain Take 1 tablet for chest pain and repeat after 5 min if no relief, call 911 and take another dose 5 min later. Max of 3 doses in 15 min.  (Patient not taking: Reported on 12/21/2022) 25 tablet 3    ALPRAZolam (XANAX) 0.5 MG tablet Take 0.5 mg by mouth 2 times daily as needed (Patient not taking: Reported on 12/21/2022)       No current facility-administered medications for this visit. OARRS    PDMP Monitoring:    Last PDMP [facility] as Reviewed formerly [facility]):  Review User Review Instant Review Result   Romayne Muscat 10/23/2022  7:08 PM Reviewed PDMP [1]       Physical Exam  Vitals and nursing note reviewed. Exam conducted with a chaperone present. Constitutional:       General: She is not in acute distress. Appearance: She is ill-appearing (chronically). She is not toxic-appearing or diaphoretic. Comments: She is a well-developed well-nourished  female who is here today with her significant other, Ladarius. She has received Xanax to help for better experience during the simulation. The previous attempt was complicated by a panic attack. She has all of her teeth extracted and she is healing. She is somewhat nervous, anxious and somewhat depressed   HENT:      Head: Normocephalic and atraumatic. Comments: Gums are healing and are still swollen. I see no evidence of thrush. Mouth/Throat:      Mouth: Mucous membranes are moist.      Pharynx: Oropharynx is clear. No oropharyngeal exudate or posterior oropharyngeal erythema. Eyes:      General: No scleral icterus. Extraocular Movements: Extraocular movements intact. Pupils: Pupils are equal, round, and reactive to light. Cardiovascular:      Rate and Rhythm: Normal rate and regular rhythm. Pulmonary:      Effort: Pulmonary effort is normal. No respiratory distress. Breath sounds: Normal breath sounds. No stridor. No wheezing, rhonchi or rales. Comments: Occasional moist congested cough. Breath sounds decreased throughout. Her port is healing well. There is minimal erythema and no discharge. Chest:      Chest wall: No tenderness.    Abdominal:      General: There is no distension. Palpations: Abdomen is soft. Tenderness: There is no abdominal tenderness. There is no guarding or rebound. Comments: There is no gross organomegaly masses or ascites. Her PEG tube site is healing well. There is no drainage. Musculoskeletal:         General: Deformity present. Cervical back: Normal range of motion and neck supple. No rigidity or tenderness. Right lower leg: No edema. Comments: She is status post left leg above-the-knee amputation. She has no edema on the right. Skin:     General: Skin is warm and dry. Coloration: Skin is not jaundiced or pale. Neurological:      General: No focal deficit present. Mental Status: She is oriented to person, place, and time. Cranial Nerves: No cranial nerve deficit. Motor: Weakness present. Gait: Gait abnormal (uses a scooter due to amputation). Psychiatric:         Behavior: Behavior normal.         Thought Content: Thought content normal.         Judgment: Judgment normal.      Comments: Somewhat anxious and depressed        ECOG STATUS    3: Capable of only limited self-care; confined to bed or chair more than 50% of waking hours    LABS/IMAGING    CT ABDOMEN W CONTRAST Additional Contrast? Oral    Result Date: 12/12/2022  1. Interval removal of the previously noted gastrostomy tube. Mucosal thickening in the stomach, second and third portions of the duodenum. 2. Small hiatal hernia. Ventral hernia containing fat measuring 3 x 2.2 cm in size. 3. Tiny gallstones. 4. Atherosclerotic calcification in the abdominal aorta and visceral arteries. 5. Lumbar spondylosis. **This report has been created using voice recognition software. It may contain minor errors which are inherent in voice recognition technology. ** Final report electronically signed by DR Kathe Marquis on 12/12/2022 11:12 AM    CT GUIDED NEEDLE PLACEMENT    Result Date: 11/22/2022  1. Status post successful CT guided lung biopsy.  2. Followup chest radiographs will be obtained. **This report has been created using voice recognition software. It may contain minor errors which are inherent in voice recognition technology. ** Final report electronically signed by Dr Dee Dee Horn on 11/22/2022 12:17 PM    CT ABDOMEN PELVIS W IV CONTRAST Additional Contrast? None    Result Date: 11/29/2022  1. There is gas tracking along the patient's PEG tube of uncertain significance since the previous exam possibly related to manipulation of the catheter for yesterday's examination. Persistent mild edema adjacent to the tube from insertion without evidence of enhancing collection. **This report has been created using voice recognition software. It may contain minor errors which are inherent in voice recognition technology. ** Final report electronically signed by Dr. Enmanuel Sheehan on 11/29/2022 2:17 PM    CT ABDOMEN PELVIS W IV CONTRAST Additional Contrast? None    Result Date: 11/26/2022  Impression: 1. PEG tube balloon appears to be within the gastric lumen. Mild soft tissue edema seen along the PEG tube catheter likely related to recent placement. No focal soft tissue fluid collection. 2. Gallstones. No gallbladder wall thickening to suggest acute cholecystitis. 3. Small fat-containing anterior abdominal wall hernia 4. Normal appendix This document has been electronically signed by: Keith Aggarwal MD on 11/26/2022 12:00 AM All CTs at this facility use dose modulation techniques and iterative reconstructions, and/or weight-based dosing when appropriate to reduce radiation to a low as reasonably achievable. XR CHEST 1 VIEW    Result Date: 11/21/2022  1. Status post surgical MediPort right side, catheter tip at cavoatrial junction. 2. No pneumothorax is seen. ET tube in good position. Final report electronically signed by Dr. Syed Samuel on 11/21/2022 4:02 PM    CT NEEDLE BIOPSY LUNG PERCUTANEOUS    Result Date: 11/22/2022  1.  Status post successful CT guided lung biopsy. 2. Followup chest radiographs will be obtained. **This report has been created using voice recognition software. It may contain minor errors which are inherent in voice recognition technology. ** Final report electronically signed by Dr Pedro Flynn on 11/22/2022 12:17 PM    XR INJ CONTRAST GASTRIC TUBE PERC    Result Date: 11/28/2022  PEG tube is in the gastric antrum and there is no evidence of leak or obstruction of the tube. **This report has been created using voice recognition software. It may contain minor errors which are inherent in voice recognition technology. ** Final report electronically signed by Dr. Manan Dunn on 11/28/2022 10:21 AM    XR CHEST PA INSPIRATION 1 VW    Result Date: 11/22/2022  There is no acute intrathoracic process. **This report has been created using voice recognition software. It may contain minor errors which are inherent in voice recognition technology. ** Final report electronically signed by Dr Pedro Flynn on 11/22/2022 1:14 PM    XR CHEST PA INSPIRATION 1 VW    Result Date: 11/22/2022  There is a very small left-sided pneumothorax. This is stable when compared to the images at time of biopsy. Another radiograph will be performed in 2 additional hours. **This report has been created using voice recognition software. It may contain minor errors which are inherent in voice recognition technology. ** Final report electronically signed by Dr Pedro Flynn on 11/22/2022 11:34 AM     Laboratory data done today shows her sodium is 133. BUN is 25 and creatinine 0.6. Sugar is 521 today. Liver function test shows an alkaline phosphatase of 137 transaminases are normal.  Total protein 7.1 with an albumin of 3.6. CBC shows a white count of 8.8 with a hemoglobin of 14 hematocrit 42 and a platelet count of 094,472. White blood cell differential count showed 89 polys, 9 lymphs, 2 monos.     PATHOLOGY/GENETICS    Squamous cell carcinoma of the epiglottis, p16 negative. ASSESSMENT and PLAN    1. Squamous cell carcinoma of the epiglottis, p16 negative. She seems to be ready to start her treatment as soon as radiation is in place. We will be treating her weekly with carboplatin and Taxol. She says that she has undergone chemotherapy teaching. We will have to verify this. She may not be a reliable historian today under the influence of Xanax for her simulation. She is on oxycodone for pain control. Prescription for her antinausea medicines and Emla cream were sent to her pharmacy. 2.  PEG tube dysfunction. This had to be removed by her surgeon because of severe pain. Seems to be healing well. 3.  Port placement. This seems to have gone well. There is some tenderness at the site but no evidence of infection but there is minimal inflammation. 4.  Psychosocial barriers to care. We have had a lot of delay getting her on her treatment for lots of different reasons. I am told to expect to get started on her treatment before the end of the year. Social service was involved concerning transportation issues    5. Multiple comorbidities. She has difficult to control diabetes and her sugar today was over 500. I not think that she took her insulin because of the simulation. She is expecting to have feeding difficulties. She said that she would not consider another tube placement. She continues under treatment for her coronary artery disease, chronic pain, hyperlipidemia and atherosclerosis among other things. He knows that she can call us in the interim for any change in her status, questions or concerns.     Kong Rust MD 12/21/2022 12:15 PM

## 2022-12-21 NOTE — PROGRESS NOTES
Oncology Social Work    Date: 12/14/2022  Time: 10:54 AM  Name: Jim Toro  MRN: [id number]     Contact Type: Face-To-Face    Note:    met briefly with the patient before scheduled Radiation Simulation on 12/14/22. The patient is doing okay, will need assistance with transportation for chemotherapy appointments.  gave the patient information to contact nurse navigator, Digna, when transportation is needed as 's last day with the [facility] will be 1/6/23, Patient understanding.  spoke briefly with the patient after starting her Sim appointment, as the patient began having a panic attack. Samantha Fernandes stated that she was not informed that she would be going into the machine today, and that she thought she was just going to be talking with the doctor. Samantha Fernandes stated that she will need to reschedule and she will take her anxiety medication the next time.  provided emotional support and talked with the the patient. Sim rescheduled for 12/21/22 at 9:30am.     will continue to follow up as needed. TAMIKA Mcleod, [facility]  Oncology Social Worker      Electronically signed by Sudhir Adorno.  TAMIKA Vyas, [place] on 12/21/2022 at 10:54 AM

## 2022-12-22 ENCOUNTER — TELEPHONE (OUTPATIENT)
Dept: CASE MANAGEMENT | Age: 52
End: 2022-12-22

## 2022-12-22 NOTE — TELEPHONE ENCOUNTER
Natanael conferred with Dee Rodriguez, Radiation Dept Mgr. XRt anticipated start is 1/3/23. Natanael updated Med ONC since chemo start was tentative on 12/30/22, with chemo teaching 12/27. ONC informs to reschedule chemo start to 1/3/23. OP ONC staff confirms pt has already completed chemo teaching, so the 12/27 appt can be cancelled. Natanael met with , Tierra Bagley. Appt with ONC rescheduled to 1/3/23 at 0945;  OP ONC staff for line access & lab draw at [address] [place] [address], per [address] [facility] notified to cancel 12/30/22 transport & reschedule to 1/3/23 for 0915 arrival.  [place] with [facility] confirms 0845  on 1/3/23. Natanael phoned pt & updated her on all of the above. Pt asked about an afternoon appt, however, pt's chemo tx is a 3 hr infusion allotment, so an afternoon appt is not appropriate. Pt voiced understanding & was able to reiterate info back. Natanael reminded pt to apply EMPLA cream to port prior to leaving home to numb the skin surface. Pt voiced understanding. Natanael also informed pt that her boyfriend Gene is able to come with her to her appt with the transport. Pt knows Natanael is available to assist as needed.

## 2022-12-23 ASSESSMENT — ENCOUNTER SYMPTOMS
EYE PAIN: 0
EYES NEGATIVE: 1
SINUS PAIN: 0
EYE REDNESS: 0
CHEST TIGHTNESS: 0
DIARRHEA: 1
STRIDOR: 0
SHORTNESS OF BREATH: 0
VOMITING: 1
ALLERGIC/IMMUNOLOGIC NEGATIVE: 1
BACK PAIN: 1
APNEA: 0
VOICE CHANGE: 0
COUGH: 1
CHOKING: 0
EYE DISCHARGE: 0
WHEEZING: 0
PHOTOPHOBIA: 0
EYE ITCHING: 0
COLOR CHANGE: 0
FACIAL SWELLING: 1
SINUS PRESSURE: 0
RHINORRHEA: 0
TROUBLE SWALLOWING: 0
SORE THROAT: 0
CONSTIPATION: 1

## 2022-12-24 NOTE — PROGRESS NOTES
[address] [facility] [address]  [facility] [address]  Dept: [phone]  Dept Fax: [phone]  Loc: [phone]    Visit Date: 2022    King Lau is a 47 y.o. female who presentstoday for:  Chief Complaint   Patient presents with    Follow Up After Procedure     s/p Peg tube removal done in the procedure room-2022 check drainage       HPI:     HPI she had a PEG tube placed been having a lot of pain and discomfort was removed 2 days ago presents here for recheck of the wound    Past Medical History:   Diagnosis Date    Above knee amputation of left lower extremity ([facility] [address]     Blood circulation, collateral     CAD (coronary artery disease)     Diabetes mellitus ([facility] [address]     Hyperlipidemia     Hypertension     MDRO (multiple drug resistant organisms) resistance     MRSA wound     MI, old 2007    Obesity (BMI 30-39. 9)     Status post skin graft 01/15/2014    Amniox - Dr Wallis Earnest     Throat cancer [facility]) 10/2022    squamous cell carcinoma of the epiglottis      Past Surgical History:   Procedure Laterality Date    ANGIOPLASTY Left 2014    left leg     SECTION  1998    CORONARY ANGIOPLASTY WITH STENT PLACEMENT      [facility]    CORONARY ANGIOPLASTY WITH STENT PLACEMENT      3 stents in [facility]  2016    [place] [facility], 1 stent to RCA    CT NEEDLE BIOPSY LUNG PERCUTANEOUS  2022    CT NEEDLE BIOPSY LUNG PERCUTANEOUS 2022 [facility] CT SCAN    ESOPHAGOGASTRODUODENOSCOPY  10/06/2022    [facility]    GASTROSTOMY TUBE PLACEMENT N/A 2022    EGD Peg Tube Placement possible Open G Tube performed by Cee Michael MD at [address] [facility]  2015    Left Femoral to Peroneal Bypass with Composite Vein Graft and Propaten Graft     OTHER SURGICAL HISTORY Left 2016    Left Leg Above Knee Amputation    PORT SURGERY N/A 2022    Insertion Single Lumen Mediport performed by Mary Jane Saucedo MD at [place] [address]          Family History   Problem Relation Age of Onset    No Known Problems Brother     Heart Disease Maternal Grandfather     High Blood Pressure Maternal Grandfather     Asthma Neg Hx     Cancer Neg Hx     Diabetes Neg Hx     Kidney Disease Neg Hx     Stroke Neg Hx         Social History     Tobacco Use    Smoking status: Former     Packs/day: 0.50     Years: 27.00     Pack years: 13.50     Types: Cigarettes     Quit date: 2014     Years since quittin.2    Smokeless tobacco: Never    Tobacco comments:     e cigs daily use 22   Substance Use Topics    Alcohol use: No          Current Outpatient Medications   Medication Sig Dispense Refill    amoxicillin (AMOXIL) 125 MG/5ML suspension Take by mouth 3 times daily Prescribed by family doctor unsure of dose      zolpidem (AMBIEN) 10 MG tablet TAKE 1 TABLET BY MOUTH AT NIGHT      ALPRAZolam (XANAX) 1 MG tablet Take 1 mg by mouth as needed. oxyCODONE-acetaminophen (PERCOCET) 5-325 MG per tablet Take 2 tablets by mouth every 8 hours as needed for Pain. clopidogrel (PLAVIX) 75 MG tablet Take 1 tablet by mouth daily      TRULICITY 1.5 PS/0.7KF SC injection Inject 1.5 mg into the skin once a week      HUMALOG KWIKPEN 100 UNIT/ML SOPN       fentaNYL 37.5 MCG/HR PT72 Apply 1 patch topically. JARDIANCE 25 MG tablet Take 1 tablet by mouth daily      nitroGLYCERIN (NITROSTAT) 0.4 MG SL tablet Place 1 tablet under the tongue every 5 minutes as needed for Chest pain Take 1 tablet for chest pain and repeat after 5 min if no relief, call 911 and take another dose 5 min later. Max of 3 doses in 15 min.  (Patient not taking: Reported on 2022) 25 tablet 3    metFORMIN (GLUCOPHAGE) 500 MG tablet Take 2 tablets by mouth 2 times daily Resume  60 tablet 3    pioglitazone (ACTOS) 30 MG tablet Take 1 tablet by mouth daily 30 tablet 3    atorvastatin (LIPITOR) 20 MG tablet Take 2 tablets by mouth nightly 30 tablet 3    lisinopril (PRINIVIL;ZESTRIL) 2.5 MG tablet Take 1 tablet by mouth daily 30 tablet 3    metoprolol tartrate (LOPRESSOR) 25 MG tablet Take 1 tablet by mouth 2 times daily 60 tablet 3    isosorbide dinitrate (ISORDIL) 20 MG tablet Take 30 mg by mouth daily      amLODIPine (NORVASC) 5 MG tablet Take 5 mg by mouth daily      budesonide-formoterol (SYMBICORT) 160-4.5 MCG/ACT AERO Inhale 2 puffs into the lungs 2 times daily      pregabalin (LYRICA) 150 MG capsule Take 150 mg by mouth 2 times daily      aspirin 81 MG tablet Take 81 mg by mouth daily      albuterol (PROVENTIL HFA) 108 (90 BASE) MCG/ACT inhaler Inhale 2 puffs into the lungs every 4 hours as needed for Wheezing or Shortness of Breath (Space out to every 6 hours as symptoms improve). Space out to every 6 hours as symptoms improve. 1 Inhaler 10    prochlorperazine (COMPAZINE) 10 MG tablet Take 1 tablet by mouth every 6 hours as needed (as needed for nausea) 60 tablet 3    ondansetron (ZOFRAN) 4 MG tablet Take 1 tablet by mouth every 8 hours as needed for Nausea or Vomiting 20 tablet 1    lidocaine-prilocaine (EMLA) 2.5-2.5 % cream Apply topically as needed. 5 g 4    oxyCODONE (ROXICODONE) 5 MG/5ML solution Take 10 mLs by mouth every 4 hours as needed for Pain for up to 14 days. 500 mL 0    pantoprazole (PROTONIX) 40 MG tablet Take 1 tablet by mouth every morning (before breakfast) 90 tablet 1    ALPRAZolam (XANAX) 0.5 MG tablet Take 0.5 mg by mouth 2 times daily as needed (Patient not taking: Reported on 12/21/2022)       No current facility-administered medications for this visit.      Allergies   Allergen Reactions    Shellfish-Derived Products Anaphylaxis     \"Throat closes up\"    Shrimp Flavor Swelling       Subjective:      Review of Systems    Objective:   /62 (Site: Right Upper Arm, Position: Sitting, Cuff Size: Medium Adult)   Pulse 84   Temp 97.1 °F (36.2 °C)   Ht 5' 1\" (1.549 m)   Wt 141 lb (64 kg)   LMP 12/05/2022   SpO2 97%   BMI 26.64 kg/m²     Physical Exam patient was seen by Zac Rene our nurse indicated site looks looking good minimal drainage       Results for orders placed or performed during the hospital encounter of 11/30/22   Hepatic Function Panel   Result Value Ref Range    Albumin 3.3 (L) 3.5 - 5.1 g/dL    Total Bilirubin 0.3 0.3 - 1.2 mg/dL    Bilirubin, Direct <0.2 0.0 - 0.3 mg/dL    Alkaline Phosphatase 187 (H) 38 - 126 U/L    AST 10 5 - 40 U/L    ALT 12 11 - 66 U/L    Total Protein 6.3 6.1 - 8.0 g/dL   CBC with Auto Differential   Result Value Ref Range    WBC 11.0 (H) 4.8 - 10.8 thou/mm3    RBC 4.01 (L) 4.20 - 5.40 mill/mm3    Hemoglobin 12.9 12.0 - 16.0 gm/dl    Hematocrit 38.8 37.0 - 47.0 %    MCV 97 81 - 99 fL    MCH 32.2 26.0 - 33.0 pg    MCHC 33.2 32.2 - 35.5 gm/dl    RDW 12.5 11.5 - 14.5 %    Platelets 940 (H) 608 - 400 thou/mm3    MPV 10.6 9.4 - 12.4 fL    Seg Neutrophils 64 43 - 75 %    Lymphocytes 28 15 - 47 %    Monocytes 7 0 - 12 %    Eosinophils % 0 0 - 4 %    BASINOPHIL, AUTOMATED 0 0 - 3 %    Immature Granulocytes 0 %    Segs Absolute 7.1 1.8 - 7.7 thou/mm3    Lymphocytes Absolute 3.1 1.0 - 4.8 thou/mm3    Monocytes Absolute 0.8 0.4 - 1.3 thou/mm3    Eosinophils Absolute 0.0 0.0 - 0.4 thou/mm3    Basophils Absolute 0.0 0.0 - 0.1 thou/mm3    Absolute Immature Granulocyte 0.04 0.00 - 0.07 thou/mm3   POC PANEL BMP W/IOCA   Result Value Ref Range    Sodium, Whole Blood 136 (L) 138 - 146 meq/l    Potassium, Whole Blood 3.6 3.5 - 4.9 meq/l    Chloride, Whole Blood 103 98 - 109 meq/l    TOTAL CO2, WHOLE BLOOD 22 (L) 23 - 33 meq/l    Glucose, Whole Blood 405 (H) 70 - 108 mg/dl    BUN, WHOLE BLOOD 17 8 - 26 mg/dl    CREATININE, WHOLE BLOOD 0.7 0.5 - 1.2 mg/dl    Ionized Calcium, WB 1.11 (L) 1.12 - 1.32 mmol/L   Glomerular Filtration Rate, Estimated   Result Value Ref Range    Est, Glom Filt Rate >60 >60 ml/min/1.73m2       Assessment:     Stable post removal of PEG tube no evidence of peritonitis to suggest leaking from the stomach opening within the peritoneal cavity    Plan:     Low up with oncology      Electronicallysigned by Gricel Benavides MD on 12/23/2022 at 10:16 PM

## 2022-12-29 ENCOUNTER — TELEPHONE (OUTPATIENT)
Dept: CASE MANAGEMENT | Age: 52
End: 2022-12-29

## 2022-12-29 NOTE — TELEPHONE ENCOUNTER
Natanael received call from pt's sig other with inquiry about appt tomorrow. Natanael reviewed EMR; pt is not scheduled at the cancer center tomorrow. Natanael reminded Gene that pt had her appt rescheduled to 1/3/2023 when XRt would be ready to start with chemotherapy. Natanael reminded Gene of  time by [id number] [facility], at [address] [place] [address]  on 1/3/23  Gene voiced understanding.

## 2022-12-30 ENCOUNTER — HOSPITAL ENCOUNTER (OUTPATIENT)
Dept: RADIATION ONCOLOGY | Age: 52
End: 2022-12-30
Payer: MEDICARE

## 2022-12-30 PROCEDURE — 77338 DESIGN MLC DEVICE FOR IMRT: CPT | Performed by: RADIOLOGY

## 2023-01-03 ENCOUNTER — TELEPHONE (OUTPATIENT)
Dept: CASE MANAGEMENT | Age: 53
End: 2023-01-03

## 2023-01-03 ENCOUNTER — TELEPHONE (OUTPATIENT)
Dept: RADIATION ONCOLOGY | Age: 53
End: 2023-01-03

## 2023-01-03 ENCOUNTER — TELEPHONE (OUTPATIENT)
Dept: ONCOLOGY | Age: 53
End: 2023-01-03

## 2023-01-03 DIAGNOSIS — C32.1 MALIGNANT NEOPLASM OF EPIGLOTTIS (HCC): Primary | ICD-10-CM

## 2023-01-03 NOTE — TELEPHONE ENCOUNTER
I CALLED AND LEFT A MESSAGE TO RESCHEDULE. Please approve or deny this request.    Rx requested:  Requested Prescriptions     Pending Prescriptions Disp Refills    predniSONE (DELTASONE) 10 MG tablet [Pharmacy Med Name: PREDNISONE 10MG TABS] 20 tablet 0     Sig: TAKE FOUR TABLETS BY MOUTH EVERY DAY FOR 5 DAYS         Last Office Visit:   8/22/2022      Next Visit Date:  Future Appointments   Date Time Provider Gela Pearl   10/5/2022 10:00 AM Mindy Kuhn MD [address]   12/19/2022  9:45 AM Corey Marquez MD [address] [facility]

## 2023-01-03 NOTE — TELEPHONE ENCOUNTER
Antanael received call form pt's sig other. [employer] transport arrive to transport pt to [facility], however the transport was not handicap accessible. Pt did not come for appt d/t no transport. ONC updated. Awaiting direction from ONC on scheduling pt for appt so handicap transport can be coordinated.

## 2023-01-03 NOTE — TELEPHONE ENCOUNTER
Pt phoned Natanael this evening & verbalized, she will ensure her son transports her via her [facility] for her appts if we can provide her gas cards to offset the cost associated with the appts, since it presents a financial hardship. Natanael spoke with PAUL Verma re: gas cards for pt transportation. Leanne Shetty will obtain the gas cards to disperse weekly to the patient.

## 2023-01-03 NOTE — TELEPHONE ENCOUNTER
Oncology Social Work: Transportation    Date: 1/3/2023  Name: Abhay Greenwood  MRN: [id number]     Contact Type: Phone Call     Note:   1/3/22 1:05PM:  attempted to reach patient by phone regarding transportation for appointment tomorrow. There was no answer. A VM was left requesting a call back about transportation.  contacted [address] [place] [address] on [address], [place] on [address], and [address] [place] [address] regarding transportation and they are all booked. Medicaid will not accept transportation requests if fewer than 3 business days out. 1/3/22 3:30PM-  received VM from the patient. Patient stated that she does need transportation for tomorrow and she needs to arrive early in order to get blood work, probably around 12:15pm.     1/4/22: 8:40AM -  called and spoke with patient. She has arranged transportation with nurse navigator. [facility] will be providing a gas card weekly to help with transportation concerns.  will continue to follow up as needed. TAMIKA Fonseca, [facility]  Oncology Social Worker    Electronically signed by Ned Dillon.  TAMIKA Vyas, [place] on 1/3/2023 at 1:05 PM

## 2023-01-03 NOTE — TELEPHONE ENCOUNTER
ONC informs to schedule pt on 1/5 at 0930 with Dr Ernesto Martins. Dr. Ernesto Martins updated on XRT start tomorrow; ONC informs to proceed as scheduled. Natanael phoned [facility], spoke with Emy. Natanael shared that all transports need to be Handicap transport, d/t pt being in a motorized W/C- AKA left lower extremity. Natanael reviewed appt for XRT start tomorrow; Emy informs \"no handicap transport available tomorrow. \"    Pt scheduled for [facility] transport on Thursday 1/5 for 0930 appt;  will be at 0830. Natanael expressed daily transport need. Emy requested Natanael return call in the morning since w/c transports are not usually daily transports from home, & would require management input. Natanael phoned Gene, pt's sig other & discussed the above. Natanael query if there was an option for family transport, if gas cards were provided to offset the cost with the daily appts. Gene expressed concern re: family reliability in getting pt to her daily radiation appts. Natanael shared the arrangements coordinated for pt's appt on 1/5. Gene expressed that pt's son will need to transport pt tomorrow to her radiation appt. Natanael to contact Gene after collaboration with [facility] re: pt's transport needs tomorrow morning. Gene voiced understanding.

## 2023-01-04 ENCOUNTER — HOSPITAL ENCOUNTER (OUTPATIENT)
Age: 53
Discharge: HOME | End: 2023-01-04
Payer: MEDICARE

## 2023-01-04 ENCOUNTER — CLINICAL DOCUMENTATION (OUTPATIENT)
Dept: CASE MANAGEMENT | Age: 53
End: 2023-01-04

## 2023-01-04 ENCOUNTER — HOSPITAL ENCOUNTER (OUTPATIENT)
Dept: RADIATION ONCOLOGY | Age: 53
Discharge: HOME | End: 2023-01-04
Payer: MEDICARE

## 2023-01-04 DIAGNOSIS — C32.1 MALIGNANT NEOPLASM OF EPIGLOTTIS (HCC): ICD-10-CM

## 2023-01-04 LAB
ALBUMIN SERPL-MCNC: 3.4 G/DL (ref 3.5–5.1)
ALP BLD-CCNC: 102 U/L (ref 38–126)
ALT SERPL-CCNC: 33 U/L (ref 11–66)
ANION GAP SERPL CALCULATED.3IONS-SCNC: 12 MEQ/L (ref 8–16)
AST SERPL-CCNC: 31 U/L (ref 5–40)
BASOPHILS # BLD: 0.6 %
BASOPHILS ABSOLUTE: 0.1 THOU/MM3 (ref 0–0.1)
BILIRUB SERPL-MCNC: < 0.2 MG/DL (ref 0.3–1.2)
BILIRUBIN DIRECT: < 0.2 MG/DL (ref 0–0.3)
BUN BLDV-MCNC: 27 MG/DL (ref 7–22)
CALCIUM SERPL-MCNC: 8.8 MG/DL (ref 8.5–10.5)
CHLORIDE BLD-SCNC: 101 MEQ/L (ref 98–111)
CO2: 27 MEQ/L (ref 23–33)
CREAT SERPL-MCNC: 0.6 MG/DL (ref 0.4–1.2)
EOSINOPHIL # BLD: 2 %
EOSINOPHILS ABSOLUTE: 0.2 THOU/MM3 (ref 0–0.4)
ERYTHROCYTE [DISTWIDTH] IN BLOOD BY AUTOMATED COUNT: 15.3 % (ref 11.5–14.5)
ERYTHROCYTE [DISTWIDTH] IN BLOOD BY AUTOMATED COUNT: 57.6 FL (ref 35–45)
GFR SERPL CREATININE-BSD FRML MDRD: > 60 ML/MIN/1.73M2
GLUCOSE BLD-MCNC: 212 MG/DL (ref 70–108)
HCT VFR BLD CALC: 43 % (ref 37–47)
HEMOGLOBIN: 13.9 GM/DL (ref 12–16)
IMMATURE GRANS (ABS): 0.03 THOU/MM3 (ref 0–0.07)
IMMATURE GRANULOCYTES: 0.3 %
LYMPHOCYTES # BLD: 37.9 %
LYMPHOCYTES ABSOLUTE: 4.1 THOU/MM3 (ref 1–4.8)
MCH RBC QN AUTO: 33.3 PG (ref 26–33)
MCHC RBC AUTO-ENTMCNC: 32.3 GM/DL (ref 32.2–35.5)
MCV RBC AUTO: 102.9 FL (ref 81–99)
MONOCYTES # BLD: 5.3 %
MONOCYTES ABSOLUTE: 0.6 THOU/MM3 (ref 0.4–1.3)
NUCLEATED RED BLOOD CELLS: 0 /100 WBC
PLATELET # BLD: 342 THOU/MM3 (ref 130–400)
PMV BLD AUTO: 11.7 FL (ref 9.4–12.4)
POTASSIUM SERPL-SCNC: 4.1 MEQ/L (ref 3.5–5.2)
PREGNANCY, SERUM: NEGATIVE
RBC # BLD: 4.18 MILL/MM3 (ref 4.2–5.4)
SEG NEUTROPHILS: 53.9 %
SEGMENTED NEUTROPHILS ABSOLUTE COUNT: 5.8 THOU/MM3 (ref 1.8–7.7)
SODIUM BLD-SCNC: 140 MEQ/L (ref 135–145)
TOTAL PROTEIN: 5.7 G/DL (ref 6.1–8)
WBC # BLD: 10.8 THOU/MM3 (ref 4.8–10.8)

## 2023-01-04 PROCEDURE — 80053 COMPREHEN METABOLIC PANEL: CPT

## 2023-01-04 PROCEDURE — 85025 COMPLETE CBC W/AUTO DIFF WBC: CPT

## 2023-01-04 PROCEDURE — 84703 CHORIONIC GONADOTROPIN ASSAY: CPT

## 2023-01-04 PROCEDURE — 77014 CHG CT GUIDANCE RADIATION THERAPY FLDS PLACEMENT: CPT | Performed by: RADIOLOGY

## 2023-01-04 PROCEDURE — 82248 BILIRUBIN DIRECT: CPT

## 2023-01-04 PROCEDURE — 77386 HC NTSTY MODUL RAD TX DLVR CPLX: CPT | Performed by: RADIOLOGY

## 2023-01-04 PROCEDURE — 36415 COLL VENOUS BLD VENIPUNCTURE: CPT

## 2023-01-04 NOTE — PROGRESS NOTES
Met with pt & sig other today after her 1st XRT tx today. Gas card given to help with financial challenges with daily appts for XRT. Reminded pt of her appt tomorrow with Dr Sebas Davidson at [address] [facility]; pt's XRT was changed to 0830 tomorrow. Pt requesting to cancel [facility] handicap transport tomorrow; her son or cousin will be providing her transportation to her appts. Pt reminded Natanael she has PCP appt at 1330 tomorrow & voiced concern about chemo being completed in time for her to reach her PCP appt on time. Natanael discussed with MARYLIN Davidson pt's worry about missing her PCP appt, bc he manages pt's pain regimen. ONC agreeable to pt seeing her tomorrow tomorrow as scheduled, but chemo will be changed to Friday 1/6/23 at 1030. Natanael phoned pt's sig other, Gene & updated him on the chemo infusion being changed to Friday, 1/6/23 at 1030. Gene voiced understanding & will update pt accordingly.

## 2023-01-05 ENCOUNTER — OFFICE VISIT (OUTPATIENT)
Dept: ONCOLOGY | Age: 53
End: 2023-01-05
Payer: MEDICARE

## 2023-01-05 ENCOUNTER — HOSPITAL ENCOUNTER (OUTPATIENT)
Dept: RADIATION ONCOLOGY | Age: 53
Discharge: HOME | End: 2023-01-05
Payer: MEDICARE

## 2023-01-05 ENCOUNTER — HOSPITAL ENCOUNTER (OUTPATIENT)
Dept: INFUSION THERAPY | Age: 53
Discharge: HOME | End: 2023-01-05
Payer: MEDICARE

## 2023-01-05 VITALS
DIASTOLIC BLOOD PRESSURE: 67 MMHG | RESPIRATION RATE: 18 BRPM | OXYGEN SATURATION: 97 % | HEART RATE: 72 BPM | TEMPERATURE: 98.2 F | SYSTOLIC BLOOD PRESSURE: 117 MMHG

## 2023-01-05 VITALS
TEMPERATURE: 98.2 F | HEART RATE: 72 BPM | OXYGEN SATURATION: 97 % | WEIGHT: 146 LBS | RESPIRATION RATE: 18 BRPM | BODY MASS INDEX: 27.56 KG/M2 | HEIGHT: 61 IN | DIASTOLIC BLOOD PRESSURE: 67 MMHG | SYSTOLIC BLOOD PRESSURE: 117 MMHG

## 2023-01-05 VITALS
DIASTOLIC BLOOD PRESSURE: 69 MMHG | HEART RATE: 73 BPM | WEIGHT: 144.62 LBS | RESPIRATION RATE: 18 BRPM | OXYGEN SATURATION: 99 % | SYSTOLIC BLOOD PRESSURE: 123 MMHG | BODY MASS INDEX: 27.33 KG/M2 | TEMPERATURE: 97.8 F

## 2023-01-05 DIAGNOSIS — C32.1 MALIGNANT NEOPLASM OF EPIGLOTTIS (HCC): Primary | ICD-10-CM

## 2023-01-05 PROCEDURE — 99215 OFFICE O/P EST HI 40 MIN: CPT | Performed by: INTERNAL MEDICINE

## 2023-01-05 PROCEDURE — G8484 FLU IMMUNIZE NO ADMIN: HCPCS | Performed by: INTERNAL MEDICINE

## 2023-01-05 PROCEDURE — 3017F COLORECTAL CA SCREEN DOC REV: CPT | Performed by: INTERNAL MEDICINE

## 2023-01-05 PROCEDURE — 3078F DIAST BP <80 MM HG: CPT | Performed by: INTERNAL MEDICINE

## 2023-01-05 PROCEDURE — 99211 OFF/OP EST MAY X REQ PHY/QHP: CPT

## 2023-01-05 PROCEDURE — 3074F SYST BP LT 130 MM HG: CPT | Performed by: INTERNAL MEDICINE

## 2023-01-05 PROCEDURE — 77386 HC NTSTY MODUL RAD TX DLVR CPLX: CPT | Performed by: RADIOLOGY

## 2023-01-05 PROCEDURE — 1036F TOBACCO NON-USER: CPT | Performed by: INTERNAL MEDICINE

## 2023-01-05 PROCEDURE — G8417 CALC BMI ABV UP PARAM F/U: HCPCS | Performed by: INTERNAL MEDICINE

## 2023-01-05 PROCEDURE — G8427 DOCREV CUR MEDS BY ELIG CLIN: HCPCS | Performed by: INTERNAL MEDICINE

## 2023-01-05 ASSESSMENT — PAIN SCALES - GENERAL: PAINLEVEL_OUTOF10: 10

## 2023-01-05 ASSESSMENT — ENCOUNTER SYMPTOMS
CONSTIPATION: 0
COUGH: 0
DIARRHEA: 0
SHORTNESS OF BREATH: 0
SORE THROAT: 0
NAUSEA: 0
VOMITING: 0
TROUBLE SWALLOWING: 0

## 2023-01-05 ASSESSMENT — PAIN DESCRIPTION - LOCATION: LOCATION: THROAT

## 2023-01-05 NOTE — PATIENT INSTRUCTIONS
Reviewed labs and recent medical history. Will be okay for her chemotherapy tomorrow 1/6/23  Discussed her pain and seeing her Primary provider. She will see him today. Discussed her pain. Script for magic mouthwash sent to pharmacy. She will return tomorrow to receive her treatment. (Chemotherapy)  See MD next Friday for her next treatment.

## 2023-01-05 NOTE — PROGRESS NOTES
[address] CANCER [address] [place] [address]  Dept: [phone]  Loc: Ronal Agudelooy  1970   No ref. provider found   Lana Jimenez MD       Hetal Moore is a 47 y.o.  female with head and neck cancer, specifically cancer of the epiglottis, p16 negative. CHIEF COMPLAINT  Chief Complaint   Patient presents with    Follow-up     Malignant neoplasm of epiglottis*Labs done 1/4/2023          HISTORY OF PRESENT ILLNESS    1/31/2022. CT scan of soft tissues of the neck without contrast done for right-sided neck pain showed no discrete neck mass fluid collection or acute phonatory change there are multiple nonspecific cervical lymph nodes at levels 1 through 5 measuring less than 1 cm in short axis no pathologically enlarged lymph nodes were seen. She initially presented with sore throat, difficulty swallowing and progressive weight loss. She was treated with multiple courses of antibiotics, steroids and antifungal medication with limited improvement. 2/10/2022. Chest x-ray done for shortness of breath and sore throat showed no focal acute lung lesions or significant chronic lung findings. 9/20/2022. Referred  to ENT and seen by Aide Hansen PA-C and had flexible laryngoscopy demonstrating an edematous epiglottis. She was given another round of oral steroids. He said that she was here for thrush and mouth and ear soreness for a year. The pain will get to be 10 out of 10. The pain was constant but would fluctuate in severity. There is no drainage or history of hearing loss or tinnitus. She said that she had a constant sore throat making it difficult for her to swallow and she has lost a lot of weight. She says that the pain is worse on the right and would extend into her ear. She is also finding it hard to breathe. Weight was estimated to be 65 kg.   Examination showed multiple broken teeth and cavities. Salivary gland exam was normal.  There is mild lymphadenopathy with tender lymph nodes on the right side. Flexible laryngoscopy showed abnormally enlarged epiglottis obstructing the view of the larynx worse on the right than on the left. She increased her steroids that she was already on and had her come back in a week and told the patient to go to the ED if her airway became compromised. CT scan of the soft tissues of the neck with contrast was ordered. 9/21/2022. CAT scan of the neck showed nodular thickening of the area epiglottic folds associated with abnormal soft tissue thickening of the right side of the hypopharynx extending into the right posterolateral wall of the hypopharynx and flattening deformity of the right vallecula and piriform sinus raising the question of squamous cell carcinoma. There was no significant lymphadenopathy seen. A significant thyroid mass was not identified. The upper esophagus was unremarkable and a foreign body was not identified. There was heavy bilateral carotid bulb calcification seen and multiple dental caries were noted. PET CT scan was recommended. 9/28/2022. Follow-up in the ENT office. Patient said that her throat felt better but she had a bump on the roof of her mouth that was painful and she still complained of trouble swallowing and a sore throat. Flexible laryngoscopy was performed again hypopharynx was markedly abnormal with white speckles but most notably significant thickening of the epiglottis extending into the arytenoid space and false cords worse on the right. There was a possible necrotic area in the posterior face of the epiglottis. The larynx was abnormal with white speckles indicative of fungal infection. Dr. Brad Ragland reviewed the case and discussed with the patient the benefit of biopsy. The possibility of tracheostomy was discussed to protect her airway. She was treated with fluconazole for her thrush.   Steroids were held.    10/3/2022. Chest x-ray that the cardiac silhouette was within normal limits. Coronary stent was present. There is strandy interstitial opacities in the perihilar distribution along with peribronchial thickening and no focal areas of consolidation, no pneumothorax or pleural effusion. No acute bony changes were seen. 10/6/2022. 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 The epiglottis was edematous and had granular tissue involving the laryngeal surface of the left epiglottis. There was a separate lesion involving the right false vocal cord with a papillomatous appearance. Both true vocal cords seem to be spared. There is no involvement of the arytenoid complex or postcricoid region. The lingual surface of the epiglottis did not have any mucosal erosions but was edematous. The vallecula and base the tongue was unremarkable. The entire cervical esophagus was normal.  She is a former smoker. The tentative stage is T3, N1 versus T3, N2c    Pathology report showed squamous cell carcinoma, poorly differentiated, of the left epiglottis, p16 negative. 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 10/13/2022. Follow-up in the office. Since her biopsy her sore throat had been somewhat tolerable but she continued to have pain with swallowing. She denied choking and coughing episodes with swallowing. She had no problems with shortness of breath. She did complain of facial pain postnasal drip sore throat dysphagia cough with sputum production. He said that she had a 1.5 cm right level 3 lymph node. PET CT scan has been ordered and will be performed on November 3, 2022. The possible need for a gastrostomy tube for nutrition was discussed.   Given that she already had baseline epiglottic edema which might worsen during treatment resulting in airway obstruction, the possibility of requiring a tracheostomy tube was also reviewed. Options for treatment were discussed including primary surgery versus chemo rads. She was not felt to be an optimal surgical candidate. The patient opted for chemoradiation therapy and was referred to radiation therapy. She is here today to establish with medical oncology. Good control of her diabetes was stressed as well. She was also to have dental caries and was encouraged to go see a dentist.    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 Her above-the-knee amputation on the left side was on 2/26/2016. Has undergone cardiac catheterization with percutaneous stenting in August 2016 and an September 2016. Her diabetes requires insulin. She is on a fentanyl patch. She is here today to establish medical oncology follow-up. She has an appointment to see Dr. Iva Long on 10/26. She says that she does not have much of an appetite and she continues to lose weight. She used to weigh 240 pounds and has lost 100 pounds in the past year and a half. Continues to have throat pain, dysphagia and odynophagia. She had been a smoker of a pack of cigarettes a day from age 13 through 2014 and then tried to cut down her smoking. She says that she is vaping and smoking as little as possible. She says that she does have a cough but no hemoptysis. She has been a diabetic for many years. She had gestational diabetes and did not check her sugars for a long time. She felt bad and went to a local ED and was found to have a sugar of 590. She says that she checks her sugars once a day. She says that her body tells her when her sugar is high.   She usually checks her sugar sometime between 3 and 6 PM.  He has had an above-the-knee amputation of her left leg.  Says that she has had no problems with her stump. She says that she has a prosthesis but it does not fit anymore because she has lost so much weight. She says that she has no neuropathy. She uses a scooter. She has not had a recent eye examination. She said that she \"just did not go\" feels that her renal function is fine. She says that she has no known anemia. The only bleeding that she has experienced was a little bit after she had her biopsy. She did have the above-mentioned thrush and that was sore. She knows she has bad teeth and she is supposed to go to the dentist on October 27. We discussed this today in detail and gave her some assistance in dealing with this. She has fallen in the past fracturing her nose and breaking her finger and a tooth fell out which she has remedied herself. She has had myocardial infarction in 2007 and in 2016. She has had 4 stents placed. She has been on Repatha. She also says that she has had a valvular heart problem. She says that she has had nausea and occasional vomiting. She denies diarrhea and constipation. She says that she has never had a stroke. She has not had skin cancer. 11/9/2022. Mrs. Adalid Green is here today to discuss the results of her PET scan. Unfortunately this shows a lesion in the periphery of the lung and hilar adenopathy which looks very much like a new primary lung cancer. I have discussed this with her and have asked her if we can go forward with a biopsy. The lesion is very peripheral and may be able to be biopsied by interventional radiology. That would circumvent the need to possibly pass an endoscope past her epiglottic tumor. I have discussed her case with Dr. Som Dominguez.   We think that a combination chemotherapy/ radiation therapy approach with her epiglottic and also her lung tumor is the best and I think that highly emetogenic chemotherapy is probably not in her best interest since it may compromise her airway if she vomits. We will give her weekly CarboTaxol along with her radiation therapy. She will need to have a port placed. She has decided to go forward with a PEG tube and trach if needed. She will contact her ENT doctor. 11/30/2022. There have been multiple interventions since last time Laurie Drew was in the office. 11/16/2022. Seen in consultation by Dr. Cj Mayer. He arranged for Mediport placement and feeding tube placement. 11/21/2022. Surgery performed with port placement and feeding tube. 11/22/2022. 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 My reading of the data shows that if this is just a nodule no treatment is needed. CBC at that time showed a white count of 19.3 with hemoglobin of 13 hematocrit 46 and a platelet count of 383,717.    11/23/2022. She has had her teeth pulled through [employer] with a stanley pain for the oral surgeon. She tolerated it well. She is healing well. 11/25/2022. Emergency department encounter for severe abdominal pain. She had had nausea with vomiting 3 times over the last several days. She has significant pain in her abdomen when she has a bowel movement and also when she urinates. She was afebrile but tachycardic at a rate of 102. She was tender to palpation in the left lower quadrant and around the PEG tube site. The site itself was not erythematous and the tube seem to be in place. CBC showed white count of 9.9 with a hemoglobin of 15.4 hematocrit 46.5 and a platelet count of 738,864 with 75% polys, 14 lymphs, 7 monos, 2 eos. CAT scan showed mild soft tissue edema along the PEG tube catheter site related to recent PEG tube placement. There is no soft tissue fluid collection. She was discharged home. 11/28/2022. He had seen Dr. Angle Pinto at the [facility] emergency room yesterday. She was treated for pain and then was discharged.   That day her vital signs or normal with a pulse rate of 83.  She was afebrile. She had generalized abdominal tenderness. Injection into the tube showed that the tube was at the level of the gastric antrum and contrast opacified the gastric lumen with no extravasation of contrast to suggest a leak. She was treated with clindamycin. 11/29. She called the surgeon's office saying that her PEG tube site was red and there was thick white drainage around the tube. They told her it would take a few days for the antibiotics to work. Today the patient is still having a lot of pain. There is a lot of clear drainage with some thickened dark discharge it appears to be old blood but it is not tested. There is no red to it. There is some bright pink fluid in the PEG tube line which appears to have been from Snapple ingested the day before. She has rebound tenderness throughout the left side of her abdomen. Even though the study did not show any leaking, I fear that it had occurred and has caused irritation of the peritoneum causing this rebound. I have encouraged the patient to lie flat as much as possible letting the fluids drain internally rather than into her peritoneum. We will continue on her antibiotic. We have another CAT scan scheduled for Friday morning and we will see her on Friday after that. Her wound was inspected and redressed. We increased her pain medication. Her concerns about her treatment in the emergency room were also addressed. I urged her son to talk to the [facility] about her concerns about her treatment. 12/21/2022. Paris Arora is here today with her significant other Gene to discuss going forward with her radiation therapy and chemotherapy. It is still not clear to me when radiation will begin. We have a plan in place for carboplatin and Taxol to be given concurrently weekly. She is pleased to have her PEG tube out and has much less pain although she still has pain at the site.   Dr. Kristin Parks note said that she drained so much fluid he had to apply an ostomy bag. She also has pain at her port site but there is minimal inflammation and no obvious infection and there is no pus. She was instructed to call if there is any change in this finding. González Elizondo says that she is swallowing fine at this time. Her current weight is 145 pounds which is up 3 pounds since last time she was weighed here about 2 weeks ago. She denies fevers, chills, sweats, bleeding, constipation or diarrhea. She says that she has her usual nausea but no vomiting. She continues to smoke. The patient had simulation with her mask on today. She was pretreated with Xanax. She is somewhat sleepy. Sugar was markedly elevated. I do not know if she took her insulin today. INTERVAL NOTE    1/5/2023. Mrs. Raúl Turcios is here today with her significant other Gene. She has started her radiation and tomorrow she will get her chemotherapy. Her mouth continues to heal.  Her gums are still somewhat swollen so that she cannot get fitted for her dentures yet. Her abdominal pain is markedly improved and her PEG tube site has healed over without any signs of infection or inflammation. Mrs. Raúl Turcios says that the liquid pain medication that she has been given is her throat almost immediately. I do not know if this is a placebo effect or if there is something in the oral preparation that actually improves the pain in her oropharynx. I told this to Mrs. Vyas and I offered miles mixture as an alternative to a narcotic analgesic and she accepted this offer. Dr. Bradley Sanchez and I have agreed that her primary care physician will monitor her her pain medication and prescribe that for her. She says that she is extremely anxious and had to take several doses of Xanax today as she woke up extremely early so that she could be calm enough to come into the office today    She has gained weight. If we can believe our scales she has gained 6 pounds. She says that she had fever a week ago. She denies any bleeding. She has had some nausea. She has had diarrhea and she has had nausea and vomiting. This is all before she starts any chemotherapy or radiation. MONITORING PARAMETERS    Physical examinations, CAT scans, PET scans    PAST MEDICAL HISTORY  Past Medical History:   Diagnosis Date    Above knee amputation of left lower extremity (HCC)     Blood circulation, collateral     CAD (coronary artery disease)     Diabetes mellitus ([address]     Hyperlipidemia     Hypertension     MDRO (multiple drug resistant organisms) resistance     MRSA wound 2013    MI, old 01/01/2007    Obesity (BMI 30-39. 9)     Status post skin graft 01/15/2014    Amniox - Dr Lassiter Blood cancer [facility]) 10/2022    squamous cell carcinoma of the epiglottis        REVIEW OF SYSTEMS  Review of Systems   Constitutional:  Positive for fatigue. Negative for appetite change and fever. HENT:  Negative for dental problem, mouth sores, sore throat and trouble swallowing. Eyes:  Negative for visual disturbance. Respiratory:  Negative for cough and shortness of breath. Cardiovascular:  Positive for leg swelling. Gastrointestinal:  Negative for constipation, diarrhea, nausea and vomiting. Endocrine: Negative for polyuria. Genitourinary:  Negative for dysuria, frequency, hematuria and urgency. Musculoskeletal:  Positive for arthralgias (generalized). Skin:  Negative for pallor. Neurological:  Positive for weakness. Negative for light-headedness and headaches. Hematological:  Does not bruise/bleed easily. Psychiatric/Behavioral:  Negative for self-injury, sleep disturbance and suicidal ideas. The patient is not nervous/anxious.        FAMILY HISTORY  Family History   Problem Relation Age of Onset    No Known Problems Brother     Heart Disease Maternal Grandfather     High Blood Pressure Maternal Grandfather     Asthma Neg Hx     Cancer Neg Hx     Diabetes Neg Hx     Kidney Disease Neg Hx     Stroke Neg Hx SOCIAL HISTORY  Social History     Socioeconomic History    Marital status:      Spouse name: Not on file    Number of children: 1    Years of education: Not on file    Highest education level: Not on file   Occupational History    Not on file   Tobacco Use    Smoking status: Former     Packs/day: 0.50     Years: 27.00     Pack years: 13.50     Types: Cigarettes     Quit date: 2014     Years since quittin.2    Smokeless tobacco: Never    Tobacco comments:     e cigs daily use 22   Vaping Use    Vaping Use: Some days    Substances: Nicotine   Substance and Sexual Activity    Alcohol use: No    Drug use: No    Sexual activity: Not on file   Other Topics Concern    Not on file   Social History Narrative    Not on file     Social Determinants of Health     Financial Resource Strain: Not on file   Food Insecurity: Not on file   Transportation Needs: Not on file   Physical Activity: Not on file   Stress: Not on file   Social Connections: Not on file   Intimate Partner Violence: Not on file   Housing Stability: Not on file        CURRENT MEDICATIONS  Current Outpatient Medications   Medication Sig Dispense Refill    prochlorperazine (COMPAZINE) 10 MG tablet Take 1 tablet by mouth every 6 hours as needed (as needed for nausea) 60 tablet 3    ondansetron (ZOFRAN) 4 MG tablet Take 1 tablet by mouth every 8 hours as needed for Nausea or Vomiting 20 tablet 1    lidocaine-prilocaine (EMLA) 2.5-2.5 % cream Apply topically as needed. 5 g 4    amoxicillin (AMOXIL) 125 MG/5ML suspension Take by mouth 3 times daily Prescribed by family doctor unsure of dose      zolpidem (AMBIEN) 10 MG tablet TAKE 1 TABLET BY MOUTH AT NIGHT      pantoprazole (PROTONIX) 40 MG tablet Take 1 tablet by mouth every morning (before breakfast) 90 tablet 1    ALPRAZolam (XANAX) 1 MG tablet Take 1 mg by mouth as needed.       oxyCODONE-acetaminophen (PERCOCET) 5-325 MG per tablet Take 2 tablets by mouth every 8 hours as needed for Pain.      clopidogrel (PLAVIX) 75 MG tablet Take 1 tablet by mouth daily      TRULICITY 1.5 ZW/4.4QQ SC injection Inject 1.5 mg into the skin once a week      HUMALOG KWIKPEN 100 UNIT/ML SOPN       fentaNYL 37.5 MCG/HR PT72 Apply 1 patch topically. JARDIANCE 25 MG tablet Take 1 tablet by mouth daily      nitroGLYCERIN (NITROSTAT) 0.4 MG SL tablet Place 1 tablet under the tongue every 5 minutes as needed for Chest pain Take 1 tablet for chest pain and repeat after 5 min if no relief, call 911 and take another dose 5 min later. Max of 3 doses in 15 min. 25 tablet 3    metFORMIN (GLUCOPHAGE) 500 MG tablet Take 2 tablets by mouth 2 times daily Resume 8/30 60 tablet 3    pioglitazone (ACTOS) 30 MG tablet Take 1 tablet by mouth daily 30 tablet 3    atorvastatin (LIPITOR) 20 MG tablet Take 2 tablets by mouth nightly 30 tablet 3    lisinopril (PRINIVIL;ZESTRIL) 2.5 MG tablet Take 1 tablet by mouth daily 30 tablet 3    metoprolol tartrate (LOPRESSOR) 25 MG tablet Take 1 tablet by mouth 2 times daily 60 tablet 3    isosorbide dinitrate (ISORDIL) 20 MG tablet Take 30 mg by mouth daily      amLODIPine (NORVASC) 5 MG tablet Take 5 mg by mouth daily      budesonide-formoterol (SYMBICORT) 160-4.5 MCG/ACT AERO Inhale 2 puffs into the lungs 2 times daily      pregabalin (LYRICA) 150 MG capsule Take 150 mg by mouth 2 times daily      ALPRAZolam (XANAX) 0.5 MG tablet Take 0.5 mg by mouth 2 times daily as needed. aspirin 81 MG tablet Take 81 mg by mouth daily      albuterol (PROVENTIL HFA) 108 (90 BASE) MCG/ACT inhaler Inhale 2 puffs into the lungs every 4 hours as needed for Wheezing or Shortness of Breath (Space out to every 6 hours as symptoms improve). Space out to every 6 hours as symptoms improve. 1 Inhaler 10     No current facility-administered medications for this visit.         OARRS    PDMP Monitoring:    Last PDMP Elnor Power as Reviewed [facility]):  Review User Review Instant Review Result   Jefferson Stephens 10/23/2022  7:08 PM Reviewed PDMP [1]       Physical Exam  Vitals and nursing note reviewed. Exam conducted with a chaperone present. Constitutional:       General: She is not in acute distress. Appearance: She is ill-appearing. She is not toxic-appearing or diaphoretic. Comments: Mrs. Pramod Huertas is a chronically ill-appearing occasion female who is status post above-the-knee amputation on the left and she is in a motorized scooter. She is accompanied by her significant other. She is very anxious. HENT:      Head: Normocephalic and atraumatic. Comments: She has bilateral temporal wasting     Nose: Nose normal.      Mouth/Throat:      Mouth: Mucous membranes are moist.      Pharynx: Oropharynx is clear. No oropharyngeal exudate or posterior oropharyngeal erythema. Eyes:      General: No scleral icterus. Extraocular Movements: Extraocular movements intact. Conjunctiva/sclera: Conjunctivae normal.      Pupils: Pupils are equal, round, and reactive to light. Cardiovascular:      Rate and Rhythm: Normal rate and regular rhythm. Heart sounds: Murmur (2 out of 6 systolic) heard. Pulmonary:      Effort: Pulmonary effort is normal. No respiratory distress. Breath sounds: Normal breath sounds. No stridor. No wheezing, rhonchi or rales. Chest:      Chest wall: No tenderness. Abdominal:      General: There is no distension. Palpations: Abdomen is soft. There is no mass. Tenderness: There is no abdominal tenderness. There is no guarding or rebound. Hernia: No hernia is present. Comments: Well-healed scar where her PEG tube was. Abdomen is nontender. Musculoskeletal:         General: Normal range of motion. Cervical back: Normal range of motion. No rigidity. Right lower leg: No edema. Left lower leg: No edema. Comments: She is status post left above-knee amputation. Lymphadenopathy:      Cervical: No cervical adenopathy. Skin:     General: Skin is warm and dry. Coloration: Skin is pale. Skin is not jaundiced. Neurological:      General: No focal deficit present. Mental Status: She is alert and oriented to person, place, and time. Cranial Nerves: No cranial nerve deficit. Motor: No weakness. Psychiatric:         Mood and Affect: Mood normal.         Behavior: Behavior normal.         Thought Content: Thought content normal.         Judgment: Judgment normal.        ECOG STATUS    1:  Restricted in physically strenuous activity but ambulatory and able to carry out work of a light or sedentary nature, e.g., light house work, office work    LABS/IMAGING    [address] Additional Contrast? Oral    Result Date: 12/12/2022  1. Interval removal of the previously noted gastrostomy tube. Mucosal thickening in the stomach, second and third portions of the duodenum. 2. Small hiatal hernia. Ventral hernia containing fat measuring 3 x 2.2 cm in size. 3. Tiny gallstones. 4. Atherosclerotic calcification in the abdominal aorta and visceral arteries. 5. Lumbar spondylosis. **This report has been created using voice recognition software. It may contain minor errors which are inherent in voice recognition technology. ** Final report electronically signed by DR Bushra Aldridge on 12/12/2022 11:12 AM     Laboratory data shows that her BUN is 27 and her creatinine is 0.6. Sugar was 212. Total protein is 5.7 with an albumin of 3.4. Liver function tests are normal.  CBC shows white count 10.8 with a hemoglobin 13.9 hematocrit 43.0 with platelet count of 776,985. White blood cell differential shows 53% polys, 37% lymphs, 5 monos, 2 eosinophils. PATHOLOGY/GENETICS    Squamous cell carcinoma of the epiglottis, p16 negative. ASSESSMENT and PLAN    1. Squamous cell carcinoma of the epiglottis, p16 negative. Started radiation today. She will get her chemotherapy tomorrow. Received weekly carboplatin and Taxol. 2.  Anxiety and depression.   She seems to be taking liberal doses of Xanax to control her anxiety. Dr. Neelima Singh and I agree that primary care doctor should control her pain medication, sedatives and other controlled substances. 3.  Multiple comorbidities. She will continue with her current regimen and current providers for her multiple comorbidities eluding chronic pain, poorly controlled diabetes, atherosclerosis, Neo artery disease, hyperlipidemia, feeding difficulties and multiple others. The patient and her significant other know to call if she has any change in her status, questions or concerns. We will see her again tomorrow at the time of her chemotherapy and weekly.       Marleen Alvarado MD 1/5/2023 10:33 AM

## 2023-01-05 NOTE — PROGRESS NOTES
[address] [facility] [address], [address]        [place],  [facility]        Sammie Nelson: [phone]        F: [phone]       [email or web address]            Dr. Charli Cantu MD MS          Dr. Connor Madison MD PhD    ON TREATMENT VISIT (OTV) NOTE     Date of Service: 2023  Patient ID: Hetal Moore   : 1970  MRN: [id number]   Acct Number: [de-identified]     RADIATION ONCOLOGY ATTENDING:  Angelina Carrillo MD MS    DIAGNOSIS:  Cancer Staging  Cancer, epiglottis [facility])  Staging form: Larynx - Supraglottis, AJCC 8th Edition  - Clinical stage from 10/6/2022: Stage III (cT3, cN0, cM0) - Signed by Mary Rushing MD on 2022      Treatment Area: Head/Neck    Current Total Dose(cGy): 400  Current Fraction:   Final/Cumulative Rx. Dose (cGy): 7000    Patient was seen today for weekly visit. Wt Readings from Last 3 Encounters:   23 144 lb 10 oz (65.6 kg)   22 145 lb (65.8 kg)   22 141 lb (64 kg)       /69   Pulse 73   Temp 97.8 °F (36.6 °C) (Infrared)   Resp 18   Wt 144 lb 10 oz (65.6 kg)   SpO2 99%   BMI 27.33 kg/m²     Lab Results   Component Value Date    WBC 10.8 2023     2023       Comfort Alteration  Fatigue:Must curtail daily activities even with rest periods and early bedtime    Pain Location: Throat  Pain Intensity (Current): 10 Worst possible pain  Pain Treatment: Opioid  Pain Relief: Pain relieved 50%    Emotional Alteration:   Coping: somewhat effective    Nutritional Alteration  Anorexia: Loss of appetite but able to eat smaller portions of food and/or liquids  Nausea: 1-2 episodes of nausea in 24 hours  Vomiting: No vomiting   Salivary Glands- Acute:  Moderate dryness, thick sticky saliva, markedly altered taste  Taste Disturbance (Dysgeusia): Slightly altered  Dyspepsia/Heartburn: Mild  Dysphagia/Esophagitis: None    Skin Alteration   Skin reaction: No changes noted  Alopecia: No loss    Mucous Membrane Alteration  Mucositis XRT Related: None  Pharynx and Esophagus- Acute: No change over baseline  Voice Changes: Mild or intermittent hoarseness    Ventilation Alteration  Cough: Productive  Hemoptysis: None  Dyspnea: Normal  Mucous Quantity/Quality: Green/white mucous    CNS Alteration  Level of Consciousness: Alert, responds briskly, appropriately with minimal stimulus  Seizure Activity: None  Insomnia: Occasional difficulty sleeping not interfering with function  Orientation: Oriented in 3 spheres of person, place, and time  Comment:   Speech Impairment: No  Ataxia: Normal    Additional Comments: Starting Chemo today. MEDICATIONS:     Current Outpatient Medications   Medication Sig Dispense Refill    prochlorperazine (COMPAZINE) 10 MG tablet Take 1 tablet by mouth every 6 hours as needed (as needed for nausea) 60 tablet 3    ondansetron (ZOFRAN) 4 MG tablet Take 1 tablet by mouth every 8 hours as needed for Nausea or Vomiting 20 tablet 1    lidocaine-prilocaine (EMLA) 2.5-2.5 % cream Apply topically as needed. 5 g 4    amoxicillin (AMOXIL) 125 MG/5ML suspension Take by mouth 3 times daily Prescribed by family doctor unsure of dose      zolpidem (AMBIEN) 10 MG tablet TAKE 1 TABLET BY MOUTH AT NIGHT      pantoprazole (PROTONIX) 40 MG tablet Take 1 tablet by mouth every morning (before breakfast) 90 tablet 1    ALPRAZolam (XANAX) 1 MG tablet Take 1 mg by mouth as needed. oxyCODONE-acetaminophen (PERCOCET) 5-325 MG per tablet Take 2 tablets by mouth every 8 hours as needed for Pain. clopidogrel (PLAVIX) 75 MG tablet Take 1 tablet by mouth daily      TRULICITY 1.5 WG/8.3UT SC injection Inject 1.5 mg into the skin once a week      HUMALOG KWIKPEN 100 UNIT/ML SOPN       fentaNYL 37.5 MCG/HR PT72 Apply 1 patch topically.       JARDIANCE 25 MG tablet Take 1 tablet by mouth daily      nitroGLYCERIN (NITROSTAT) 0.4 MG SL tablet Place 1 tablet under the tongue every 5 minutes as needed for Chest pain Take 1 tablet for chest pain and repeat after 5 min if no relief, call 911 and take another dose 5 min later. Max of 3 doses in 15 min. (Patient not taking: Reported on 2022) 25 tablet 3    metFORMIN (GLUCOPHAGE) 500 MG tablet Take 2 tablets by mouth 2 times daily Resume  60 tablet 3    pioglitazone (ACTOS) 30 MG tablet Take 1 tablet by mouth daily 30 tablet 3    atorvastatin (LIPITOR) 20 MG tablet Take 2 tablets by mouth nightly 30 tablet 3    lisinopril (PRINIVIL;ZESTRIL) 2.5 MG tablet Take 1 tablet by mouth daily 30 tablet 3    metoprolol tartrate (LOPRESSOR) 25 MG tablet Take 1 tablet by mouth 2 times daily 60 tablet 3    isosorbide dinitrate (ISORDIL) 20 MG tablet Take 30 mg by mouth daily      amLODIPine (NORVASC) 5 MG tablet Take 5 mg by mouth daily      budesonide-formoterol (SYMBICORT) 160-4.5 MCG/ACT AERO Inhale 2 puffs into the lungs 2 times daily      pregabalin (LYRICA) 150 MG capsule Take 150 mg by mouth 2 times daily      ALPRAZolam (XANAX) 0.5 MG tablet Take 0.5 mg by mouth 2 times daily as needed (Patient not taking: Reported on 2022)      aspirin 81 MG tablet Take 81 mg by mouth daily      albuterol (PROVENTIL HFA) 108 (90 BASE) MCG/ACT inhaler Inhale 2 puffs into the lungs every 4 hours as needed for Wheezing or Shortness of Breath (Space out to every 6 hours as symptoms improve). Space out to every 6 hours as symptoms improve. 1 Inhaler 10     No current facility-administered medications for this encounter. * New    PHYSICAL EXAM:       ECO - Symptomatic, <50% in bed during the day (Ambulatory and capable of all self care but unable to carry out any work activities. Up and about more than 50% of waking hours)     General: NAD, AO x 3, Mentation is clear with appropriate affect.   HEENT: Normocephalic, atraumatic  Thorax:  Unlabored  COR: Nontachycardic  Abdomen:  Non-distended  Neuro:  Cranial nerves grossly intact; no focal deficits  Skin - treatment portal: SEE above. Skin intact with no treatment effects noted. Chemotherapy Update: Concurrent chemotherapy    Treatment Imaging: CBCT and All imaging reviewed and approved by Dr. Cricket Klein: No significant radiation side effects. Responding appropriately to symptomatic management. New medications, diagnostic results: Continue treatment as planned    PLAN: Again reviewed potential side effects of radiation for the patient's treatment. Continue local/topical care. Continue current radiation course as prescribed.

## 2023-01-06 ENCOUNTER — HOSPITAL ENCOUNTER (OUTPATIENT)
Dept: INFUSION THERAPY | Age: 53
Discharge: HOME | End: 2023-01-06
Payer: MEDICARE

## 2023-01-06 ENCOUNTER — HOSPITAL ENCOUNTER (OUTPATIENT)
Dept: RADIATION ONCOLOGY | Age: 53
Discharge: HOME | End: 2023-01-06
Payer: MEDICARE

## 2023-01-06 VITALS
HEART RATE: 75 BPM | TEMPERATURE: 97.8 F | WEIGHT: 146 LBS | OXYGEN SATURATION: 98 % | BODY MASS INDEX: 27.56 KG/M2 | SYSTOLIC BLOOD PRESSURE: 134 MMHG | RESPIRATION RATE: 18 BRPM | HEIGHT: 61 IN | DIASTOLIC BLOOD PRESSURE: 74 MMHG

## 2023-01-06 DIAGNOSIS — C32.1 CANCER, EPIGLOTTIS (HCC): Primary | ICD-10-CM

## 2023-01-06 PROCEDURE — 2580000003 HC RX 258: Performed by: INTERNAL MEDICINE

## 2023-01-06 PROCEDURE — 77386 HC NTSTY MODUL RAD TX DLVR CPLX: CPT | Performed by: RADIOLOGY

## 2023-01-06 PROCEDURE — 96415 CHEMO IV INFUSION ADDL HR: CPT

## 2023-01-06 PROCEDURE — 96413 CHEMO IV INFUSION 1 HR: CPT

## 2023-01-06 PROCEDURE — A4216 STERILE WATER/SALINE, 10 ML: HCPCS | Performed by: INTERNAL MEDICINE

## 2023-01-06 PROCEDURE — 96367 TX/PROPH/DG ADDL SEQ IV INF: CPT

## 2023-01-06 PROCEDURE — 6360000002 HC RX W HCPCS: Performed by: INTERNAL MEDICINE

## 2023-01-06 PROCEDURE — 2500000003 HC RX 250 WO HCPCS: Performed by: INTERNAL MEDICINE

## 2023-01-06 PROCEDURE — 96417 CHEMO IV INFUS EACH ADDL SEQ: CPT

## 2023-01-06 PROCEDURE — 96375 TX/PRO/DX INJ NEW DRUG ADDON: CPT

## 2023-01-06 RX ORDER — SODIUM CHLORIDE 9 MG/ML
5-40 INJECTION INTRAVENOUS PRN
Status: CANCELLED | OUTPATIENT
Start: 2023-01-06

## 2023-01-06 RX ORDER — PALONOSETRON 0.05 MG/ML
0.25 INJECTION, SOLUTION INTRAVENOUS ONCE
Status: CANCELLED | OUTPATIENT
Start: 2023-01-06 | End: 2023-01-06

## 2023-01-06 RX ORDER — SODIUM CHLORIDE 0.9 % (FLUSH) 0.9 %
5-40 SYRINGE (ML) INJECTION PRN
Status: DISCONTINUED | OUTPATIENT
Start: 2023-01-06 | End: 2023-01-07 | Stop reason: HOSPADM

## 2023-01-06 RX ORDER — ACETAMINOPHEN 325 MG/1
650 TABLET ORAL
Status: CANCELLED | OUTPATIENT
Start: 2023-01-06

## 2023-01-06 RX ORDER — HEPARIN SODIUM (PORCINE) LOCK FLUSH IV SOLN 100 UNIT/ML 100 UNIT/ML
500 SOLUTION INTRAVENOUS PRN
Status: DISCONTINUED | OUTPATIENT
Start: 2023-01-06 | End: 2023-01-07 | Stop reason: HOSPADM

## 2023-01-06 RX ORDER — HEPARIN SODIUM (PORCINE) LOCK FLUSH IV SOLN 100 UNIT/ML 100 UNIT/ML
500 SOLUTION INTRAVENOUS PRN
Status: CANCELLED | OUTPATIENT
Start: 2023-01-06

## 2023-01-06 RX ORDER — SODIUM CHLORIDE 9 MG/ML
5-250 INJECTION, SOLUTION INTRAVENOUS PRN
Status: CANCELLED | OUTPATIENT
Start: 2023-01-06

## 2023-01-06 RX ORDER — DIPHENHYDRAMINE HYDROCHLORIDE 50 MG/ML
50 INJECTION INTRAMUSCULAR; INTRAVENOUS
Status: CANCELLED | OUTPATIENT
Start: 2023-01-06

## 2023-01-06 RX ORDER — MEPERIDINE HYDROCHLORIDE 50 MG/ML
12.5 INJECTION INTRAMUSCULAR; INTRAVENOUS; SUBCUTANEOUS PRN
Status: CANCELLED | OUTPATIENT
Start: 2023-01-06

## 2023-01-06 RX ORDER — FAMOTIDINE 10 MG/ML
20 INJECTION, SOLUTION INTRAVENOUS
Status: CANCELLED | OUTPATIENT
Start: 2023-01-06

## 2023-01-06 RX ORDER — PALONOSETRON 0.05 MG/ML
0.25 INJECTION, SOLUTION INTRAVENOUS ONCE
Status: COMPLETED | OUTPATIENT
Start: 2023-01-06 | End: 2023-01-06

## 2023-01-06 RX ORDER — ALBUTEROL SULFATE 90 UG/1
4 AEROSOL, METERED RESPIRATORY (INHALATION) PRN
Status: CANCELLED | OUTPATIENT
Start: 2023-01-06

## 2023-01-06 RX ORDER — FAMOTIDINE 10 MG/ML
20 INJECTION, SOLUTION INTRAVENOUS ONCE
Status: CANCELLED | OUTPATIENT
Start: 2023-01-06 | End: 2023-01-06

## 2023-01-06 RX ORDER — ONDANSETRON 2 MG/ML
8 INJECTION INTRAMUSCULAR; INTRAVENOUS
Status: CANCELLED | OUTPATIENT
Start: 2023-01-06

## 2023-01-06 RX ORDER — EPINEPHRINE 1 MG/ML
0.3 INJECTION, SOLUTION, CONCENTRATE INTRAVENOUS PRN
Status: CANCELLED | OUTPATIENT
Start: 2023-01-06

## 2023-01-06 RX ORDER — SODIUM CHLORIDE 9 MG/ML
5-40 INJECTION INTRAVENOUS PRN
Status: DISCONTINUED | OUTPATIENT
Start: 2023-01-06 | End: 2023-01-07 | Stop reason: HOSPADM

## 2023-01-06 RX ORDER — SODIUM CHLORIDE 0.9 % (FLUSH) 0.9 %
5-40 SYRINGE (ML) INJECTION PRN
Status: CANCELLED | OUTPATIENT
Start: 2023-01-06

## 2023-01-06 RX ORDER — SODIUM CHLORIDE 9 MG/ML
5-250 INJECTION, SOLUTION INTRAVENOUS PRN
Status: DISCONTINUED | OUTPATIENT
Start: 2023-01-06 | End: 2023-01-07 | Stop reason: HOSPADM

## 2023-01-06 RX ORDER — SODIUM CHLORIDE 9 MG/ML
INJECTION, SOLUTION INTRAVENOUS CONTINUOUS
Status: CANCELLED | OUTPATIENT
Start: 2023-01-06

## 2023-01-06 RX ORDER — DIPHENHYDRAMINE HYDROCHLORIDE 50 MG/ML
50 INJECTION INTRAMUSCULAR; INTRAVENOUS ONCE
Status: COMPLETED | OUTPATIENT
Start: 2023-01-06 | End: 2023-01-06

## 2023-01-06 RX ORDER — DIPHENHYDRAMINE HYDROCHLORIDE 50 MG/ML
50 INJECTION INTRAMUSCULAR; INTRAVENOUS ONCE
Status: CANCELLED | OUTPATIENT
Start: 2023-01-06 | End: 2023-01-06

## 2023-01-06 RX ADMIN — SODIUM CHLORIDE 20 ML/HR: 9 INJECTION, SOLUTION INTRAVENOUS at 11:45

## 2023-01-06 RX ADMIN — DIPHENHYDRAMINE HYDROCHLORIDE 50 MG: 50 INJECTION, SOLUTION INTRAMUSCULAR; INTRAVENOUS at 12:17

## 2023-01-06 RX ADMIN — CARBOPLATIN 215.8 MG: 10 INJECTION, SOLUTION INTRAVENOUS at 14:39

## 2023-01-06 RX ADMIN — DEXAMETHASONE SODIUM PHOSPHATE 12 MG: 4 INJECTION, SOLUTION INTRAMUSCULAR; INTRAVENOUS at 11:47

## 2023-01-06 RX ADMIN — HEPARIN 500 UNITS: 100 SYRINGE at 15:20

## 2023-01-06 RX ADMIN — PACLITAXEL 78 MG: 6 INJECTION, SOLUTION INTRAVENOUS at 12:33

## 2023-01-06 RX ADMIN — SODIUM CHLORIDE, PRESERVATIVE FREE 10 ML: 5 INJECTION INTRAVENOUS at 15:20

## 2023-01-06 RX ADMIN — PALONOSETRON 0.25 MG: 0.05 INJECTION, SOLUTION INTRAVENOUS at 12:14

## 2023-01-06 RX ADMIN — FAMOTIDINE 20 MG: 10 INJECTION, SOLUTION INTRAVENOUS at 12:19

## 2023-01-06 ASSESSMENT — PAIN DESCRIPTION - PAIN TYPE: TYPE: CHRONIC PAIN

## 2023-01-06 ASSESSMENT — PAIN DESCRIPTION - INTENSITY: RATING_7: 10

## 2023-01-06 ASSESSMENT — PAIN DESCRIPTION - LOCATION
LOCATION_7: BACK
LOCATION: BACK

## 2023-01-06 ASSESSMENT — PAIN SCALES - GENERAL: PAINLEVEL_OUTOF10: 10

## 2023-01-06 NOTE — PROGRESS NOTES
Taxol Administration:  Taxol is filtered, use specific tubing for administration. If hypersensitivity reaction occurs, STOP TAXOL, maintain plain IV and notify physician for additional orders. Taxol is considered an irritant in low doses. High dose Taxol is considered a vesicant. Check with physician if infusion should be through a central line.    Neurological assessment completed pre administration [x]   Pre-medications administered as ordered [x]   Document baseline vital signs before administration [x]   For 3 hour Taxol: Document blood pressure, pulse, respiratory rate every 15 min times 1 hour after the start of Taxol []   For 2 hour Taxol: Document blood pressure, pulse, respiratory rate 15 min after the start of Taxol [x]   Document blood pressure, pulse, respiratory rate at the completion of Taxol [x]   Other:     []

## 2023-01-06 NOTE — DISCHARGE INSTRUCTIONS
Please contact your Oncologist if you have any questions regarding the chemotherapy taxol and carboplatin that you received today. Patient instructed if experience any of the symptoms following today's chemotherapy / to notify MD immediately or go to emergency department.     * dizziness/lightheadedness  *acute nausea/vomiting - not relieved with medication  *headache - not relieved from Tylenol/pain medication  *chest pain/pressure  *rash/itching  *shortness of breath        Drink fluids - 48oz fluids daily  Call if develop fever/ chills/ signs or symptoms of infection

## 2023-01-06 NOTE — PLAN OF CARE
Problem: Chronic Conditions and Co-morbidities  Goal: Patient's chronic conditions and co-morbidity symptoms are monitored and maintained or improved  Outcome: Adequate for Discharge  Flowsheets (Taken 1/6/2023 1658)  Care Plan - Patient's Chronic Conditions and Co-Morbidity Symptoms are Monitored and Maintained or Improved:   Monitor and assess patient's chronic conditions and comorbid symptoms for stability, deterioration, or improvement   Collaborate with multidisciplinary team to address chronic and comorbid conditions and prevent exacerbation or deterioration  Note: Patient verbalizes understanding to verbal information given on Taxol and Carboplatin,action and possible side effects. Aware to call MD if develop complications.        Problem: Chronic Conditions and Co-morbidities  Goal: Patient's chronic conditions and co-morbidity symptoms are monitored and maintained or improved  Outcome: Adequate for Discharge  Flowsheets (Taken 1/6/2023 0806)  Care Plan - Patient's Chronic Conditions and Co-Morbidity Symptoms are Monitored and Maintained or Improved:   Monitor and assess patient's chronic conditions and comorbid symptoms for stability, deterioration, or improvement   Collaborate with multidisciplinary team to address chronic and comorbid conditions and prevent exacerbation or deterioration  Note: Chemotherapy Teaching     What is Chemotherapy   Drug action [x]   Method of Administration [x]   Handouts given []     Side Effects  Nausea/vomiting [x]   Diarrhea [x]   Fatigue [x]   Signs / Symptoms of infection [x]   Neutropenia [x]   Thrombocytopenia [x]   Alopecia [x]   neuropathy [x]   [place] diet &  the importance of fluids [x]       Micellaneous  Importance of nutrition [x]   Importance of oral hygiene [x]   When to call the MD [x]   Monitoring labs [x]   Use of supportive services []     Explanation of Drug Regimen / Frequency  Taxol and carboplatin     Comments  Verbalized understanding to drug,action,side effects and when to call MD         Problem: Infection - Adult  Goal: Absence of infection at discharge  Outcome: Adequate for Discharge  Flowsheets (Taken 1/6/2023 1454)  Absence of infection at discharge:   Assess and monitor for signs and symptoms of infection   Monitor lab/diagnostic results   Monitor all insertion sites i.e., indwelling lines, tubes and drains  Note: Mediport site with no redness or warmth. Skin over port site intact with no signs of breakdown noted. Patient verbalizes signs/symptoms of port infection and when to notify the physician. Goal: Absence of fever/infection during anticipated neutropenic period  Outcome: Adequate for Discharge  Note: Discuss port maintenance,infection prevention, sign of infection and when to call MD.      Problem: Safety - Adult  Goal: Free from fall injury  Outcome: Adequate for Discharge  Flowsheets (Taken 1/6/2023 1454)  Free From Fall Injury:   Instruct family/caregiver on patient safety   Based on caregiver fall risk screen, instruct family/caregiver to ask for assistance with transferring infant if caregiver noted to have fall risk factors  Note: Free from falls while in [facility].P. Oncology. Problem: Safety - Adult  Goal: Free from fall injury  Outcome: Adequate for Discharge  Flowsheets (Taken 1/6/2023 1454)  Free From Fall Injury:   Instruct family/caregiver on patient safety   Based on caregiver fall risk screen, instruct family/caregiver to ask for assistance with transferring infant if caregiver noted to have fall risk factors  Note: Discussed the need to use the call light for assistance when getting up to ambulate.       Problem: Discharge Planning  Goal: Discharge to home or other facility with appropriate resources  Outcome: Adequate for Discharge  Flowsheets (Taken 1/6/2023 1454)  Discharge to home or other facility with appropriate resources:   Identify barriers to discharge with patient and caregiver   Arrange for needed discharge resources and transportation as appropriate   Identify discharge learning needs (meds, wound care, etc)  Note: Verbalize understanding of discharge instructions, follow up appointments, and when to call Physician. Problem: Discharge Planning  Goal: Discharge to home or other facility with appropriate resources  Outcome: Adequate for Discharge  Flowsheets (Taken 1/6/2023 7777)  Discharge to home or other facility with appropriate resources:   Identify barriers to discharge with patient and caregiver   Arrange for needed discharge resources and transportation as appropriate   Identify discharge learning needs (meds, wound care, etc)  Note: Discuss understanding of discharge instructions, follow up appointments and when to call Physician. Care plan reviewed with patient and spouse. Patient and spouse verbalize understanding of the plan of care and contribute to goal setting.

## 2023-01-06 NOTE — PROGRESS NOTES
Patient assessed for the following post chemotherapy:    Dizziness   No  Lightheadedness  No      Acute nausea/vomiting No  Headache   No  Chest pain/pressure  No  Rash/itching   No  Shortness of breath  No    Patient kept for 20 minutes observation post infusion chemotherapy. Patient tolerated chemotherapy treatment taxol and carboplatin without any complications. Last vital signs:   /74   Pulse 75   Temp 97.8 °F (36.6 °C) (Oral)   Resp 18   Ht 5' 1\" (1.549 m)   Wt 146 lb (66.2 kg)   SpO2 98%   BMI 27.59 kg/m²         Patient instructed if experience any of the above symptoms following today's infusion,he/she is to notify MD immediately or go to the emergency department. Discharge instructions given to patient. Verbalizes understanding. Off unit in power wheelchair per self with belongings.

## 2023-01-09 ENCOUNTER — TELEPHONE (OUTPATIENT)
Dept: CASE MANAGEMENT | Age: 53
End: 2023-01-09

## 2023-01-09 ENCOUNTER — HOSPITAL ENCOUNTER (OUTPATIENT)
Dept: RADIATION ONCOLOGY | Age: 53
Discharge: HOME | End: 2023-01-09
Payer: MEDICARE

## 2023-01-09 PROCEDURE — 77014 CHG CT GUIDANCE RADIATION THERAPY FLDS PLACEMENT: CPT | Performed by: RADIOLOGY

## 2023-01-09 PROCEDURE — 77386 HC NTSTY MODUL RAD TX DLVR CPLX: CPT | Performed by: RADIOLOGY

## 2023-01-09 NOTE — TELEPHONE ENCOUNTER
Natanael received call from pt's sig other, sharing that early morning appts are challenging for pt to get around, & pt's son availability to transport her; request made to adjust chemo tx time on 1/13. Natanael updated OP ONC Charge RN, Mihai Shelley, on the above. Chemo appt changed to Thursday 1/12 at 36 for OP ONC Rn for labwork, followed by appt with ONC at [facility]. Natanael left vm message with OP ONC , Alvin Sin re: the above. Natanael spoke with Pooja, office , & pt's appt with ONC changed to 1/12 at [facility]. Natanael phoned pt's sig other & shared the above. He voiced understanding & appreciation.

## 2023-01-10 ENCOUNTER — APPOINTMENT (OUTPATIENT)
Dept: RADIATION ONCOLOGY | Age: 53
End: 2023-01-10
Payer: MEDICARE

## 2023-01-11 ENCOUNTER — HOSPITAL ENCOUNTER (OUTPATIENT)
Dept: RADIATION ONCOLOGY | Age: 53
Discharge: HOME | End: 2023-01-11
Payer: MEDICARE

## 2023-01-11 PROCEDURE — 77014 CHG CT GUIDANCE RADIATION THERAPY FLDS PLACEMENT: CPT | Performed by: RADIOLOGY

## 2023-01-11 PROCEDURE — 77386 HC NTSTY MODUL RAD TX DLVR CPLX: CPT | Performed by: RADIOLOGY

## 2023-01-12 ENCOUNTER — HOSPITAL ENCOUNTER (OUTPATIENT)
Dept: INFUSION THERAPY | Age: 53
Discharge: HOME | End: 2023-01-12
Payer: MEDICARE

## 2023-01-12 ENCOUNTER — HOSPITAL ENCOUNTER (OUTPATIENT)
Dept: RADIATION ONCOLOGY | Age: 53
Discharge: HOME | End: 2023-01-12
Payer: MEDICARE

## 2023-01-12 ENCOUNTER — TELEMEDICINE (OUTPATIENT)
Dept: ONCOLOGY | Age: 53
End: 2023-01-12
Payer: MEDICARE

## 2023-01-12 VITALS
OXYGEN SATURATION: 97 % | DIASTOLIC BLOOD PRESSURE: 72 MMHG | BODY MASS INDEX: 27.56 KG/M2 | SYSTOLIC BLOOD PRESSURE: 141 MMHG | WEIGHT: 146 LBS | HEIGHT: 61 IN | HEART RATE: 75 BPM | TEMPERATURE: 98.5 F | RESPIRATION RATE: 16 BRPM

## 2023-01-12 VITALS
DIASTOLIC BLOOD PRESSURE: 63 MMHG | RESPIRATION RATE: 16 BRPM | OXYGEN SATURATION: 97 % | SYSTOLIC BLOOD PRESSURE: 137 MMHG | BODY MASS INDEX: 27.59 KG/M2 | HEART RATE: 75 BPM | TEMPERATURE: 97.6 F | WEIGHT: 146 LBS

## 2023-01-12 DIAGNOSIS — C32.1 CANCER, EPIGLOTTIS (HCC): Primary | ICD-10-CM

## 2023-01-12 DIAGNOSIS — C32.1 MALIGNANT NEOPLASM OF EPIGLOTTIS (HCC): ICD-10-CM

## 2023-01-12 LAB
ABSOLUTE IMMATURE GRANULOCYTE: 0.01 THOU/MM3 (ref 0–0.07)
ALBUMIN SERPL-MCNC: 3.4 G/DL (ref 3.5–5.1)
ALP BLD-CCNC: 123 U/L (ref 38–126)
ALT SERPL-CCNC: 44 U/L (ref 11–66)
AST SERPL-CCNC: 34 U/L (ref 5–40)
BASINOPHIL, AUTOMATED: 0 % (ref 0–3)
BASOPHILS ABSOLUTE: 0 THOU/MM3 (ref 0–0.1)
BILIRUB SERPL-MCNC: 0.2 MG/DL (ref 0.3–1.2)
BILIRUBIN DIRECT: < 0.2 MG/DL (ref 0–0.3)
BUN, WHOLE BLOOD: 19 MG/DL (ref 8–26)
CHLORIDE, WHOLE BLOOD: 104 MEQ/L (ref 98–109)
CREATININE, WHOLE BLOOD: 0.6 MG/DL (ref 0.5–1.2)
EOSINOPHILS ABSOLUTE: 0.2 THOU/MM3 (ref 0–0.4)
EOSINOPHILS RELATIVE PERCENT: 3 % (ref 0–4)
GFR SERPL CREATININE-BSD FRML MDRD: > 60 ML/MIN/1.73M2
GLUCOSE, WHOLE BLOOD: 73 MG/DL (ref 70–108)
HCT VFR BLD CALC: 37.1 % (ref 37–47)
HEMOGLOBIN: 11.7 GM/DL (ref 12–16)
IMMATURE GRANULOCYTES: 0 %
IONIZED CALCIUM, WHOLE BLOOD: 1.25 MMOL/L (ref 1.12–1.32)
LYMPHOCYTES # BLD: 42 % (ref 15–47)
LYMPHOCYTES ABSOLUTE: 2.5 THOU/MM3 (ref 1–4.8)
MCH RBC QN AUTO: 32.5 PG (ref 26–33)
MCHC RBC AUTO-ENTMCNC: 31.5 GM/DL (ref 32.2–35.5)
MCV RBC AUTO: 103 FL (ref 81–99)
MONOCYTES ABSOLUTE: 0.3 THOU/MM3 (ref 0.4–1.3)
MONOCYTES: 5 % (ref 0–12)
PDW BLD-RTO: 14.5 % (ref 11.5–14.5)
PLATELET # BLD: 326 THOU/MM3 (ref 130–400)
PMV BLD AUTO: 10.3 FL (ref 9.4–12.4)
POTASSIUM, WHOLE BLOOD: 4 MEQ/L (ref 3.5–4.9)
RBC # BLD: 3.6 MILL/MM3 (ref 4.2–5.4)
SEG NEUTROPHILS: 49 % (ref 43–75)
SEGMENTED NEUTROPHILS ABSOLUTE COUNT: 2.9 THOU/MM3 (ref 1.8–7.7)
SODIUM, WHOLE BLOOD: 143 MEQ/L (ref 138–146)
TOTAL CO2, WHOLE BLOOD: 28 MEQ/L (ref 23–33)
TOTAL PROTEIN: 6.1 G/DL (ref 6.1–8)
WBC # BLD: 6 THOU/MM3 (ref 4.8–10.8)

## 2023-01-12 PROCEDURE — 36415 COLL VENOUS BLD VENIPUNCTURE: CPT

## 2023-01-12 PROCEDURE — 96413 CHEMO IV INFUSION 1 HR: CPT

## 2023-01-12 PROCEDURE — 36593 DECLOT VASCULAR DEVICE: CPT

## 2023-01-12 PROCEDURE — A4216 STERILE WATER/SALINE, 10 ML: HCPCS | Performed by: INTERNAL MEDICINE

## 2023-01-12 PROCEDURE — 85025 COMPLETE CBC W/AUTO DIFF WBC: CPT

## 2023-01-12 PROCEDURE — 80047 BASIC METABLC PNL IONIZED CA: CPT

## 2023-01-12 PROCEDURE — 99212 OFFICE O/P EST SF 10 MIN: CPT | Performed by: INTERNAL MEDICINE

## 2023-01-12 PROCEDURE — 77386 HC NTSTY MODUL RAD TX DLVR CPLX: CPT | Performed by: RADIOLOGY

## 2023-01-12 PROCEDURE — 80076 HEPATIC FUNCTION PANEL: CPT

## 2023-01-12 PROCEDURE — 96375 TX/PRO/DX INJ NEW DRUG ADDON: CPT

## 2023-01-12 PROCEDURE — G8427 DOCREV CUR MEDS BY ELIG CLIN: HCPCS | Performed by: INTERNAL MEDICINE

## 2023-01-12 PROCEDURE — 2580000003 HC RX 258: Performed by: INTERNAL MEDICINE

## 2023-01-12 PROCEDURE — 6360000002 HC RX W HCPCS: Performed by: INTERNAL MEDICINE

## 2023-01-12 PROCEDURE — 77014 CHG CT GUIDANCE RADIATION THERAPY FLDS PLACEMENT: CPT | Performed by: RADIOLOGY

## 2023-01-12 PROCEDURE — 3017F COLORECTAL CA SCREEN DOC REV: CPT | Performed by: INTERNAL MEDICINE

## 2023-01-12 PROCEDURE — 2500000003 HC RX 250 WO HCPCS: Performed by: INTERNAL MEDICINE

## 2023-01-12 PROCEDURE — 96367 TX/PROPH/DG ADDL SEQ IV INF: CPT

## 2023-01-12 PROCEDURE — 96417 CHEMO IV INFUS EACH ADDL SEQ: CPT

## 2023-01-12 PROCEDURE — 99211 OFF/OP EST MAY X REQ PHY/QHP: CPT

## 2023-01-12 RX ORDER — SODIUM CHLORIDE 0.9 % (FLUSH) 0.9 %
5-40 SYRINGE (ML) INJECTION PRN
Status: CANCELLED | OUTPATIENT
Start: 2023-01-13

## 2023-01-12 RX ORDER — ACETAMINOPHEN 325 MG/1
650 TABLET ORAL
Status: CANCELLED | OUTPATIENT
Start: 2023-01-13

## 2023-01-12 RX ORDER — DIPHENHYDRAMINE HYDROCHLORIDE 50 MG/ML
50 INJECTION INTRAMUSCULAR; INTRAVENOUS
Status: CANCELLED | OUTPATIENT
Start: 2023-01-13

## 2023-01-12 RX ORDER — ONDANSETRON 2 MG/ML
8 INJECTION INTRAMUSCULAR; INTRAVENOUS
Status: CANCELLED | OUTPATIENT
Start: 2023-01-13

## 2023-01-12 RX ORDER — HEPARIN SODIUM (PORCINE) LOCK FLUSH IV SOLN 100 UNIT/ML 100 UNIT/ML
500 SOLUTION INTRAVENOUS PRN
Status: CANCELLED | OUTPATIENT
Start: 2023-01-12

## 2023-01-12 RX ORDER — MEPERIDINE HYDROCHLORIDE 50 MG/ML
12.5 INJECTION INTRAMUSCULAR; INTRAVENOUS; SUBCUTANEOUS PRN
Status: CANCELLED | OUTPATIENT
Start: 2023-01-13

## 2023-01-12 RX ORDER — SODIUM CHLORIDE 9 MG/ML
INJECTION, SOLUTION INTRAVENOUS CONTINUOUS
Status: CANCELLED | OUTPATIENT
Start: 2023-01-13

## 2023-01-12 RX ORDER — EPINEPHRINE 1 MG/ML
0.3 INJECTION, SOLUTION, CONCENTRATE INTRAVENOUS PRN
Status: CANCELLED | OUTPATIENT
Start: 2023-01-13

## 2023-01-12 RX ORDER — FAMOTIDINE 10 MG/ML
20 INJECTION, SOLUTION INTRAVENOUS
Status: CANCELLED | OUTPATIENT
Start: 2023-01-13

## 2023-01-12 RX ORDER — PALONOSETRON 0.05 MG/ML
0.25 INJECTION, SOLUTION INTRAVENOUS ONCE
Status: CANCELLED | OUTPATIENT
Start: 2023-01-13 | End: 2023-01-13

## 2023-01-12 RX ORDER — ALBUTEROL SULFATE 90 UG/1
4 AEROSOL, METERED RESPIRATORY (INHALATION) PRN
Status: CANCELLED | OUTPATIENT
Start: 2023-01-13

## 2023-01-12 RX ORDER — DIPHENHYDRAMINE HYDROCHLORIDE 50 MG/ML
25 INJECTION INTRAMUSCULAR; INTRAVENOUS ONCE
Status: CANCELLED | OUTPATIENT
Start: 2023-01-13 | End: 2023-01-13

## 2023-01-12 RX ORDER — FAMOTIDINE 10 MG/ML
20 INJECTION, SOLUTION INTRAVENOUS ONCE
Status: CANCELLED | OUTPATIENT
Start: 2023-01-13 | End: 2023-01-13

## 2023-01-12 RX ORDER — SODIUM CHLORIDE 9 MG/ML
5-250 INJECTION, SOLUTION INTRAVENOUS PRN
Status: CANCELLED | OUTPATIENT
Start: 2023-01-13

## 2023-01-12 RX ORDER — PALONOSETRON 0.05 MG/ML
0.25 INJECTION, SOLUTION INTRAVENOUS ONCE
Status: COMPLETED | OUTPATIENT
Start: 2023-01-12 | End: 2023-01-12

## 2023-01-12 RX ORDER — SODIUM CHLORIDE 9 MG/ML
5-250 INJECTION, SOLUTION INTRAVENOUS PRN
Status: DISCONTINUED | OUTPATIENT
Start: 2023-01-12 | End: 2023-01-13 | Stop reason: HOSPADM

## 2023-01-12 RX ORDER — DIPHENHYDRAMINE HYDROCHLORIDE 50 MG/ML
25 INJECTION INTRAMUSCULAR; INTRAVENOUS ONCE
Status: COMPLETED | OUTPATIENT
Start: 2023-01-12 | End: 2023-01-12

## 2023-01-12 RX ORDER — HEPARIN SODIUM (PORCINE) LOCK FLUSH IV SOLN 100 UNIT/ML 100 UNIT/ML
500 SOLUTION INTRAVENOUS PRN
Status: DISCONTINUED | OUTPATIENT
Start: 2023-01-12 | End: 2023-01-13 | Stop reason: HOSPADM

## 2023-01-12 RX ORDER — SODIUM CHLORIDE 0.9 % (FLUSH) 0.9 %
5-40 SYRINGE (ML) INJECTION PRN
Status: CANCELLED | OUTPATIENT
Start: 2023-01-12

## 2023-01-12 RX ORDER — SODIUM CHLORIDE 0.9 % (FLUSH) 0.9 %
5-40 SYRINGE (ML) INJECTION PRN
Status: DISCONTINUED | OUTPATIENT
Start: 2023-01-12 | End: 2023-01-13 | Stop reason: HOSPADM

## 2023-01-12 RX ORDER — HEPARIN SODIUM (PORCINE) LOCK FLUSH IV SOLN 100 UNIT/ML 100 UNIT/ML
500 SOLUTION INTRAVENOUS PRN
Status: CANCELLED | OUTPATIENT
Start: 2023-01-13

## 2023-01-12 RX ORDER — SODIUM CHLORIDE 9 MG/ML
5-40 INJECTION INTRAVENOUS PRN
Status: CANCELLED | OUTPATIENT
Start: 2023-01-13

## 2023-01-12 RX ORDER — SODIUM CHLORIDE 9 MG/ML
25 INJECTION, SOLUTION INTRAVENOUS PRN
Status: CANCELLED | OUTPATIENT
Start: 2023-01-12

## 2023-01-12 RX ADMIN — HEPARIN 500 UNITS: 100 SYRINGE at 15:38

## 2023-01-12 RX ADMIN — SODIUM CHLORIDE 25 ML/HR: 9 INJECTION, SOLUTION INTRAVENOUS at 12:53

## 2023-01-12 RX ADMIN — DEXAMETHASONE SODIUM PHOSPHATE 12 MG: 4 INJECTION, SOLUTION INTRA-ARTICULAR; INTRALESIONAL; INTRAMUSCULAR; INTRAVENOUS; SOFT TISSUE at 12:54

## 2023-01-12 RX ADMIN — CARBOPLATIN 215.8 MG: 10 INJECTION, SOLUTION INTRAVENOUS at 15:00

## 2023-01-12 RX ADMIN — WATER 2 MG: 1 INJECTION INTRAMUSCULAR; INTRAVENOUS; SUBCUTANEOUS at 11:56

## 2023-01-12 RX ADMIN — PACLITAXEL 78 MG: 6 INJECTION, SOLUTION INTRAVENOUS at 13:28

## 2023-01-12 RX ADMIN — FAMOTIDINE 20 MG: 10 INJECTION INTRAVENOUS at 13:21

## 2023-01-12 RX ADMIN — SODIUM CHLORIDE, PRESERVATIVE FREE 30 ML: 5 INJECTION INTRAVENOUS at 11:50

## 2023-01-12 RX ADMIN — SODIUM CHLORIDE, PRESERVATIVE FREE 10 ML: 5 INJECTION INTRAVENOUS at 15:38

## 2023-01-12 RX ADMIN — SODIUM CHLORIDE, PRESERVATIVE FREE 10 ML: 5 INJECTION INTRAVENOUS at 12:39

## 2023-01-12 RX ADMIN — PALONOSETRON 0.25 MG: 0.05 INJECTION, SOLUTION INTRAVENOUS at 13:17

## 2023-01-12 RX ADMIN — DIPHENHYDRAMINE HYDROCHLORIDE 25 MG: 50 INJECTION, SOLUTION INTRAMUSCULAR; INTRAVENOUS at 13:19

## 2023-01-12 ASSESSMENT — PAIN SCALES - GENERAL: PAINLEVEL_OUTOF10: 6

## 2023-01-12 ASSESSMENT — PAIN DESCRIPTION - LOCATION: LOCATION: THROAT

## 2023-01-12 NOTE — PLAN OF CARE
Care plan reviewed with patient. Patient verbalized understanding of the plan of care and contribute to goal setting. Problem: Chronic Conditions and Co-morbidities  Goal: Patient's chronic conditions and co-morbidity symptoms are monitored and maintained or improved  Outcome: Adequate for Discharge  Flowsheets (Taken 1/12/2023 1600)  Care Plan - Patient's Chronic Conditions and Co-Morbidity Symptoms are Monitored and Maintained or Improved:   Monitor and assess patient's chronic conditions and comorbid symptoms for stability, deterioration, or improvement   Collaborate with multidisciplinary team to address chronic and comorbid conditions and prevent exacerbation or deterioration   Update acute care plan with appropriate goals if chronic or comorbid symptoms are exacerbated and prevent overall improvement and discharge  Note: Patient verbalizes understanding to verbal information given on taxol and carboplatin,action and possible side effects. Aware to call MD if develop complications. Problem: Safety - Adult  Goal: Free from fall injury  Outcome: Adequate for Discharge  Flowsheets (Taken 1/12/2023 1600)  Free From Fall Injury:   Instruct family/caregiver on patient safety   Based on caregiver fall risk screen, instruct family/caregiver to ask for assistance with transferring infant if caregiver noted to have fall risk factors  Note: Free from falls while in infusion center.  Verbalized understanding of fall prevention and to ask for assistance with ambulation      Problem: Discharge Planning  Goal: Discharge to home or other facility with appropriate resources  Outcome: Adequate for Discharge  Flowsheets (Taken 1/12/2023 1600)  Discharge to home or other facility with appropriate resources:   Identify barriers to discharge with patient and caregiver   Arrange for needed discharge resources and transportation as appropriate   Identify discharge learning needs (meds, wound care, etc)  Note: Verbalized understanding of discharge instructions, follow ups and when to call doctor      Problem: Infection - Adult  Goal: Absence of infection at discharge  Outcome: Adequate for Discharge  Flowsheets (Taken 1/12/2023 1600)  Absence of infection at discharge:   Assess and monitor for signs and symptoms of infection   Monitor lab/diagnostic results   Monitor all insertion sites i.e., indwelling lines, tubes and drains  Note: Mediport site with no redness or warmth. Skin over port intact with no signs of breakdown noted. Patient verbalizes signs/symptoms of port infection and when to notify the physician.

## 2023-01-12 NOTE — PROGRESS NOTES
Patient assessed for the following post chemotherapy:    Dizziness   No  Lightheadedness  No      Acute nausea/vomiting No  Headache   No  Chest pain/pressure  No  Rash/itching   No  Shortness of breath  No    Patient kept for 20 minutes observation post infusion chemotherapy. Patient tolerated chemotherapy treatment Taxol/Carboplatin without any complications. Last vital signs:   BP (!) 141/72   Pulse 75   Temp 98.5 °F (36.9 °C) (Oral)   Resp 16   Ht 5' 1\" (1.549 m)   Wt 146 lb (66.2 kg)   SpO2 97%   BMI 27.59 kg/m²     Physician's instructions:  Reviewed labs and recent medical history. Okay for treatment today, 1/12/23  Encouraged to keep eating and stay hydrated. Return next Thursday to see MD and for next treatment. Patient instructed if experience any of the above symptoms following today's infusion, she is to notify MD immediately or go to the emergency department. Discharge instructions given to patient. Verbalizes understanding. Motorized wheelchair off unit per self, accompanied by spouse, with belongings.

## 2023-01-12 NOTE — PROGRESS NOTES
Vital signs:  B/P- 137/65   Pulse- 78  O2- 96  Temp-98.4   Resp- 16  Weight- 146 lb  Height- 5' 1\"  Pain- 0

## 2023-01-12 NOTE — DISCHARGE INSTRUCTIONS
Please contact your Oncologist if you have any questions regarding the chemotherapy Taxol/Carboplatin that you received today. Patient instructed if experience any of the symptoms following today's chemotherapy treatment to notify MD immediately or go to emergency department. * dizziness/lightheadedness  *acute nausea/vomiting - not relieved with medication  *headache - not relieved from Tylenol/pain medication  *chest pain/pressure  *rash/itching  *shortness of breath        Drink fluids - 48oz fluids daily  Call if develop fever/ chills/ signs or symptoms of infection     Physician's instructions:  Reviewed labs and recent medical history. Okay for treatment today, 1/12/23  Encouraged to keep eating and stay hydrated. Return next Thursday to see MD and for next treatment.

## 2023-01-12 NOTE — PROGRESS NOTES
[facility] QUINTIN Koth[address], [address]        [address], [address] [place] [address]        Kylah Daniel: [phone]        F: [phone]       [facility]            Dr. Cesia Celestin MD MS          Dr. 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 Dose (cGy): 7000    Patient was seen today for weekly visit. Wt Readings from Last 3 Encounters:   23 146 lb (66.2 kg)   23 146 lb (66.2 kg)   23 146 lb (66.2 kg)       /63   Pulse 75   Temp 97.6 °F (36.4 °C) (Oral)   Resp 16   Wt 146 lb (66.2 kg)   SpO2 97%   BMI 27.59 kg/m²     Lab Results   Component Value Date    WBC 6.0 2023     2023       Comfort Alteration  Fatigue:Must curtail daily activities even with rest periods and early bedtime    Pain Location: Throat  Pain Intensity (Current): 6 Severe pain  Pain Treatment: Opioid  Pain Relief: Pain relieved 75%    Emotional Alteration:   Coping: somewhat effective    Nutritional Alteration  Anorexia: Loss of appetite but able to eat a small amount of food and/or liquids  Nausea: 3-4 episodes of nausea in 24 hours, not resolved with nausea medication  Vomiting: No vomiting , occasionally  Salivary Glands- Acute:  Moderate dryness, thick sticky saliva, markedly altered taste, biotene spray  Taste Disturbance (Dysgeusia): Slightly altered  Dyspepsia/Heartburn: None  Dysphagia/Esophagitis: None    Skin Alteration Skin reaction: No changes noted  Alopecia: No loss    Mucous Membrane Alteration  Mucositis XRT Related: None  Pharynx and Esophagus- Acute: No change over baseline  Voice Changes: Normal    Ventilation Alteration  Cough: Productive  Hemoptysis: None  Dyspnea: Normal  Mucous Quantity/Quality: White/grey foam mucous    CNS Alteration  Level of Consciousness: Alert, responds briskly, appropriately with minimal stimulus  Seizure Activity: None  Insomnia: Occasional difficulty sleeping not interfering with function  Orientation: Oriented in 3 spheres of person, place, and time  Comment:   Speech Impairment: No  Ataxia: Normal    Additional Comments: Teaching done today. MEDICATIONS:     Current Outpatient Medications   Medication Sig Dispense Refill    Magic Mouthwash (MIRACLE MOUTHWASH) Swish and spit 5 mLs 4 times daily as needed for Irritation 300 mL 1    prochlorperazine (COMPAZINE) 10 MG tablet Take 1 tablet by mouth every 6 hours as needed (as needed for nausea) 60 tablet 3    ondansetron (ZOFRAN) 4 MG tablet Take 1 tablet by mouth every 8 hours as needed for Nausea or Vomiting 20 tablet 1    lidocaine-prilocaine (EMLA) 2.5-2.5 % cream Apply topically as needed. 5 g 4    amoxicillin (AMOXIL) 125 MG/5ML suspension Take by mouth 3 times daily Prescribed by family doctor unsure of dose      zolpidem (AMBIEN) 10 MG tablet TAKE 1 TABLET BY MOUTH AT NIGHT      pantoprazole (PROTONIX) 40 MG tablet Take 1 tablet by mouth every morning (before breakfast) 90 tablet 1    ALPRAZolam (XANAX) 1 MG tablet Take 1 mg by mouth as needed. oxyCODONE-acetaminophen (PERCOCET) 5-325 MG per tablet Take 2 tablets by mouth every 8 hours as needed for Pain. clopidogrel (PLAVIX) 75 MG tablet Take 1 tablet by mouth daily      TRULICITY 1.5 CT/2.7LL SC injection Inject 1.5 mg into the skin once a week      HUMALOG KWIKPEN 100 UNIT/ML SOPN       fentaNYL 37.5 MCG/HR PT72 Apply 1 patch topically.       JARDIANCE 25 MG tablet Take 1 tablet by mouth daily      nitroGLYCERIN (NITROSTAT) 0.4 MG SL tablet Place 1 tablet under the tongue every 5 minutes as needed for Chest pain Take 1 tablet for chest pain and repeat after 5 min if no relief, call 911 and take another dose 5 min later. Max of 3 doses in 15 min. 25 tablet 3    metFORMIN (GLUCOPHAGE) 500 MG tablet Take 2 tablets by mouth 2 times daily Resume 8/30 60 tablet 3    pioglitazone (ACTOS) 30 MG tablet Take 1 tablet by mouth daily 30 tablet 3    atorvastatin (LIPITOR) 20 MG tablet Take 2 tablets by mouth nightly 30 tablet 3    lisinopril (PRINIVIL;ZESTRIL) 2.5 MG tablet Take 1 tablet by mouth daily 30 tablet 3    metoprolol tartrate (LOPRESSOR) 25 MG tablet Take 1 tablet by mouth 2 times daily 60 tablet 3    isosorbide dinitrate (ISORDIL) 20 MG tablet Take 30 mg by mouth daily      amLODIPine (NORVASC) 5 MG tablet Take 5 mg by mouth daily      budesonide-formoterol (SYMBICORT) 160-4.5 MCG/ACT AERO Inhale 2 puffs into the lungs 2 times daily      pregabalin (LYRICA) 150 MG capsule Take 150 mg by mouth 2 times daily      ALPRAZolam (XANAX) 0.5 MG tablet Take 0.5 mg by mouth 2 times daily as needed. aspirin 81 MG tablet Take 81 mg by mouth daily      albuterol (PROVENTIL HFA) 108 (90 BASE) MCG/ACT inhaler Inhale 2 puffs into the lungs every 4 hours as needed for Wheezing or Shortness of Breath (Space out to every 6 hours as symptoms improve). Space out to every 6 hours as symptoms improve. 1 Inhaler 10     No current facility-administered medications for this encounter.      Facility-Administered Medications Ordered in Other Encounters   Medication Dose Route Frequency Provider Last Rate Last Admin    sodium chloride flush 0.9 % injection 5-40 mL  5-40 mL IntraVENous PRN Ibrahima Gilmore MD   10 mL at 01/12/23 1239    heparin flush 100 UNIT/ML injection 500 Units  500 Units IntraCATHeter PRN Ibrahima Gilmore MD        alteplase (CATHFLO) 2 mg in sterile water 2 mL injection  2 mg IntraCATHeter PRN Libby Villeda MD   2 mg at 01/12/23 1156    0.9 % sodium chloride infusion  5-250 mL/hr IntraVENous PRN Libby Villeda MD 25 mL/hr at 01/12/23 1253 25 mL/hr at 01/12/23 1253    PACLitaxel (TAXOL) 78 mg in sodium chloride 0.9 % 250 mL chemo infusion  45 mg/m2 (Treatment Plan Recorded) IntraVENous Once Libby Villdea  mL/hr at 01/12/23 1328 78 mg at 01/12/23 1328    CARBOplatin (PARAPLATIN) 215.8 mg in sodium chloride 0.9 % 250 mL chemo IVPB  215.8 mg IntraVENous Once Libby Villeda MD            PHYSICAL EXAM:       ECOG: 3 - Symptomatic, >50% in bed, but not bedbound (Capable of only limited self-care, confined to bed or chair 50% or more of waking hours)     General: NAD, AO x 3, Mentation is clear with appropriate affect. HEENT:  Oral mucosa without notable lesion/exudate   Thorax:  Unlabored  Abdomen:  Non-distended  Neuro:  Cranial nerves grossly intact; no focal deficits  Skin - treatment portal: SEE above. No skin reaction noted    Chemotherapy Update: Concurrent chemotherapy    Treatment Imaging: CBCT and All imaging reviewed and approved by Dr. Georgiana Lindo: No significant radiation side effects. Responding appropriately to symptomatic management. Feeling better since nausea and diarrhea early this week; talked with Dr. Chiara Begum today about more optimal management of these. Reports stable dysphagia - eating soft and bite-sized foods. Feels she has adequate nutrition and hydration; reinforced importance of this. Reports chronic stable throat pain, managed on pain medicines by another provider    New medications, diagnostic results: Continue treatment as planned    PLAN: Again reviewed potential side effects of radiation for the patient's treatment. Continue local/topical care. Continue current radiation course as prescribed.

## 2023-01-12 NOTE — PATIENT INSTRUCTIONS
Reviewed labs and recent medical history. Okay for treatment today, 1/12/23  Encouraged to keep eating and stay hydrated. Return next Thursday to see MD and for next treatment.

## 2023-01-12 NOTE — PROGRESS NOTES
[facility]  [facility], [address]  Phone: [phone]   Toll Free: [phone]   Fax: [phone]    RADIATION ONCOLOGY EDUCATION    CHIEF COMPLAINT: Dolly Estrada presents to radiation oncology today for education regarding treatment to the Supraglottis. PLAN:   Expected and potential side effects were discussed in detail, along with written handouts. Skin care and moisturization instructions were discussed in detail. Patient was not agreeable to physical therapy referral. Explained referral could be placed at any time if patient changes their mind. Patient was informed of dietician that is available weekly and as needed. Educated on weekly on treatment visit to meet with Physician to monitor side effects and treatment course. Informed patient that Physician is available at any time to discuss radiation side effects/concerns through out treatment course. Dolly Estrada had the opportunity to ask questions, and indicated that all questions were satisfactorily addressed.

## 2023-01-13 ENCOUNTER — HOSPITAL ENCOUNTER (OUTPATIENT)
Dept: RADIATION ONCOLOGY | Age: 53
Discharge: HOME | End: 2023-01-13
Payer: MEDICARE

## 2023-01-13 PROCEDURE — 77386 HC NTSTY MODUL RAD TX DLVR CPLX: CPT | Performed by: RADIOLOGY

## 2023-01-14 NOTE — PROGRESS NOTES
[address] CANCER [address] [place] [address]  Dept: [phone]  Loc: Ronal Valderrama  1970   No ref. provider found   Christina Perla MD       Cooper Ramirez is a 47 y.o.  female with squamous cell carcinoma of the epiglottis, p16 negative. CHIEF COMPLAINT  Chief Complaint   Patient presents with    Follow-up     Malignant neoplasm of epiglottis ([address]          HISTORY OF PRESENT ILLNESS    1/31/2022. CT scan of soft tissues of the neck without contrast done for right-sided neck pain showed no discrete neck mass fluid collection or acute phonatory change there are multiple nonspecific cervical lymph nodes at levels 1 through 5 measuring less than 1 cm in short axis no pathologically enlarged lymph nodes were seen. She initially presented with sore throat, difficulty swallowing and progressive weight loss. She was treated with multiple courses of antibiotics, steroids and antifungal medication with limited improvement. 2/10/2022. Chest x-ray done for shortness of breath and sore throat showed no focal acute lung lesions or significant chronic lung findings. 9/20/2022. Referred  to ENT and seen by Juliet De La Torre PA-C and had flexible laryngoscopy demonstrating an edematous epiglottis. She was given another round of oral steroids. He said that she was here for thrush and mouth and ear soreness for a year. The pain will get to be 10 out of 10. The pain was constant but would fluctuate in severity. There is no drainage or history of hearing loss or tinnitus. She said that she had a constant sore throat making it difficult for her to swallow and she has lost a lot of weight. She says that the pain is worse on the right and would extend into her ear. She is also finding it hard to breathe. Weight was estimated to be 65 kg. Examination showed multiple broken teeth and cavities.   Salivary gland exam was normal.  There is mild lymphadenopathy with tender lymph nodes on the right side. Flexible laryngoscopy showed abnormally enlarged epiglottis obstructing the view of the larynx worse on the right than on the left. She increased her steroids that she was already on and had her come back in a week and told the patient to go to the ED if her airway became compromised. CT scan of the soft tissues of the neck with contrast was ordered. 9/21/2022. CAT scan of the neck showed nodular thickening of the area epiglottic folds associated with abnormal soft tissue thickening of the right side of the hypopharynx extending into the right posterolateral wall of the hypopharynx and flattening deformity of the right vallecula and piriform sinus raising the question of squamous cell carcinoma. There was no significant lymphadenopathy seen. A significant thyroid mass was not identified. The upper esophagus was unremarkable and a foreign body was not identified. There was heavy bilateral carotid bulb calcification seen and multiple dental caries were noted. PET CT scan was recommended. 9/28/2022. Follow-up in the ENT office. Patient said that her throat felt better but she had a bump on the roof of her mouth that was painful and she still complained of trouble swallowing and a sore throat. Flexible laryngoscopy was performed again hypopharynx was markedly abnormal with white speckles but most notably significant thickening of the epiglottis extending into the arytenoid space and false cords worse on the right. There was a possible necrotic area in the posterior face of the epiglottis. The larynx was abnormal with white speckles indicative of fungal infection. Dr. Yaneli Disla reviewed the case and discussed with the patient the benefit of biopsy. The possibility of tracheostomy was discussed to protect her airway. She was treated with fluconazole for her thrush. Steroids were held. 10/3/2022.   Chest x-ray that the cardiac silhouette was within normal limits. Coronary stent was present. There is strandy interstitial opacities in the perihilar distribution along with peribronchial thickening and no focal areas of consolidation, no pneumothorax or pleural effusion. No acute bony changes were seen. 10/6/2022. 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 The epiglottis was edematous and had granular tissue involving the laryngeal surface of the left epiglottis. There was a separate lesion involving the right false vocal cord with a papillomatous appearance. Both true vocal cords seem to be spared. There is no involvement of the arytenoid complex or postcricoid region. The lingual surface of the epiglottis did not have any mucosal erosions but was edematous. The vallecula and base the tongue was unremarkable. The entire cervical esophagus was normal.  She is a former smoker. The tentative stage is T3, N1 versus T3, N2c    Pathology report showed squamous cell carcinoma, poorly differentiated, of the left epiglottis, p16 negative. 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 10/13/2022. Follow-up in the office. Since her biopsy her sore throat had been somewhat tolerable but she continued to have pain with swallowing. She denied choking and coughing episodes with swallowing. She had no problems with shortness of breath. She did complain of facial pain postnasal drip sore throat dysphagia cough with sputum production. He said that she had a 1.5 cm right level 3 lymph node. PET CT scan has been ordered and will be performed on November 3, 2022. The possible need for a gastrostomy tube for nutrition was discussed.   Given that she already had baseline epiglottic edema which might worsen during treatment resulting in airway obstruction, the possibility of requiring a tracheostomy tube was also reviewed. Options for treatment were discussed including primary surgery versus chemo rads. She was not felt to be an optimal surgical candidate. The patient opted for chemoradiation therapy and was referred to radiation therapy. She is here today to establish with medical oncology. Good control of her diabetes was stressed as well. She was also to have dental caries and was encouraged to go see a dentist.    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 Her above-the-knee amputation on the left side was on 2/26/2016. Has undergone cardiac catheterization with percutaneous stenting in August 2016 and an September 2016. Her diabetes requires insulin. She is on a fentanyl patch. She is here today to establish medical oncology follow-up. She has an appointment to see Dr. Danielle Bosch on 10/26. She says that she does not have much of an appetite and she continues to lose weight. She used to weigh 240 pounds and has lost 100 pounds in the past year and a half. Continues to have throat pain, dysphagia and odynophagia. She had been a smoker of a pack of cigarettes a day from age 13 through 2014 and then tried to cut down her smoking. She says that she is vaping and smoking as little as possible. She says that she does have a cough but no hemoptysis. She has been a diabetic for many years. She had gestational diabetes and did not check her sugars for a long time. She felt bad and went to a local ED and was found to have a sugar of 590. She says that she checks her sugars once a day. She says that her body tells her when her sugar is high. She usually checks her sugar sometime between 3 and 6 PM.  He has had an above-the-knee amputation of her left leg.   Says that she has had no problems with her stump. She says that she has a prosthesis but it does not fit anymore because she has lost so much weight. She says that she has no neuropathy. She uses a scooter. She has not had a recent eye examination. She said that she \"just did not go\" feels that her renal function is fine. She says that she has no known anemia. The only bleeding that she has experienced was a little bit after she had her biopsy. She did have the above-mentioned thrush and that was sore. She knows she has bad teeth and she is supposed to go to the dentist on October 27. We discussed this today in detail and gave her some assistance in dealing with this. She has fallen in the past fracturing her nose and breaking her finger and a tooth fell out which she has remedied herself. She has had myocardial infarction in 2007 and in 2016. She has had 4 stents placed. She has been on Repatha. She also says that she has had a valvular heart problem. She says that she has had nausea and occasional vomiting. She denies diarrhea and constipation. She says that she has never had a stroke. She has not had skin cancer. 11/9/2022. Mrs. Clista Skiff is here today to discuss the results of her PET scan. Unfortunately this shows a lesion in the periphery of the lung and hilar adenopathy which looks very much like a new primary lung cancer. I have discussed this with her and have asked her if we can go forward with a biopsy. The lesion is very peripheral and may be able to be biopsied by interventional radiology. That would circumvent the need to possibly pass an endoscope past her epiglottic tumor. I have discussed her case with Dr. Preston Thompson. We think that a combination chemotherapy/ radiation therapy approach with her epiglottic and also her lung tumor is the best and I think that highly emetogenic chemotherapy is probably not in her best interest since it may compromise her airway if she vomits.   We will give her weekly CarboTaxol along with her radiation therapy. She will need to have a port placed. She has decided to go forward with a PEG tube and trach if needed. She will contact her ENT doctor. 11/30/2022. There have been multiple interventions since last time Bonnie Adams was in the office. 11/16/2022. Seen in consultation by Dr. Matt Crystal. He arranged for Mediport placement and feeding tube placement. 11/21/2022. Surgery performed with port placement and feeding tube. 11/22/2022. 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 My reading of the data shows that if this is just a nodule no treatment is needed. CBC at that time showed a white count of 19.3 with hemoglobin of 13 hematocrit 46 and a platelet count of 078,245.    11/23/2022. She has had her teeth pulled through [employer] with a stanley pain for the oral surgeon. She tolerated it well. She is healing well. 11/25/2022. Emergency department encounter for severe abdominal pain. She had had nausea with vomiting 3 times over the last several days. She has significant pain in her abdomen when she has a bowel movement and also when she urinates. She was afebrile but tachycardic at a rate of 102. She was tender to palpation in the left lower quadrant and around the PEG tube site. The site itself was not erythematous and the tube seem to be in place. CBC showed white count of 9.9 with a hemoglobin of 15.4 hematocrit 46.5 and a platelet count of 816,768 with 75% polys, 14 lymphs, 7 monos, 2 eos. CAT scan showed mild soft tissue edema along the PEG tube catheter site related to recent PEG tube placement. There is no soft tissue fluid collection. She was discharged home. 11/28/2022. He had seen Dr. Merlin Plata at the [facility] emergency room yesterday. She was treated for pain and then was discharged. That day her vital signs or normal with a pulse rate of 83. She was afebrile.   She had generalized abdominal tenderness. Injection into the tube showed that the tube was at the level of the gastric antrum and contrast opacified the gastric lumen with no extravasation of contrast to suggest a leak. She was treated with clindamycin. 11/29. She called the surgeon's office saying that her PEG tube site was red and there was thick white drainage around the tube. They told her it would take a few days for the antibiotics to work. Today the patient is still having a lot of pain. There is a lot of clear drainage with some thickened dark discharge it appears to be old blood but it is not tested. There is no red to it. There is some bright pink fluid in the PEG tube line which appears to have been from Snapple ingested the day before. She has rebound tenderness throughout the left side of her abdomen. Even though the study did not show any leaking, I fear that it had occurred and has caused irritation of the peritoneum causing this rebound. I have encouraged the patient to lie flat as much as possible letting the fluids drain internally rather than into her peritoneum. We will continue on her antibiotic. We have another CAT scan scheduled for Friday morning and we will see her on Friday after that. Her wound was inspected and redressed. We increased her pain medication. Her concerns about her treatment in the emergency room were also addressed. I urged her son to talk to the [facility] about her concerns about her treatment. 12/21/2022. Lisa Galarza is here today with her significant other Gene to discuss going forward with her radiation therapy and chemotherapy. It is still not clear to me when radiation will begin. We have a plan in place for carboplatin and Taxol to be given concurrently weekly. She is pleased to have her PEG tube out and has much less pain although she still has pain at the site.   Dr. Cruz Proffer note said that she drained so much fluid he had to apply an ostomy bag. She also has pain at her port site but there is minimal inflammation and no obvious infection and there is no pus. She was instructed to call if there is any change in this finding. Beatriz Bourgeois says that she is swallowing fine at this time. Her current weight is 145 pounds which is up 3 pounds since last time she was weighed here about 2 weeks ago. She denies fevers, chills, sweats, bleeding, constipation or diarrhea. She says that she has her usual nausea but no vomiting. She continues to smoke. The patient had simulation with her mask on today. She was pretreated with Xanax. She is somewhat sleepy. Sugar was markedly elevated. I do not know if she took her insulin today. 1/5/2023. Mrs. Khang Goss is here today with her significant other Gene. She has started her radiation and tomorrow she will get her chemotherapy. Her mouth continues to heal.  Her gums are still somewhat swollen so that she cannot get fitted for her dentures yet. Her abdominal pain is markedly improved and her PEG tube site has healed over without any signs of infection or inflammation. Mrs. Khang Goss says that the liquid pain medication that she has been given is her throat almost immediately. I do not know if this is a placebo effect or if there is something in the oral preparation that actually improves the pain in her oropharynx. I told this to Mrs. Vyas and I offered miles mixture as an alternative to a narcotic analgesic and she accepted this offer. Dr. Kuldip Enriquez and I have agreed that her primary care physician will monitor her her pain medication and prescribe that for her. She says that she is extremely anxious and had to take several doses of Xanax today as she woke up extremely early so that she could be calm enough to come into the office today    She has gained weight. If we can believe our scales she has gained 6 pounds. She says that she had fever a week ago. She denies any bleeding.   She has had some nausea. She has had diarrhea and she has had nausea and vomiting. This is all before she starts any chemotherapy or radiation. INTERVAL NOTE    1/12/2023. Mrs. Rodo Looney returns to the office today to continue her concurrent chemotherapy and radiation therapy for her squamous cell carcinoma of the epiglottis. She tolerated her to chemotherapy last week fairly well. She has actually gained a few pounds. She does not seem to be as anxious as she had been. She said that she did have some diarrhea. She missed 1 treatment of her radiation last week because of it. He is here today with her significant other in support. She has had no trouble swallowing. Her previous PEG tube site looks well-healed. It is no longer painful. Her sugars are under much better control than they were last week. Says that she has been cutting back on her carbs. She does not need to take as much Xanax as she had been taking. MONITORING PARAMETERS    Physical examinations, CAT scans and PET scans    PAST MEDICAL HISTORY  Past Medical History:   Diagnosis Date    Above knee amputation of left lower extremity (HCC)     Blood circulation, collateral     CAD (coronary artery disease)     Diabetes mellitus ([address]     Hyperlipidemia     Hypertension     MDRO (multiple drug resistant organisms) resistance     MRSA wound 2013    MI, old 01/01/2007    Obesity (BMI 30-39. 9)     Status post skin graft 01/15/2014    Amniox - Dr Carito Livingston cancer [facility]) 10/2022    squamous cell carcinoma of the epiglottis        REVIEW OF SYSTEMS  Review of Systems   Constitutional:  Positive for fatigue. Negative for appetite change and fever. HENT:  Negative for dental problem, mouth sores, sore throat and trouble swallowing. Eyes:  Negative for visual disturbance. Respiratory:  Negative for cough and shortness of breath. Cardiovascular:  Positive for leg swelling. Gastrointestinal:  Negative for constipation, diarrhea, nausea and vomiting. Endocrine: Negative for polyuria. Genitourinary:  Negative for dysuria, frequency, hematuria and urgency. Musculoskeletal:  Positive for arthralgias (generalized). Skin:  Negative for pallor. Neurological:  Positive for weakness. Negative for light-headedness and headaches. Hematological:  Does not bruise/bleed easily. Psychiatric/Behavioral:  Negative for self-injury, sleep disturbance and suicidal ideas. The patient is not nervous/anxious. FAMILY HISTORY  Family History   Problem Relation Age of Onset    No Known Problems Brother     Heart Disease Maternal Grandfather     High Blood Pressure Maternal Grandfather     Asthma Neg Hx     Cancer Neg Hx     Diabetes Neg Hx     Kidney Disease Neg Hx     Stroke Neg Hx         SOCIAL HISTORY  Social History     Socioeconomic History    Marital status:       Spouse name: Not on file    Number of children: 1    Years of education: Not on file    Highest education level: Not on file   Occupational History    Not on file   Tobacco Use    Smoking status: Former     Packs/day: 0.50     Years: 27.00     Pack years: 13.50     Types: Cigarettes     Quit date: 2014     Years since quittin.3    Smokeless tobacco: Never    Tobacco comments:     e cigs daily use 1/3/22   Vaping Use    Vaping Use: Some days    Substances: Nicotine   Substance and Sexual Activity    Alcohol use: No    Drug use: No    Sexual activity: Not on file   Other Topics Concern    Not on file   Social History Narrative    Not on file     Social Determinants of Health     Financial Resource Strain: Not on file   Food Insecurity: Not on file   Transportation Needs: Not on file   Physical Activity: Not on file   Stress: Not on file   Social Connections: Not on file   Intimate Partner Violence: Not on file   Housing Stability: Not on file        CURRENT MEDICATIONS  Current Outpatient Medications   Medication Sig Dispense Refill    Magic Mouthwash (MIRACLE MOUTHWASH) Swish and spit 5 mLs 4 times daily as needed for Irritation 300 mL 1    prochlorperazine (COMPAZINE) 10 MG tablet Take 1 tablet by mouth every 6 hours as needed (as needed for nausea) 60 tablet 3    ondansetron (ZOFRAN) 4 MG tablet Take 1 tablet by mouth every 8 hours as needed for Nausea or Vomiting 20 tablet 1    lidocaine-prilocaine (EMLA) 2.5-2.5 % cream Apply topically as needed. 5 g 4    amoxicillin (AMOXIL) 125 MG/5ML suspension Take by mouth 3 times daily Prescribed by family doctor unsure of dose      zolpidem (AMBIEN) 10 MG tablet TAKE 1 TABLET BY MOUTH AT NIGHT      pantoprazole (PROTONIX) 40 MG tablet Take 1 tablet by mouth every morning (before breakfast) 90 tablet 1    ALPRAZolam (XANAX) 1 MG tablet Take 1 mg by mouth as needed. oxyCODONE-acetaminophen (PERCOCET) 5-325 MG per tablet Take 2 tablets by mouth every 8 hours as needed for Pain. TRULICITY 1.5 FA/9.4HM SC injection Inject 1.5 mg into the skin once a week      HUMALOG KWIKPEN 100 UNIT/ML SOPN       fentaNYL 37.5 MCG/HR PT72 Apply 1 patch topically. JARDIANCE 25 MG tablet Take 1 tablet by mouth daily      nitroGLYCERIN (NITROSTAT) 0.4 MG SL tablet Place 1 tablet under the tongue every 5 minutes as needed for Chest pain Take 1 tablet for chest pain and repeat after 5 min if no relief, call 911 and take another dose 5 min later. Max of 3 doses in 15 min.  25 tablet 3    metFORMIN (GLUCOPHAGE) 500 MG tablet Take 2 tablets by mouth 2 times daily Resume 8/30 60 tablet 3    pioglitazone (ACTOS) 30 MG tablet Take 1 tablet by mouth daily 30 tablet 3    atorvastatin (LIPITOR) 20 MG tablet Take 2 tablets by mouth nightly 30 tablet 3    lisinopril (PRINIVIL;ZESTRIL) 2.5 MG tablet Take 1 tablet by mouth daily 30 tablet 3    metoprolol tartrate (LOPRESSOR) 25 MG tablet Take 1 tablet by mouth 2 times daily 60 tablet 3    isosorbide dinitrate (ISORDIL) 20 MG tablet Take 30 mg by mouth daily      amLODIPine (NORVASC) 5 MG tablet Take 5 mg by mouth daily budesonide-formoterol (SYMBICORT) 160-4.5 MCG/ACT AERO Inhale 2 puffs into the lungs 2 times daily      pregabalin (LYRICA) 150 MG capsule Take 150 mg by mouth 2 times daily      ALPRAZolam (XANAX) 0.5 MG tablet Take 0.5 mg by mouth 2 times daily as needed. aspirin 81 MG tablet Take 81 mg by mouth daily      albuterol (PROVENTIL HFA) 108 (90 BASE) MCG/ACT inhaler Inhale 2 puffs into the lungs every 4 hours as needed for Wheezing or Shortness of Breath (Space out to every 6 hours as symptoms improve). Space out to every 6 hours as symptoms improve. 1 Inhaler 10    clopidogrel (PLAVIX) 75 MG tablet Take 1 tablet by mouth daily       No current facility-administered medications for this visit. OARRS    PDMP Monitoring:    Last PDMP [place] Median as Reviewed [facility]):  Review User Review Instant Review Result   Nikki Trujillo 10/23/2022  7:08 PM Reviewed PDMP [1]                 PHYSICAL EXAM    Physical Exam  Vitals and nursing note reviewed. Exam conducted with a chaperone present. Constitutional:       General: She is not in acute distress. Appearance: She is ill-appearing (somewhat chronically). She is not toxic-appearing. Comments: Chrissy Ambrocio is a well-developed well-nourished  female who looks older than her stated age. She is somewhat chronically ill in appearance. She is accompanied by her significant other. HENT:      Mouth/Throat:      Comments: She says that the swelling is going down in her mouth where she had her extractions. Pulmonary:      Effort: Pulmonary effort is normal.   Neurological:      Mental Status: She is alert. Psychiatric:         Mood and Affect: Mood normal.         Behavior: Behavior normal.         Thought Content:  Thought content normal.         Judgment: Judgment normal.      Comments: She is somewhat depressed and anxious but not as bad as she was last week        ECOG STATUS    2:  Ambulatory and capable of all self-care but unable to carry out any work activities: up and about more than 50% of waking hours  He of course is not ambulatory having undergone a left AKA a in the past.  She is mobile in a scooter. I have not seen her ambulate with a walker and I never see her wear a prosthesis. LABS/IMAGING    Laboratory data shows that her BUN is 19 and her creatinine is 0.6. Her sugar 73. Calcium is 1.25. Total protein is 6.1 with an albumin of 3.4. Liver function tests are normal.  CBC shows a white count of 6.0 hemoglobin 11.7 hematocrit 37.1 with a platelet count of 530,615. White blood cell differential count shows 49% polys 42% lymphs 5 monos 3 eosinophils. PATHOLOGY/GENETICS    Squamous cell carcinoma of the epiglottis, p16 negative. ASSESSMENT and PLAN    1. Squamous cell carcinoma of the epiglottis, p16 negative. She is receiving treatment with carboplatin and Taxol along with concurrent radiation therapy. We will see her again next week. 2.  Anxiety and depression. She seems to be coping little bit better this week. 3.  Diarrhea. She says that she has Imodium handy to take if she needs it. 4.  Diabetes. Her sugars under much better control this week. Encouraged her to keep it that way. 5.  Multiple comorbidities. She will continue her current regimen of medications and see her current providers for her multiple comorbidities including atherosclerosis, hyperlipidemia, feeding difficulties, chronic pain and coronary artery disease. We will see her again next week. She knows to call should she have any change in her status, questions or concerns.         Elyce Curling, MD 1/14/2023 5:55 PM

## 2023-01-16 ENCOUNTER — HOSPITAL ENCOUNTER (OUTPATIENT)
Dept: RADIATION ONCOLOGY | Age: 53
Discharge: HOME | End: 2023-01-16
Payer: MEDICARE

## 2023-01-16 PROCEDURE — 77386 HC NTSTY MODUL RAD TX DLVR CPLX: CPT | Performed by: RADIOLOGY

## 2023-01-16 PROCEDURE — 77014 CHG CT GUIDANCE RADIATION THERAPY FLDS PLACEMENT: CPT | Performed by: RADIOLOGY

## 2023-01-17 ENCOUNTER — HOSPITAL ENCOUNTER (OUTPATIENT)
Dept: RADIATION ONCOLOGY | Age: 53
Discharge: HOME | End: 2023-01-17
Payer: MEDICARE

## 2023-01-17 PROCEDURE — 77014 CHG CT GUIDANCE RADIATION THERAPY FLDS PLACEMENT: CPT | Performed by: RADIOLOGY

## 2023-01-17 PROCEDURE — 77386 HC NTSTY MODUL RAD TX DLVR CPLX: CPT | Performed by: RADIOLOGY

## 2023-01-18 ENCOUNTER — APPOINTMENT (OUTPATIENT)
Dept: RADIATION ONCOLOGY | Age: 53
End: 2023-01-18
Payer: MEDICARE

## 2023-01-19 ENCOUNTER — HOSPITAL ENCOUNTER (OUTPATIENT)
Dept: RADIATION ONCOLOGY | Age: 53
Discharge: HOME | End: 2023-01-19
Payer: MEDICARE

## 2023-01-19 ENCOUNTER — HOSPITAL ENCOUNTER (OUTPATIENT)
Dept: INFUSION THERAPY | Age: 53
Discharge: HOME | End: 2023-01-19
Payer: MEDICARE

## 2023-01-19 ENCOUNTER — OFFICE VISIT (OUTPATIENT)
Dept: ONCOLOGY | Age: 53
End: 2023-01-19
Payer: MEDICARE

## 2023-01-19 VITALS
HEIGHT: 61 IN | OXYGEN SATURATION: 97 % | WEIGHT: 152 LBS | DIASTOLIC BLOOD PRESSURE: 65 MMHG | SYSTOLIC BLOOD PRESSURE: 109 MMHG | RESPIRATION RATE: 16 BRPM | HEART RATE: 63 BPM | TEMPERATURE: 97.9 F | BODY MASS INDEX: 28.7 KG/M2

## 2023-01-19 VITALS
DIASTOLIC BLOOD PRESSURE: 65 MMHG | RESPIRATION RATE: 16 BRPM | BODY MASS INDEX: 28.72 KG/M2 | OXYGEN SATURATION: 97 % | HEART RATE: 63 BPM | SYSTOLIC BLOOD PRESSURE: 109 MMHG | WEIGHT: 152 LBS | TEMPERATURE: 97.9 F

## 2023-01-19 VITALS
BODY MASS INDEX: 28.7 KG/M2 | RESPIRATION RATE: 16 BRPM | HEIGHT: 61 IN | DIASTOLIC BLOOD PRESSURE: 65 MMHG | WEIGHT: 152 LBS | TEMPERATURE: 97.8 F | OXYGEN SATURATION: 98 % | HEART RATE: 75 BPM | SYSTOLIC BLOOD PRESSURE: 136 MMHG

## 2023-01-19 DIAGNOSIS — C32.1 CANCER, EPIGLOTTIS (HCC): Primary | ICD-10-CM

## 2023-01-19 DIAGNOSIS — C32.1 MALIGNANT NEOPLASM OF EPIGLOTTIS (HCC): ICD-10-CM

## 2023-01-19 LAB
ABSOLUTE IMMATURE GRANULOCYTE: 0 THOU/MM3 (ref 0–0.07)
ALBUMIN SERPL BCG-MCNC: 3.2 G/DL (ref 3.5–5.1)
ALP SERPL-CCNC: 104 U/L (ref 38–126)
ALT SERPL W/O P-5'-P-CCNC: 20 U/L (ref 11–66)
AST SERPL-CCNC: 19 U/L (ref 5–40)
BASINOPHIL, AUTOMATED: 0 % (ref 0–3)
BASOPHILS ABSOLUTE: 0 THOU/MM3 (ref 0–0.1)
BILIRUB CONJ SERPL-MCNC: < 0.2 MG/DL (ref 0–0.3)
BILIRUB SERPL-MCNC: < 0.2 MG/DL (ref 0.3–1.2)
BUN, WHOLE BLOOD: 16 MG/DL (ref 8–26)
CHLORIDE, WHOLE BLOOD: 105 MEQ/L (ref 98–109)
CREATININE, WHOLE BLOOD: 0.5 MG/DL (ref 0.5–1.2)
EOSINOPHILS ABSOLUTE: 0.1 THOU/MM3 (ref 0–0.4)
EOSINOPHILS RELATIVE PERCENT: 2 % (ref 0–4)
GFR SERPL CREATININE-BSD FRML MDRD: > 60 ML/MIN/1.73M2
GLUCOSE, WHOLE BLOOD: 65 MG/DL (ref 70–108)
HCT VFR BLD CALC: 37.4 % (ref 37–47)
HEMOGLOBIN: 12 GM/DL (ref 12–16)
IMMATURE GRANULOCYTES: 0 %
IONIZED CALCIUM, WHOLE BLOOD: 1.19 MMOL/L (ref 1.12–1.32)
LYMPHOCYTES # BLD: 33 % (ref 15–47)
LYMPHOCYTES ABSOLUTE: 1.6 THOU/MM3 (ref 1–4.8)
MCH RBC QN AUTO: 33 PG (ref 26–33)
MCHC RBC AUTO-ENTMCNC: 32.1 GM/DL (ref 32.2–35.5)
MCV RBC AUTO: 103 FL (ref 81–99)
MONOCYTES ABSOLUTE: 0.5 THOU/MM3 (ref 0.4–1.3)
MONOCYTES: 11 % (ref 0–12)
PDW BLD-RTO: 14.8 % (ref 11.5–14.5)
PLATELET # BLD: 358 THOU/MM3 (ref 130–400)
PMV BLD AUTO: 10 FL (ref 9.4–12.4)
POTASSIUM, WHOLE BLOOD: 3.8 MEQ/L (ref 3.5–4.9)
PROT SERPL-MCNC: 5.8 G/DL (ref 6.1–8)
RBC # BLD: 3.64 MILL/MM3 (ref 4.2–5.4)
SEG NEUTROPHILS: 54 % (ref 43–75)
SEGMENTED NEUTROPHILS ABSOLUTE COUNT: 2.6 THOU/MM3 (ref 1.8–7.7)
SODIUM, WHOLE BLOOD: 143 MEQ/L (ref 138–146)
TOTAL CO2, WHOLE BLOOD: 30 MEQ/L (ref 23–33)
WBC # BLD: 4.8 THOU/MM3 (ref 4.8–10.8)

## 2023-01-19 PROCEDURE — 96413 CHEMO IV INFUSION 1 HR: CPT

## 2023-01-19 PROCEDURE — 80047 BASIC METABLC PNL IONIZED CA: CPT

## 2023-01-19 PROCEDURE — G8427 DOCREV CUR MEDS BY ELIG CLIN: HCPCS | Performed by: INTERNAL MEDICINE

## 2023-01-19 PROCEDURE — 99214 OFFICE O/P EST MOD 30 MIN: CPT | Performed by: INTERNAL MEDICINE

## 2023-01-19 PROCEDURE — G8484 FLU IMMUNIZE NO ADMIN: HCPCS | Performed by: INTERNAL MEDICINE

## 2023-01-19 PROCEDURE — 36591 DRAW BLOOD OFF VENOUS DEVICE: CPT

## 2023-01-19 PROCEDURE — 99211 OFF/OP EST MAY X REQ PHY/QHP: CPT

## 2023-01-19 PROCEDURE — 96367 TX/PROPH/DG ADDL SEQ IV INF: CPT

## 2023-01-19 PROCEDURE — 1036F TOBACCO NON-USER: CPT | Performed by: INTERNAL MEDICINE

## 2023-01-19 PROCEDURE — 85025 COMPLETE CBC W/AUTO DIFF WBC: CPT

## 2023-01-19 PROCEDURE — 96417 CHEMO IV INFUS EACH ADDL SEQ: CPT

## 2023-01-19 PROCEDURE — 77386 HC NTSTY MODUL RAD TX DLVR CPLX: CPT | Performed by: RADIOLOGY

## 2023-01-19 PROCEDURE — 3017F COLORECTAL CA SCREEN DOC REV: CPT | Performed by: INTERNAL MEDICINE

## 2023-01-19 PROCEDURE — 80076 HEPATIC FUNCTION PANEL: CPT

## 2023-01-19 PROCEDURE — 2500000003 HC RX 250 WO HCPCS: Performed by: INTERNAL MEDICINE

## 2023-01-19 PROCEDURE — 3074F SYST BP LT 130 MM HG: CPT | Performed by: INTERNAL MEDICINE

## 2023-01-19 PROCEDURE — 6360000002 HC RX W HCPCS: Performed by: INTERNAL MEDICINE

## 2023-01-19 PROCEDURE — G8417 CALC BMI ABV UP PARAM F/U: HCPCS | Performed by: INTERNAL MEDICINE

## 2023-01-19 PROCEDURE — 3078F DIAST BP <80 MM HG: CPT | Performed by: INTERNAL MEDICINE

## 2023-01-19 PROCEDURE — 2580000003 HC RX 258: Performed by: INTERNAL MEDICINE

## 2023-01-19 PROCEDURE — 96375 TX/PRO/DX INJ NEW DRUG ADDON: CPT

## 2023-01-19 RX ORDER — HEPARIN SODIUM (PORCINE) LOCK FLUSH IV SOLN 100 UNIT/ML 100 UNIT/ML
500 SOLUTION INTRAVENOUS PRN
Status: DISCONTINUED | OUTPATIENT
Start: 2023-01-19 | End: 2023-01-20 | Stop reason: HOSPADM

## 2023-01-19 RX ORDER — SODIUM CHLORIDE 9 MG/ML
25 INJECTION, SOLUTION INTRAVENOUS PRN
Status: CANCELLED | OUTPATIENT
Start: 2023-01-19

## 2023-01-19 RX ORDER — EPINEPHRINE 1 MG/ML
0.3 INJECTION, SOLUTION, CONCENTRATE INTRAVENOUS PRN
Status: CANCELLED | OUTPATIENT
Start: 2023-01-20

## 2023-01-19 RX ORDER — DIPHENHYDRAMINE HYDROCHLORIDE 50 MG/ML
50 INJECTION INTRAMUSCULAR; INTRAVENOUS ONCE
Status: COMPLETED | OUTPATIENT
Start: 2023-01-19 | End: 2023-01-19

## 2023-01-19 RX ORDER — SODIUM CHLORIDE 9 MG/ML
INJECTION, SOLUTION INTRAVENOUS CONTINUOUS
Status: CANCELLED | OUTPATIENT
Start: 2023-01-20

## 2023-01-19 RX ORDER — MEPERIDINE HYDROCHLORIDE 50 MG/ML
12.5 INJECTION INTRAMUSCULAR; INTRAVENOUS; SUBCUTANEOUS PRN
Status: CANCELLED | OUTPATIENT
Start: 2023-01-20

## 2023-01-19 RX ORDER — PALONOSETRON 0.05 MG/ML
0.25 INJECTION, SOLUTION INTRAVENOUS ONCE
Status: COMPLETED | OUTPATIENT
Start: 2023-01-19 | End: 2023-01-19

## 2023-01-19 RX ORDER — SODIUM CHLORIDE 9 MG/ML
5-250 INJECTION, SOLUTION INTRAVENOUS PRN
Status: CANCELLED | OUTPATIENT
Start: 2023-01-20

## 2023-01-19 RX ORDER — HEPARIN SODIUM (PORCINE) LOCK FLUSH IV SOLN 100 UNIT/ML 100 UNIT/ML
500 SOLUTION INTRAVENOUS PRN
Status: CANCELLED | OUTPATIENT
Start: 2023-01-19

## 2023-01-19 RX ORDER — HEPARIN SODIUM (PORCINE) LOCK FLUSH IV SOLN 100 UNIT/ML 100 UNIT/ML
500 SOLUTION INTRAVENOUS PRN
Status: CANCELLED | OUTPATIENT
Start: 2023-01-20

## 2023-01-19 RX ORDER — PALONOSETRON 0.05 MG/ML
0.25 INJECTION, SOLUTION INTRAVENOUS ONCE
Status: CANCELLED | OUTPATIENT
Start: 2023-01-20 | End: 2023-01-20

## 2023-01-19 RX ORDER — SODIUM CHLORIDE 9 MG/ML
5-40 INJECTION INTRAVENOUS PRN
Status: CANCELLED | OUTPATIENT
Start: 2023-01-20

## 2023-01-19 RX ORDER — SODIUM CHLORIDE 0.9 % (FLUSH) 0.9 %
5-40 SYRINGE (ML) INJECTION PRN
Status: CANCELLED | OUTPATIENT
Start: 2023-01-20

## 2023-01-19 RX ORDER — SODIUM CHLORIDE 0.9 % (FLUSH) 0.9 %
5-40 SYRINGE (ML) INJECTION PRN
Status: CANCELLED | OUTPATIENT
Start: 2023-01-19

## 2023-01-19 RX ORDER — SODIUM CHLORIDE 9 MG/ML
5-250 INJECTION, SOLUTION INTRAVENOUS PRN
Status: DISCONTINUED | OUTPATIENT
Start: 2023-01-19 | End: 2023-01-20 | Stop reason: HOSPADM

## 2023-01-19 RX ORDER — DIPHENHYDRAMINE HYDROCHLORIDE 50 MG/ML
50 INJECTION INTRAMUSCULAR; INTRAVENOUS
Status: CANCELLED | OUTPATIENT
Start: 2023-01-20

## 2023-01-19 RX ORDER — ACETAMINOPHEN 325 MG/1
650 TABLET ORAL
Status: CANCELLED | OUTPATIENT
Start: 2023-01-20

## 2023-01-19 RX ORDER — ALBUTEROL SULFATE 90 UG/1
4 AEROSOL, METERED RESPIRATORY (INHALATION) PRN
Status: CANCELLED | OUTPATIENT
Start: 2023-01-20

## 2023-01-19 RX ORDER — SODIUM CHLORIDE 0.9 % (FLUSH) 0.9 %
5-40 SYRINGE (ML) INJECTION PRN
Status: DISCONTINUED | OUTPATIENT
Start: 2023-01-19 | End: 2023-01-20 | Stop reason: HOSPADM

## 2023-01-19 RX ORDER — FAMOTIDINE 10 MG/ML
20 INJECTION, SOLUTION INTRAVENOUS ONCE
Status: CANCELLED | OUTPATIENT
Start: 2023-01-20 | End: 2023-01-20

## 2023-01-19 RX ORDER — FAMOTIDINE 10 MG/ML
20 INJECTION, SOLUTION INTRAVENOUS
Status: CANCELLED | OUTPATIENT
Start: 2023-01-20

## 2023-01-19 RX ORDER — ONDANSETRON 2 MG/ML
8 INJECTION INTRAMUSCULAR; INTRAVENOUS
Status: CANCELLED | OUTPATIENT
Start: 2023-01-20

## 2023-01-19 RX ORDER — DIPHENHYDRAMINE HYDROCHLORIDE 50 MG/ML
50 INJECTION INTRAMUSCULAR; INTRAVENOUS ONCE
Status: CANCELLED | OUTPATIENT
Start: 2023-01-20 | End: 2023-01-20

## 2023-01-19 RX ADMIN — DIPHENHYDRAMINE HYDROCHLORIDE 50 MG: 50 INJECTION, SOLUTION INTRAMUSCULAR; INTRAVENOUS at 13:53

## 2023-01-19 RX ADMIN — HEPARIN 500 UNITS: 100 SYRINGE at 16:00

## 2023-01-19 RX ADMIN — SODIUM CHLORIDE, PRESERVATIVE FREE 10 ML: 5 INJECTION INTRAVENOUS at 16:00

## 2023-01-19 RX ADMIN — DEXAMETHASONE SODIUM PHOSPHATE 12 MG: 4 INJECTION, SOLUTION INTRA-ARTICULAR; INTRALESIONAL; INTRAMUSCULAR; INTRAVENOUS; SOFT TISSUE at 14:00

## 2023-01-19 RX ADMIN — SODIUM CHLORIDE, PRESERVATIVE FREE 10 ML: 5 INJECTION INTRAVENOUS at 13:49

## 2023-01-19 RX ADMIN — PACLITAXEL 78 MG: 6 INJECTION, SOLUTION INTRAVENOUS at 14:22

## 2023-01-19 RX ADMIN — PALONOSETRON 0.25 MG: 0.05 INJECTION, SOLUTION INTRAVENOUS at 13:55

## 2023-01-19 RX ADMIN — SODIUM CHLORIDE, PRESERVATIVE FREE 20 MG: 5 INJECTION INTRAVENOUS at 13:51

## 2023-01-19 RX ADMIN — SODIUM CHLORIDE, PRESERVATIVE FREE 10 ML: 5 INJECTION INTRAVENOUS at 11:10

## 2023-01-19 RX ADMIN — CARBOPLATIN 219 MG: 10 INJECTION, SOLUTION INTRAVENOUS at 15:24

## 2023-01-19 RX ADMIN — SODIUM CHLORIDE, PRESERVATIVE FREE 20 ML: 5 INJECTION INTRAVENOUS at 11:11

## 2023-01-19 RX ADMIN — SODIUM CHLORIDE 20 ML/HR: 9 INJECTION, SOLUTION INTRAVENOUS at 13:50

## 2023-01-19 ASSESSMENT — PAIN DESCRIPTION - LOCATION
LOCATION: THROAT

## 2023-01-19 ASSESSMENT — PAIN SCALES - GENERAL
PAINLEVEL_OUTOF10: 8
PAINLEVEL_OUTOF10: 9
PAINLEVEL_OUTOF10: 8
PAINLEVEL_OUTOF10: 8

## 2023-01-19 ASSESSMENT — PAIN DESCRIPTION - PAIN TYPE: TYPE: CHRONIC PAIN

## 2023-01-19 NOTE — PLAN OF CARE
Problem: Chronic Conditions and Co-morbidities  Goal: Patient's chronic conditions and co-morbidity symptoms are monitored and maintained or improved  Outcome: Progressing  Flowsheets (Taken 1/19/2023 1805)  Care Plan - Patient's Chronic Conditions and Co-Morbidity Symptoms are Monitored and Maintained or Improved: Monitor and assess patient's chronic conditions and comorbid symptoms for stability, deterioration, or improvement  Note: Chemotherapy Teaching     What is Chemotherapy   Drug action [x]   Method of Administration [x]   Handouts given []     Side Effects  Nausea/vomiting [x]   Diarrhea [x]   Fatigue [x]   Signs / Symptoms of infection [x]   Neutropenia [x]   Thrombocytopenia [x]   Alopecia [x]   neuropathy [x]   [place] diet &  the importance of fluids [x]       Micellaneous  Importance of nutrition [x]   Importance of oral hygiene [x]   When to call the MD [x]   Monitoring labs [x]   Use of supportive services []     Explanation of Drug Regimen / Frequency  Taxol/Carboplatin     Comments  Verbalized understanding to drug,action,side effects and when to call MD         Problem: Safety - Adult  Goal: Free from fall injury  1/19/2023 1806 by Jasmin Mcwilliams RN  Flowsheets (Taken 1/19/2023 1806)  Free From Fall Injury: Instruct family/caregiver on patient safety  Note: No falls occurred with visit today. Verbalized understanding of fall prevention to ask for assistance with ambulation. Call light within reach. 1/19/2023 1805 by Jasmin Mcwilliams RN  Outcome: Progressing     Problem: Discharge Planning  Goal: Discharge to home or other facility with appropriate resources  1/19/2023 1806 by Jasmin Mcwilliams RN  Flowsheets (Taken 1/19/2023 1806)  Discharge to home or other facility with appropriate resources: Identify barriers to discharge with patient and caregiver  Note: Discuss understanding of discharge instructions,follow-up appointments, and when to call the physician.    1/19/2023 1805 by Severo Gavel GENTRY Dior  Outcome: Progressing     Problem: Infection - Adult  Goal: Absence of infection at discharge  1/19/2023 1806 by Yanely Mendosa RN  Flowsheets (Taken 1/19/2023 1806)  Absence of infection at discharge:   Assess and monitor for signs and symptoms of infection   Monitor all insertion sites i.e., indwelling lines, tubes and drains  Note: Mediport site with no redness or warmth. Skin over port site intact with no signs of breakdown noted. Patient verbalizes signs/symptoms of port infection and when to notify the physician. 1/19/2023 1805 by Yanely Mendosa RN  Outcome: Progressing   Care plan reviewed with patient and spouse. Patient and spouse verbalize understanding of the plan of care and contribute to goal setting.

## 2023-01-19 NOTE — PATIENT INSTRUCTIONS
Reviewed labs and recent medical history. Okay to treat today 1/19/23  Return next Thursday for treatment, 1/26/23  See MD in 2 weeks with next cycle.

## 2023-01-19 NOTE — PROGRESS NOTES
[id number] [facility] [place] [address]  Dept: [phone]  Loc: Ronal Valderrama  1970   No ref. provider found   MD Pierre Kentfarhat Crowe is a 47 y.o.  female with squamous cell carcinoma of the epiglottis, p16 negative    CHIEF COMPLAINT  Chief Complaint   Patient presents with    Follow-up     Malignant neoplasm of epiglottis ([address]          HISTORY OF PRESENT ILLNESS    1/31/2022. CT scan of soft tissues of the neck without contrast done for right-sided neck pain showed no discrete neck mass fluid collection or acute phonatory change there are multiple nonspecific cervical lymph nodes at levels 1 through 5 measuring less than 1 cm in short axis no pathologically enlarged lymph nodes were seen. She initially presented with sore throat, difficulty swallowing and progressive weight loss. She was treated with multiple courses of antibiotics, steroids and antifungal medication with limited improvement. 2/10/2022. Chest x-ray done for shortness of breath and sore throat showed no focal acute lung lesions or significant chronic lung findings. 9/20/2022. Referred  to ENT and seen by Al Childers PA-C and had flexible laryngoscopy demonstrating an edematous epiglottis. She was given another round of oral steroids. He said that she was here for thrush and mouth and ear soreness for a year. The pain will get to be 10 out of 10. The pain was constant but would fluctuate in severity. There is no drainage or history of hearing loss or tinnitus. She said that she had a constant sore throat making it difficult for her to swallow and she has lost a lot of weight. She says that the pain is worse on the right and would extend into her ear. She is also finding it hard to breathe. Weight was estimated to be 65 kg. Examination showed multiple broken teeth and cavities.   Salivary gland exam was normal.  There is mild lymphadenopathy with tender lymph nodes on the right side. Flexible laryngoscopy showed abnormally enlarged epiglottis obstructing the view of the larynx worse on the right than on the left. She increased her steroids that she was already on and had her come back in a week and told the patient to go to the ED if her airway became compromised. CT scan of the soft tissues of the neck with contrast was ordered. 9/21/2022. CAT scan of the neck showed nodular thickening of the area epiglottic folds associated with abnormal soft tissue thickening of the right side of the hypopharynx extending into the right posterolateral wall of the hypopharynx and flattening deformity of the right vallecula and piriform sinus raising the question of squamous cell carcinoma. There was no significant lymphadenopathy seen. A significant thyroid mass was not identified. The upper esophagus was unremarkable and a foreign body was not identified. There was heavy bilateral carotid bulb calcification seen and multiple dental caries were noted. PET CT scan was recommended. 9/28/2022. Follow-up in the ENT office. Patient said that her throat felt better but she had a bump on the roof of her mouth that was painful and she still complained of trouble swallowing and a sore throat. Flexible laryngoscopy was performed again hypopharynx was markedly abnormal with white speckles but most notably significant thickening of the epiglottis extending into the arytenoid space and false cords worse on the right. There was a possible necrotic area in the posterior face of the epiglottis. The larynx was abnormal with white speckles indicative of fungal infection. Dr. Yu Larsen reviewed the case and discussed with the patient the benefit of biopsy. The possibility of tracheostomy was discussed to protect her airway. She was treated with fluconazole for her thrush. Steroids were held. 10/3/2022.   Chest x-ray that the cardiac silhouette was within normal limits. Coronary stent was present. There is strandy interstitial opacities in the perihilar distribution along with peribronchial thickening and no focal areas of consolidation, no pneumothorax or pleural effusion. No acute bony changes were seen. 10/6/2022. 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 The epiglottis was edematous and had granular tissue involving the laryngeal surface of the left epiglottis. There was a separate lesion involving the right false vocal cord with a papillomatous appearance. Both true vocal cords seem to be spared. There is no involvement of the arytenoid complex or postcricoid region. The lingual surface of the epiglottis did not have any mucosal erosions but was edematous. The vallecula and base the tongue was unremarkable. The entire cervical esophagus was normal.  She is a former smoker. The tentative stage is T3, N1 versus T3, N2c    Pathology report showed squamous cell carcinoma, poorly differentiated, of the left epiglottis, p16 negative. 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 10/13/2022. Follow-up in the office. Since her biopsy her sore throat had been somewhat tolerable but she continued to have pain with swallowing. She denied choking and coughing episodes with swallowing. She had no problems with shortness of breath. She did complain of facial pain postnasal drip sore throat dysphagia cough with sputum production. He said that she had a 1.5 cm right level 3 lymph node. PET CT scan has been ordered and will be performed on November 3, 2022. The possible need for a gastrostomy tube for nutrition was discussed.   Given that she already had baseline epiglottic edema which might worsen during treatment resulting in airway obstruction, the possibility of requiring a tracheostomy tube was also reviewed. Options for treatment were discussed including primary surgery versus chemo rads. She was not felt to be an optimal surgical candidate. The patient opted for chemoradiation therapy and was referred to radiation therapy. She is here today to establish with medical oncology. Good control of her diabetes was stressed as well. She was also to have dental caries and was encouraged to go see a dentist.    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 Her above-the-knee amputation on the left side was on 2/26/2016. Has undergone cardiac catheterization with percutaneous stenting in August 2016 and an September 2016. Her diabetes requires insulin. She is on a fentanyl patch. She is here today to establish medical oncology follow-up. She has an appointment to see Dr. Bishnu Pepper on 10/26. She says that she does not have much of an appetite and she continues to lose weight. She used to weigh 240 pounds and has lost 100 pounds in the past year and a half. Continues to have throat pain, dysphagia and odynophagia. She had been a smoker of a pack of cigarettes a day from age 13 through 2014 and then tried to cut down her smoking. She says that she is vaping and smoking as little as possible. She says that she does have a cough but no hemoptysis. She has been a diabetic for many years. She had gestational diabetes and did not check her sugars for a long time. She felt bad and went to a local ED and was found to have a sugar of 590. She says that she checks her sugars once a day. She says that her body tells her when her sugar is high. She usually checks her sugar sometime between 3 and 6 PM.  He has had an above-the-knee amputation of her left leg.   Says that she has had no problems with her stump. She says that she has a prosthesis but it does not fit anymore because she has lost so much weight. She says that she has no neuropathy. She uses a scooter. She has not had a recent eye examination. She said that she \"just did not go\" feels that her renal function is fine. She says that she has no known anemia. The only bleeding that she has experienced was a little bit after she had her biopsy. She did have the above-mentioned thrush and that was sore. She knows she has bad teeth and she is supposed to go to the dentist on October 27. We discussed this today in detail and gave her some assistance in dealing with this. She has fallen in the past fracturing her nose and breaking her finger and a tooth fell out which she has remedied herself. She has had myocardial infarction in 2007 and in 2016. She has had 4 stents placed. She has been on Repatha. She also says that she has had a valvular heart problem. She says that she has had nausea and occasional vomiting. She denies diarrhea and constipation. She says that she has never had a stroke. She has not had skin cancer. 11/9/2022. Mrs. Lashaun Waldron is here today to discuss the results of her PET scan. Unfortunately this shows a lesion in the periphery of the lung and hilar adenopathy which looks very much like a new primary lung cancer. I have discussed this with her and have asked her if we can go forward with a biopsy. The lesion is very peripheral and may be able to be biopsied by interventional radiology. That would circumvent the need to possibly pass an endoscope past her epiglottic tumor. I have discussed her case with Dr. Gi Ramirez. We think that a combination chemotherapy/ radiation therapy approach with her epiglottic and also her lung tumor is the best and I think that highly emetogenic chemotherapy is probably not in her best interest since it may compromise her airway if she vomits.   We will give her weekly CarboTaxol along with her radiation therapy. She will need to have a port placed. She has decided to go forward with a PEG tube and trach if needed. She will contact her ENT doctor. 11/30/2022. There have been multiple interventions since last time Mariaa Landin was in the office. 11/16/2022. Seen in consultation by Dr. Devante Chery. He arranged for Mediport placement and feeding tube placement. 11/21/2022. Surgery performed with port placement and feeding tube. 11/22/2022. 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 My reading of the data shows that if this is just a nodule no treatment is needed. CBC at that time showed a white count of 19.3 with hemoglobin of 13 hematocrit 46 and a platelet count of [phone],699.    11/23/2022. She has had her teeth pulled through [employer] with a stanley pain for the oral surgeon. She tolerated it well. She is healing well. 11/25/2022. Emergency department encounter for severe abdominal pain. She had had nausea with vomiting 3 times over the last several days. She has significant pain in her abdomen when she has a bowel movement and also when she urinates. She was afebrile but tachycardic at a rate of 102. She was tender to palpation in the left lower quadrant and around the PEG tube site. The site itself was not erythematous and the tube seem to be in place. CBC showed white count of 9.9 with a hemoglobin of 15.4 hematocrit 46.5 and a platelet count of 431,134 with 75% polys, 14 lymphs, 7 monos, 2 eos. CAT scan showed mild soft tissue edema along the PEG tube catheter site related to recent PEG tube placement. There is no soft tissue fluid collection. She was discharged home. 11/28/2022. He had seen Dr. Kelsie Burns at the [facility] emergency room yesterday. She was treated for pain and then was discharged. That day her vital signs or normal with a pulse rate of 83. She was afebrile.   She had generalized abdominal tenderness. Injection into the tube showed that the tube was at the level of the gastric antrum and contrast opacified the gastric lumen with no extravasation of contrast to suggest a leak. She was treated with clindamycin. 11/29. She called the surgeon's office saying that her PEG tube site was red and there was thick white drainage around the tube. They told her it would take a few days for the antibiotics to work. Today the patient is still having a lot of pain. There is a lot of clear drainage with some thickened dark discharge it appears to be old blood but it is not tested. There is no red to it. There is some bright pink fluid in the PEG tube line which appears to have been from Snapple ingested the day before. She has rebound tenderness throughout the left side of her abdomen. Even though the study did not show any leaking, I fear that it had occurred and has caused irritation of the peritoneum causing this rebound. I have encouraged the patient to lie flat as much as possible letting the fluids drain internally rather than into her peritoneum. We will continue on her antibiotic. We have another CAT scan scheduled for Friday morning and we will see her on Friday after that. Her wound was inspected and redressed. We increased her pain medication. Her concerns about her treatment in the emergency room were also addressed. I urged her son to talk to the [facility] about her concerns about her treatment. 12/21/2022. Rosana Davis is here today with her significant other Gene to discuss going forward with her radiation therapy and chemotherapy. It is still not clear to me when radiation will begin. We have a plan in place for carboplatin and Taxol to be given concurrently weekly. She is pleased to have her PEG tube out and has much less pain although she still has pain at the site.   Dr. Mark Greer note said that she drained so much fluid he had to apply an ostomy bag. She also has pain at her port site but there is minimal inflammation and no obvious infection and there is no pus. She was instructed to call if there is any change in this finding. Syed Moreno says that she is swallowing fine at this time. Her current weight is 145 pounds which is up 3 pounds since last time she was weighed here about 2 weeks ago. She denies fevers, chills, sweats, bleeding, constipation or diarrhea. She says that she has her usual nausea but no vomiting. She continues to smoke. The patient had simulation with her mask on today. She was pretreated with Xanax. She is somewhat sleepy. Sugar was markedly elevated. I do not know if she took her insulin today. INTERVAL NOTE     1/5/2023. Mrs. Adalid Green is here today with her significant other Gene. She has started her radiation and tomorrow she will get her chemotherapy. Her mouth continues to heal.  Her gums are still somewhat swollen so that she cannot get fitted for her dentures yet. Her abdominal pain is markedly improved and her PEG tube site has healed over without any signs of infection or inflammation. Mrs. Adalid Green says that the liquid pain medication that she has been given is her throat almost immediately. I do not know if this is a placebo effect or if there is something in the oral preparation that actually improves the pain in her oropharynx. I told this to Mrs. Vyas and I offered miles mixture as an alternative to a narcotic analgesic and she accepted this offer. Dr. Som Dominguez and I have agreed that her primary care physician will monitor her her pain medication and prescribe that for her. She says that she is extremely anxious and had to take several doses of Xanax today as she woke up extremely early so that she could be calm enough to come into the office today    She has gained weight. If we can believe our scales she has gained 6 pounds. She says that she had fever a week ago. She denies any bleeding. She has had some nausea. She has had diarrhea and she has had nausea and vomiting. This is all before she starts any chemotherapy or radiation. INTERVAL NOTE    1/19/2023. She is here today to continue her radiation therapy with concurrent chemotherapy. She is receiving carboplatin and Taxol on a weekly basis. She tolerated her treatment well. She has had problems with sweats and also has had ongoing vomiting. She has actually gained weight and does not have any peripheral edema. Her appetite is on and off. She has had no increase in shortness of breath. She does have a cough but this is not new. She has had no constipation or diarrhea. She is here with her significant other, Ladarius. Her laboratory data is acceptable to continue her chemotherapy today. MONITORING PARAMETERS    Physical examinations, CAT scans, PET scans, MRIs    PAST MEDICAL HISTORY  Past Medical History:   Diagnosis Date    Above knee amputation of left lower extremity (HCC)     Blood circulation, collateral     CAD (coronary artery disease)     Diabetes mellitus ([facility] [address]     Hyperlipidemia     Hypertension     MDRO (multiple drug resistant organisms) resistance     MRSA wound 2013    MI, old 01/01/2007    Obesity (BMI 30-39. 9)     Status post skin graft 01/15/2014    Amniox - Dr Gopal Walsh cancer [facility]) 10/2022    squamous cell carcinoma of the epiglottis        REVIEW OF SYSTEMS  Review of Systems   Constitutional:  Positive for fatigue. Negative for appetite change and fever. HENT:  Negative for dental problem, mouth sores, sore throat and trouble swallowing. Eyes:  Negative for visual disturbance. Respiratory:  Negative for cough and shortness of breath. Cardiovascular:  Positive for leg swelling. Gastrointestinal:  Negative for constipation, diarrhea, nausea and vomiting. Endocrine: Negative for polyuria. Genitourinary:  Negative for dysuria, frequency, hematuria and urgency.    Musculoskeletal:  Positive for arthralgias (generalized). Skin:  Negative for pallor. Neurological:  Positive for weakness. Negative for light-headedness and headaches. Hematological:  Does not bruise/bleed easily. Psychiatric/Behavioral:  Negative for self-injury, sleep disturbance and suicidal ideas. The patient is not nervous/anxious. FAMILY HISTORY  Family History   Problem Relation Age of Onset    No Known Problems Brother     Heart Disease Maternal Grandfather     High Blood Pressure Maternal Grandfather     Asthma Neg Hx     Cancer Neg Hx     Diabetes Neg Hx     Kidney Disease Neg Hx     Stroke Neg Hx         SOCIAL HISTORY  Social History     Socioeconomic History    Marital status:       Spouse name: Not on file    Number of children: 1    Years of education: Not on file    Highest education level: Not on file   Occupational History    Not on file   Tobacco Use    Smoking status: Former     Packs/day: 0.50     Years: 27.00     Pack years: 13.50     Types: Cigarettes     Quit date: 2014     Years since quittin.3    Smokeless tobacco: Never    Tobacco comments:     e cigs daily use 1/3/22   Vaping Use    Vaping Use: Some days    Substances: Nicotine   Substance and Sexual Activity    Alcohol use: No    Drug use: No    Sexual activity: Not on file   Other Topics Concern    Not on file   Social History Narrative    Not on file     Social Determinants of Health     Financial Resource Strain: Not on file   Food Insecurity: Not on file   Transportation Needs: Not on file   Physical Activity: Not on file   Stress: Not on file   Social Connections: Not on file   Intimate Partner Violence: Not on file   Housing Stability: Not on file        CURRENT MEDICATIONS  Current Outpatient Medications   Medication Sig Dispense Refill    Magic Mouthwash (MIRACLE MOUTHWASH) Swish and spit 5 mLs 4 times daily as needed for Irritation 300 mL 1    prochlorperazine (COMPAZINE) 10 MG tablet Take 1 tablet by mouth every 6 hours as needed (as needed for nausea) 60 tablet 3    ondansetron (ZOFRAN) 4 MG tablet Take 1 tablet by mouth every 8 hours as needed for Nausea or Vomiting 20 tablet 1    lidocaine-prilocaine (EMLA) 2.5-2.5 % cream Apply topically as needed. 5 g 4    amoxicillin (AMOXIL) 125 MG/5ML suspension Take by mouth 3 times daily Prescribed by family doctor unsure of dose      zolpidem (AMBIEN) 10 MG tablet TAKE 1 TABLET BY MOUTH AT NIGHT      pantoprazole (PROTONIX) 40 MG tablet Take 1 tablet by mouth every morning (before breakfast) 90 tablet 1    ALPRAZolam (XANAX) 1 MG tablet Take 1 mg by mouth as needed. oxyCODONE-acetaminophen (PERCOCET) 5-325 MG per tablet Take 2 tablets by mouth every 8 hours as needed for Pain. clopidogrel (PLAVIX) 75 MG tablet Take 1 tablet by mouth daily      TRULICITY 1.5 TB/5.3JM SC injection Inject 1.5 mg into the skin once a week      HUMALOG KWIKPEN 100 UNIT/ML SOPN       fentaNYL 37.5 MCG/HR PT72 Apply 1 patch topically. JARDIANCE 25 MG tablet Take 1 tablet by mouth daily      metFORMIN (GLUCOPHAGE) 500 MG tablet Take 2 tablets by mouth 2 times daily Resume 8/30 60 tablet 3    pioglitazone (ACTOS) 30 MG tablet Take 1 tablet by mouth daily 30 tablet 3    atorvastatin (LIPITOR) 20 MG tablet Take 2 tablets by mouth nightly 30 tablet 3    lisinopril (PRINIVIL;ZESTRIL) 2.5 MG tablet Take 1 tablet by mouth daily 30 tablet 3    metoprolol tartrate (LOPRESSOR) 25 MG tablet Take 1 tablet by mouth 2 times daily 60 tablet 3    isosorbide dinitrate (ISORDIL) 20 MG tablet Take 30 mg by mouth daily      amLODIPine (NORVASC) 5 MG tablet Take 5 mg by mouth daily      budesonide-formoterol (SYMBICORT) 160-4.5 MCG/ACT AERO Inhale 2 puffs into the lungs 2 times daily      pregabalin (LYRICA) 150 MG capsule Take 150 mg by mouth 2 times daily      ALPRAZolam (XANAX) 0.5 MG tablet Take 0.5 mg by mouth 2 times daily as needed.       aspirin 81 MG tablet Take 81 mg by mouth daily      albuterol (PROVENTIL HFA) 108 (90 BASE) MCG/ACT inhaler Inhale 2 puffs into the lungs every 4 hours as needed for Wheezing or Shortness of Breath (Space out to every 6 hours as symptoms improve). Space out to every 6 hours as symptoms improve. 1 Inhaler 10    nitroGLYCERIN (NITROSTAT) 0.4 MG SL tablet Place 1 tablet under the tongue every 5 minutes as needed for Chest pain Take 1 tablet for chest pain and repeat after 5 min if no relief, call 911 and take another dose 5 min later. Max of 3 doses in 15 min. (Patient not taking: Reported on 1/19/2023) 25 tablet 3     No current facility-administered medications for this visit. Facility-Administered Medications Ordered in Other Visits   Medication Dose Route Frequency Provider Last Rate Last Admin    sodium chloride flush 0.9 % injection 5-40 mL  5-40 mL IntraVENous PRN Jamil Rob MD   20 mL at 01/19/23 1111    heparin flush 100 UNIT/ML injection 500 Units  500 Units IntraCATHeter PRN Jamil Rob MD            O[facility]    PDMP Monitoring:    Last PDMP Bhavani Roche as Reviewed [facility]):  Review User Review Instant Review Result   Artemio Hong 10/23/2022  7:08 PM Reviewed PDMP [1]       PHYSICAL EXAM    Physical Exam  Vitals and nursing note reviewed. Exam conducted with a chaperone present. Constitutional:       General: She is not in acute distress. Appearance: She is ill-appearing. She is not toxic-appearing or diaphoretic. HENT:      Head: Normocephalic and atraumatic. Mouth/Throat:      Mouth: Mucous membranes are moist.      Pharynx: Oropharynx is clear. No oropharyngeal exudate or posterior oropharyngeal erythema. Comments: She has 1 retained tooth in her lower right jaw anteriorly. Her extensive extractions are gradually healing and the swelling is going down. There is no evidence of thrush. Eyes:      General: No scleral icterus. Extraocular Movements: Extraocular movements intact. Pupils: Pupils are equal, round, and reactive to light.    Cardiovascular: Rate and Rhythm: Normal rate and regular rhythm. Pulmonary:      Effort: Pulmonary effort is normal.      Breath sounds: Normal breath sounds. Abdominal:      General: There is no distension. Palpations: Abdomen is soft. There is no mass. Tenderness: There is no abdominal tenderness. There is no guarding or rebound. Musculoskeletal:      Cervical back: Normal range of motion and neck supple. Right lower leg: No edema. Lymphadenopathy:      Cervical: No cervical adenopathy. Skin:     General: Skin is warm and dry. Coloration: Skin is pale. Skin is not jaundiced. Neurological:      Mental Status: She is alert. Comments: She is in a wheelchair due to previous AKA amputation on left   Psychiatric:         Mood and Affect: Mood normal.         Behavior: Behavior normal.         Thought Content: Thought content normal.         Judgment: Judgment normal.        ECOG STATUS    2:  Ambulatory and capable of all self-care but unable to carry out any work activities: up and about more than 50% of waking hours  Patient is not ambulatory but is in her wheelchair    LABS/IMAGING    Laboratory data today shows that her BUN is 16 and her creatinine is 0.5. Calcium is normal total protein is 5.8 with an albumin of 3.2. Liver function tests are normal.  Sugar was 65. CBC shows white count of 4.8 with a hemoglobin of 12 hematocrit 37.4 and a platelet count of 656,552. White blood cell differential count showed 54% polys, 33 lymphs 11 monos 2 eosinophils. PATHOLOGY/GENETICS    Squamous cell carcinoma of the epiglottis, p16 negative. ASSESSMENT and PLAN    1. Squamous cell carcinoma of the epiglottis, p16 negative. She is continuing concurrent radiation therapy and chemotherapy with carboplatin and Taxol. She seems to be tolerating it well. 2.  Anxiety and depression. She seems to be taking liberal doses of Xanax to control her anxiety. She says that her pain is controlled.     3. Multiple comorbidities. She will continue her current regimen and follow-up with her usual providers for her comorbidities including chronic pain, poorly controlled diabetes, atherosclerosis, coronary artery disease, hyperlipidemia, feeding difficulties and other problems. The patient and her significant other know to call if she has any change in her status, questions or concerns.   We will see her again in 2 weeks        Susan Hutton MD 1/19/2023 12:50 PM

## 2023-01-19 NOTE — PROGRESS NOTES
[facility] [address], [address]        [address] [facility], [address] [place] [address]        Meenu Finer: [phone]        F: [phone]       [place]            Dr. Opal Ulloa MD MS          Dr. Libertad Doyle MD PhD    ON TREATMENT VISIT (OTV) NOTE     Date of Service: 2023  Patient ID: Luana London   : 1970  MRN: [id number]   Acct Number: [de-identified]     RADIATION ONCOLOGY ATTENDING:  Selene Razo MD MS    DIAGNOSIS:  Cancer Staging  Cancer, epiglottis [facility])  Staging form: Larynx - Supraglottis, AJCC 8th Edition  - Clinical stage from 10/6/2022: Stage III (cT3, cN0, cM0) - Signed by Grayson Quesada MD on 2022      Treatment Area: Head/Neck    Current Total Dose(cGy): 1000  Current Fraction: 10/35  Final/Cumulative Rx. Dose (cGy): 7000    Patient was seen today for weekly visit. Wt Readings from Last 3 Encounters:   23 152 lb (68.9 kg)   23 152 lb (68.9 kg)   23 152 lb (68.9 kg)       /65   Pulse 63   Temp 97.9 °F (36.6 °C) (Oral)   Resp 16   Wt 152 lb (68.9 kg)   SpO2 97%   BMI 28.72 kg/m²     Lab Results   Component Value Date    WBC 4.8 2023     2023       Comfort Alteration  Fatigue:Must curtail daily activities even with rest periods and early bedtime    Pain Location: Throat  Pain Intensity (Current): 8 Very severe pain  Pain Treatment: Opioid  Pain Relief: Pain relieved 75%    Emotional Alteration:   Coping: somewhat effective    Nutritional Alteration  Anorexia: Loss of appetite but able to eat a small amount of food and/or liquids  Nausea: 3-4 episodes of nausea in 24 hours, not resolved with nausea medication  Vomiting: No vomiting , occasionally  Salivary Glands- Acute:  Moderate dryness, thick sticky saliva, markedly altered taste, biotene spray  Taste Disturbance (Dysgeusia): Slightly altered  Dyspepsia/Heartburn: None  Dysphagia/Esophagitis: None    Skin Alteration   Skin reaction: No changes noted  Alopecia: No loss    Mucous Membrane Alteration  Mucositis XRT Related: None  Pharynx and Esophagus- Acute: No change over baseline  Voice Changes: Normal    Ventilation Alteration  Cough: Productive  Hemoptysis: None  Dyspnea: Normal  Mucous Quantity/Quality: White/grey foam mucous    CNS Alteration  Level of Consciousness: Alert, responds briskly, appropriately with minimal stimulus  Seizure Activity: None  Insomnia: Occasional difficulty sleeping not interfering with function  Orientation: Oriented in 3 spheres of person, place, and time  Comment:   Speech Impairment: No  Ataxia: Normal    Additional Comments: Not using any lotions/creams at this time. MEDICATIONS:     Current Outpatient Medications   Medication Sig Dispense Refill    Magic Mouthwash (MIRACLE MOUTHWASH) Swish and spit 5 mLs 4 times daily as needed for Irritation 300 mL 1    prochlorperazine (COMPAZINE) 10 MG tablet Take 1 tablet by mouth every 6 hours as needed (as needed for nausea) 60 tablet 3    ondansetron (ZOFRAN) 4 MG tablet Take 1 tablet by mouth every 8 hours as needed for Nausea or Vomiting 20 tablet 1    lidocaine-prilocaine (EMLA) 2.5-2.5 % cream Apply topically as needed. 5 g 4    amoxicillin (AMOXIL) 125 MG/5ML suspension Take by mouth 3 times daily Prescribed by family doctor unsure of dose      zolpidem (AMBIEN) 10 MG tablet TAKE 1 TABLET BY MOUTH AT NIGHT      pantoprazole (PROTONIX) 40 MG tablet Take 1 tablet by mouth every morning (before breakfast) 90 tablet 1    ALPRAZolam (XANAX) 1 MG tablet Take 1 mg by mouth as needed. oxyCODONE-acetaminophen (PERCOCET) 5-325 MG per tablet Take 2 tablets by mouth every 8 hours as needed for Pain. clopidogrel (PLAVIX) 75 MG tablet Take 1 tablet by mouth daily      TRULICITY 1.5 LT/5.2BB SC injection Inject 1.5 mg into the skin once a week      HUMALOG KWIKPEN 100 UNIT/ML SOPN       fentaNYL 37.5 MCG/HR PT72 Apply 1 patch topically. JARDIANCE 25 MG tablet Take 1 tablet by mouth daily      nitroGLYCERIN (NITROSTAT) 0.4 MG SL tablet Place 1 tablet under the tongue every 5 minutes as needed for Chest pain Take 1 tablet for chest pain and repeat after 5 min if no relief, call 911 and take another dose 5 min later. Max of 3 doses in 15 min. (Patient not taking: Reported on 1/19/2023) 25 tablet 3    metFORMIN (GLUCOPHAGE) 500 MG tablet Take 2 tablets by mouth 2 times daily Resume 8/30 60 tablet 3    pioglitazone (ACTOS) 30 MG tablet Take 1 tablet by mouth daily 30 tablet 3    atorvastatin (LIPITOR) 20 MG tablet Take 2 tablets by mouth nightly 30 tablet 3    lisinopril (PRINIVIL;ZESTRIL) 2.5 MG tablet Take 1 tablet by mouth daily 30 tablet 3    metoprolol tartrate (LOPRESSOR) 25 MG tablet Take 1 tablet by mouth 2 times daily 60 tablet 3    isosorbide dinitrate (ISORDIL) 20 MG tablet Take 30 mg by mouth daily      amLODIPine (NORVASC) 5 MG tablet Take 5 mg by mouth daily      budesonide-formoterol (SYMBICORT) 160-4.5 MCG/ACT AERO Inhale 2 puffs into the lungs 2 times daily      pregabalin (LYRICA) 150 MG capsule Take 150 mg by mouth 2 times daily      ALPRAZolam (XANAX) 0.5 MG tablet Take 0.5 mg by mouth 2 times daily as needed. aspirin 81 MG tablet Take 81 mg by mouth daily      albuterol (PROVENTIL HFA) 108 (90 BASE) MCG/ACT inhaler Inhale 2 puffs into the lungs every 4 hours as needed for Wheezing or Shortness of Breath (Space out to every 6 hours as symptoms improve). Space out to every 6 hours as symptoms improve. 1 Inhaler 10     No current facility-administered medications for this encounter.      Facility-Administered Medications Ordered in Other Encounters   Medication Dose Route Frequency Provider Last Rate Last Admin    sodium chloride flush 0.9 % injection 5-40 mL  5-40 mL IntraVENous PRN Jose A Simms MD   20 mL at 01/19/23 1111    heparin flush 100 UNIT/ML injection 500 Units  500 Units IntraCATHeter PRN Jose A Simms MD palonosetron (ALOXI) injection 0.25 mg  0.25 mg IntraVENous Once Marcelino Malik MD        dexamethasone (DECADRON) 12 mg in sodium chloride 0.9 % 50 mL IVPB  12 mg IntraVENous Once Marcelino Malik MD        diphenhydrAMINE (BENADRYL) injection 50 mg  50 mg IntraVENous Once Marcelino Malik MD        famotidine (PEPCID) 20 mg in sodium chloride (PF) 0.9 % 10 mL injection  20 mg IntraVENous Once Marcelino Malik MD        0.9 % sodium chloride infusion  5-250 mL/hr IntraVENous PRN Marcelino Malik MD        PACLitaxel (TAXOL) 78 mg in sodium chloride 0.9 % 250 mL chemo infusion  45 mg/m2 (Treatment Plan Recorded) IntraVENous Once Marcelino Malik MD        CARBOplatin (PARAPLATIN) 219 mg in sodium chloride 0.9 % 250 mL chemo IVPB  219 mg IntraVENous Once Marcelino Malki MD            PHYSICAL EXAM:       ECOG: 3 - Symptomatic, >50% in bed, but not bedbound (Capable of only limited self-care, confined to bed or chair 50% or more of waking hours)     General: NAD, AO x 3, Mentation is clear with appropriate affect. HEENT:  Oral mucosa without notable lesion/exudate   Thorax:  Unlabored  Abdomen:  Non-distended  Neuro:  Cranial nerves grossly intact; no focal deficits  Skin - treatment portal: SEE above. No skin reaction noted    Chemotherapy Update: Concurrent chemotherapy    Treatment Imaging: CBCT and All imaging reviewed and approved by Dr. Amina Dial: Mild to moderate radiation side effects. Responding appropriately to symptomatic management. Feeling better but still having a lot of pain and nausea. Reports stable dysphagia - eating soft and bite-sized foods. Feels she has adequate nutrition and hydration; reinforced importance of this. Reports chronic uncontrolled throat pain, on pain medicines from another provider. New medications, diagnostic results: Continue treatment as planned    PLAN: Again reviewed potential side effects of radiation for the patient's treatment. Continue local/topical care. Continue current radiation course as prescribed.

## 2023-01-19 NOTE — PROGRESS NOTES
Patient assessed for the following post chemotherapy:    Dizziness   No  Lightheadedness  No      Acute nausea/vomiting No  Headache   No  Chest pain/pressure  No  Rash/itching   No  Shortness of breath  No    Patient kept for 20 minutes observation post infusion chemotherapy. Patient tolerated chemotherapy treatment Taxol /Carboplatin without any complications. Last vital signs:   /65   Pulse 75   Temp 97.8 °F (36.6 °C) (Oral)   Resp 16   Ht 5' 1\" (1.549 m)   Wt 152 lb (68.9 kg)   SpO2 98%   BMI 28.72 kg/m²       Patient instructed if experience any of the above symptoms following today's infusion,he/she is to notify MD immediately or go to the emergency department. Discharge instructions given to patient. Verbalizes understanding. Transported off unit in wheelchair  with belongings.

## 2023-01-20 ENCOUNTER — CLINICAL DOCUMENTATION (OUTPATIENT)
Dept: NUTRITION | Age: 53
End: 2023-01-20

## 2023-01-20 ENCOUNTER — HOSPITAL ENCOUNTER (OUTPATIENT)
Dept: RADIATION ONCOLOGY | Age: 53
Discharge: HOME | End: 2023-01-20
Payer: MEDICARE

## 2023-01-20 PROCEDURE — 77386 HC NTSTY MODUL RAD TX DLVR CPLX: CPT | Performed by: RADIOLOGY

## 2023-01-20 NOTE — PROGRESS NOTES
Nutrition Assessment    Reason for Visit:   1/20/23 - cancer of epiglottis    Nutrition Recommendations:   ONS dailiy (Glucerna)  Fluids as tolerated  Soft, easy to swallow foods    Malnutrition Assessment: (1/20/23)  Malnutrition Status: no malnutrition   Context:  Findings of the 6 clinical characteristics of malnutrition (minimum of 2 out of 6 clinical characteristics is required to make the dx of moderate or severe Protein Calorie Malnutrition based on AND/ASPEN Guidelines):   1. Energy Intake: meeting needs with drinking glucerna   2. Weight Loss: weight up 6#   3. Fat Loss: no evident loss   4. Muscle Loss: no evident loss   5. Fluid Accumulation: none noted   6.  Strength: not measured    Nutrition Diagnosis:   Problem: altered GI function  Etiology: cancer of epiglottis  Signs and Symptoms: swallowing difficulty    Nutrition Assessment:   Subjective:   Patient seen following radiation therapy today. Patient reports that she intakes mostly liquids (Glucerna) and water. Patient says that she is trying mashed potatoes and other soft foods. Patient aware that her swallowing will become more difficult as radiation progresses. Patient has gained about 6# since starting to drink Glucerna.    Current Nutrition:   Oral Diet:   liquids and soft as tolerated   Oral Diet Intake:  good liquid intake, poor food intake    Oral Nutrition Supplement (ONS): Glucerna   ONS intake: 6 per day  Anthropometric Measures:   Ht:  5'1\"   Current Weight: 152# ([facility], 1/19/23)   Admit Weight: 146# ([facility], 1/12/23)   Usual Weight: 144# ([facility], 1/5/23)     Ideal Weight:  105#   BMI: 27     Nutrition Interventions:     Continue ONS as able - recommend Glucerna  Soft and easy to chew/swallow foods as tolerated - handout provided  Soft and moist protein - handout provided  Provided recipes on high calorie, high protein pudding, jello, custard, etc  Provided RD contact information    Nutrition Evaluation:          Evaluation: Goals set

## 2023-01-23 ENCOUNTER — HOSPITAL ENCOUNTER (OUTPATIENT)
Dept: RADIATION ONCOLOGY | Age: 53
Discharge: HOME | End: 2023-01-23
Payer: MEDICARE

## 2023-01-23 DIAGNOSIS — C32.1 MALIGNANT NEOPLASM OF EPIGLOTTIS (HCC): Primary | ICD-10-CM

## 2023-01-23 PROCEDURE — 77014 CHG CT GUIDANCE RADIATION THERAPY FLDS PLACEMENT: CPT | Performed by: RADIOLOGY

## 2023-01-23 PROCEDURE — 77386 HC NTSTY MODUL RAD TX DLVR CPLX: CPT | Performed by: RADIOLOGY

## 2023-01-23 RX ORDER — LIDOCAINE HYDROCHLORIDE 20 MG/ML
SOLUTION OROPHARYNGEAL
Qty: 100 ML | Refills: 1 | Status: SHIPPED | OUTPATIENT
Start: 2023-01-23

## 2023-01-24 ENCOUNTER — HOSPITAL ENCOUNTER (OUTPATIENT)
Dept: RADIATION ONCOLOGY | Age: 53
Discharge: HOME | End: 2023-01-24
Payer: MEDICARE

## 2023-01-24 PROCEDURE — 77014 CHG CT GUIDANCE RADIATION THERAPY FLDS PLACEMENT: CPT | Performed by: RADIOLOGY

## 2023-01-24 PROCEDURE — 77386 HC NTSTY MODUL RAD TX DLVR CPLX: CPT | Performed by: RADIOLOGY

## 2023-01-25 ENCOUNTER — APPOINTMENT (OUTPATIENT)
Dept: RADIATION ONCOLOGY | Age: 53
End: 2023-01-25
Payer: MEDICARE

## 2023-01-26 ENCOUNTER — HOSPITAL ENCOUNTER (OUTPATIENT)
Dept: INFUSION THERAPY | Age: 53
Discharge: HOME | End: 2023-01-26
Payer: MEDICARE

## 2023-01-26 ENCOUNTER — HOSPITAL ENCOUNTER (OUTPATIENT)
Dept: RADIATION ONCOLOGY | Age: 53
Discharge: HOME | End: 2023-01-26
Payer: MEDICARE

## 2023-01-26 VITALS
HEART RATE: 81 BPM | RESPIRATION RATE: 16 BRPM | OXYGEN SATURATION: 97 % | HEIGHT: 61 IN | DIASTOLIC BLOOD PRESSURE: 83 MMHG | BODY MASS INDEX: 27.56 KG/M2 | WEIGHT: 146 LBS | SYSTOLIC BLOOD PRESSURE: 137 MMHG | TEMPERATURE: 97.4 F

## 2023-01-26 VITALS
WEIGHT: 146 LBS | HEART RATE: 81 BPM | SYSTOLIC BLOOD PRESSURE: 137 MMHG | OXYGEN SATURATION: 97 % | RESPIRATION RATE: 16 BRPM | TEMPERATURE: 97.4 F | DIASTOLIC BLOOD PRESSURE: 83 MMHG | BODY MASS INDEX: 27.59 KG/M2

## 2023-01-26 DIAGNOSIS — C32.1 MALIGNANT NEOPLASM OF EPIGLOTTIS (HCC): ICD-10-CM

## 2023-01-26 DIAGNOSIS — C32.1 CANCER, EPIGLOTTIS (HCC): Primary | ICD-10-CM

## 2023-01-26 LAB
ABSOLUTE IMMATURE GRANULOCYTE: 0.02 THOU/MM3 (ref 0–0.07)
ALBUMIN SERPL BCG-MCNC: 3.2 G/DL (ref 3.5–5.1)
ALP SERPL-CCNC: 134 U/L (ref 38–126)
ALT SERPL W/O P-5'-P-CCNC: 18 U/L (ref 11–66)
AST SERPL-CCNC: 12 U/L (ref 5–40)
BASOPHILS ABSOLUTE: 0 THOU/MM3 (ref 0–0.1)
BASOPHILS NFR BLD AUTO: 0 % (ref 0–3)
BILIRUB CONJ SERPL-MCNC: < 0.2 MG/DL (ref 0–0.3)
BILIRUB SERPL-MCNC: 0.2 MG/DL (ref 0.3–1.2)
BUN BLDP-MCNC: 18 MG/DL (ref 8–26)
CHLORIDE BLD-SCNC: 103 MEQ/L (ref 98–109)
CREAT BLD-MCNC: 0.7 MG/DL (ref 0.5–1.2)
EOSINOPHIL NFR BLD AUTO: 0 % (ref 0–4)
EOSINOPHILS ABSOLUTE: 0 THOU/MM3 (ref 0–0.4)
ERYTHROCYTE [DISTWIDTH] IN BLOOD BY AUTOMATED COUNT: 14.9 % (ref 11.5–14.5)
GFR SERPL CREATININE-BSD FRML MDRD: > 60 ML/MIN/1.73M2
GLUCOSE BLD-MCNC: 341 MG/DL (ref 70–108)
HCT VFR BLD AUTO: 38.1 % (ref 37–47)
HGB BLD-MCNC: 12.5 GM/DL (ref 12–16)
IMMATURE GRANULOCYTES: 0 %
IONIZED CALCIUM, WHOLE BLOOD: 1.19 MMOL/L (ref 1.12–1.32)
LYMPHOCYTES ABSOLUTE: 1 THOU/MM3 (ref 1–4.8)
LYMPHOCYTES NFR BLD AUTO: 12 % (ref 15–47)
MCH RBC QN AUTO: 33.2 PG (ref 26–33)
MCHC RBC AUTO-ENTMCNC: 32.8 GM/DL (ref 32.2–35.5)
MCV RBC AUTO: 101 FL (ref 81–99)
MONOCYTES ABSOLUTE: 0.5 THOU/MM3 (ref 0.4–1.3)
MONOCYTES NFR BLD AUTO: 7 % (ref 0–12)
NEUTROPHILS NFR BLD AUTO: 80 % (ref 43–75)
PLATELET # BLD AUTO: 357 THOU/MM3 (ref 130–400)
PMV BLD AUTO: 10.1 FL (ref 9.4–12.4)
POTASSIUM BLD-SCNC: 3.9 MEQ/L (ref 3.5–4.9)
PROT SERPL-MCNC: 6.1 G/DL (ref 6.1–8)
RBC # BLD AUTO: 3.77 MILL/MM3 (ref 4.2–5.4)
SEGMENTED NEUTROPHILS ABSOLUTE COUNT: 6.2 THOU/MM3 (ref 1.8–7.7)
SODIUM BLD-SCNC: 139 MEQ/L (ref 138–146)
TOTAL CO2, WHOLE BLOOD: 26 MEQ/L (ref 23–33)
WBC # BLD AUTO: 7.8 THOU/MM3 (ref 4.8–10.8)

## 2023-01-26 PROCEDURE — 96375 TX/PRO/DX INJ NEW DRUG ADDON: CPT

## 2023-01-26 PROCEDURE — 96367 TX/PROPH/DG ADDL SEQ IV INF: CPT

## 2023-01-26 PROCEDURE — 36591 DRAW BLOOD OFF VENOUS DEVICE: CPT

## 2023-01-26 PROCEDURE — 96417 CHEMO IV INFUS EACH ADDL SEQ: CPT

## 2023-01-26 PROCEDURE — 85025 COMPLETE CBC W/AUTO DIFF WBC: CPT

## 2023-01-26 PROCEDURE — 80047 BASIC METABLC PNL IONIZED CA: CPT

## 2023-01-26 PROCEDURE — 99211 OFF/OP EST MAY X REQ PHY/QHP: CPT

## 2023-01-26 PROCEDURE — 80076 HEPATIC FUNCTION PANEL: CPT

## 2023-01-26 PROCEDURE — A4216 STERILE WATER/SALINE, 10 ML: HCPCS | Performed by: INTERNAL MEDICINE

## 2023-01-26 PROCEDURE — 77386 HC NTSTY MODUL RAD TX DLVR CPLX: CPT | Performed by: RADIOLOGY

## 2023-01-26 PROCEDURE — 2580000003 HC RX 258: Performed by: INTERNAL MEDICINE

## 2023-01-26 PROCEDURE — 2500000003 HC RX 250 WO HCPCS: Performed by: INTERNAL MEDICINE

## 2023-01-26 PROCEDURE — 6360000002 HC RX W HCPCS: Performed by: INTERNAL MEDICINE

## 2023-01-26 PROCEDURE — 96413 CHEMO IV INFUSION 1 HR: CPT

## 2023-01-26 RX ORDER — DIPHENHYDRAMINE HYDROCHLORIDE 50 MG/ML
50 INJECTION INTRAMUSCULAR; INTRAVENOUS
Status: CANCELLED | OUTPATIENT
Start: 2023-01-26

## 2023-01-26 RX ORDER — SODIUM CHLORIDE 9 MG/ML
5-250 INJECTION, SOLUTION INTRAVENOUS PRN
Status: CANCELLED | OUTPATIENT
Start: 2023-01-26

## 2023-01-26 RX ORDER — SODIUM CHLORIDE 9 MG/ML
5-40 INJECTION INTRAVENOUS PRN
Status: CANCELLED | OUTPATIENT
Start: 2023-01-26

## 2023-01-26 RX ORDER — MEPERIDINE HYDROCHLORIDE 50 MG/ML
12.5 INJECTION INTRAMUSCULAR; INTRAVENOUS; SUBCUTANEOUS PRN
Status: CANCELLED | OUTPATIENT
Start: 2023-01-26

## 2023-01-26 RX ORDER — SODIUM CHLORIDE 0.9 % (FLUSH) 0.9 %
5-40 SYRINGE (ML) INJECTION PRN
Status: DISCONTINUED | OUTPATIENT
Start: 2023-01-26 | End: 2023-01-27 | Stop reason: HOSPADM

## 2023-01-26 RX ORDER — FAMOTIDINE 10 MG/ML
20 INJECTION, SOLUTION INTRAVENOUS ONCE
Status: CANCELLED | OUTPATIENT
Start: 2023-01-26 | End: 2023-01-27

## 2023-01-26 RX ORDER — EPINEPHRINE 1 MG/ML
0.3 INJECTION, SOLUTION, CONCENTRATE INTRAVENOUS PRN
Status: CANCELLED | OUTPATIENT
Start: 2023-01-26

## 2023-01-26 RX ORDER — DIPHENHYDRAMINE HYDROCHLORIDE 50 MG/ML
50 INJECTION INTRAMUSCULAR; INTRAVENOUS ONCE
Status: COMPLETED | OUTPATIENT
Start: 2023-01-26 | End: 2023-01-26

## 2023-01-26 RX ORDER — ALBUTEROL SULFATE 90 UG/1
4 AEROSOL, METERED RESPIRATORY (INHALATION) PRN
Status: CANCELLED | OUTPATIENT
Start: 2023-01-26

## 2023-01-26 RX ORDER — HEPARIN SODIUM (PORCINE) LOCK FLUSH IV SOLN 100 UNIT/ML 100 UNIT/ML
500 SOLUTION INTRAVENOUS PRN
Status: CANCELLED | OUTPATIENT
Start: 2023-01-26

## 2023-01-26 RX ORDER — PALONOSETRON 0.05 MG/ML
0.25 INJECTION, SOLUTION INTRAVENOUS ONCE
Status: CANCELLED | OUTPATIENT
Start: 2023-01-26 | End: 2023-01-27

## 2023-01-26 RX ORDER — SODIUM CHLORIDE 0.9 % (FLUSH) 0.9 %
5-40 SYRINGE (ML) INJECTION PRN
Status: CANCELLED | OUTPATIENT
Start: 2023-01-26

## 2023-01-26 RX ORDER — SODIUM CHLORIDE 9 MG/ML
25 INJECTION, SOLUTION INTRAVENOUS PRN
Status: CANCELLED | OUTPATIENT
Start: 2023-01-26

## 2023-01-26 RX ORDER — ONDANSETRON 2 MG/ML
8 INJECTION INTRAMUSCULAR; INTRAVENOUS
Status: CANCELLED | OUTPATIENT
Start: 2023-01-26

## 2023-01-26 RX ORDER — SODIUM CHLORIDE 9 MG/ML
5-250 INJECTION, SOLUTION INTRAVENOUS PRN
Status: DISCONTINUED | OUTPATIENT
Start: 2023-01-26 | End: 2023-01-27 | Stop reason: HOSPADM

## 2023-01-26 RX ORDER — FAMOTIDINE 10 MG/ML
20 INJECTION, SOLUTION INTRAVENOUS
Status: CANCELLED | OUTPATIENT
Start: 2023-01-26

## 2023-01-26 RX ORDER — DIPHENHYDRAMINE HYDROCHLORIDE 50 MG/ML
50 INJECTION INTRAMUSCULAR; INTRAVENOUS ONCE
Status: CANCELLED | OUTPATIENT
Start: 2023-01-26 | End: 2023-01-27

## 2023-01-26 RX ORDER — PALONOSETRON 0.05 MG/ML
0.25 INJECTION, SOLUTION INTRAVENOUS ONCE
Status: COMPLETED | OUTPATIENT
Start: 2023-01-26 | End: 2023-01-26

## 2023-01-26 RX ORDER — HEPARIN SODIUM (PORCINE) LOCK FLUSH IV SOLN 100 UNIT/ML 100 UNIT/ML
500 SOLUTION INTRAVENOUS PRN
Status: DISCONTINUED | OUTPATIENT
Start: 2023-01-26 | End: 2023-01-27 | Stop reason: HOSPADM

## 2023-01-26 RX ORDER — SODIUM CHLORIDE 9 MG/ML
INJECTION, SOLUTION INTRAVENOUS CONTINUOUS
Status: CANCELLED | OUTPATIENT
Start: 2023-01-26

## 2023-01-26 RX ORDER — ACETAMINOPHEN 325 MG/1
650 TABLET ORAL
Status: CANCELLED | OUTPATIENT
Start: 2023-01-26

## 2023-01-26 RX ADMIN — Medication 500 UNITS: at 14:43

## 2023-01-26 RX ADMIN — SODIUM CHLORIDE, PRESERVATIVE FREE 10 ML: 5 INJECTION INTRAVENOUS at 12:12

## 2023-01-26 RX ADMIN — CARBOPLATIN 215.8 MG: 10 INJECTION, SOLUTION INTRAVENOUS at 14:04

## 2023-01-26 RX ADMIN — PALONOSETRON 0.25 MG: 0.05 INJECTION, SOLUTION INTRAVENOUS at 12:56

## 2023-01-26 RX ADMIN — PACLITAXEL 78 MG: 6 INJECTION, SOLUTION INTRAVENOUS at 12:58

## 2023-01-26 RX ADMIN — SODIUM CHLORIDE, PRESERVATIVE FREE 20 ML: 5 INJECTION INTRAVENOUS at 12:13

## 2023-01-26 RX ADMIN — SODIUM CHLORIDE 40 ML/HR: 9 INJECTION, SOLUTION INTRAVENOUS at 12:32

## 2023-01-26 RX ADMIN — DEXAMETHASONE SODIUM PHOSPHATE 12 MG: 4 INJECTION, SOLUTION INTRA-ARTICULAR; INTRALESIONAL; INTRAMUSCULAR; INTRAVENOUS; SOFT TISSUE at 12:33

## 2023-01-26 RX ADMIN — FAMOTIDINE 20 MG: 10 INJECTION INTRAVENOUS at 12:53

## 2023-01-26 RX ADMIN — DIPHENHYDRAMINE HYDROCHLORIDE 50 MG: 50 INJECTION, SOLUTION INTRAMUSCULAR; INTRAVENOUS at 12:50

## 2023-01-26 RX ADMIN — SODIUM CHLORIDE, PRESERVATIVE FREE 20 ML: 5 INJECTION INTRAVENOUS at 14:43

## 2023-01-26 ASSESSMENT — PAIN SCALES - GENERAL: PAINLEVEL_OUTOF10: 8

## 2023-01-26 ASSESSMENT — PAIN DESCRIPTION - LOCATION: LOCATION: THROAT

## 2023-01-26 NOTE — PROGRESS NOTES
[address] [facility] [address], [address]        MELISSA SCHROEDER JULISSA [place]UANCARL[place]OD[place],  [facility]: [phone]        F: [phone]       [employer]            Dr. Renny Gomez MD MS          Dr. Sacihn Hernandez MD PhD    ON TREATMENT VISIT (OTV) NOTE     Date of Service: 2023  Patient ID: Bryant Crockett   : 1970  MRN: [id number]   Acct Number: [de-identified]     RADIATION ONCOLOGY ATTENDING:  Louise Deluca MD MS    DIAGNOSIS:  Cancer Staging  Cancer, epiglottis [facility])  Staging form: Larynx - Supraglottis, AJCC 8th Edition  - Clinical stage from 10/6/2022: Stage III (cT3, cN0, cM0) - Signed by Angella Wolff MD on 2022      Treatment Area: Head/Neck    Current Total Dose(cGy): 2800  Current Fraction: 14  Final/Cumulative Rx. Dose (cGy): 7000    Patient was seen today for weekly visit. Wt Readings from Last 3 Encounters:   23 146 lb (66.2 kg)   23 146 lb (66.2 kg)   23 152 lb (68.9 kg)       /83   Pulse 81   Temp 97.4 °F (36.3 °C) (Tympanic)   Resp 16   Wt 146 lb (66.2 kg)   SpO2 97%   BMI 27.59 kg/m²     Lab Results   Component Value Date    WBC 7.8 2023     2023       Comfort Alteration  Fatigue:Must curtail daily activities even with rest periods and early bedtime, normal for patient    Pain Location: Throat  Pain Intensity (Current): 8 Very severe pain  Pain Treatment: Opioid  Pain Relief: Pain relieved 75%    Emotional Alteration:   Coping: somewhat effective    Nutritional Alteration  Anorexia: Loss of appetite but able to eat a small amount of food and/or liquids  Nausea: 3-4 episodes of nausea in 24 hours, not resolved with nausea medication  Vomiting: No vomiting , occasionally  Salivary Glands- Acute:  Moderate dryness, thick sticky saliva, markedly altered taste, biotene spray  Taste Disturbance (Dysgeusia): Slightly altered  Dyspepsia/Heartburn: None  Dysphagia/Esophagitis: Dysphagia, requires pureed, soft or liquid diet    Skin Alteration   Skin reaction: No changes noted  Alopecia: No loss    Mucous Membrane Alteration  Mucositis XRT Related: None  Pharynx and Esophagus- Acute: No change over baseline  Voice Changes: Normal    Ventilation Alteration  Cough: Productive  Hemoptysis: None  Dyspnea: Normal  Mucous Quantity/Quality: White/grey foam mucous    CNS Alteration  Level of Consciousness: Alert, responds briskly, appropriately with minimal stimulus  Seizure Activity: None  Insomnia: Occasional difficulty sleeping not interfering with function  Orientation: Oriented in 3 spheres of person, place, and time  Comment:   Speech Impairment: No  Ataxia: Normal    Additional Comments: CeraVe BID and Aquaphor at night. MEDICATIONS:     Current Outpatient Medications   Medication Sig Dispense Refill    lidocaine viscous hcl (XYLOCAINE) 2 % SOLN solution Take every four hours as needed. Follow directions as given in clinic 100 mL 1    Magic Mouthwash (MIRACLE MOUTHWASH) Swish and spit 5 mLs 4 times daily as needed for Irritation 300 mL 1    prochlorperazine (COMPAZINE) 10 MG tablet Take 1 tablet by mouth every 6 hours as needed (as needed for nausea) 60 tablet 3    ondansetron (ZOFRAN) 4 MG tablet Take 1 tablet by mouth every 8 hours as needed for Nausea or Vomiting 20 tablet 1    lidocaine-prilocaine (EMLA) 2.5-2.5 % cream Apply topically as needed. 5 g 4    amoxicillin (AMOXIL) 125 MG/5ML suspension Take by mouth 3 times daily Prescribed by family doctor unsure of dose      zolpidem (AMBIEN) 10 MG tablet TAKE 1 TABLET BY MOUTH AT NIGHT      pantoprazole (PROTONIX) 40 MG tablet Take 1 tablet by mouth every morning (before breakfast) 90 tablet 1    ALPRAZolam (XANAX) 1 MG tablet Take 1 mg by mouth as needed. oxyCODONE-acetaminophen (PERCOCET) 5-325 MG per tablet Take 2 tablets by mouth every 8 hours as needed for Pain.       clopidogrel (PLAVIX) 75 MG tablet Take 1 tablet by mouth daily      TRULICITY 1.5 KT/8.7LP SC injection Inject 1.5 mg into the skin once a week      HUMALOG KWIKPEN 100 UNIT/ML SOPN       fentaNYL 37.5 MCG/HR PT72 Apply 1 patch topically. JARDIANCE 25 MG tablet Take 1 tablet by mouth daily      nitroGLYCERIN (NITROSTAT) 0.4 MG SL tablet Place 1 tablet under the tongue every 5 minutes as needed for Chest pain Take 1 tablet for chest pain and repeat after 5 min if no relief, call 911 and take another dose 5 min later. Max of 3 doses in 15 min. (Patient not taking: Reported on 1/19/2023) 25 tablet 3    metFORMIN (GLUCOPHAGE) 500 MG tablet Take 2 tablets by mouth 2 times daily Resume 8/30 60 tablet 3    pioglitazone (ACTOS) 30 MG tablet Take 1 tablet by mouth daily 30 tablet 3    atorvastatin (LIPITOR) 20 MG tablet Take 2 tablets by mouth nightly 30 tablet 3    lisinopril (PRINIVIL;ZESTRIL) 2.5 MG tablet Take 1 tablet by mouth daily 30 tablet 3    metoprolol tartrate (LOPRESSOR) 25 MG tablet Take 1 tablet by mouth 2 times daily 60 tablet 3    isosorbide dinitrate (ISORDIL) 20 MG tablet Take 30 mg by mouth daily      amLODIPine (NORVASC) 5 MG tablet Take 5 mg by mouth daily      budesonide-formoterol (SYMBICORT) 160-4.5 MCG/ACT AERO Inhale 2 puffs into the lungs 2 times daily      pregabalin (LYRICA) 150 MG capsule Take 150 mg by mouth 2 times daily      ALPRAZolam (XANAX) 0.5 MG tablet Take 0.5 mg by mouth 2 times daily as needed. aspirin 81 MG tablet Take 81 mg by mouth daily      albuterol (PROVENTIL HFA) 108 (90 BASE) MCG/ACT inhaler Inhale 2 puffs into the lungs every 4 hours as needed for Wheezing or Shortness of Breath (Space out to every 6 hours as symptoms improve). Space out to every 6 hours as symptoms improve. 1 Inhaler 10     No current facility-administered medications for this encounter.      Facility-Administered Medications Ordered in Other Encounters   Medication Dose Route Frequency Provider Last Rate Last Admin sodium chloride flush 0.9 % injection 5-40 mL  5-40 mL IntraVENous PRN Jacinta Duncan MD   20 mL at 01/26/23 1443    heparin flush 100 UNIT/ML injection 500 Units  500 Units IntraCATHeter PRN Jacinta Duncan MD   500 Units at 01/26/23 1443    0.9 % sodium chloride infusion  5-250 mL/hr IntraVENous PRKAREN Duncan MD 40 mL/hr at 01/26/23 1232 40 mL/hr at 01/26/23 1232        PHYSICAL EXAM:       ECOG: 3 - Symptomatic, >50% in bed, but not bedbound (Capable of only limited self-care, confined to bed or chair 50% or more of waking hours)     General: NAD, AO x 3, Mentation is clear with appropriate affect. HEENT:  Oral mucosa without obvious lesion/exudate   Thorax:  Unlabored  Abdomen:  Non-distended  Neuro:  Cranial nerves grossly intact; no focal deficits  Skin - treatment portal: SEE above. Mild erythema/hyperpigmentation of the neck    Chemotherapy Update: Concurrent chemotherapy    Treatment Imaging: CBCT and All imaging reviewed and approved by Dr. [place]: Mild to moderate radiation side effects. Responding appropriately to symptomatic management. Feels she has adequate nutrition and hydration; reinforced importance of this. Reports chronic uncontrolled throat pain, but just started MLB with some relief and she is on pain medicines from another provider. New medications, diagnostic results: Continue treatment as planned    PLAN: Again reviewed potential side effects of radiation for the patient's treatment. Continue local/topical care. Continue current radiation course as prescribed.

## 2023-01-26 NOTE — ONCOLOGY
Chemotherapy Administration    Pre-assessment Data: Antineoplastic Agents  See toxicity flow sheet for assessment                                          [x]         Interventions:   Chemotherapy SQ injection given []   Taxol administered-VS per protocol [x]   Blood pressure meds held 12 hours prior to Rituxan/Ruxience []   Rituxan/Ruxience administered- VS and precautions per guidelines []   Emergency drugs available as appropriate [x]   Anaphylaxis assessment completed [x]   Pre-medications administered as ordered [x]   Blood return noted upon initiation of chemotherapy [x]   Blood return noted each 1-2ml of a vesicant medication if given IV push []   Navelbine, Vincristine and Velban given as a monitored wide open drip, blood return noted before during and after infusion.  []   Blood return noted each 2-3ml of a non-vesicant medication if given IV push []   Patient aware of potential Immunotherapy toxicities []   Monitor for signs / symptoms of hypersensitivity reaction [x]   Chemotherapy orders (drug/dose/rate) verified by 2 Chemo certified RNs [x]   Monitor IV site and blood return throughout the infusion of the medication [x]   Document IV site checks on the IV assessment form [x]   Document chemotherapy teaching on the Patient Education tab [x]   Document patient verbalizes understanding of medications being administered- Taxol and carboplatin [x]   If IV infiltration, see ONS Guidelines []   Other:      []

## 2023-01-26 NOTE — PLAN OF CARE
Problem: Chronic Conditions and Co-morbidities  Goal: Patient's chronic conditions and co-morbidity symptoms are monitored and maintained or improved  Outcome: Adequate for Discharge  Flowsheets (Taken 1/26/2023 1531)  Care Plan - Patient's Chronic Conditions and Co-Morbidity Symptoms are Monitored and Maintained or Improved:   Monitor and assess patient's chronic conditions and comorbid symptoms for stability, deterioration, or improvement   Collaborate with multidisciplinary team to address chronic and comorbid conditions and prevent exacerbation or deterioration  Note: Chemotherapy Teaching     What is Chemotherapy   Drug action [x]   Method of Administration [x]   Handouts given []     Side Effects  Nausea/vomiting [x]   Diarrhea [x]   Fatigue [x]   Signs / Symptoms of infection [x]   Neutropenia [x]   Thrombocytopenia [x]   Alopecia [x]   neuropathy [x]   [place] diet &  the importance of fluids [x]       Micellaneous  Importance of nutrition [x]   Importance of oral hygiene [x]   When to call the MD [x]   Monitoring labs [x]   Use of supportive services []     Explanation of Drug Regimen / Frequency  Taxol and carboplatin     Comments  Verbalized understanding to drug,action,side effects and when to call MD         Problem: Safety - Adult  Goal: Free from fall injury  Outcome: Adequate for Discharge  Flowsheets (Taken 1/26/2023 1531)  Free From Fall Injury: Instruct family/caregiver on patient safety  Note: No falls this admission Patient aware of fall precautions for here and at home -call light in reach while here       Problem: Infection - Adult  Goal: Absence of infection at discharge  Outcome: Adequate for Discharge  Flowsheets (Taken 1/26/2023 1531)  Absence of infection at discharge:   Assess and monitor for signs and symptoms of infection   Monitor lab/diagnostic results   Monitor all insertion sites i.e., indwelling lines, tubes and drains   Instruct and encourage patient and family to use good hand  hygiene technique  Note: No signs of infection noted at [address]  [facility] [facility] site  Patient aware that is of increased risk for infection due to receiving chemotherapy and having a central venous catheter. Patient aware of signs and symptoms of infection and when to call the doctor      Problem: Discharge Planning  Goal: Discharge to home or other facility with appropriate resources  Outcome: Adequate for Discharge  Flowsheets (Taken 1/26/2023 1531)  Discharge to home or other facility with appropriate resources:   Identify barriers to discharge with patient and caregiver   Identify discharge learning needs (meds, wound care, etc)   Arrange for needed discharge resources and transportation as appropriate  Note: Discharge instructions given and reviewed with patient. All questions answered. Patient verbalized understanding Patient able to teach back follow up appointments and when to call the doctor. Patient offers no questions at this time    Care plan reviewed with patient and she verbalized understanding of the plan of care and contributed to goal setting.

## 2023-01-26 NOTE — PROGRESS NOTES
Patient tolerated Taxol and Carboplatin without any complications. Denies dizziness, lightheadedness, acute nausea or vomiting, headache, heart palpitations, rash/itching or increased SOB. Last vital signs  /83   Pulse 81   Temp 97.4 °F (36.3 °C) (Tympanic)   Resp 16   Ht 5' 1\" (1.549 m)   Wt 146 lb (66.2 kg)   SpO2 97%   BMI 27.59 kg/m²     Patient instructed if they experience any of the above symptoms following today's visit, he/she is to notify the Physician or go to the Emergency Dept. Discharge instructions given to patient, Verbalizes understanding. Ambulated off unit per self in stable condition with all belongings.

## 2023-01-26 NOTE — DISCHARGE INSTRUCTIONS
Please contact your Oncologist if you have any questions regarding the Taxol and Carbo that you received today. You are instructed to call the office or go to the Emergency Dept. If you experience any of the following symptoms:    Dizziness/lightheadedness   Acute nausea or vomiting-not relieved by medications  Headaches-not relieved by medications  New chest pain or pressure  New rash /itching  New shortness of breath  Fever,chills or signs or symptoms of infection    Make sure you are drinking 48 to 64 ounces of water daily-if you are unable to drink fluids let us know right away.

## 2023-01-27 ENCOUNTER — CLINICAL DOCUMENTATION (OUTPATIENT)
Dept: NUTRITION | Age: 53
End: 2023-01-27

## 2023-01-27 ENCOUNTER — HOSPITAL ENCOUNTER (OUTPATIENT)
Dept: RADIATION ONCOLOGY | Age: 53
Discharge: HOME | End: 2023-01-27
Payer: MEDICARE

## 2023-01-27 PROCEDURE — 77386 HC NTSTY MODUL RAD TX DLVR CPLX: CPT | Performed by: RADIOLOGY

## 2023-01-27 NOTE — PROGRESS NOTES
Glucerna. Current Nutrition:              Oral Diet:   liquids and soft as tolerated              Oral Diet Intake:  good liquid intake, fair food intake              Oral Nutrition Supplement (ONS): Glucerna              ONS intake: couple  Anthropometric Measures:              Ht:  5'1\"              Current Weight: 146# (EHR, 1/26/23), 152# (EHR, 1/19/23)              Admit Weight: 146# (EHR, 1/12/23)              Usual Weight: 144# (EHR, 1/5/23)                Ideal Weight:  105#              BMI: 27      Nutrition Interventions:     1/27/23:  -Encouraged to continue ONS, provided more samples (glucerna, ensure max, and premier protein). Provided glucerna coupons  -Encouraged to continue soft, moist foods. Patient says that she is utilizing the handouts provided to her on 1/20/23.   -Encouragement provided.  -Patient encouraged to call RD with needs  1/20/23:  Continue ONS as able - recommend Glucerna  Soft and easy to chew/swallow foods as tolerated - handout provided  Soft and moist protein - handout provided  Provided recipes on high calorie, high protein pudding, jello, custard, etc  Provided RD contact information     Nutrition Evaluation:          Evaluation: limited progress toward goals       Goals: Patient will consume 75% or greater of normal intake and maintain weight throughout treatment.          Monitoring: Oral intake, diet tolerance, weight, chewing/swallowing function, hydration     Signed: Electronically signed by Alyssa Ramirez RD, LD on 1/27/2023 at 2:10 PM     Contact number: [phone]

## 2023-01-30 ENCOUNTER — HOSPITAL ENCOUNTER (OUTPATIENT)
Dept: RADIATION ONCOLOGY | Age: 53
Discharge: HOME | End: 2023-01-30
Payer: MEDICARE

## 2023-01-30 PROCEDURE — 77014 CHG CT GUIDANCE RADIATION THERAPY FLDS PLACEMENT: CPT | Performed by: RADIOLOGY

## 2023-01-30 PROCEDURE — 77386 HC NTSTY MODUL RAD TX DLVR CPLX: CPT | Performed by: RADIOLOGY

## 2023-01-31 ENCOUNTER — HOSPITAL ENCOUNTER (OUTPATIENT)
Dept: RADIATION ONCOLOGY | Age: 53
Discharge: HOME | End: 2023-01-31
Payer: MEDICARE

## 2023-01-31 PROCEDURE — 77014 CHG CT GUIDANCE RADIATION THERAPY FLDS PLACEMENT: CPT | Performed by: RADIOLOGY

## 2023-01-31 PROCEDURE — 77386 HC NTSTY MODUL RAD TX DLVR CPLX: CPT | Performed by: RADIOLOGY

## 2023-02-01 ENCOUNTER — APPOINTMENT (OUTPATIENT)
Dept: RADIATION ONCOLOGY | Age: 53
End: 2023-02-01
Payer: MEDICARE

## 2023-02-02 ENCOUNTER — HOSPITAL ENCOUNTER (OUTPATIENT)
Dept: INFUSION THERAPY | Age: 53
Discharge: HOME | End: 2023-02-02
Payer: MEDICARE

## 2023-02-02 ENCOUNTER — OFFICE VISIT (OUTPATIENT)
Dept: ONCOLOGY | Age: 53
End: 2023-02-02

## 2023-02-02 ENCOUNTER — HOSPITAL ENCOUNTER (OUTPATIENT)
Dept: RADIATION ONCOLOGY | Age: 53
Discharge: HOME | End: 2023-02-02
Payer: MEDICARE

## 2023-02-02 VITALS
HEIGHT: 61 IN | DIASTOLIC BLOOD PRESSURE: 84 MMHG | RESPIRATION RATE: 16 BRPM | BODY MASS INDEX: 26.62 KG/M2 | HEART RATE: 79 BPM | TEMPERATURE: 98.7 F | WEIGHT: 141 LBS | OXYGEN SATURATION: 96 % | SYSTOLIC BLOOD PRESSURE: 143 MMHG

## 2023-02-02 VITALS
HEART RATE: 80 BPM | TEMPERATURE: 98.2 F | HEIGHT: 61 IN | WEIGHT: 141 LBS | RESPIRATION RATE: 16 BRPM | OXYGEN SATURATION: 97 % | SYSTOLIC BLOOD PRESSURE: 92 MMHG | DIASTOLIC BLOOD PRESSURE: 61 MMHG | BODY MASS INDEX: 26.62 KG/M2

## 2023-02-02 VITALS
DIASTOLIC BLOOD PRESSURE: 84 MMHG | TEMPERATURE: 98.7 F | SYSTOLIC BLOOD PRESSURE: 143 MMHG | BODY MASS INDEX: 26.64 KG/M2 | HEART RATE: 79 BPM | OXYGEN SATURATION: 96 % | WEIGHT: 141 LBS

## 2023-02-02 DIAGNOSIS — C32.1 CANCER, EPIGLOTTIS (HCC): Primary | ICD-10-CM

## 2023-02-02 DIAGNOSIS — C32.1 MALIGNANT NEOPLASM OF EPIGLOTTIS (HCC): ICD-10-CM

## 2023-02-02 DIAGNOSIS — C32.1 MALIGNANT NEOPLASM OF EPIGLOTTIS (HCC): Primary | ICD-10-CM

## 2023-02-02 DIAGNOSIS — E87.6 HYPOKALEMIA: ICD-10-CM

## 2023-02-02 DIAGNOSIS — E16.2 HYPOGLYCEMIA: ICD-10-CM

## 2023-02-02 DIAGNOSIS — Z51.11 ENCOUNTER FOR CHEMOTHERAPY MANAGEMENT: ICD-10-CM

## 2023-02-02 LAB
ABSOLUTE IMMATURE GRANULOCYTE: 0.01 THOU/MM3 (ref 0–0.07)
ALBUMIN SERPL BCG-MCNC: 3.5 G/DL (ref 3.5–5.1)
ALP SERPL-CCNC: 100 U/L (ref 38–126)
ALT SERPL W/O P-5'-P-CCNC: 18 U/L (ref 11–66)
AST SERPL-CCNC: 19 U/L (ref 5–40)
BASOPHILS ABSOLUTE: 0 THOU/MM3 (ref 0–0.1)
BASOPHILS NFR BLD AUTO: 0 % (ref 0–3)
BILIRUB CONJ SERPL-MCNC: < 0.2 MG/DL (ref 0–0.3)
BILIRUB SERPL-MCNC: 0.3 MG/DL (ref 0.3–1.2)
BUN BLDP-MCNC: 15 MG/DL (ref 8–26)
CHLORIDE BLD-SCNC: 98 MEQ/L (ref 98–109)
CREAT BLD-MCNC: 0.6 MG/DL (ref 0.5–1.2)
EOSINOPHIL NFR BLD AUTO: 1 % (ref 0–4)
EOSINOPHILS ABSOLUTE: 0 THOU/MM3 (ref 0–0.4)
ERYTHROCYTE [DISTWIDTH] IN BLOOD BY AUTOMATED COUNT: 14.9 % (ref 11.5–14.5)
GFR SERPL CREATININE-BSD FRML MDRD: > 60 ML/MIN/1.73M2
GLUCOSE BLD STRIP.AUTO-MCNC: 103 MG/DL (ref 70–108)
GLUCOSE BLD-MCNC: 46 MG/DL (ref 70–108)
HCT VFR BLD AUTO: 39.5 % (ref 37–47)
HGB BLD-MCNC: 13.3 GM/DL (ref 12–16)
IMMATURE GRANULOCYTES: 0 %
IONIZED CALCIUM, WHOLE BLOOD: 1.16 MMOL/L (ref 1.12–1.32)
LYMPHOCYTES ABSOLUTE: 0.6 THOU/MM3 (ref 1–4.8)
LYMPHOCYTES NFR BLD AUTO: 9 % (ref 15–47)
MCH RBC QN AUTO: 32.9 PG (ref 26–33)
MCHC RBC AUTO-ENTMCNC: 33.7 GM/DL (ref 32.2–35.5)
MCV RBC AUTO: 98 FL (ref 81–99)
MONOCYTES ABSOLUTE: 0.5 THOU/MM3 (ref 0.4–1.3)
MONOCYTES NFR BLD AUTO: 7 % (ref 0–12)
NEUTROPHILS NFR BLD AUTO: 83 % (ref 43–75)
PLATELET # BLD AUTO: 312 THOU/MM3 (ref 130–400)
PMV BLD AUTO: 10 FL (ref 9.4–12.4)
POTASSIUM BLD-SCNC: 3.2 MEQ/L (ref 3.5–4.9)
PROT SERPL-MCNC: 6.4 G/DL (ref 6.1–8)
RBC # BLD AUTO: 4.04 MILL/MM3 (ref 4.2–5.4)
SEGMENTED NEUTROPHILS ABSOLUTE COUNT: 5.7 THOU/MM3 (ref 1.8–7.7)
SODIUM BLD-SCNC: 138 MEQ/L (ref 138–146)
TOTAL CO2, WHOLE BLOOD: 30 MEQ/L (ref 23–33)
WBC # BLD AUTO: 6.9 THOU/MM3 (ref 4.8–10.8)

## 2023-02-02 PROCEDURE — 96366 THER/PROPH/DIAG IV INF ADDON: CPT

## 2023-02-02 PROCEDURE — 85025 COMPLETE CBC W/AUTO DIFF WBC: CPT

## 2023-02-02 PROCEDURE — 2580000003 HC RX 258: Performed by: INTERNAL MEDICINE

## 2023-02-02 PROCEDURE — 6360000002 HC RX W HCPCS: Performed by: NURSE PRACTITIONER

## 2023-02-02 PROCEDURE — 96367 TX/PROPH/DG ADDL SEQ IV INF: CPT

## 2023-02-02 PROCEDURE — 96375 TX/PRO/DX INJ NEW DRUG ADDON: CPT

## 2023-02-02 PROCEDURE — 96417 CHEMO IV INFUS EACH ADDL SEQ: CPT

## 2023-02-02 PROCEDURE — 6360000002 HC RX W HCPCS: Performed by: INTERNAL MEDICINE

## 2023-02-02 PROCEDURE — 96413 CHEMO IV INFUSION 1 HR: CPT

## 2023-02-02 PROCEDURE — 77386 HC NTSTY MODUL RAD TX DLVR CPLX: CPT | Performed by: RADIOLOGY

## 2023-02-02 PROCEDURE — 36591 DRAW BLOOD OFF VENOUS DEVICE: CPT

## 2023-02-02 PROCEDURE — 99211 OFF/OP EST MAY X REQ PHY/QHP: CPT

## 2023-02-02 PROCEDURE — 80076 HEPATIC FUNCTION PANEL: CPT

## 2023-02-02 PROCEDURE — 2500000003 HC RX 250 WO HCPCS: Performed by: INTERNAL MEDICINE

## 2023-02-02 PROCEDURE — 80047 BASIC METABLC PNL IONIZED CA: CPT

## 2023-02-02 PROCEDURE — 82948 REAGENT STRIP/BLOOD GLUCOSE: CPT

## 2023-02-02 RX ORDER — HEPARIN SODIUM (PORCINE) LOCK FLUSH IV SOLN 100 UNIT/ML 100 UNIT/ML
500 SOLUTION INTRAVENOUS PRN
Status: CANCELLED | OUTPATIENT
Start: 2023-02-02

## 2023-02-02 RX ORDER — SODIUM CHLORIDE 9 MG/ML
5-250 INJECTION, SOLUTION INTRAVENOUS PRN
Status: CANCELLED | OUTPATIENT
Start: 2023-02-02

## 2023-02-02 RX ORDER — EPINEPHRINE 1 MG/ML
0.3 INJECTION, SOLUTION, CONCENTRATE INTRAVENOUS PRN
Status: CANCELLED | OUTPATIENT
Start: 2023-02-02

## 2023-02-02 RX ORDER — SODIUM CHLORIDE AND POTASSIUM CHLORIDE 300; 900 MG/100ML; MG/100ML
INJECTION, SOLUTION INTRAVENOUS ONCE
Status: COMPLETED | OUTPATIENT
Start: 2023-02-02 | End: 2023-02-02

## 2023-02-02 RX ORDER — SODIUM CHLORIDE 0.9 % (FLUSH) 0.9 %
5-40 SYRINGE (ML) INJECTION PRN
Status: CANCELLED | OUTPATIENT
Start: 2023-02-02

## 2023-02-02 RX ORDER — ACETAMINOPHEN 325 MG/1
650 TABLET ORAL
Status: CANCELLED | OUTPATIENT
Start: 2023-02-02

## 2023-02-02 RX ORDER — ALBUTEROL SULFATE 90 UG/1
4 AEROSOL, METERED RESPIRATORY (INHALATION) PRN
Status: CANCELLED | OUTPATIENT
Start: 2023-02-02

## 2023-02-02 RX ORDER — PALONOSETRON 0.05 MG/ML
0.25 INJECTION, SOLUTION INTRAVENOUS ONCE
Status: CANCELLED | OUTPATIENT
Start: 2023-02-02 | End: 2023-02-02

## 2023-02-02 RX ORDER — ONDANSETRON 2 MG/ML
8 INJECTION INTRAMUSCULAR; INTRAVENOUS
Status: CANCELLED | OUTPATIENT
Start: 2023-02-02

## 2023-02-02 RX ORDER — MEPERIDINE HYDROCHLORIDE 50 MG/ML
12.5 INJECTION INTRAMUSCULAR; INTRAVENOUS; SUBCUTANEOUS PRN
Status: CANCELLED | OUTPATIENT
Start: 2023-02-02

## 2023-02-02 RX ORDER — SODIUM CHLORIDE 9 MG/ML
25 INJECTION, SOLUTION INTRAVENOUS PRN
Status: CANCELLED | OUTPATIENT
Start: 2023-02-02

## 2023-02-02 RX ORDER — FAMOTIDINE 10 MG/ML
20 INJECTION, SOLUTION INTRAVENOUS
Status: CANCELLED | OUTPATIENT
Start: 2023-02-02

## 2023-02-02 RX ORDER — SODIUM CHLORIDE 9 MG/ML
INJECTION, SOLUTION INTRAVENOUS CONTINUOUS
Status: CANCELLED | OUTPATIENT
Start: 2023-02-02

## 2023-02-02 RX ORDER — SODIUM CHLORIDE 9 MG/ML
5-40 INJECTION INTRAVENOUS PRN
Status: CANCELLED | OUTPATIENT
Start: 2023-02-02

## 2023-02-02 RX ORDER — SODIUM CHLORIDE 9 MG/ML
5-250 INJECTION, SOLUTION INTRAVENOUS PRN
Status: DISCONTINUED | OUTPATIENT
Start: 2023-02-02 | End: 2023-02-03 | Stop reason: HOSPADM

## 2023-02-02 RX ORDER — DIPHENHYDRAMINE HYDROCHLORIDE 50 MG/ML
50 INJECTION INTRAMUSCULAR; INTRAVENOUS ONCE
Status: COMPLETED | OUTPATIENT
Start: 2023-02-02 | End: 2023-02-02

## 2023-02-02 RX ORDER — DIPHENHYDRAMINE HYDROCHLORIDE 50 MG/ML
50 INJECTION INTRAMUSCULAR; INTRAVENOUS ONCE
Status: CANCELLED | OUTPATIENT
Start: 2023-02-02 | End: 2023-02-02

## 2023-02-02 RX ORDER — DIPHENHYDRAMINE HYDROCHLORIDE 50 MG/ML
50 INJECTION INTRAMUSCULAR; INTRAVENOUS
Status: CANCELLED | OUTPATIENT
Start: 2023-02-02

## 2023-02-02 RX ORDER — HEPARIN SODIUM (PORCINE) LOCK FLUSH IV SOLN 100 UNIT/ML 100 UNIT/ML
500 SOLUTION INTRAVENOUS PRN
Status: DISCONTINUED | OUTPATIENT
Start: 2023-02-02 | End: 2023-02-03 | Stop reason: HOSPADM

## 2023-02-02 RX ORDER — SODIUM CHLORIDE AND POTASSIUM CHLORIDE 300; 900 MG/100ML; MG/100ML
INJECTION, SOLUTION INTRAVENOUS CONTINUOUS
Status: DISCONTINUED | OUTPATIENT
Start: 2023-02-02 | End: 2023-02-02 | Stop reason: CLARIF

## 2023-02-02 RX ORDER — PALONOSETRON 0.05 MG/ML
0.25 INJECTION, SOLUTION INTRAVENOUS ONCE
Status: COMPLETED | OUTPATIENT
Start: 2023-02-02 | End: 2023-02-02

## 2023-02-02 RX ORDER — SODIUM CHLORIDE 0.9 % (FLUSH) 0.9 %
5-40 SYRINGE (ML) INJECTION PRN
Status: DISCONTINUED | OUTPATIENT
Start: 2023-02-02 | End: 2023-02-03 | Stop reason: HOSPADM

## 2023-02-02 RX ORDER — FAMOTIDINE 10 MG/ML
20 INJECTION, SOLUTION INTRAVENOUS ONCE
Status: CANCELLED | OUTPATIENT
Start: 2023-02-02 | End: 2023-02-02

## 2023-02-02 RX ADMIN — SODIUM CHLORIDE 20 ML/HR: 9 INJECTION, SOLUTION INTRAVENOUS at 14:06

## 2023-02-02 RX ADMIN — PALONOSETRON 0.25 MG: 0.05 INJECTION, SOLUTION INTRAVENOUS at 14:27

## 2023-02-02 RX ADMIN — SODIUM CHLORIDE, PRESERVATIVE FREE 10 ML: 5 INJECTION INTRAVENOUS at 11:54

## 2023-02-02 RX ADMIN — DIPHENHYDRAMINE HYDROCHLORIDE 50 MG: 50 INJECTION, SOLUTION INTRAMUSCULAR; INTRAVENOUS at 14:28

## 2023-02-02 RX ADMIN — HEPARIN 500 UNITS: 100 SYRINGE at 16:10

## 2023-02-02 RX ADMIN — CARBOPLATIN 213 MG: 10 INJECTION, SOLUTION INTRAVENOUS at 15:38

## 2023-02-02 RX ADMIN — DEXAMETHASONE SODIUM PHOSPHATE 12 MG: 4 INJECTION, SOLUTION INTRA-ARTICULAR; INTRALESIONAL; INTRAMUSCULAR; INTRAVENOUS; SOFT TISSUE at 14:07

## 2023-02-02 RX ADMIN — PACLITAXEL 78 MG: 6 INJECTION, SOLUTION INTRAVENOUS at 14:35

## 2023-02-02 RX ADMIN — POTASSIUM CHLORIDE AND SODIUM CHLORIDE: 900; 300 INJECTION, SOLUTION INTRAVENOUS at 11:56

## 2023-02-02 RX ADMIN — SODIUM CHLORIDE, PRESERVATIVE FREE 20 MG: 5 INJECTION INTRAVENOUS at 14:25

## 2023-02-02 RX ADMIN — SODIUM CHLORIDE, PRESERVATIVE FREE 20 ML: 5 INJECTION INTRAVENOUS at 10:51

## 2023-02-02 RX ADMIN — SODIUM CHLORIDE, PRESERVATIVE FREE 10 ML: 5 INJECTION INTRAVENOUS at 16:10

## 2023-02-02 RX ADMIN — SODIUM CHLORIDE, PRESERVATIVE FREE 10 ML: 5 INJECTION INTRAVENOUS at 10:50

## 2023-02-02 ASSESSMENT — PAIN SCALES - GENERAL
PAINLEVEL_OUTOF10: 10

## 2023-02-02 ASSESSMENT — PAIN DESCRIPTION - DESCRIPTORS: DESCRIPTORS: BURNING

## 2023-02-02 ASSESSMENT — PAIN DESCRIPTION - PAIN TYPE: TYPE: CHRONIC PAIN

## 2023-02-02 ASSESSMENT — PAIN DESCRIPTION - LOCATION: LOCATION: THROAT

## 2023-02-02 NOTE — PROGRESS NOTES
[facility], [address] [facility],[address]  [address] [address]  Dept: [phone]  Dept Fax: [phone]: [phone]      Visit Date:2/2/2023     Oly Lorenzo is a 47 y.o. female who presents today for:   Chief Complaint   Patient presents with    Follow-up     Cancer, epiglottis ([address]        HPI:   Oly Lorenzo is a 47 y.o. 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 HPI per previous note in our office:      1/31/2022. CT scan of soft tissues of the neck without contrast done for right-sided neck pain showed no discrete neck mass fluid collection or acute phonatory change there are multiple nonspecific cervical lymph nodes at levels 1 through 5 measuring less than 1 cm in short axis no pathologically enlarged lymph nodes were seen. She initially presented with sore throat, difficulty swallowing and progressive weight loss. She was treated with multiple courses of antibiotics, steroids and antifungal medication with limited improvement. 2/10/2022. Chest x-ray done for shortness of breath and sore throat showed no focal acute lung lesions or significant chronic lung findings. 9/20/2022. Referred  to ENT and seen by Mi Lynn PA-C and had flexible laryngoscopy demonstrating an edematous epiglottis. She was given another round of oral steroids. He said that she was here for thrush and mouth and ear soreness for a year. The pain will get to be 10 out of 10. The pain was constant but would fluctuate in severity. There is no drainage or history of hearing loss or tinnitus. She said that she had a constant sore throat making it difficult for her to swallow and she has lost a lot of weight. She says that the pain is worse on the right and would extend into her ear. She is also finding it hard to breathe. Weight was estimated to be 65 kg. Examination showed multiple broken teeth and cavities.   Salivary gland exam was normal.  There is mild lymphadenopathy with tender lymph nodes on the right side. Flexible laryngoscopy showed abnormally enlarged epiglottis obstructing the view of the larynx worse on the right than on the left. She increased her steroids that she was already on and had her come back in a week and told the patient to go to the ED if her airway became compromised. CT scan of the soft tissues of the neck with contrast was ordered. 9/21/2022. CAT scan of the neck showed nodular thickening of the area epiglottic folds associated with abnormal soft tissue thickening of the right side of the hypopharynx extending into the right posterolateral wall of the hypopharynx and flattening deformity of the right vallecula and piriform sinus raising the question of squamous cell carcinoma. There was no significant lymphadenopathy seen. A significant thyroid mass was not identified. The upper esophagus was unremarkable and a foreign body was not identified. There was heavy bilateral carotid bulb calcification seen and multiple dental caries were noted. PET CT scan was recommended. 9/28/2022. Follow-up in the ENT office. Patient said that her throat felt better but she had a bump on the roof of her mouth that was painful and she still complained of trouble swallowing and a sore throat. Flexible laryngoscopy was performed again hypopharynx was markedly abnormal with white speckles but most notably significant thickening of the epiglottis extending into the arytenoid space and false cords worse on the right. There was a possible necrotic area in the posterior face of the epiglottis. The larynx was abnormal with white speckles indicative of fungal infection. Dr. Anjali Schaefer reviewed the case and discussed with the patient the benefit of biopsy. The possibility of tracheostomy was discussed to protect her airway. She was treated with fluconazole for her thrush. Steroids were held. 10/3/2022.   Chest x-ray that the cardiac silhouette was within normal limits. Coronary stent was present. There is strandy interstitial opacities in the perihilar distribution along with peribronchial thickening and no focal areas of consolidation, no pneumothorax or pleural effusion. No acute bony changes were seen. 10/6/2022. 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 The epiglottis was edematous and had granular tissue involving the laryngeal surface of the left epiglottis. There was a separate lesion involving the right false vocal cord with a papillomatous appearance. Both true vocal cords seem to be spared. There is no involvement of the arytenoid complex or postcricoid region. The lingual surface of the epiglottis did not have any mucosal erosions but was edematous. The vallecula and base the tongue was unremarkable. The entire cervical esophagus was normal.  She is a former smoker. The tentative stage is T3, N1 versus T3, N2c    Pathology report showed squamous cell carcinoma, poorly differentiated, of the left epiglottis, p16 negative. 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 10/13/2022. Follow-up in the office. Since her biopsy her sore throat had been somewhat tolerable but she continued to have pain with swallowing. She denied choking and coughing episodes with swallowing. She had no problems with shortness of breath. She did complain of facial pain postnasal drip sore throat dysphagia cough with sputum production. He said that she had a 1.5 cm right level 3 lymph node. PET CT scan has been ordered and will be performed on November 3, 2022. The possible need for a gastrostomy tube for nutrition was discussed.   Given that she already had baseline epiglottic edema which might worsen during treatment resulting in airway obstruction, the possibility of requiring a tracheostomy tube was also reviewed. Options for treatment were discussed including primary surgery versus chemo rads. She was not felt to be an optimal surgical candidate. The patient opted for chemoradiation therapy and was referred to radiation therapy. She is here today to establish with medical oncology. Good control of her diabetes was stressed as well. She was also to have dental caries and was encouraged to go see a dentist.    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 Her above-the-knee amputation on the left side was on 2/26/2016. Has undergone cardiac catheterization with percutaneous stenting in August 2016 and an September 2016. Her diabetes requires insulin. She is on a fentanyl patch. She is here today to establish medical oncology follow-up. She has an appointment to see Dr. Penny Yang on 10/26. She says that she does not have much of an appetite and she continues to lose weight. She used to weigh 240 pounds and has lost 100 pounds in the past year and a half. Continues to have throat pain, dysphagia and odynophagia. She had been a smoker of a pack of cigarettes a day from age 13 through 2014 and then tried to cut down her smoking. She says that she is vaping and smoking as little as possible. She says that she does have a cough but no hemoptysis. She has been a diabetic for many years. She had gestational diabetes and did not check her sugars for a long time. She felt bad and went to a local ED and was found to have a sugar of 590. She says that she checks her sugars once a day. She says that her body tells her when her sugar is high. She usually checks her sugar sometime between 3 and 6 PM.  He has had an above-the-knee amputation of her left leg.   Says that she has had no problems with her natalia.  She says that she has a prosthesis but it does not fit anymore because she has lost so much weight. She says that she has no neuropathy. She uses a scooter. She has not had a recent eye examination. She said that she \"just did not go\" feels that her renal function is fine. She says that she has no known anemia. The only bleeding that she has experienced was a little bit after she had her biopsy. She did have the above-mentioned thrush and that was sore. She knows she has bad teeth and she is supposed to go to the dentist on October 27. We discussed this today in detail and gave her some assistance in dealing with this. She has fallen in the past fracturing her nose and breaking her finger and a tooth fell out which she has remedied herself. She has had myocardial infarction in 2007 and in 2016. She has had 4 stents placed. She has been on Repatha. She also says that she has had a valvular heart problem. She says that she has had nausea and occasional vomiting. She denies diarrhea and constipation. She says that she has never had a stroke. She has not had skin cancer. 11/9/2022. Mrs. Hilario Govea is here today to discuss the results of her PET scan. Unfortunately this shows a lesion in the periphery of the lung and hilar adenopathy which looks very much like a new primary lung cancer. I have discussed this with her and have asked her if we can go forward with a biopsy. The lesion is very peripheral and may be able to be biopsied by interventional radiology. That would circumvent the need to possibly pass an endoscope past her epiglottic tumor. I have discussed her case with Dr. Sharla Ohara. We think that a combination chemotherapy/ radiation therapy approach with her epiglottic and also her lung tumor is the best and I think that highly emetogenic chemotherapy is probably not in her best interest since it may compromise her airway if she vomits.   We will give her weekly CarboTaxol along with her radiation therapy. She will need to have a port placed. She has decided to go forward with a PEG tube and trach if needed. She will contact her ENT doctor. 11/30/2022. There have been multiple interventions since last time Uche Taylor was in the office. 11/16/2022. Seen in consultation by Dr. Erik Gutierrez. He arranged for Mediport placement and feeding tube placement. 11/21/2022. Surgery performed with port placement and feeding tube. 11/22/2022. 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 My reading of the data shows that if this is just a nodule no treatment is needed. CBC at that time showed a white count of 19.3 with hemoglobin of 13 hematocrit 46 and a platelet count of 048,036.    11/23/2022. She has had her teeth pulled through [employer] with a stanley pain for the oral surgeon. She tolerated it well. She is healing well. 11/25/2022. Emergency department encounter for severe abdominal pain. She had had nausea with vomiting 3 times over the last several days. She has significant pain in her abdomen when she has a bowel movement and also when she urinates. She was afebrile but tachycardic at a rate of 102. She was tender to palpation in the left lower quadrant and around the PEG tube site. The site itself was not erythematous and the tube seem to be in place. CBC showed white count of 9.9 with a hemoglobin of 15.4 hematocrit 46.5 and a platelet count of 399,816 with 75% polys, 14 lymphs, 7 monos, 2 eos. CAT scan showed mild soft tissue edema along the PEG tube catheter site related to recent PEG tube placement. There is no soft tissue fluid collection. She was discharged home. 11/28/2022. He had seen Dr. Lucy Akbar at the [facility] emergency room yesterday. She was treated for pain and then was discharged. That day her vital signs or normal with a pulse rate of 83. She was afebrile.   She had generalized abdominal tenderness. Injection into the tube showed that the tube was at the level of the gastric antrum and contrast opacified the gastric lumen with no extravasation of contrast to suggest a leak. She was treated with clindamycin. 11/29. She called the surgeon's office saying that her PEG tube site was red and there was thick white drainage around the tube. They told her it would take a few days for the antibiotics to work. Today the patient is still having a lot of pain. There is a lot of clear drainage with some thickened dark discharge it appears to be old blood but it is not tested. There is no red to it. There is some bright pink fluid in the PEG tube line which appears to have been from Snapple ingested the day before. She has rebound tenderness throughout the left side of her abdomen. Even though the study did not show any leaking, I fear that it had occurred and has caused irritation of the peritoneum causing this rebound. I have encouraged the patient to lie flat as much as possible letting the fluids drain internally rather than into her peritoneum. We will continue on her antibiotic. We have another CAT scan scheduled for Friday morning and we will see her on Friday after that. Her wound was inspected and redressed. We increased her pain medication. Her concerns about her treatment in the emergency room were also addressed. I urged her son to talk to the [facility] about her concerns about her treatment. 12/21/2022. Megan Dove is here today with her significant other Gene to discuss going forward with her radiation therapy and chemotherapy. It is still not clear to me when radiation will begin. We have a plan in place for carboplatin and Taxol to be given concurrently weekly. She is pleased to have her PEG tube out and has much less pain although she still has pain at the site. Dr. Ardyce Bamberger note said that she drained so much fluid he had to apply an ostomy bag. She also has pain at her port site but there is minimal inflammation and no obvious infection and there is no pus. She was instructed to call if there is any change in this finding. Soniya Hernandez says that she is swallowing fine at this time. Her current weight is 145 pounds which is up 3 pounds since last time she was weighed here about 2 weeks ago. She denies fevers, chills, sweats, bleeding, constipation or diarrhea. She says that she has her usual nausea but no vomiting. She continues to smoke. The patient had simulation with her mask on today. She was pretreated with Xanax. She is somewhat sleepy. Sugar was markedly elevated. I do not know if she took her insulin today. 1/5/2023. Mrs. Skye Christopher is here today with her significant other Gene. She has started her radiation and tomorrow she will get her chemotherapy. Her mouth continues to heal.  Her gums are still somewhat swollen so that she cannot get fitted for her dentures yet. Her abdominal pain is markedly improved and her PEG tube site has healed over without any signs of infection or inflammation. Mrs. Skye Christopher says that the liquid pain medication that she has been given is her throat almost immediately. I do not know if this is a placebo effect or if there is something in the oral preparation that actually improves the pain in her oropharynx. I told this to Mrs. Vyas and I offered miles mixture as an alternative to a narcotic analgesic and she accepted this offer. Dr. David Foss and I have agreed that her primary care physician will monitor her her pain medication and prescribe that for her. She says that she is extremely anxious and had to take several doses of Xanax today as she woke up extremely early so that she could be calm enough to come into the office today    She has gained weight. If we can believe our scales she has gained 6 pounds. She says that she had fever a week ago. She denies any bleeding. She has had some nausea.   She has had diarrhea and she has had nausea and vomiting. This is all before she starts any chemotherapy or radiation. Interval History 2/3/2023:   The patient presents to the office today for follow up and evaluation of malignant neoplasm of epiglottis, as well as next cycle of treatment. The patient reports mouth swollen, headaches have worsened-she attributes both to radiation. She has appnt with Rad Onc today for radiation. Discussed with Dr. Rivera Vegas who wants her to have treatment if she is able. BP low today at 92/61, give IVF.  K 3.2, give K replacement. BS 46, pt has not eaten today. Pt will eat, BS to be rechecked. She reports ongoing night sweats, some vomiting at times, chronic SOB/cough, chronic intermittent constipation/diarrhea. Chronic fatigue/weakness ongoing. She reports diarrhea every other day, none today-she does use imodium prn. Her weight is down 5#s in the last week, she states she is still eating but eating smaller meals. No peripheral edema. She uses a wheelchair. She states she wants to push forward with treatment. Will give IVF with K replacement, feed pt & recheck of . PMH, SH, and FH:  I reviewed the patient's medication and allergy lists as noted on the electronic medical record. The PMH, SH, and FH were also reviewed as noted on the EMR. Past Medical History:   Diagnosis Date    Above knee amputation of left lower extremity (HCC)     Blood circulation, collateral     CAD (coronary artery disease)     Diabetes mellitus ([address]     Hyperlipidemia     Hypertension     MDRO (multiple drug resistant organisms) resistance     MRSA wound     MI, old 2007    Obesity (BMI 30-39. 9)     Status post skin graft 01/15/2014    Amniox - Dr Humphreys Smoker cancer [facility]) 10/2022    squamous cell carcinoma of the epiglottis      Past Surgical History:   Procedure Laterality Date    ANGIOPLASTY Left 2014    left leg     SECTION  1998    CORONARY ANGIOPLASTY WITH STENT PLACEMENT      [facility]    CORONARY ANGIOPLASTY WITH STENT PLACEMENT      3 stents in [place]  2016    debbi, 1 stent to RCA    CT NEEDLE BIOPSY LUNG PERCUTANEOUS  2022    CT NEEDLE BIOPSY LUNG PERCUTANEOUS 2022 [facility] CT SCAN    ESOPHAGOGASTRODUODENOSCOPY  10/06/2022    [facility]    GASTROSTOMY TUBE PLACEMENT N/A 2022    EGD Peg Tube Placement possible Open G Tube performed by Britt Hernandez MD at [address] [place] [address]  2015    Left Femoral to Peroneal Bypass with Composite Vein Graft and Propaten Graft     OTHER SURGICAL HISTORY Left 2016    Left Leg Above Knee Amputation    PORT SURGERY N/A 2022    Insertion Single Lumen Mediport performed by Britt Hernandez MD at [place] [address]        Family History   Problem Relation Age of Onset    No Known Problems Brother     Heart Disease Maternal Grandfather     High Blood Pressure Maternal Grandfather     Asthma Neg Hx     Cancer Neg Hx     Diabetes Neg Hx     Kidney Disease Neg Hx     Stroke Neg Hx       Social History     Tobacco Use    Smoking status: Former     Packs/day: 0.50     Years: 27.00     Pack years: 13.50     Types: Cigarettes     Quit date: 2014     Years since quittin.3    Smokeless tobacco: Never    Tobacco comments:     e cigs daily use 1/3/22   Substance Use Topics    Alcohol use: No      Current Outpatient Medications   Medication Sig Dispense Refill    lidocaine viscous hcl (XYLOCAINE) 2 % SOLN solution Take every four hours as needed.  Follow directions as given in clinic 100 mL 1    Magic Mouthwash (MIRACLE MOUTHWASH) Swish and spit 5 mLs 4 times daily as needed for Irritation 300 mL 1    prochlorperazine (COMPAZINE) 10 MG tablet Take 1 tablet by mouth every 6 hours as needed (as needed for nausea) 60 tablet 3    ondansetron (ZOFRAN) 4 MG tablet Take 1 tablet by mouth every 8 hours as needed for Nausea or Vomiting 20 tablet 1    lidocaine-prilocaine (EMLA) 2.5-2.5 % cream Apply topically as needed. 5 g 4    amoxicillin (AMOXIL) 125 MG/5ML suspension Take by mouth 3 times daily Prescribed by family doctor unsure of dose      zolpidem (AMBIEN) 10 MG tablet TAKE 1 TABLET BY MOUTH AT NIGHT      pantoprazole (PROTONIX) 40 MG tablet Take 1 tablet by mouth every morning (before breakfast) 90 tablet 1    ALPRAZolam (XANAX) 1 MG tablet Take 1 mg by mouth as needed. oxyCODONE-acetaminophen (PERCOCET) 5-325 MG per tablet Take 2 tablets by mouth every 8 hours as needed for Pain. clopidogrel (PLAVIX) 75 MG tablet Take 1 tablet by mouth daily      TRULICITY 1.5 DH/5.7BA SC injection Inject 1.5 mg into the skin once a week      HUMALOG KWIKPEN 100 UNIT/ML SOPN       fentaNYL 37.5 MCG/HR PT72 Apply 1 patch topically. JARDIANCE 25 MG tablet Take 1 tablet by mouth daily      metFORMIN (GLUCOPHAGE) 500 MG tablet Take 2 tablets by mouth 2 times daily Resume 8/30 60 tablet 3    pioglitazone (ACTOS) 30 MG tablet Take 1 tablet by mouth daily 30 tablet 3    atorvastatin (LIPITOR) 20 MG tablet Take 2 tablets by mouth nightly 30 tablet 3    lisinopril (PRINIVIL;ZESTRIL) 2.5 MG tablet Take 1 tablet by mouth daily 30 tablet 3    metoprolol tartrate (LOPRESSOR) 25 MG tablet Take 1 tablet by mouth 2 times daily 60 tablet 3    isosorbide dinitrate (ISORDIL) 20 MG tablet Take 30 mg by mouth daily      amLODIPine (NORVASC) 5 MG tablet Take 5 mg by mouth daily      budesonide-formoterol (SYMBICORT) 160-4.5 MCG/ACT AERO Inhale 2 puffs into the lungs 2 times daily      pregabalin (LYRICA) 150 MG capsule Take 150 mg by mouth 2 times daily      ALPRAZolam (XANAX) 0.5 MG tablet Take 0.5 mg by mouth 2 times daily as needed.       aspirin 81 MG tablet Take 81 mg by mouth daily      albuterol (PROVENTIL HFA) 108 (90 BASE) MCG/ACT inhaler Inhale 2 puffs into the lungs every 4 hours as needed for Wheezing or Shortness of Breath (Space out to every 6 hours as symptoms improve). Space out to every 6 hours as symptoms improve. 1 Inhaler 10    nitroGLYCERIN (NITROSTAT) 0.4 MG SL tablet Place 1 tablet under the tongue every 5 minutes as needed for Chest pain Take 1 tablet for chest pain and repeat after 5 min if no relief, call 911 and take another dose 5 min later. Max of 3 doses in 15 min. (Patient not taking: No sig reported) 25 tablet 3     No current facility-administered medications for this visit. Allergies   Allergen Reactions    Shellfish-Derived Products Anaphylaxis     \"Throat closes up\"    Shrimp Flavor Swelling          Review of Systems:   Review of Systems   Pertinent review of systems noted in HPI, all other ROS negative. Objective:   Physical Exam   BP 92/61 (Site: Right Upper Arm, Position: Sitting, Cuff Size: Medium Adult)   Pulse 80   Temp 98.2 °F (36.8 °C) (Oral)   Resp 16   Ht 5' 1\" (1.549 m)   Wt 141 lb (64 kg)   SpO2 97%   BMI 26.64 kg/m²    General appearance: No apparent distress, anxious and cooperative. HEENT: Pupils equal, round, and reactive to light. Conjunctivae/corneas clear. Oral mucosa moist  Neck: Supple, with full range of motion. Trachea midline. Respiratory:  Normal respiratory effort. Clear to auscultation, bilaterally diminished. Cardiovascular: RRR, S1/S2  Abdomen: Soft, non-tender, non-distended with active BS  Musculoskeletal: No clubbing, cyanosis or edema bilaterally. She uses wheelchair. Skin: Skin color, texture, turgor normal.  No visible rashes or lesions. Neurologic:  Neurovascularly intact without any focal sensory/motor deficits.  Cranial nerves: II-XII intact, grossly non-focal.  Psychiatric: Alert and oriented x 3, thought content appropriate, normal insight  Capillary Refill: < 3 seconds   Peripheral Pulses: +2 palpable      Imaging Studies and Labs:   CBC:   Lab Results   Component Value Date    WBC 6.9 02/02/2023    HGB 13.3 02/02/2023    HCT 39.5 02/02/2023    MCV 98 02/02/2023     02/02/2023     BMP:   Lab Results   Component Value Date/Time     02/02/2023 10:50 AM     01/04/2023 12:31 PM    K 3.2 02/02/2023 10:50 AM    K 4.1 01/04/2023 12:31 PM    K 4.8 11/26/2022 02:58 AM     01/04/2023 12:31 PM    CO2 27 01/04/2023 12:31 PM    BUN 27 01/04/2023 12:31 PM    CREATININE 0.6 02/02/2023 10:50 AM    CREATININE 0.6 01/04/2023 12:31 PM    GLUCOSE 212 01/04/2023 12:31 PM    CALCIUM 8.8 01/04/2023 12:31 PM      LFT:   Lab Results   Component Value Date    ALT 18 02/02/2023    AST 19 02/02/2023    ALKPHOS 100 02/02/2023    BILITOT 0.3 02/02/2023         Assessment and Plan:     1. Malignant neoplasm of epiglottis ([facility]  10/6/2022-direct microlaryngoscopy with biopsy (+) squamous cell carcinoma of oropharynx. Path (+) squamous cell carcinoma, poorly differentiated, left epiglottis, p16 (-) with involvement of right false vocal cord, invasive squamous cell carcinoma, poorly differentiated, p16 (-). Treatment recommendations include concurrent chemoradiation with carboplatin & Taxol. 2. Hypokalemia  K 3.2, she has some diarrhea. Give IV replacement d/t mouth swollen from radiation. 3. Hypoglycemia  BS 46, after eating her repeat BS improved to 103.      4. Encounter for chemotherapy management  Current treatment recommendations include concurrent chemoradiation with carboplatin & Taxol. She tolerates treatment without significant side effects, she has more side effects from radiation with reports of headaches and edema to oral cavity. She sees Rad Onc today. Labs today:  K 3.2, will give IV replacement. BS 46, after pt ate her repeat BS improved to 103. WBC 6.9, H/H 13.3/39.5, platelets 041. Give 1 L IVF + K replacement before treatment. Return in about 2 weeks (around 2/16/2023). All patient questions answered. Pt voiced understanding. Patient agreed with treatment plan. Follow up as directed.  Patient instructed to call for questions or concerns. Electronically signed by   EVELIO Blum CNP     I spent a total of 37 minutes on the day of the visit.

## 2023-02-02 NOTE — PROGRESS NOTES
Patient assessed for the following post chemotherapy:    Dizziness   No  Lightheadedness  No      Acute nausea/vomiting No  Headache   No  Chest pain/pressure  No  Rash/itching   No  Shortness of breath  No    Patient kept for 20 minutes observation post infusion chemotherapy. Patient tolerated chemotherapy treatment Taxol/Carboplatin without any complications. Last vital signs:   BP (!) 143/84   Pulse 79   Temp 98.7 °F (37.1 °C) (Oral)   Resp 16   Ht 5' 1\" (1.549 m)   Wt 141 lb (64 kg)   SpO2 96%   BMI 26.64 kg/m²       Patient instructed if experience any of the above symptoms following today's infusion,he/she is to notify MD immediately or go to the emergency department. Discharge instructions given to patient. Verbalizes understanding. Transported off unit  in wheelchair with belongings. To radiation for treatment.

## 2023-02-02 NOTE — PROGRESS NOTES
[facility] [address], [address]        [address] [facility], [address] [place], [place] [address]erty: [phone]        F: [phone]       [email or web address]            Dr. Joel Armendariz MD MS          Dr. 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 Dose (cGy): 7000    Patient was seen today for weekly visit. Wt Readings from Last 3 Encounters:   23 141 lb (64 kg)   23 141 lb (64 kg)   23 141 lb (64 kg)       BP (!) 143/84   Pulse 79   Temp 98.7 °F (37.1 °C)   Wt 141 lb (64 kg)   SpO2 96%   BMI 26.64 kg/m²     Lab Results   Component Value Date    WBC 6.9 2023     2023       Comfort Alteration  Fatigue:Must curtail daily activities even with rest periods and early bedtime, normal for patient    Pain Location: Throat  Pain Intensity (Current): 8 Very severe pain  Pain Treatment: Opioid  Pain Relief: Pain relieved 75%    Emotional Alteration:   Coping: somewhat effective    Nutritional Alteration  Anorexia: Loss of appetite but able to eat a small amount of food and/or liquids  Nausea: 3-4 episodes of nausea in 24 hours, not resolved with nausea medication  Vomiting: No vomiting , occasionally  Salivary Glands- Acute:  Moderate dryness, thick sticky saliva, markedly altered taste, biotene spray  Taste Disturbance (Dysgeusia): Slightly altered  Dyspepsia/Heartburn: None  Dysphagia/Esophagitis: Dysphagia, requires pureed, soft or liquid diet    Skin Alteration   Skin reaction: No changes noted  Alopecia: No loss    Mucous Membrane Alteration  Mucositis XRT Related: None  Pharynx and Esophagus- Acute: No change over baseline  Voice Changes: Normal    Ventilation Alteration  Cough: Productive  Hemoptysis: None  Dyspnea: Normal  Mucous Quantity/Quality: White/grey foam mucous    CNS Alteration  Level of Consciousness: Alert, responds briskly, appropriately with minimal stimulus  Seizure Activity: None  Insomnia: Occasional difficulty sleeping not interfering with function  Orientation: Oriented in 3 spheres of person, place, and time  Comment:   Speech Impairment: No  Ataxia: Normal    Additional Comments: CeraVe BID and Aquaphor at night. MEDICATIONS:     Current Outpatient Medications   Medication Sig Dispense Refill    lidocaine viscous hcl (XYLOCAINE) 2 % SOLN solution Take every four hours as needed. Follow directions as given in clinic 100 mL 1    Magic Mouthwash (MIRACLE MOUTHWASH) Swish and spit 5 mLs 4 times daily as needed for Irritation 300 mL 1    prochlorperazine (COMPAZINE) 10 MG tablet Take 1 tablet by mouth every 6 hours as needed (as needed for nausea) 60 tablet 3    ondansetron (ZOFRAN) 4 MG tablet Take 1 tablet by mouth every 8 hours as needed for Nausea or Vomiting 20 tablet 1    lidocaine-prilocaine (EMLA) 2.5-2.5 % cream Apply topically as needed. 5 g 4    amoxicillin (AMOXIL) 125 MG/5ML suspension Take by mouth 3 times daily Prescribed by family doctor unsure of dose      zolpidem (AMBIEN) 10 MG tablet TAKE 1 TABLET BY MOUTH AT NIGHT      pantoprazole (PROTONIX) 40 MG tablet Take 1 tablet by mouth every morning (before breakfast) 90 tablet 1    ALPRAZolam (XANAX) 1 MG tablet Take 1 mg by mouth as needed. oxyCODONE-acetaminophen (PERCOCET) 5-325 MG per tablet Take 2 tablets by mouth every 8 hours as needed for Pain.       clopidogrel (PLAVIX) 75 MG tablet Take 1 tablet by mouth daily      TRULICITY 1.5 MG/0.5ML SC injection Inject 1.5 mg into the skin once a week      HUMALOG KWIKPEN 100 UNIT/ML SOPN       fentaNYL 37.5 MCG/HR PT72 Apply 1 patch topically. JARDIANCE 25 MG tablet Take 1 tablet by mouth daily      nitroGLYCERIN (NITROSTAT) 0.4 MG SL tablet Place 1 tablet under the tongue every 5 minutes as needed for Chest pain Take 1 tablet for chest pain and repeat after 5 min if no relief, call 911 and take another dose 5 min later. Max of 3 doses in 15 min. (Patient not taking: No sig reported) 25 tablet 3    metFORMIN (GLUCOPHAGE) 500 MG tablet Take 2 tablets by mouth 2 times daily Resume 8/30 60 tablet 3    pioglitazone (ACTOS) 30 MG tablet Take 1 tablet by mouth daily 30 tablet 3    atorvastatin (LIPITOR) 20 MG tablet Take 2 tablets by mouth nightly 30 tablet 3    lisinopril (PRINIVIL;ZESTRIL) 2.5 MG tablet Take 1 tablet by mouth daily 30 tablet 3    metoprolol tartrate (LOPRESSOR) 25 MG tablet Take 1 tablet by mouth 2 times daily 60 tablet 3    isosorbide dinitrate (ISORDIL) 20 MG tablet Take 30 mg by mouth daily      amLODIPine (NORVASC) 5 MG tablet Take 5 mg by mouth daily      budesonide-formoterol (SYMBICORT) 160-4.5 MCG/ACT AERO Inhale 2 puffs into the lungs 2 times daily      pregabalin (LYRICA) 150 MG capsule Take 150 mg by mouth 2 times daily      ALPRAZolam (XANAX) 0.5 MG tablet Take 0.5 mg by mouth 2 times daily as needed. aspirin 81 MG tablet Take 81 mg by mouth daily      albuterol (PROVENTIL HFA) 108 (90 BASE) MCG/ACT inhaler Inhale 2 puffs into the lungs every 4 hours as needed for Wheezing or Shortness of Breath (Space out to every 6 hours as symptoms improve). Space out to every 6 hours as symptoms improve. 1 Inhaler 10     No current facility-administered medications for this encounter.      Facility-Administered Medications Ordered in Other Encounters   Medication Dose Route Frequency Provider Last Rate Last Admin    sodium chloride flush 0.9 % injection 5-40 mL  5-40 mL IntraVENous PRN Lai Gomez MD   10 mL at 02/02/23 1610    heparin flush 100 UNIT/ML injection 500 Units  500 Units IntraCATHeter PRN Lai Gomez MD   500 Units at 02/02/23 1610    0.9 % sodium chloride infusion  5-250 mL/hr IntraVENous PRN Lai Gomez MD   Stopped at 02/02/23 1610        PHYSICAL EXAM:       ECOG: 3 - Symptomatic, >50% in bed, but not bedbound (Capable of only limited self-care, confined to bed or chair 50% or more of waking hours)     General: NAD, AO x 3, Mentation is clear with appropriate affect. HEENT:  Oral mucosa without obvious lesion/exudate   Thorax:  Unlabored  Abdomen:  Non-distended  Neuro:  Cranial nerves grossly intact; no focal deficits  Skin - treatment portal: SEE above. Mild erythema/hyperpigmentation of the neck    Chemotherapy Update: Concurrent chemotherapy    Treatment Imaging: CBCT and All imaging reviewed and approved by Dr. Jean Espinal: Mild to moderate radiation side effects. Responding appropriately to symptomatic management. Liban Mccormick is very stressed out today, following her appointment with medical oncology. Apparently her blood glucose was 47 and a low potassium level. This required Liban Mccormick to be in medical oncology longer than anticipated today, getting to radiation later than expected. She stated she did not feel like her treatment was even working. She was reassured that her tumor is indeed responding, according the CBCT images. Xerostomia present, but no candidiasis noted. Mild erythema to the treatment area today. New medications, diagnostic results: Continue treatment as planned    PLAN: Again reviewed potential side effects of radiation for the patient's treatment. Continue local/topical care. Continue current radiation course as prescribed.

## 2023-02-02 NOTE — DISCHARGE INSTRUCTIONS
Please contact your Oncologist if you have any questions regarding the chemotherapy Taxol/Carboplatin that you received today. Patient instructed if experience any of the symptoms following today's chemotherapy / to notify MD immediately or go to emergency department. * dizziness/lightheadedness  *acute nausea/vomiting - not relieved with medication  *headache - not relieved from Tylenol/pain medication  *chest pain/pressure  *rash/itching  *shortness of breath        Drink fluids - 48oz fluids daily  Call if develop fever/ chills/ signs or symptoms of infection     IVF today    K replacement IV   Recheck blood sugar   Ok for treatment today  5. Return to clinic in 1 week for ongoing weekly treatment + labs + nurse    eval:  CBC, CMP  6. Return to clinic in 2 weeks to see Dr. Adelaida Davis with labs:  CBC, CMP  7.     Treatment in 2 weeks

## 2023-02-02 NOTE — PLAN OF CARE
Problem: Chronic Conditions and Co-morbidities  Goal: Patient's chronic conditions and co-morbidity symptoms are monitored and maintained or improved  Outcome: Adequate for Discharge  Flowsheets (Taken 2/2/2023 1628)  Care Plan - Patient's Chronic Conditions and Co-Morbidity Symptoms are Monitored and Maintained or Improved: Monitor and assess patient's chronic conditions and comorbid symptoms for stability, deterioration, or improvement  Note: Chemotherapy Teaching     What is Chemotherapy   Drug action [x]   Method of Administration [x]   Handouts given []     Side Effects  Nausea/vomiting [x]   Diarrhea [x]   Fatigue [x]   Signs / Symptoms of infection [x]   Neutropenia [x]   Thrombocytopenia [x]   Alopecia [x]   neuropathy [x]   [place] diet &  the importance of fluids [x]       Micellaneous  Importance of nutrition [x]   Importance of oral hygiene [x]   When to call the MD [x]   Monitoring labs [x]   Use of supportive services []     Explanation of Drug Regimen / Frequency  taxol     Comments  Verbalized understanding to drug,action,side effects and when to call MD         Problem: Safety - Adult  Goal: Free from fall injury  Outcome: Adequate for Discharge  Flowsheets (Taken 2/2/2023 1628)  Free From Fall Injury: Instruct family/caregiver on patient safety  Note: No falls occurred with visit today. Problem: Infection - Adult  Goal: Absence of infection at discharge  Outcome: Adequate for Discharge  Flowsheets (Taken 2/2/2023 1628)  Absence of infection at discharge: Assess and monitor for signs and symptoms of infection  Note: Mediport site with no redness or warmth. Skin over port site intact with no signs of breakdown noted. Patient verbalizes signs/symptoms of port infection and when to notify the physician.       Problem: Discharge Planning  Goal: Discharge to home or other facility with appropriate resources  Outcome: Adequate for Discharge  Flowsheets (Taken 2/2/2023 1628)  Discharge to home or other facility with appropriate resources: Identify barriers to discharge with patient and caregiver  Note: Verbalize understanding of discharge instructions, follow up appointments, and when to call Physician. Care plan reviewed with patient. Patient verbalizes understanding of the plan of care and contributes to goal setting.

## 2023-02-02 NOTE — PROGRESS NOTES
Patient had eaten chicken salad sandwich/ 1/2 turkey sandwich, one peanut butter cracker with extra peanut butter and 1/3 can of pepsi-- Blood sugar rechecked -  result 103.

## 2023-02-02 NOTE — PATIENT INSTRUCTIONS
IVF today    K replacement IV   Recheck blood sugar   Ok for treatment today  5. Return to clinic in 1 week for ongoing weekly treatment + labs + nurse    eval:  CBC, CMP  6. Return to clinic in 2 weeks to see Dr. Teri Allen with labs:  CBC, CMP  7.     Treatment in 2 weeks

## 2023-02-03 ENCOUNTER — HOSPITAL ENCOUNTER (OUTPATIENT)
Dept: RADIATION ONCOLOGY | Age: 53
Discharge: HOME | End: 2023-02-03
Payer: MEDICARE

## 2023-02-03 ENCOUNTER — TELEPHONE (OUTPATIENT)
Dept: CASE MANAGEMENT | Age: 53
End: 2023-02-03

## 2023-02-03 PROCEDURE — 77386 HC NTSTY MODUL RAD TX DLVR CPLX: CPT | Performed by: RADIOLOGY

## 2023-02-03 NOTE — TELEPHONE ENCOUNTER
Natanael received call from pt's sig other requesting pt's appt with ONC on 2/16 be changed to a later time d/t difficulty with pt getting around in the morning. Natanael spoke with Eugenio carbajal. Pt's 2/16/23 appt changed to 10a to see Joni Jones 60; [address] arrival for labwork. Natanael phoned pt's sig other & updated him on the above.

## 2023-02-06 ENCOUNTER — HOSPITAL ENCOUNTER (OUTPATIENT)
Dept: RADIATION ONCOLOGY | Age: 53
Discharge: HOME | End: 2023-02-06
Payer: MEDICARE

## 2023-02-06 PROCEDURE — 77014 CHG CT GUIDANCE RADIATION THERAPY FLDS PLACEMENT: CPT | Performed by: RADIOLOGY

## 2023-02-06 PROCEDURE — 77386 HC NTSTY MODUL RAD TX DLVR CPLX: CPT | Performed by: RADIOLOGY

## 2023-02-07 ENCOUNTER — HOSPITAL ENCOUNTER (OUTPATIENT)
Dept: RADIATION ONCOLOGY | Age: 53
Discharge: HOME | End: 2023-02-07
Payer: MEDICARE

## 2023-02-07 PROCEDURE — 77386 HC NTSTY MODUL RAD TX DLVR CPLX: CPT | Performed by: RADIOLOGY

## 2023-02-07 PROCEDURE — 77014 CHG CT GUIDANCE RADIATION THERAPY FLDS PLACEMENT: CPT | Performed by: RADIOLOGY

## 2023-02-08 ENCOUNTER — HOSPITAL ENCOUNTER (OUTPATIENT)
Dept: RADIATION ONCOLOGY | Age: 53
Discharge: HOME | End: 2023-02-08
Payer: MEDICARE

## 2023-02-08 PROCEDURE — 77386 HC NTSTY MODUL RAD TX DLVR CPLX: CPT | Performed by: RADIOLOGY

## 2023-02-09 ENCOUNTER — HOSPITAL ENCOUNTER (OUTPATIENT)
Dept: RADIATION ONCOLOGY | Age: 53
Discharge: HOME | End: 2023-02-09
Payer: MEDICARE

## 2023-02-09 ENCOUNTER — HOSPITAL ENCOUNTER (OUTPATIENT)
Dept: INFUSION THERAPY | Age: 53
Discharge: HOME | End: 2023-02-09
Payer: MEDICARE

## 2023-02-09 VITALS
TEMPERATURE: 98.5 F | SYSTOLIC BLOOD PRESSURE: 133 MMHG | BODY MASS INDEX: 27.12 KG/M2 | DIASTOLIC BLOOD PRESSURE: 76 MMHG | WEIGHT: 143.52 LBS | HEART RATE: 73 BPM | OXYGEN SATURATION: 98 % | RESPIRATION RATE: 20 BRPM

## 2023-02-09 VITALS
HEART RATE: 82 BPM | SYSTOLIC BLOOD PRESSURE: 142 MMHG | OXYGEN SATURATION: 96 % | RESPIRATION RATE: 16 BRPM | TEMPERATURE: 97.6 F | DIASTOLIC BLOOD PRESSURE: 65 MMHG

## 2023-02-09 DIAGNOSIS — C32.1 MALIGNANT NEOPLASM OF EPIGLOTTIS (HCC): ICD-10-CM

## 2023-02-09 DIAGNOSIS — C32.1 CANCER, EPIGLOTTIS (HCC): Primary | ICD-10-CM

## 2023-02-09 LAB
ABSOLUTE IMMATURE GRANULOCYTE: 0.02 THOU/MM3 (ref 0–0.07)
ALBUMIN SERPL BCG-MCNC: 3.7 G/DL (ref 3.5–5.1)
ALP SERPL-CCNC: 83 U/L (ref 38–126)
ALT SERPL W/O P-5'-P-CCNC: 14 U/L (ref 11–66)
AST SERPL-CCNC: 15 U/L (ref 5–40)
BASOPHILS ABSOLUTE: 0 THOU/MM3 (ref 0–0.1)
BASOPHILS NFR BLD AUTO: 0 % (ref 0–3)
BILIRUB CONJ SERPL-MCNC: < 0.2 MG/DL (ref 0–0.3)
BILIRUB SERPL-MCNC: 0.2 MG/DL (ref 0.3–1.2)
BUN BLDP-MCNC: 33 MG/DL (ref 8–26)
CHLORIDE BLD-SCNC: 100 MEQ/L (ref 98–109)
CREAT BLD-MCNC: 0.7 MG/DL (ref 0.5–1.2)
EOSINOPHIL NFR BLD AUTO: 1 % (ref 0–4)
EOSINOPHILS ABSOLUTE: 0 THOU/MM3 (ref 0–0.4)
ERYTHROCYTE [DISTWIDTH] IN BLOOD BY AUTOMATED COUNT: 15.5 % (ref 11.5–14.5)
GFR SERPL CREATININE-BSD FRML MDRD: > 60 ML/MIN/1.73M2
GLUCOSE BLD STRIP.AUTO-MCNC: 109 MG/DL (ref 70–108)
GLUCOSE BLD-MCNC: 51 MG/DL (ref 70–108)
HCT VFR BLD AUTO: 35.6 % (ref 37–47)
HGB BLD-MCNC: 12.1 GM/DL (ref 12–16)
IMMATURE GRANULOCYTES: 0 %
IONIZED CALCIUM, WHOLE BLOOD: 1.17 MMOL/L (ref 1.12–1.32)
LYMPHOCYTES ABSOLUTE: 0.7 THOU/MM3 (ref 1–4.8)
LYMPHOCYTES NFR BLD AUTO: 9 % (ref 15–47)
MCH RBC QN AUTO: 33.9 PG (ref 26–33)
MCHC RBC AUTO-ENTMCNC: 34 GM/DL (ref 32.2–35.5)
MCV RBC AUTO: 100 FL (ref 81–99)
MONOCYTES ABSOLUTE: 0.6 THOU/MM3 (ref 0.4–1.3)
MONOCYTES NFR BLD AUTO: 8 % (ref 0–12)
NEUTROPHILS NFR BLD AUTO: 82 % (ref 43–75)
PLATELET # BLD AUTO: 251 THOU/MM3 (ref 130–400)
PMV BLD AUTO: 10.1 FL (ref 9.4–12.4)
POTASSIUM BLD-SCNC: 3.6 MEQ/L (ref 3.5–4.9)
PROT SERPL-MCNC: 6.1 G/DL (ref 6.1–8)
RBC # BLD AUTO: 3.57 MILL/MM3 (ref 4.2–5.4)
SEGMENTED NEUTROPHILS ABSOLUTE COUNT: 5.8 THOU/MM3 (ref 1.8–7.7)
SODIUM BLD-SCNC: 138 MEQ/L (ref 138–146)
TOTAL CO2, WHOLE BLOOD: 29 MEQ/L (ref 23–33)
WBC # BLD AUTO: 7.1 THOU/MM3 (ref 4.8–10.8)

## 2023-02-09 PROCEDURE — 80047 BASIC METABLC PNL IONIZED CA: CPT

## 2023-02-09 PROCEDURE — 77386 HC NTSTY MODUL RAD TX DLVR CPLX: CPT | Performed by: RADIOLOGY

## 2023-02-09 PROCEDURE — A4216 STERILE WATER/SALINE, 10 ML: HCPCS | Performed by: INTERNAL MEDICINE

## 2023-02-09 PROCEDURE — 36591 DRAW BLOOD OFF VENOUS DEVICE: CPT

## 2023-02-09 PROCEDURE — 82948 REAGENT STRIP/BLOOD GLUCOSE: CPT

## 2023-02-09 PROCEDURE — 96375 TX/PRO/DX INJ NEW DRUG ADDON: CPT

## 2023-02-09 PROCEDURE — 96413 CHEMO IV INFUSION 1 HR: CPT

## 2023-02-09 PROCEDURE — 6360000002 HC RX W HCPCS: Performed by: INTERNAL MEDICINE

## 2023-02-09 PROCEDURE — 80076 HEPATIC FUNCTION PANEL: CPT

## 2023-02-09 PROCEDURE — 2580000003 HC RX 258: Performed by: INTERNAL MEDICINE

## 2023-02-09 PROCEDURE — 85025 COMPLETE CBC W/AUTO DIFF WBC: CPT

## 2023-02-09 PROCEDURE — 96367 TX/PROPH/DG ADDL SEQ IV INF: CPT

## 2023-02-09 PROCEDURE — 2500000003 HC RX 250 WO HCPCS: Performed by: INTERNAL MEDICINE

## 2023-02-09 PROCEDURE — 96417 CHEMO IV INFUS EACH ADDL SEQ: CPT

## 2023-02-09 RX ORDER — SODIUM CHLORIDE 0.9 % (FLUSH) 0.9 %
5-40 SYRINGE (ML) INJECTION PRN
OUTPATIENT
Start: 2023-02-09

## 2023-02-09 RX ORDER — ACETAMINOPHEN 325 MG/1
650 TABLET ORAL
Status: CANCELLED | OUTPATIENT
Start: 2023-02-09

## 2023-02-09 RX ORDER — MEPERIDINE HYDROCHLORIDE 50 MG/ML
12.5 INJECTION INTRAMUSCULAR; INTRAVENOUS; SUBCUTANEOUS PRN
Status: CANCELLED | OUTPATIENT
Start: 2023-02-09

## 2023-02-09 RX ORDER — SODIUM CHLORIDE 9 MG/ML
25 INJECTION, SOLUTION INTRAVENOUS PRN
OUTPATIENT
Start: 2023-02-09

## 2023-02-09 RX ORDER — FAMOTIDINE 10 MG/ML
20 INJECTION, SOLUTION INTRAVENOUS ONCE
Status: CANCELLED | OUTPATIENT
Start: 2023-02-09 | End: 2023-02-09

## 2023-02-09 RX ORDER — HEPARIN SODIUM (PORCINE) LOCK FLUSH IV SOLN 100 UNIT/ML 100 UNIT/ML
500 SOLUTION INTRAVENOUS PRN
Status: CANCELLED | OUTPATIENT
Start: 2023-02-09

## 2023-02-09 RX ORDER — SODIUM CHLORIDE 9 MG/ML
5-250 INJECTION, SOLUTION INTRAVENOUS PRN
Status: DISCONTINUED | OUTPATIENT
Start: 2023-02-09 | End: 2023-02-10 | Stop reason: HOSPADM

## 2023-02-09 RX ORDER — EPINEPHRINE 1 MG/ML
0.3 INJECTION, SOLUTION, CONCENTRATE INTRAVENOUS PRN
Status: CANCELLED | OUTPATIENT
Start: 2023-02-09

## 2023-02-09 RX ORDER — DIPHENHYDRAMINE HYDROCHLORIDE 50 MG/ML
50 INJECTION INTRAMUSCULAR; INTRAVENOUS
Status: CANCELLED | OUTPATIENT
Start: 2023-02-09

## 2023-02-09 RX ORDER — DIPHENHYDRAMINE HYDROCHLORIDE 50 MG/ML
50 INJECTION INTRAMUSCULAR; INTRAVENOUS ONCE
Status: CANCELLED | OUTPATIENT
Start: 2023-02-09 | End: 2023-02-09

## 2023-02-09 RX ORDER — SODIUM CHLORIDE 9 MG/ML
5-250 INJECTION, SOLUTION INTRAVENOUS PRN
Status: CANCELLED | OUTPATIENT
Start: 2023-02-09

## 2023-02-09 RX ORDER — SODIUM CHLORIDE 9 MG/ML
INJECTION, SOLUTION INTRAVENOUS CONTINUOUS
Status: CANCELLED | OUTPATIENT
Start: 2023-02-09

## 2023-02-09 RX ORDER — ONDANSETRON 2 MG/ML
8 INJECTION INTRAMUSCULAR; INTRAVENOUS
Status: CANCELLED | OUTPATIENT
Start: 2023-02-09

## 2023-02-09 RX ORDER — SODIUM CHLORIDE 9 MG/ML
5-40 INJECTION INTRAVENOUS PRN
Status: CANCELLED | OUTPATIENT
Start: 2023-02-09

## 2023-02-09 RX ORDER — PALONOSETRON 0.05 MG/ML
0.25 INJECTION, SOLUTION INTRAVENOUS ONCE
Status: COMPLETED | OUTPATIENT
Start: 2023-02-09 | End: 2023-02-09

## 2023-02-09 RX ORDER — HEPARIN SODIUM (PORCINE) LOCK FLUSH IV SOLN 100 UNIT/ML 100 UNIT/ML
500 SOLUTION INTRAVENOUS PRN
OUTPATIENT
Start: 2023-02-09

## 2023-02-09 RX ORDER — SODIUM CHLORIDE 0.9 % (FLUSH) 0.9 %
5-40 SYRINGE (ML) INJECTION PRN
Status: DISCONTINUED | OUTPATIENT
Start: 2023-02-09 | End: 2023-02-10 | Stop reason: HOSPADM

## 2023-02-09 RX ORDER — DIPHENHYDRAMINE HYDROCHLORIDE 50 MG/ML
50 INJECTION INTRAMUSCULAR; INTRAVENOUS ONCE
Status: COMPLETED | OUTPATIENT
Start: 2023-02-09 | End: 2023-02-09

## 2023-02-09 RX ORDER — FAMOTIDINE 10 MG/ML
20 INJECTION, SOLUTION INTRAVENOUS
Status: CANCELLED | OUTPATIENT
Start: 2023-02-09

## 2023-02-09 RX ORDER — HEPARIN SODIUM (PORCINE) LOCK FLUSH IV SOLN 100 UNIT/ML 100 UNIT/ML
500 SOLUTION INTRAVENOUS PRN
Status: DISCONTINUED | OUTPATIENT
Start: 2023-02-09 | End: 2023-02-10 | Stop reason: HOSPADM

## 2023-02-09 RX ORDER — PALONOSETRON 0.05 MG/ML
0.25 INJECTION, SOLUTION INTRAVENOUS ONCE
Status: CANCELLED | OUTPATIENT
Start: 2023-02-09 | End: 2023-02-09

## 2023-02-09 RX ORDER — ALBUTEROL SULFATE 90 UG/1
4 AEROSOL, METERED RESPIRATORY (INHALATION) PRN
Status: CANCELLED | OUTPATIENT
Start: 2023-02-09

## 2023-02-09 RX ORDER — SODIUM CHLORIDE 0.9 % (FLUSH) 0.9 %
5-40 SYRINGE (ML) INJECTION PRN
Status: CANCELLED | OUTPATIENT
Start: 2023-02-09

## 2023-02-09 RX ADMIN — PACLITAXEL 78 MG: 6 INJECTION, SOLUTION INTRAVENOUS at 13:59

## 2023-02-09 RX ADMIN — SODIUM CHLORIDE, PRESERVATIVE FREE 10 ML: 5 INJECTION INTRAVENOUS at 12:55

## 2023-02-09 RX ADMIN — PALONOSETRON 0.25 MG: 0.05 INJECTION, SOLUTION INTRAVENOUS at 13:42

## 2023-02-09 RX ADMIN — SODIUM CHLORIDE, PRESERVATIVE FREE 20 ML: 5 INJECTION INTRAVENOUS at 12:11

## 2023-02-09 RX ADMIN — SODIUM CHLORIDE 20 ML/HR: 9 INJECTION, SOLUTION INTRAVENOUS at 13:00

## 2023-02-09 RX ADMIN — DEXAMETHASONE SODIUM PHOSPHATE 12 MG: 4 INJECTION, SOLUTION INTRA-ARTICULAR; INTRALESIONAL; INTRAMUSCULAR; INTRAVENOUS; SOFT TISSUE at 13:01

## 2023-02-09 RX ADMIN — DIPHENHYDRAMINE HYDROCHLORIDE 50 MG: 50 INJECTION, SOLUTION INTRAMUSCULAR; INTRAVENOUS at 13:45

## 2023-02-09 RX ADMIN — Medication 500 UNITS: at 15:57

## 2023-02-09 RX ADMIN — SODIUM CHLORIDE, PRESERVATIVE FREE 10 ML: 5 INJECTION INTRAVENOUS at 15:57

## 2023-02-09 RX ADMIN — CARBOPLATIN 214.2 MG: 10 INJECTION, SOLUTION INTRAVENOUS at 15:11

## 2023-02-09 RX ADMIN — SODIUM CHLORIDE, PRESERVATIVE FREE 10 ML: 5 INJECTION INTRAVENOUS at 12:10

## 2023-02-09 RX ADMIN — FAMOTIDINE 20 MG: 10 INJECTION, SOLUTION INTRAVENOUS at 13:50

## 2023-02-09 ASSESSMENT — PAIN DESCRIPTION - DESCRIPTORS: DESCRIPTORS: BURNING

## 2023-02-09 ASSESSMENT — PAIN SCALES - GENERAL: PAINLEVEL_OUTOF10: 7

## 2023-02-09 ASSESSMENT — PAIN DESCRIPTION - LOCATION: LOCATION: NECK

## 2023-02-09 NOTE — PROGRESS NOTES
Patient assessed for the following post chemotherapy:    Dizziness   No  Lightheadedness  No      Acute nausea/vomiting No  Headache   No  Chest pain/pressure  No  Rash/itching   No  Shortness of breath  No    Patient declined to stay for 20 minutes observation post infusion chemotherapy. Patient tolerated chemotherapy treatment Taxol/Carboplatin without any complications. Last vital signs:   BP (!) 142/65   Pulse 82   Temp 97.6 °F (36.4 °C) (Oral)   Resp 16   SpO2 96%       Patient instructed if experience any of the above symptoms following today's infusion,he/she is to notify MD immediately or go to the emergency department. Discharge instructions given to patient. Verbalizes understanding. Escorted off unit in power wheelchair by son and  with belongings.

## 2023-02-09 NOTE — DISCHARGE INSTRUCTIONS
Please contact your Oncologist if you have any questions regarding the chemotherapy Taxol/Carboplatin that you received today. Patient instructed if experience any of the symptoms following today's chemotherapy / to notify MD immediately or go to emergency department.     * dizziness/lightheadedness  *acute nausea/vomiting - not relieved with medication  *headache - not relieved from Tylenol/pain medication  *chest pain/pressure  *rash/itching  *shortness of breath        Drink fluids - 48oz fluids daily  Call if develop fever/ chills/ signs or symptoms of infection

## 2023-02-09 NOTE — PLAN OF CARE
Problem: Chronic Conditions and Co-morbidities  Goal: Patient's chronic conditions and co-morbidity symptoms are monitored and maintained or improved  Outcome: Progressing  Flowsheets (Taken 2/9/2023 1503)  Care Plan - Patient's Chronic Conditions and Co-Morbidity Symptoms are Monitored and Maintained or Improved: Monitor and assess patient's chronic conditions and comorbid symptoms for stability, deterioration, or improvement  Note: Patient verbalizes understanding to verbal information given on Taxol and Carboplatin action and possible side effects. Aware to call MD if develop complications. Problem: Safety - Adult  Goal: Free from fall injury  Outcome: Progressing  Flowsheets (Taken 2/9/2023 1503)  Free From Fall Injury: Instruct family/caregiver on patient safety  Note: No falls occurred with visit today. Problem: Discharge Planning  Goal: Discharge to home or other facility with appropriate resources  Outcome: Progressing  Flowsheets (Taken 2/9/2023 1503)  Discharge to home or other facility with appropriate resources: Identify barriers to discharge with patient and caregiver  Note: Verbalized understanding of discharge instructions, follow-up appointments, and when to call the physician. Problem: Infection - Adult  Goal: Absence of infection at discharge  Outcome: Progressing  Flowsheets (Taken 2/9/2023 1503)  Absence of infection at discharge:   Assess and monitor for signs and symptoms of infection   Monitor lab/diagnostic results   Monitor all insertion sites i.e., indwelling lines, tubes and drains  Note: Mediport site with no redness or warmth. Skin over port site intact with no signs of breakdown noted. Patient verbalizes signs/symptoms of port infection and when to notify the physician. Care plan reviewed with patient and spouse. Patient and spouse verbalize understanding of the plan of care and contribute to goal setting.

## 2023-02-09 NOTE — PLAN OF CARE
Problem: Chronic Conditions and Co-morbidities  Goal: Patient's chronic conditions and co-morbidity symptoms are monitored and maintained or improved  Outcome: Adequate for Discharge  Flowsheets  Taken 2/9/2023 1552 by Martha Rodas 34 - Patient's Chronic Conditions and Co-Morbidity Symptoms are Monitored and Maintained or Improved: Monitor and assess patient's chronic conditions and comorbid symptoms for stability, deterioration, or improvement  Taken 2/9/2023 1503 by Martha Levy 34 - Patient's Chronic Conditions and Co-Morbidity Symptoms are Monitored and Maintained or Improved: Monitor and assess patient's chronic conditions and comorbid symptoms for stability, deterioration, or improvement  Note: Chemotherapy Teaching     What is Chemotherapy   Drug action [x]   Method of Administration [x]   Handouts given []     Side Effects  Nausea/vomiting [x]   Diarrhea [x]   Fatigue [x]   Signs / Symptoms of infection [x]   Neutropenia [x]   Thrombocytopenia [x]   Alopecia [x]   neuropathy [x]   [place] diet &  the importance of fluids [x]       Micellaneous  Importance of nutrition [x]   Importance of oral hygiene [x]   When to call the MD [x]   Monitoring labs [x]   Use of supportive services []     Explanation of Drug Regimen / Frequency  Taxol/Carboplatin     Comments  Verbalized understanding to drug,action,side effects and when to call MD         Problem: Chronic Conditions and Co-morbidities  Goal: Patient's chronic conditions and co-morbidity symptoms are monitored and maintained or improved  2/9/2023 1552 by Patti Mistry RN  Outcome: Adequate for Discharge  Flowsheets (Taken 2/9/2023 1552)  Care Plan - Patient's Chronic Conditions and Co-Morbidity Symptoms are Monitored and Maintained or Improved: Monitor and assess patient's chronic conditions and comorbid symptoms for stability, deterioration, or improvement  2/9/2023 1503 by Lacy Galeas RN  Outcome: Progressing  Flowsheets (Taken 2/9/2023 1503)  Care Plan - Patient's Chronic Conditions and Co-Morbidity Symptoms are Monitored and Maintained or Improved: Monitor and assess patient's chronic conditions and comorbid symptoms for stability, deterioration, or improvement  Note: Patient verbalizes understanding to verbal information given on Taxol and Carboplatin action and possible side effects. Aware to call MD if develop complications. Problem: Safety - Adult  Goal: Free from fall injury  2/9/2023 1552 by Ruddy Alonso RN  Outcome: Adequate for Discharge  Flowsheets (Taken 2/9/2023 1552)  Free From Fall Injury: Instruct family/caregiver on patient safety  2/9/2023 1503 by Christina Block RN  Outcome: Progressing  Flowsheets (Taken 2/9/2023 1503)  Free From Fall Injury: Instruct family/caregiver on patient safety  Note: No falls occurred with visit today. Problem: Discharge Planning  Goal: Discharge to home or other facility with appropriate resources  2/9/2023 1552 by Ruddy Alonso RN  Outcome: Adequate for Discharge  Flowsheets (Taken 2/9/2023 1552)  Discharge to home or other facility with appropriate resources: Identify barriers to discharge with patient and caregiver  2/9/2023 1503 by Christina Block RN  Outcome: Progressing  Flowsheets (Taken 2/9/2023 1503)  Discharge to home or other facility with appropriate resources: Identify barriers to discharge with patient and caregiver  Note: Verbalized understanding of discharge instructions, follow-up appointments, and when to call the physician. Problem: Infection - Adult  Goal: Absence of infection at discharge  2/9/2023 1552 by Ruddy Alonso RN  Outcome: Adequate for Discharge  Flowsheets (Taken 2/9/2023 1552)  Absence of infection at discharge: Monitor all insertion sites i.e., indwelling lines, tubes and drains  Note: Mediport site with no redness or warmth. Skin over port site intact with no signs of breakdown noted.  Patient verbalizes signs/symptoms of port infection and when to notify the physician. 2/9/2023 1503 by aMtthieu Rabago RN  Outcome: Progressing  Flowsheets (Taken 2/9/2023 1503)  Absence of infection at discharge:   Assess and monitor for signs and symptoms of infection   Monitor lab/diagnostic results   Monitor all insertion sites i.e., indwelling lines, tubes and drains  Note: Mediport site with no redness or warmth. Skin over port site intact with no signs of breakdown noted. Patient verbalizes signs/symptoms of port infection and when to notify the physician. Care plan reviewed with patient and family. Patient and family verbalize understanding of the plan of care and contribute to goal setting.

## 2023-02-09 NOTE — ONCOLOGY
Patient's glucose on labs was 51 at 1210. Patient given a turkey sandwich and nutrigrain bar to eat and pepsi to drink. Rechecked glucose at this time and result was 109.

## 2023-02-10 ENCOUNTER — HOSPITAL ENCOUNTER (OUTPATIENT)
Dept: RADIATION ONCOLOGY | Age: 53
Discharge: HOME | End: 2023-02-10
Payer: MEDICARE

## 2023-02-10 PROCEDURE — 77386 HC NTSTY MODUL RAD TX DLVR CPLX: CPT | Performed by: RADIOLOGY

## 2023-02-13 ENCOUNTER — TELEPHONE (OUTPATIENT)
Dept: CASE MANAGEMENT | Age: 53
End: 2023-02-13

## 2023-02-13 ENCOUNTER — HOSPITAL ENCOUNTER (OUTPATIENT)
Dept: RADIATION ONCOLOGY | Age: 53
Discharge: HOME | End: 2023-02-13
Payer: MEDICARE

## 2023-02-13 PROCEDURE — 77014 CHG CT GUIDANCE RADIATION THERAPY FLDS PLACEMENT: CPT | Performed by: RADIOLOGY

## 2023-02-13 PROCEDURE — 77386 HC NTSTY MODUL RAD TX DLVR CPLX: CPT | Performed by: RADIOLOGY

## 2023-02-13 NOTE — TELEPHONE ENCOUNTER
Natanael received call from pt's sig other re: to pt chemo appt on Thurs 2/16. Natanael informs pt is scheduled on 2/16 at 0930 for port access & labwork followed by appt with Dr Damian Client. Gene voiced understanding.

## 2023-02-14 ENCOUNTER — HOSPITAL ENCOUNTER (OUTPATIENT)
Dept: RADIATION ONCOLOGY | Age: 53
Discharge: HOME | End: 2023-02-14
Payer: MEDICARE

## 2023-02-14 PROCEDURE — 77014 CHG CT GUIDANCE RADIATION THERAPY FLDS PLACEMENT: CPT | Performed by: RADIOLOGY

## 2023-02-14 PROCEDURE — 77386 HC NTSTY MODUL RAD TX DLVR CPLX: CPT | Performed by: RADIOLOGY

## 2023-02-15 ENCOUNTER — HOSPITAL ENCOUNTER (OUTPATIENT)
Dept: RADIATION ONCOLOGY | Age: 53
Discharge: HOME | End: 2023-02-15
Payer: MEDICARE

## 2023-02-15 VITALS
WEIGHT: 143.52 LBS | HEART RATE: 84 BPM | OXYGEN SATURATION: 96 % | BODY MASS INDEX: 27.12 KG/M2 | RESPIRATION RATE: 18 BRPM | TEMPERATURE: 99.1 F | SYSTOLIC BLOOD PRESSURE: 141 MMHG | DIASTOLIC BLOOD PRESSURE: 91 MMHG

## 2023-02-15 DIAGNOSIS — R05.3 PERSISTENT COUGH FOR 3 WEEKS OR LONGER: Primary | ICD-10-CM

## 2023-02-15 PROCEDURE — 77386 HC NTSTY MODUL RAD TX DLVR CPLX: CPT | Performed by: RADIOLOGY

## 2023-02-15 ASSESSMENT — PAIN DESCRIPTION - LOCATION: LOCATION: THROAT

## 2023-02-15 ASSESSMENT — PAIN SCALES - GENERAL: PAINLEVEL_OUTOF10: 8

## 2023-02-15 NOTE — PROGRESS NOTES
[address] [facility] [address], [address]        [facility] Center[facility]BBI[facility] SCHROEDER AM JOSEY WOODS,  [facility]        Mino Gil: [phone]        F: [phone]       [email or web address]            Dr. Felicita Krueger MD MS          Dr. 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 Dose (cGy): 7000    Patient was seen today for weekly visit. Wt Readings from Last 3 Encounters:   02/15/23 143 lb 8.3 oz (65.1 kg)   23 143 lb 8.3 oz (65.1 kg)   23 141 lb (64 kg)       BP (!) 141/91   Pulse 84   Temp 99.1 °F (37.3 °C) (Infrared)   Resp 18   Wt 143 lb 8.3 oz (65.1 kg)   SpO2 96%   BMI 27.12 kg/m²     Lab Results   Component Value Date    WBC 7.1 2023     2023       Comfort Alteration  Fatigue:Must curtail daily activities even with rest periods and early bedtime, normal for patient    Pain Location: Throat  Pain Intensity (Current): 8 Very severe pain  Pain Treatment: Opioid  Pain Relief: Pain relieved 75%    Emotional Alteration:   Coping: somewhat effective    Nutritional Alteration  Anorexia: Loss of appetite but able to eat a small amount of food and/or liquids  Nausea: 1-2 episodes of nausea in 24 hours  Vomiting: No vomiting , occasionally  Salivary Glands- Acute:  Moderate dryness, thick sticky saliva, markedly altered taste, biotene spray  Taste Disturbance (Dysgeusia): Slightly altered  Dyspepsia/Heartburn: None  Dysphagia/Esophagitis: Dysphagia, requires pureed, soft or liquid diet    Skin Alteration   Skin reaction: No changes noted  Alopecia: No loss    Mucous Membrane Alteration  Mucositis XRT Related: None  Pharynx and Esophagus- Acute: No change over baseline  Voice Changes: Normal    Ventilation Alteration  Cough: Productive  Hemoptysis: None  Dyspnea: Normal  Mucous Quantity/Quality: White/grey foam mucous    CNS Alteration  Level of Consciousness: Alert, responds briskly, appropriately with minimal stimulus  Seizure Activity: None  Insomnia: Occasional difficulty sleeping not interfering with function  Orientation: Oriented in 3 spheres of person, place, and time  Comment:   Speech Impairment: No  Ataxia: Normal    Additional Comments: CeraVe BID and Aquaphor at night. MEDICATIONS:     Current Outpatient Medications   Medication Sig Dispense Refill    lidocaine viscous hcl (XYLOCAINE) 2 % SOLN solution Take every four hours as needed. Follow directions as given in clinic 100 mL 1    Magic Mouthwash (MIRACLE MOUTHWASH) Swish and spit 5 mLs 4 times daily as needed for Irritation 300 mL 1    prochlorperazine (COMPAZINE) 10 MG tablet Take 1 tablet by mouth every 6 hours as needed (as needed for nausea) 60 tablet 3    ondansetron (ZOFRAN) 4 MG tablet Take 1 tablet by mouth every 8 hours as needed for Nausea or Vomiting 20 tablet 1    lidocaine-prilocaine (EMLA) 2.5-2.5 % cream Apply topically as needed. 5 g 4    zolpidem (AMBIEN) 10 MG tablet TAKE 1 TABLET BY MOUTH AT NIGHT      pantoprazole (PROTONIX) 40 MG tablet Take 1 tablet by mouth every morning (before breakfast) 90 tablet 1    ALPRAZolam (XANAX) 1 MG tablet Take 1 mg by mouth as needed. oxyCODONE-acetaminophen (PERCOCET) 5-325 MG per tablet Take 2 tablets by mouth every 8 hours as needed for Pain.       clopidogrel (PLAVIX) 75 MG tablet Take 1 tablet by mouth daily      TRULICITY 1.5 LO/8.7OG SC injection Inject 1.5 mg into the skin once a week      HUMALOG KWIKPEN 100 UNIT/ML SOPN fentaNYL 37.5 MCG/HR PT72 Apply 1 patch topically. JARDIANCE 25 MG tablet Take 1 tablet by mouth daily      nitroGLYCERIN (NITROSTAT) 0.4 MG SL tablet Place 1 tablet under the tongue every 5 minutes as needed for Chest pain Take 1 tablet for chest pain and repeat after 5 min if no relief, call 911 and take another dose 5 min later. Max of 3 doses in 15 min. (Patient not taking: No sig reported) 25 tablet 3    metFORMIN (GLUCOPHAGE) 500 MG tablet Take 2 tablets by mouth 2 times daily Resume 8/30 60 tablet 3    pioglitazone (ACTOS) 30 MG tablet Take 1 tablet by mouth daily 30 tablet 3    atorvastatin (LIPITOR) 20 MG tablet Take 2 tablets by mouth nightly 30 tablet 3    lisinopril (PRINIVIL;ZESTRIL) 2.5 MG tablet Take 1 tablet by mouth daily 30 tablet 3    metoprolol tartrate (LOPRESSOR) 25 MG tablet Take 1 tablet by mouth 2 times daily 60 tablet 3    isosorbide dinitrate (ISORDIL) 20 MG tablet Take 30 mg by mouth daily      amLODIPine (NORVASC) 5 MG tablet Take 5 mg by mouth daily      budesonide-formoterol (SYMBICORT) 160-4.5 MCG/ACT AERO Inhale 2 puffs into the lungs 2 times daily      pregabalin (LYRICA) 150 MG capsule Take 150 mg by mouth 2 times daily      ALPRAZolam (XANAX) 0.5 MG tablet Take 0.5 mg by mouth 2 times daily as needed. aspirin 81 MG tablet Take 81 mg by mouth daily      albuterol (PROVENTIL HFA) 108 (90 BASE) MCG/ACT inhaler Inhale 2 puffs into the lungs every 4 hours as needed for Wheezing or Shortness of Breath (Space out to every 6 hours as symptoms improve). Space out to every 6 hours as symptoms improve. 1 Inhaler 10     No current facility-administered medications for this encounter. PHYSICAL EXAM:       ECOG: 3 - Symptomatic, >50% in bed, but not bedbound (Capable of only limited self-care, confined to bed or chair 50% or more of waking hours)     General: NAD, AO x 3, Mentation is clear with appropriate affect. HEENT:  No significant lesions of the oral cavity.  Mild to moderate hyperpigmentation of neck. Thorax:  Unlabored. Patient with wet cough   Abdomen:  Non-distended  Neuro:  Cranial nerves grossly intact; no focal deficits  Skin - treatment portal: SEE above. Chemotherapy Update: Concurrent chemotherapy    Treatment Imaging: CBCT and All imaging reviewed and approved by Dr. Clarence Warner: Mild to moderate radiation side effects. Responding appropriately to symptomatic management. Pt is eating mostly soup. PCP increased pain medications, but throat pain is persistent. MLB is helping temporarily. Pt states her cough is persistent with thick white phlegm, and related to nausea which is improved on antiemetics. Denies fevers but does feel sick. Unresolved by 2 rounds of liquid antibiotic (patient thinks this is amoxicillin) and tessalon perles. We will order CXR and refer to pulmonology. We will reach out to PCP or cardiologist, Dr. Kath Hamilton, about fluids as patient is unsure if she has HF    New medications, diagnostic results: Continue treatment as planned. Order CXR. Refer to pulm    PLAN: Again reviewed potential side effects of radiation for the patient's treatment. Monitor cough. Consider IV fluids if able to have/tolerate given history of significant cardiac disease. Continue local/topical care. Continue current radiation course as prescribed.

## 2023-02-16 ENCOUNTER — HOSPITAL ENCOUNTER (OUTPATIENT)
Dept: RADIATION ONCOLOGY | Age: 53
Discharge: HOME | End: 2023-02-16
Payer: MEDICARE

## 2023-02-16 ENCOUNTER — HOSPITAL ENCOUNTER (OUTPATIENT)
Dept: INFUSION THERAPY | Age: 53
Discharge: HOME | End: 2023-02-16
Payer: MEDICARE

## 2023-02-16 ENCOUNTER — OFFICE VISIT (OUTPATIENT)
Dept: ONCOLOGY | Age: 53
End: 2023-02-16
Payer: MEDICARE

## 2023-02-16 VITALS
BODY MASS INDEX: 27.1 KG/M2 | SYSTOLIC BLOOD PRESSURE: 121 MMHG | HEART RATE: 75 BPM | DIASTOLIC BLOOD PRESSURE: 66 MMHG | OXYGEN SATURATION: 93 % | TEMPERATURE: 97.3 F | HEIGHT: 61 IN | WEIGHT: 143.52 LBS | RESPIRATION RATE: 16 BRPM

## 2023-02-16 VITALS
RESPIRATION RATE: 18 BRPM | TEMPERATURE: 98.2 F | OXYGEN SATURATION: 96 % | BODY MASS INDEX: 27.1 KG/M2 | HEART RATE: 76 BPM | HEIGHT: 61 IN | WEIGHT: 143.52 LBS | DIASTOLIC BLOOD PRESSURE: 80 MMHG | SYSTOLIC BLOOD PRESSURE: 163 MMHG

## 2023-02-16 DIAGNOSIS — C32.1 CANCER, EPIGLOTTIS (HCC): Primary | ICD-10-CM

## 2023-02-16 LAB
ABSOLUTE IMMATURE GRANULOCYTE: 0 THOU/MM3 (ref 0–0.07)
ALBUMIN SERPL BCG-MCNC: 3.6 G/DL (ref 3.5–5.1)
ALP SERPL-CCNC: 73 U/L (ref 38–126)
ALT SERPL W/O P-5'-P-CCNC: 14 U/L (ref 11–66)
AST SERPL-CCNC: 15 U/L (ref 5–40)
BASOPHILS ABSOLUTE: 0 THOU/MM3 (ref 0–0.1)
BASOPHILS NFR BLD AUTO: 0 % (ref 0–3)
BILIRUB CONJ SERPL-MCNC: < 0.2 MG/DL (ref 0–0.3)
BILIRUB SERPL-MCNC: 0.2 MG/DL (ref 0.3–1.2)
BUN BLDP-MCNC: 27 MG/DL (ref 8–26)
CHLORIDE BLD-SCNC: 101 MEQ/L (ref 98–109)
CREAT BLD-MCNC: 0.8 MG/DL (ref 0.5–1.2)
EOSINOPHIL NFR BLD AUTO: 1 % (ref 0–4)
EOSINOPHILS ABSOLUTE: 0 THOU/MM3 (ref 0–0.4)
ERYTHROCYTE [DISTWIDTH] IN BLOOD BY AUTOMATED COUNT: 15.5 % (ref 11.5–14.5)
GFR SERPL CREATININE-BSD FRML MDRD: > 60 ML/MIN/1.73M2
GLUCOSE BLD STRIP.AUTO-MCNC: 129 MG/DL (ref 70–108)
GLUCOSE BLD-MCNC: 134 MG/DL (ref 70–108)
HCT VFR BLD AUTO: 35.8 % (ref 37–47)
HGB BLD-MCNC: 12 GM/DL (ref 12–16)
IMMATURE GRANULOCYTES: 0 %
IONIZED CALCIUM, WHOLE BLOOD: 1.14 MMOL/L (ref 1.12–1.32)
LYMPHOCYTES ABSOLUTE: 0.6 THOU/MM3 (ref 1–4.8)
LYMPHOCYTES NFR BLD AUTO: 12 % (ref 15–47)
MCH RBC QN AUTO: 33.6 PG (ref 26–33)
MCHC RBC AUTO-ENTMCNC: 33.5 GM/DL (ref 32.2–35.5)
MCV RBC AUTO: 100 FL (ref 81–99)
MONOCYTES ABSOLUTE: 0.5 THOU/MM3 (ref 0.4–1.3)
MONOCYTES NFR BLD AUTO: 9 % (ref 0–12)
NEUTROPHILS NFR BLD AUTO: 78 % (ref 43–75)
PLATELET # BLD AUTO: 263 THOU/MM3 (ref 130–400)
PMV BLD AUTO: 9.8 FL (ref 9.4–12.4)
POTASSIUM BLD-SCNC: 3.4 MEQ/L (ref 3.5–4.9)
PROT SERPL-MCNC: 5.9 G/DL (ref 6.1–8)
RBC # BLD AUTO: 3.57 MILL/MM3 (ref 4.2–5.4)
SEGMENTED NEUTROPHILS ABSOLUTE COUNT: 4.2 THOU/MM3 (ref 1.8–7.7)
SODIUM BLD-SCNC: 141 MEQ/L (ref 138–146)
TOTAL CO2, WHOLE BLOOD: 31 MEQ/L (ref 23–33)
WBC # BLD AUTO: 5.3 THOU/MM3 (ref 4.8–10.8)

## 2023-02-16 PROCEDURE — 85025 COMPLETE CBC W/AUTO DIFF WBC: CPT

## 2023-02-16 PROCEDURE — 36591 DRAW BLOOD OFF VENOUS DEVICE: CPT

## 2023-02-16 PROCEDURE — 99214 OFFICE O/P EST MOD 30 MIN: CPT | Performed by: INTERNAL MEDICINE

## 2023-02-16 PROCEDURE — G8417 CALC BMI ABV UP PARAM F/U: HCPCS | Performed by: INTERNAL MEDICINE

## 2023-02-16 PROCEDURE — 6360000002 HC RX W HCPCS: Performed by: INTERNAL MEDICINE

## 2023-02-16 PROCEDURE — 3017F COLORECTAL CA SCREEN DOC REV: CPT | Performed by: INTERNAL MEDICINE

## 2023-02-16 PROCEDURE — 77386 HC NTSTY MODUL RAD TX DLVR CPLX: CPT | Performed by: RADIOLOGY

## 2023-02-16 PROCEDURE — 1036F TOBACCO NON-USER: CPT | Performed by: INTERNAL MEDICINE

## 2023-02-16 PROCEDURE — 96417 CHEMO IV INFUS EACH ADDL SEQ: CPT

## 2023-02-16 PROCEDURE — 2580000003 HC RX 258: Performed by: INTERNAL MEDICINE

## 2023-02-16 PROCEDURE — 3078F DIAST BP <80 MM HG: CPT | Performed by: INTERNAL MEDICINE

## 2023-02-16 PROCEDURE — A4216 STERILE WATER/SALINE, 10 ML: HCPCS | Performed by: INTERNAL MEDICINE

## 2023-02-16 PROCEDURE — 3074F SYST BP LT 130 MM HG: CPT | Performed by: INTERNAL MEDICINE

## 2023-02-16 PROCEDURE — 77014 CHG CT GUIDANCE RADIATION THERAPY FLDS PLACEMENT: CPT | Performed by: RADIOLOGY

## 2023-02-16 PROCEDURE — G8428 CUR MEDS NOT DOCUMENT: HCPCS | Performed by: INTERNAL MEDICINE

## 2023-02-16 PROCEDURE — 96367 TX/PROPH/DG ADDL SEQ IV INF: CPT

## 2023-02-16 PROCEDURE — 96375 TX/PRO/DX INJ NEW DRUG ADDON: CPT

## 2023-02-16 PROCEDURE — 80076 HEPATIC FUNCTION PANEL: CPT

## 2023-02-16 PROCEDURE — 99211 OFF/OP EST MAY X REQ PHY/QHP: CPT

## 2023-02-16 PROCEDURE — 96413 CHEMO IV INFUSION 1 HR: CPT

## 2023-02-16 PROCEDURE — 82948 REAGENT STRIP/BLOOD GLUCOSE: CPT

## 2023-02-16 PROCEDURE — 80047 BASIC METABLC PNL IONIZED CA: CPT

## 2023-02-16 PROCEDURE — G8484 FLU IMMUNIZE NO ADMIN: HCPCS | Performed by: INTERNAL MEDICINE

## 2023-02-16 PROCEDURE — 2500000003 HC RX 250 WO HCPCS: Performed by: INTERNAL MEDICINE

## 2023-02-16 RX ORDER — SODIUM CHLORIDE 9 MG/ML
5-250 INJECTION, SOLUTION INTRAVENOUS PRN
Status: CANCELLED | OUTPATIENT
Start: 2023-02-16

## 2023-02-16 RX ORDER — SODIUM CHLORIDE 0.9 % (FLUSH) 0.9 %
5-40 SYRINGE (ML) INJECTION PRN
Status: CANCELLED | OUTPATIENT
Start: 2023-02-16

## 2023-02-16 RX ORDER — PALONOSETRON 0.05 MG/ML
0.25 INJECTION, SOLUTION INTRAVENOUS ONCE
Status: CANCELLED | OUTPATIENT
Start: 2023-02-16 | End: 2023-02-16

## 2023-02-16 RX ORDER — HEPARIN SODIUM (PORCINE) LOCK FLUSH IV SOLN 100 UNIT/ML 100 UNIT/ML
500 SOLUTION INTRAVENOUS PRN
Status: DISCONTINUED | OUTPATIENT
Start: 2023-02-16 | End: 2023-02-17 | Stop reason: HOSPADM

## 2023-02-16 RX ORDER — SODIUM CHLORIDE 9 MG/ML
5-250 INJECTION, SOLUTION INTRAVENOUS PRN
Status: DISCONTINUED | OUTPATIENT
Start: 2023-02-16 | End: 2023-02-17 | Stop reason: HOSPADM

## 2023-02-16 RX ORDER — ACETAMINOPHEN 325 MG/1
650 TABLET ORAL
Status: CANCELLED | OUTPATIENT
Start: 2023-02-16

## 2023-02-16 RX ORDER — ONDANSETRON 2 MG/ML
8 INJECTION INTRAMUSCULAR; INTRAVENOUS
Status: CANCELLED | OUTPATIENT
Start: 2023-02-16

## 2023-02-16 RX ORDER — DIPHENHYDRAMINE HYDROCHLORIDE 50 MG/ML
50 INJECTION INTRAMUSCULAR; INTRAVENOUS
Status: CANCELLED | OUTPATIENT
Start: 2023-02-16

## 2023-02-16 RX ORDER — DIPHENHYDRAMINE HYDROCHLORIDE 50 MG/ML
50 INJECTION INTRAMUSCULAR; INTRAVENOUS ONCE
Status: COMPLETED | OUTPATIENT
Start: 2023-02-16 | End: 2023-02-16

## 2023-02-16 RX ORDER — SODIUM CHLORIDE 0.9 % (FLUSH) 0.9 %
5-40 SYRINGE (ML) INJECTION PRN
Status: DISCONTINUED | OUTPATIENT
Start: 2023-02-16 | End: 2023-02-17 | Stop reason: HOSPADM

## 2023-02-16 RX ORDER — SODIUM CHLORIDE 9 MG/ML
5-40 INJECTION INTRAVENOUS PRN
Status: CANCELLED | OUTPATIENT
Start: 2023-02-16

## 2023-02-16 RX ORDER — ALBUTEROL SULFATE 90 UG/1
4 AEROSOL, METERED RESPIRATORY (INHALATION) PRN
Status: CANCELLED | OUTPATIENT
Start: 2023-02-16

## 2023-02-16 RX ORDER — SODIUM CHLORIDE 9 MG/ML
INJECTION, SOLUTION INTRAVENOUS CONTINUOUS
Status: CANCELLED | OUTPATIENT
Start: 2023-02-16

## 2023-02-16 RX ORDER — PALONOSETRON 0.05 MG/ML
0.25 INJECTION, SOLUTION INTRAVENOUS ONCE
Status: COMPLETED | OUTPATIENT
Start: 2023-02-16 | End: 2023-02-16

## 2023-02-16 RX ORDER — FAMOTIDINE 10 MG/ML
20 INJECTION, SOLUTION INTRAVENOUS ONCE
Status: CANCELLED | OUTPATIENT
Start: 2023-02-16 | End: 2023-02-16

## 2023-02-16 RX ORDER — FAMOTIDINE 10 MG/ML
20 INJECTION, SOLUTION INTRAVENOUS
Status: CANCELLED | OUTPATIENT
Start: 2023-02-16

## 2023-02-16 RX ORDER — DIPHENHYDRAMINE HYDROCHLORIDE 50 MG/ML
50 INJECTION INTRAMUSCULAR; INTRAVENOUS ONCE
Status: CANCELLED | OUTPATIENT
Start: 2023-02-16 | End: 2023-02-16

## 2023-02-16 RX ORDER — HEPARIN SODIUM (PORCINE) LOCK FLUSH IV SOLN 100 UNIT/ML 100 UNIT/ML
500 SOLUTION INTRAVENOUS PRN
Status: CANCELLED | OUTPATIENT
Start: 2023-02-16

## 2023-02-16 RX ORDER — EPINEPHRINE 1 MG/ML
0.3 INJECTION, SOLUTION, CONCENTRATE INTRAVENOUS PRN
Status: CANCELLED | OUTPATIENT
Start: 2023-02-16

## 2023-02-16 RX ORDER — MEPERIDINE HYDROCHLORIDE 50 MG/ML
12.5 INJECTION INTRAMUSCULAR; INTRAVENOUS; SUBCUTANEOUS PRN
Status: CANCELLED | OUTPATIENT
Start: 2023-02-16

## 2023-02-16 RX ADMIN — DIPHENHYDRAMINE HYDROCHLORIDE 50 MG: 50 INJECTION, SOLUTION INTRAMUSCULAR; INTRAVENOUS at 11:45

## 2023-02-16 RX ADMIN — PACLITAXEL 78 MG: 6 INJECTION, SOLUTION INTRAVENOUS at 12:25

## 2023-02-16 RX ADMIN — FAMOTIDINE 20 MG: 10 INJECTION, SOLUTION INTRAVENOUS at 11:41

## 2023-02-16 RX ADMIN — DEXAMETHASONE SODIUM PHOSPHATE 12 MG: 4 INJECTION, SOLUTION INTRA-ARTICULAR; INTRALESIONAL; INTRAMUSCULAR; INTRAVENOUS; SOFT TISSUE at 11:50

## 2023-02-16 RX ADMIN — CARBOPLATIN 193.6 MG: 10 INJECTION, SOLUTION INTRAVENOUS at 13:28

## 2023-02-16 RX ADMIN — Medication 500 UNITS: at 14:30

## 2023-02-16 RX ADMIN — PALONOSETRON 0.25 MG: 0.05 INJECTION, SOLUTION INTRAVENOUS at 11:45

## 2023-02-16 RX ADMIN — SODIUM CHLORIDE 200 ML/HR: 9 INJECTION, SOLUTION INTRAVENOUS at 11:41

## 2023-02-16 RX ADMIN — SODIUM CHLORIDE, PRESERVATIVE FREE 10 ML: 5 INJECTION INTRAVENOUS at 10:25

## 2023-02-16 RX ADMIN — SODIUM CHLORIDE, PRESERVATIVE FREE 20 ML: 5 INJECTION INTRAVENOUS at 14:30

## 2023-02-16 ASSESSMENT — PAIN DESCRIPTION - LOCATION: LOCATION_7: THROAT

## 2023-02-16 NOTE — PROGRESS NOTES
Patient assessed for the following post chemotherapy:    Dizziness   No  Lightheadedness  No      Acute nausea/vomiting No  Headache   No  Chest pain/pressure  No  Rash/itching   No  Shortness of breath  No    Patient kept for 20 minutes observation post infusion chemotherapy. Patient tolerated chemotherapy treatment taxol and carboplatin without any complications. Last vital signs:   BP (!) 163/80   Pulse 76   Temp 98.2 °F (36.8 °C) (Oral)   Resp 18   Ht 5' 1\" (1.549 m)   Wt 143 lb 8.3 oz (65.1 kg)   SpO2 96%   BMI 27.12 kg/m²     . Patient instructed if experience any of the above symptoms following today's infusion,he/she is to notify MD immediately or go to the emergency department. Discharge instructions given to patient. Verbalizes understanding. Off uni per self in motorized wheelchair with belongings.

## 2023-02-16 NOTE — PLAN OF CARE
Care plan reviewed with patient. Patient  verbalized understanding of the plan of care and contribute to goal setting. Problem: Chronic Conditions and Co-morbidities  Goal: Patient's chronic conditions and co-morbidity symptoms are monitored and maintained or improved  Outcome: Adequate for Discharge  Flowsheets (Taken 2/16/2023 1608)  Care Plan - Patient's Chronic Conditions and Co-Morbidity Symptoms are Monitored and Maintained or Improved:   Monitor and assess patient's chronic conditions and comorbid symptoms for stability, deterioration, or improvement   Collaborate with multidisciplinary team to address chronic and comorbid conditions and prevent exacerbation or deterioration   Update acute care plan with appropriate goals if chronic or comorbid symptoms are exacerbated and prevent overall improvement and discharge  Note: Patient verbalizes understanding to verbal information given on taxol and carboplatin,action and possible side effects. Aware to call MD if develop complications. Problem: Pain  Goal: Verbalizes/displays adequate comfort level or baseline comfort level  Outcome: Adequate for Discharge  Flowsheets (Taken 2/16/2023 1608)  Verbalizes/displays adequate comfort level or baseline comfort level:   Encourage patient to monitor pain and request assistance   Administer analgesics based on type and severity of pain and evaluate response   Assess pain using appropriate pain scale  Note: Taking pain meds as prescribed     Problem: Safety - Adult  Goal: Free from fall injury  Outcome: Adequate for Discharge  Flowsheets (Taken 2/16/2023 1608)  Free From Fall Injury:   Instruct family/caregiver on patient safety   Based on caregiver fall risk screen, instruct family/caregiver to ask for assistance with transferring infant if caregiver noted to have fall risk factors  Note: Free from falls while in infusion center.  Verbalized understanding of fall prevention and to ask for assistance with ambulation      Problem: Discharge Planning  Goal: Discharge to home or other facility with appropriate resources  Outcome: Adequate for Discharge  Flowsheets (Taken 2/16/2023 1603)  Discharge to home or other facility with appropriate resources:   Identify discharge learning needs (meds, wound care, etc)   Identify barriers to discharge with patient and caregiver   Arrange for needed discharge resources and transportation as appropriate  Note: Verbalized understanding of discharge instructions, follow ups and when to call doctor      Problem: Infection - Adult  Goal: Absence of infection at discharge  Outcome: Adequate for Discharge  Flowsheets (Taken 2/16/2023 1600)  Absence of infection at discharge:   Assess and monitor for signs and symptoms of infection   Monitor lab/diagnostic results   Monitor all insertion sites i.e., indwelling lines, tubes and drains  Note: Mediport site with no redness or warmth. Skin over port intact with no signs of breakdown noted. Patient verbalizes signs/symptoms of port infection and when to notify the physician.

## 2023-02-16 NOTE — PROGRESS NOTES
[address] CANCER [address] [place] [address]  Dept: [phone]  Loc: Ronal Valderrama  1970   No ref. provider found   Salma Lima MD       Chelsie Delcid is a 47 y.o.  female with squamous cell carcinoma of the epiglottis    CHIEF COMPLAINT  Chief Complaint   Patient presents with    Follow-up     Malignant neoplasm of epiglottis ([address]          HISTORY OF PRESENT ILLNESS    1/31/2022. CT scan of soft tissues of the neck without contrast done for right-sided neck pain showed no discrete neck mass fluid collection or acute phonatory change there are multiple nonspecific cervical lymph nodes at levels 1 through 5 measuring less than 1 cm in short axis no pathologically enlarged lymph nodes were seen. She initially presented with sore throat, difficulty swallowing and progressive weight loss. She was treated with multiple courses of antibiotics, steroids and antifungal medication with limited improvement. 2/10/2022. Chest x-ray done for shortness of breath and sore throat showed no focal acute lung lesions or significant chronic lung findings. 9/20/2022. Referred  to ENT and seen by Cindy Calhoun PA-C and had flexible laryngoscopy demonstrating an edematous epiglottis. She was given another round of oral steroids. He said that she was here for thrush and mouth and ear soreness for a year. The pain will get to be 10 out of 10. The pain was constant but would fluctuate in severity. There is no drainage or history of hearing loss or tinnitus. She said that she had a constant sore throat making it difficult for her to swallow and she has lost a lot of weight. She says that the pain is worse on the right and would extend into her ear. She is also finding it hard to breathe. Weight was estimated to be 65 kg. Examination showed multiple broken teeth and cavities.   Salivary gland exam was normal. There is mild lymphadenopathy with tender lymph nodes on the right side. Flexible laryngoscopy showed abnormally enlarged epiglottis obstructing the view of the larynx worse on the right than on the left. She increased her steroids that she was already on and had her come back in a week and told the patient to go to the ED if her airway became compromised. CT scan of the soft tissues of the neck with contrast was ordered. 9/21/2022. CAT scan of the neck showed nodular thickening of the area epiglottic folds associated with abnormal soft tissue thickening of the right side of the hypopharynx extending into the right posterolateral wall of the hypopharynx and flattening deformity of the right vallecula and piriform sinus raising the question of squamous cell carcinoma. There was no significant lymphadenopathy seen. A significant thyroid mass was not identified. The upper esophagus was unremarkable and a foreign body was not identified. There was heavy bilateral carotid bulb calcification seen and multiple dental caries were noted. PET CT scan was recommended. 9/28/2022. Follow-up in the ENT office. Patient said that her throat felt better but she had a bump on the roof of her mouth that was painful and she still complained of trouble swallowing and a sore throat. Flexible laryngoscopy was performed again hypopharynx was markedly abnormal with white speckles but most notably significant thickening of the epiglottis extending into the arytenoid space and false cords worse on the right. There was a possible necrotic area in the posterior face of the epiglottis. The larynx was abnormal with white speckles indicative of fungal infection. Dr. Kristin Avendano reviewed the case and discussed with the patient the benefit of biopsy. The possibility of tracheostomy was discussed to protect her airway. She was treated with fluconazole for her thrush. Steroids were held. 10/3/2022.   Chest x-ray that the cardiac silhouette was within normal limits.  Coronary stent was present.  There is strandy interstitial opacities in the perihilar distribution along with peribronchial thickening and no focal areas of consolidation, no pneumothorax or pleural effusion.  No acute bony changes were seen.    10/6/2022.  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  The epiglottis was edematous and had granular tissue involving the laryngeal surface of the left epiglottis.  There was a separate lesion involving the right false vocal cord with a papillomatous appearance.  Both true vocal cords seem to be spared.  There is no involvement of the arytenoid complex or postcricoid region.  The lingual surface of the epiglottis did not have any mucosal erosions but was edematous.  The vallecula and base the tongue was unremarkable.  The entire cervical esophagus was normal.  She is a former smoker.  The tentative stage is T3, N1 versus T3, N2c    Pathology report showed squamous cell carcinoma, poorly differentiated, of the left epiglottis, p16 negative.  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    10/13/2022.  Follow-up in the office.  Since her biopsy her sore throat had been somewhat tolerable but she continued to have pain with swallowing.  She denied choking and coughing episodes with swallowing.  She had no problems with shortness of breath.  She did complain of facial pain postnasal drip sore throat dysphagia cough with sputum production.  He said that she had a 1.5 cm right level 3 lymph node.  PET CT scan has been ordered and will be performed on November 3, 2022.  The possible need for a gastrostomy tube for nutrition was discussed.  Given that she already had baseline epiglottic edema which might worsen during treatment resulting in airway obstruction, the possibility of requiring a  tracheostomy tube was also reviewed. Options for treatment were discussed including primary surgery versus chemo rads. She was not felt to be an optimal surgical candidate. The patient opted for chemoradiation therapy and was referred to radiation therapy. She is here today to establish with medical oncology. Good control of her diabetes was stressed as well. She was also to have dental caries and was encouraged to go see a dentist.    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 Her above-the-knee amputation on the left side was on 2/26/2016. Has undergone cardiac catheterization with percutaneous stenting in August 2016 and an September 2016. Her diabetes requires insulin. She is on a fentanyl patch. She is here today to establish medical oncology follow-up. She has an appointment to see Dr. Sharla Ohara on 10/26. She says that she does not have much of an appetite and she continues to lose weight. She used to weigh 240 pounds and has lost 100 pounds in the past year and a half. Continues to have throat pain, dysphagia and odynophagia. She had been a smoker of a pack of cigarettes a day from age 13 through 2014 and then tried to cut down her smoking. She says that she is vaping and smoking as little as possible. She says that she does have a cough but no hemoptysis. She has been a diabetic for many years. She had gestational diabetes and did not check her sugars for a long time. She felt bad and went to a local ED and was found to have a sugar of 590. She says that she checks her sugars once a day. She says that her body tells her when her sugar is high. She usually checks her sugar sometime between 3 and 6 PM.  He has had an above-the-knee amputation of her left leg.   Says that she has had no problems with her natalia.  She says that she has a prosthesis but it does not fit anymore because she has lost so much weight. She says that she has no neuropathy. She uses a scooter. She has not had a recent eye examination. She said that she \"just did not go\" feels that her renal function is fine. She says that she has no known anemia. The only bleeding that she has experienced was a little bit after she had her biopsy. She did have the above-mentioned thrush and that was sore. She knows she has bad teeth and she is supposed to go to the dentist on October 27. We discussed this today in detail and gave her some assistance in dealing with this. She has fallen in the past fracturing her nose and breaking her finger and a tooth fell out which she has remedied herself. She has had myocardial infarction in 2007 and in 2016. She has had 4 stents placed. She has been on Repatha. She also says that she has had a valvular heart problem. She says that she has had nausea and occasional vomiting. She denies diarrhea and constipation. She says that she has never had a stroke. She has not had skin cancer. 11/9/2022. Mrs. Elena Mensah is here today to discuss the results of her PET scan. Unfortunately this shows a lesion in the periphery of the lung and hilar adenopathy which looks very much like a new primary lung cancer. I have discussed this with her and have asked her if we can go forward with a biopsy. The lesion is very peripheral and may be able to be biopsied by interventional radiology. That would circumvent the need to possibly pass an endoscope past her epiglottic tumor. I have discussed her case with Dr. Torey Bell. We think that a combination chemotherapy/ radiation therapy approach with her epiglottic and also her lung tumor is the best and I think that highly emetogenic chemotherapy is probably not in her best interest since it may compromise her airway if she vomits.   We will give her weekly CarboTaxol along with her radiation therapy. She will need to have a port placed. She has decided to go forward with a PEG tube and trach if needed. She will contact her ENT doctor. 11/30/2022. There have been multiple interventions since last time Carlos Farias was in the office. 11/16/2022. Seen in consultation by Dr. Janusz Aguilar. He arranged for Mediport placement and feeding tube placement. 11/21/2022. Surgery performed with port placement and feeding tube. 11/22/2022. 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 My reading of the data shows that if this is just a nodule no treatment is needed. CBC at that time showed a white count of 19.3 with hemoglobin of 13 hematocrit 46 and a platelet count of 385,356.    11/23/2022. She has had her teeth pulled through [employer] with a stanley pain for the oral surgeon. She tolerated it well. She is healing well. 11/25/2022. Emergency department encounter for severe abdominal pain. She had had nausea with vomiting 3 times over the last several days. She has significant pain in her abdomen when she has a bowel movement and also when she urinates. She was afebrile but tachycardic at a rate of 102. She was tender to palpation in the left lower quadrant and around the PEG tube site. The site itself was not erythematous and the tube seem to be in place. CBC showed white count of 9.9 with a hemoglobin of 15.4 hematocrit 46.5 and a platelet count of 095,335 with 75% polys, 14 lymphs, 7 monos, 2 eos. CAT scan showed mild soft tissue edema along the PEG tube catheter site related to recent PEG tube placement. There is no soft tissue fluid collection. She was discharged home. 11/28/2022. He had seen Dr. Anali Marin at the [facility] emergency room yesterday. She was treated for pain and then was discharged. That day her vital signs or normal with a pulse rate of 83. She was afebrile.   She had generalized abdominal tenderness.  Injection into the tube showed that the tube was at the level of the gastric antrum and contrast opacified the gastric lumen with no extravasation of contrast to suggest a leak.  She was treated with clindamycin.    11/29.  She called the surgeon's office saying that her PEG tube site was red and there was thick white drainage around the tube.  They told her it would take a few days for the antibiotics to work.    Today the patient is still having a lot of pain.  There is a lot of clear drainage with some thickened dark discharge it appears to be old blood but it is not tested.  There is no red to it.  There is some bright pink fluid in the PEG tube line which appears to have been from Snapple ingested the day before.  She has rebound tenderness throughout the left side of her abdomen.  Even though the study did not show any leaking, I fear that it had occurred and has caused irritation of the peritoneum causing this rebound.  I have encouraged the patient to lie flat as much as possible letting the fluids drain internally rather than into her peritoneum.  We will continue on her antibiotic.  We have another CAT scan scheduled for Friday morning and we will see her on Friday after that.  Her wound was inspected and redressed.  We increased her pain medication.  Her concerns about her treatment in the emergency room were also addressed.  I urged her son to talk to the [facility] about her concerns about her treatment.     12/21/2022.  Flaquita is here today with her significant other Gene to discuss going forward with her radiation therapy and chemotherapy.  It is still not clear to me when radiation will begin.  We have a plan in place for carboplatin and Taxol to be given concurrently weekly.    She is pleased to have her PEG tube out and has much less pain although she still has pain at the site.  Dr. Harry's note said that she drained so much fluid he had to apply an ostomy bag.   She also has pain at her port site but there is minimal inflammation and no obvious infection and there is no pus. She was instructed to call if there is any change in this finding. Jamison Lee says that she is swallowing fine at this time. Her current weight is 145 pounds which is up 3 pounds since last time she was weighed here about 2 weeks ago. She denies fevers, chills, sweats, bleeding, constipation or diarrhea. She says that she has her usual nausea but no vomiting. She continues to smoke. The patient had simulation with her mask on today. She was pretreated with Xanax. She is somewhat sleepy. Sugar was markedly elevated. I do not know if she took her insulin today. 1/5/2023. Mrs. Shelby Cardenas is here today with her significant other Gene. She has started her radiation and tomorrow she will get her chemotherapy. Her mouth continues to heal.  Her gums are still somewhat swollen so that she cannot get fitted for her dentures yet. Her abdominal pain is markedly improved and her PEG tube site has healed over without any signs of infection or inflammation. Mrs. Shelby Cardenas says that the liquid pain medication that she has been given is her throat almost immediately. I do not know if this is a placebo effect or if there is something in the oral preparation that actually improves the pain in her oropharynx. I told this to Mrs. Vyas and I offered miles mixture as an alternative to a narcotic analgesic and she accepted this offer. Dr. Ryan Beebe and I have agreed that her primary care physician will monitor her her pain medication and prescribe that for her. She says that she is extremely anxious and had to take several doses of Xanax today as she woke up extremely early so that she could be calm enough to come into the office today    She has gained weight. If we can believe our scales she has gained 6 pounds. She says that she had fever a week ago. She denies any bleeding. She has had some nausea.   She has had diarrhea and she has had nausea and vomiting. This is all before she starts any chemotherapy or radiation. INTERVAL NOTE    2/16/2023. Mrs. Nomi Doe is here today to complete her chemotherapy with cis-platinum and etoposide in conjunction with her radiation therapy. She has tolerated it fairly well. She does have changes of radiation reaction on the skin of her face neck and upper chest with a minimum amount of denuded skin. She is having trouble swallowing due to pain. We do not have accurate weights on her today because she is having trouble with weakness. This is compounded by the fact that she has had an AKA amputation on the left. She is somewhat sleepy because she took pain medications and anxiety medications prior to coming in today. Her counts are adequate for chemotherapy and we will proceed with her last infusion. The weight that we have is 143 pounds. She has had no fevers, chills, sweats, bleeding but has had nausea. She says that her appetite is good. She has had no constipation or diarrhea. She is here with her significant other today. MONITORING PARAMETERS    Physical examinations, fiberoptic nasolaryngoscopy, CAT scans PET scans. PAST MEDICAL HISTORY  Past Medical History:   Diagnosis Date    Above knee amputation of left lower extremity (HCC)     Blood circulation, collateral     CAD (coronary artery disease)     Diabetes mellitus ([facility] [address] [address]     Hyperlipidemia     Hypertension     MDRO (multiple drug resistant organisms) resistance     MRSA wound 2013    MI, old 01/01/2007    Obesity (BMI 30-39. 9)     Status post skin graft 01/15/2014    Amniox - Dr [facility]) 10/2022    squamous cell carcinoma of the epiglottis        REVIEW OF SYSTEMS  Review of Systems   Constitutional:  Positive for fatigue. Negative for appetite change and fever. HENT:  Negative for dental problem, mouth sores, sore throat and trouble swallowing.     Eyes:  Negative for visual disturbance. Respiratory:  Negative for cough and shortness of breath. Cardiovascular:  Positive for leg swelling. Gastrointestinal:  Negative for constipation, diarrhea, nausea and vomiting. Endocrine: Negative for polyuria. Genitourinary:  Negative for dysuria, frequency, hematuria and urgency. Musculoskeletal:  Positive for arthralgias (generalized). Skin:  Negative for pallor. Neurological:  Positive for weakness. Negative for light-headedness and headaches. Hematological:  Does not bruise/bleed easily. Psychiatric/Behavioral:  Negative for self-injury, sleep disturbance and suicidal ideas. The patient is not nervous/anxious. FAMILY HISTORY  Family History   Problem Relation Age of Onset    No Known Problems Brother     Heart Disease Maternal Grandfather     High Blood Pressure Maternal Grandfather     Asthma Neg Hx     Cancer Neg Hx     Diabetes Neg Hx     Kidney Disease Neg Hx     Stroke Neg Hx         SOCIAL HISTORY  Social History     Socioeconomic History    Marital status:       Spouse name: Not on file    Number of children: 1    Years of education: Not on file    Highest education level: Not on file   Occupational History    Not on file   Tobacco Use    Smoking status: Former     Packs/day: 0.50     Years: 27.00     Pack years: 13.50     Types: Cigarettes     Quit date: 2014     Years since quittin.4    Smokeless tobacco: Never    Tobacco comments:     e cigs daily use 1/3/22   Vaping Use    Vaping Use: Some days    Substances: Nicotine   Substance and Sexual Activity    Alcohol use: No    Drug use: No    Sexual activity: Not on file   Other Topics Concern    Not on file   Social History Narrative    Not on file     Social Determinants of Health     Financial Resource Strain: Not on file   Food Insecurity: Not on file   Transportation Needs: Not on file   Physical Activity: Not on file   Stress: Not on file   Social Connections: Not on file   Intimate Partner Violence: Not on file   Housing Stability: Not on file        CURRENT MEDICATIONS  Current Outpatient Medications   Medication Sig Dispense Refill    lidocaine viscous hcl (XYLOCAINE) 2 % SOLN solution Take every four hours as needed. Follow directions as given in clinic 100 mL 1    Magic Mouthwash (MIRACLE MOUTHWASH) Swish and spit 5 mLs 4 times daily as needed for Irritation 300 mL 1    prochlorperazine (COMPAZINE) 10 MG tablet Take 1 tablet by mouth every 6 hours as needed (as needed for nausea) 60 tablet 3    ondansetron (ZOFRAN) 4 MG tablet Take 1 tablet by mouth every 8 hours as needed for Nausea or Vomiting 20 tablet 1    lidocaine-prilocaine (EMLA) 2.5-2.5 % cream Apply topically as needed. 5 g 4    zolpidem (AMBIEN) 10 MG tablet TAKE 1 TABLET BY MOUTH AT NIGHT      pantoprazole (PROTONIX) 40 MG tablet Take 1 tablet by mouth every morning (before breakfast) 90 tablet 1    ALPRAZolam (XANAX) 1 MG tablet Take 1 mg by mouth as needed. oxyCODONE-acetaminophen (PERCOCET) 5-325 MG per tablet Take 2 tablets by mouth every 8 hours as needed for Pain. clopidogrel (PLAVIX) 75 MG tablet Take 1 tablet by mouth daily      TRULICITY 1.5 KZ/2.7BV SC injection Inject 1.5 mg into the skin once a week      HUMALOG KWIKPEN 100 UNIT/ML SOPN       fentaNYL 37.5 MCG/HR PT72 Apply 1 patch topically. JARDIANCE 25 MG tablet Take 1 tablet by mouth daily      nitroGLYCERIN (NITROSTAT) 0.4 MG SL tablet Place 1 tablet under the tongue every 5 minutes as needed for Chest pain Take 1 tablet for chest pain and repeat after 5 min if no relief, call 911 and take another dose 5 min later. Max of 3 doses in 15 min.  (Patient not taking: No sig reported) 25 tablet 3    metFORMIN (GLUCOPHAGE) 500 MG tablet Take 2 tablets by mouth 2 times daily Resume 8/30 60 tablet 3    pioglitazone (ACTOS) 30 MG tablet Take 1 tablet by mouth daily 30 tablet 3    atorvastatin (LIPITOR) 20 MG tablet Take 2 tablets by mouth nightly 30 tablet 3 lisinopril (PRINIVIL;ZESTRIL) 2.5 MG tablet Take 1 tablet by mouth daily 30 tablet 3    metoprolol tartrate (LOPRESSOR) 25 MG tablet Take 1 tablet by mouth 2 times daily 60 tablet 3    isosorbide dinitrate (ISORDIL) 20 MG tablet Take 30 mg by mouth daily      amLODIPine (NORVASC) 5 MG tablet Take 5 mg by mouth daily      budesonide-formoterol (SYMBICORT) 160-4.5 MCG/ACT AERO Inhale 2 puffs into the lungs 2 times daily      pregabalin (LYRICA) 150 MG capsule Take 150 mg by mouth 2 times daily      ALPRAZolam (XANAX) 0.5 MG tablet Take 0.5 mg by mouth 2 times daily as needed. aspirin 81 MG tablet Take 81 mg by mouth daily      albuterol (PROVENTIL HFA) 108 (90 BASE) MCG/ACT inhaler Inhale 2 puffs into the lungs every 4 hours as needed for Wheezing or Shortness of Breath (Space out to every 6 hours as symptoms improve). Space out to every 6 hours as symptoms improve. 1 Inhaler 10     No current facility-administered medications for this visit. OARRS    PDMP Monitoring:    Last PDMP Ramez Albrightt as Reviewed [facility]):  Review User Review Instant Review Result   Herminia Odom 10/23/2022  7:08 PM Reviewed PDMP [1]       Urine Drug Screenings (1 yr)       Urine Drug Screen  Collected: 11/29/2022  2:15 PM (Final result)                  Medication Contract and Consent for Opioid Use Documents Filed        No documents found                     PHYSICAL EXAM    Physical Exam  Vitals and nursing note reviewed. Exam conducted with a chaperone present. Constitutional:       General: She is not in acute distress. Appearance: She is ill-appearing. She is not toxic-appearing or diaphoretic. Comments: Justine Ames is a well-developed well-nourished somewhat sleepy appearing  female who is in a motorized scooter due to her previous AKA amputation. When aroused she is appropriate in her speech but her speech is delayed. She is here today with her significant other. She appears to be chronically ill.    HENT: Head: Normocephalic and atraumatic. Nose: Nose normal.      Mouth/Throat:      Mouth: Mucous membranes are moist.      Pharynx: Oropharynx is clear. No oropharyngeal exudate. Eyes:      General: No scleral icterus. Extraocular Movements: Extraocular movements intact. Conjunctiva/sclera: Conjunctivae normal.      Pupils: Pupils are equal, round, and reactive to light. Cardiovascular:      Rate and Rhythm: Normal rate and regular rhythm. Pulses: Normal pulses. Heart sounds: Normal heart sounds. No murmur heard. Pulmonary:      Effort: Pulmonary effort is normal.      Breath sounds: Normal breath sounds. No wheezing. Abdominal:      General: Bowel sounds are normal.      Palpations: Abdomen is soft. There is mass. Tenderness: There is no abdominal tenderness. There is no guarding. Musculoskeletal:         General: Normal range of motion. Cervical back: Normal range of motion. Right lower leg: No edema. Lymphadenopathy:      Cervical: No cervical adenopathy. Skin:     General: Skin is warm and dry. Coloration: Skin is not jaundiced. Neurological:      General: No focal deficit present. Mental Status: She is oriented to person, place, and time. Mental status is at baseline. Motor: No weakness. Psychiatric:         Mood and Affect: Mood normal.         Thought Content: Thought content normal.         Judgment: Judgment normal.        ECOG STATUS    2:  Ambulatory and capable of all self-care but unable to carry out any work activities: up and about more than 50% of waking hours. She is confined to her motorized scooter. LABS/IMAGING    Laboratory data done today shows that her BUN is 27 and her creatinine is 0.8. Calcium is normal.  Sugar is 129. Total protein is 5.9 and her potassium is 3.4.   Her albumin is 3.6 and the rest of her liver function tests are normal.  CBC shows white count of 5.3 with a hemoglobin of 12.0 hematocrit of 35.8 and a platelet count of 718,391. White blood cell differential count shows 70% polys, 12% lymphs, 9 monos, 1 eosinophil          PATHOLOGY/GENETICS    Squamous cell carcinoma of the epiglottis        ASSESSMENT and PLAN    1. Squamous cell carcinoma of the epiglottis, p16 negative. She is continuing concurrent radiation therapy and chemotherapy with carboplatin and Taxol. She is finishing her course today. She was congratulated for getting this accomplished. 2.  Anxiety and depression. She seems to be taking liberal doses of Xanax to control her anxiety. She says that her pain is under control. 3.  Multiple comorbidities. She will continue her current regimen of medications and follow-up with her usual providers for her comorbidities including chronic pain, poorly trolled diabetes, atherosclerosis, coronary artery disease, hyperlipidemia, feeding difficulties and other problems. We encouraged her to call if she feels the need for fluids. She will return to see us in a month. She knows that she should call if she has any change in her status, questions or concerns.             Babatunde Ma MD 2/16/2023 11:03 AM

## 2023-02-16 NOTE — PATIENT INSTRUCTIONS
Reviewed labs and recent medical history  Okay for her treatment today, 2/16/23  This is her last chemotherapy. She will complete her radiation next week. Encouraged her to call if she needs fluids or hydration.   Return to see MD in 1 month

## 2023-02-17 ENCOUNTER — HOSPITAL ENCOUNTER (OUTPATIENT)
Dept: GENERAL RADIOLOGY | Age: 53
Discharge: HOME | End: 2023-02-17
Payer: MEDICARE

## 2023-02-17 ENCOUNTER — HOSPITAL ENCOUNTER (OUTPATIENT)
Dept: RADIATION ONCOLOGY | Age: 53
Discharge: HOME | End: 2023-02-17
Payer: MEDICARE

## 2023-02-17 ENCOUNTER — HOSPITAL ENCOUNTER (OUTPATIENT)
Age: 53
Discharge: HOME | End: 2023-02-17
Payer: MEDICARE

## 2023-02-17 DIAGNOSIS — R05.3 PERSISTENT COUGH FOR 3 WEEKS OR LONGER: ICD-10-CM

## 2023-02-17 PROCEDURE — 77386 HC NTSTY MODUL RAD TX DLVR CPLX: CPT | Performed by: RADIOLOGY

## 2023-02-17 PROCEDURE — 71046 X-RAY EXAM CHEST 2 VIEWS: CPT

## 2023-02-20 ENCOUNTER — HOSPITAL ENCOUNTER (OUTPATIENT)
Dept: RADIATION ONCOLOGY | Age: 53
Discharge: HOME | End: 2023-02-20
Payer: MEDICARE

## 2023-02-20 PROCEDURE — 77014 CHG CT GUIDANCE RADIATION THERAPY FLDS PLACEMENT: CPT | Performed by: RADIOLOGY

## 2023-02-20 PROCEDURE — 77386 HC NTSTY MODUL RAD TX DLVR CPLX: CPT | Performed by: RADIOLOGY

## 2023-02-21 ENCOUNTER — HOSPITAL ENCOUNTER (OUTPATIENT)
Dept: RADIATION ONCOLOGY | Age: 53
Discharge: HOME | End: 2023-02-21
Payer: MEDICARE

## 2023-02-21 PROCEDURE — 77338 DESIGN MLC DEVICE FOR IMRT: CPT | Performed by: RADIOLOGY

## 2023-02-21 PROCEDURE — 77014 CHG CT GUIDANCE RADIATION THERAPY FLDS PLACEMENT: CPT | Performed by: RADIOLOGY

## 2023-02-21 PROCEDURE — 77300 RADIATION THERAPY DOSE PLAN: CPT | Performed by: RADIOLOGY

## 2023-02-21 PROCEDURE — 77386 HC NTSTY MODUL RAD TX DLVR CPLX: CPT | Performed by: RADIOLOGY

## 2023-02-22 ENCOUNTER — HOSPITAL ENCOUNTER (OUTPATIENT)
Dept: RADIATION ONCOLOGY | Age: 53
Discharge: HOME | End: 2023-02-22
Payer: MEDICARE

## 2023-02-22 ENCOUNTER — HOSPITAL ENCOUNTER (OUTPATIENT)
Dept: INFUSION THERAPY | Age: 53
Discharge: HOME | End: 2023-02-22
Payer: MEDICARE

## 2023-02-22 VITALS
TEMPERATURE: 98.3 F | HEIGHT: 61 IN | WEIGHT: 143.1 LBS | BODY MASS INDEX: 27.02 KG/M2 | DIASTOLIC BLOOD PRESSURE: 93 MMHG | HEART RATE: 87 BPM | RESPIRATION RATE: 16 BRPM | OXYGEN SATURATION: 97 % | SYSTOLIC BLOOD PRESSURE: 135 MMHG

## 2023-02-22 DIAGNOSIS — C32.1 MALIGNANT NEOPLASM OF EPIGLOTTIS (HCC): Primary | ICD-10-CM

## 2023-02-22 DIAGNOSIS — C32.1 CANCER, EPIGLOTTIS (HCC): ICD-10-CM

## 2023-02-22 LAB
ABSOLUTE IMMATURE GRANULOCYTE: 0 THOU/MM3 (ref 0–0.07)
BASOPHILS ABSOLUTE: 0 THOU/MM3 (ref 0–0.1)
BASOPHILS NFR BLD AUTO: 0 % (ref 0–3)
EOSINOPHIL NFR BLD AUTO: 1 % (ref 0–4)
EOSINOPHILS ABSOLUTE: 0 THOU/MM3 (ref 0–0.4)
ERYTHROCYTE [DISTWIDTH] IN BLOOD BY AUTOMATED COUNT: 15.4 % (ref 11.5–14.5)
HCT VFR BLD AUTO: 36.3 % (ref 37–47)
HGB BLD-MCNC: 12.4 GM/DL (ref 12–16)
IMMATURE GRANULOCYTES: 0 %
LYMPHOCYTES ABSOLUTE: 0.5 THOU/MM3 (ref 1–4.8)
LYMPHOCYTES NFR BLD AUTO: 22 % (ref 15–47)
MCH RBC QN AUTO: 33.7 PG (ref 26–33)
MCHC RBC AUTO-ENTMCNC: 34.2 GM/DL (ref 32.2–35.5)
MCV RBC AUTO: 99 FL (ref 81–99)
MONOCYTES ABSOLUTE: 0.3 THOU/MM3 (ref 0.4–1.3)
MONOCYTES NFR BLD AUTO: 12 % (ref 0–12)
NEUTROPHILS NFR BLD AUTO: 65 % (ref 43–75)
PLATELET # BLD AUTO: 265 THOU/MM3 (ref 130–400)
PMV BLD AUTO: 10.1 FL (ref 9.4–12.4)
RBC # BLD AUTO: 3.68 MILL/MM3 (ref 4.2–5.4)
SEGMENTED NEUTROPHILS ABSOLUTE COUNT: 1.5 THOU/MM3 (ref 1.8–7.7)
WBC # BLD AUTO: 2.4 THOU/MM3 (ref 4.8–10.8)

## 2023-02-22 PROCEDURE — 2580000003 HC RX 258: Performed by: INTERNAL MEDICINE

## 2023-02-22 PROCEDURE — 77386 HC NTSTY MODUL RAD TX DLVR CPLX: CPT | Performed by: RADIOLOGY

## 2023-02-22 PROCEDURE — 77338 DESIGN MLC DEVICE FOR IMRT: CPT | Performed by: RADIOLOGY

## 2023-02-22 PROCEDURE — 6360000002 HC RX W HCPCS: Performed by: INTERNAL MEDICINE

## 2023-02-22 PROCEDURE — 96361 HYDRATE IV INFUSION ADD-ON: CPT

## 2023-02-22 PROCEDURE — 2580000003 HC RX 258: Performed by: RADIOLOGY

## 2023-02-22 PROCEDURE — 36591 DRAW BLOOD OFF VENOUS DEVICE: CPT

## 2023-02-22 PROCEDURE — 85025 COMPLETE CBC W/AUTO DIFF WBC: CPT

## 2023-02-22 PROCEDURE — 96360 HYDRATION IV INFUSION INIT: CPT

## 2023-02-22 PROCEDURE — 77014 CHG CT GUIDANCE RADIATION THERAPY FLDS PLACEMENT: CPT | Performed by: RADIOLOGY

## 2023-02-22 RX ORDER — SODIUM CHLORIDE 0.9 % (FLUSH) 0.9 %
5-40 SYRINGE (ML) INJECTION PRN
Status: DISCONTINUED | OUTPATIENT
Start: 2023-02-22 | End: 2023-02-23 | Stop reason: HOSPADM

## 2023-02-22 RX ORDER — 0.9 % SODIUM CHLORIDE 0.9 %
500 INTRAVENOUS SOLUTION INTRAVENOUS ONCE
Status: COMPLETED | OUTPATIENT
Start: 2023-02-22 | End: 2023-02-22

## 2023-02-22 RX ORDER — HEPARIN SODIUM (PORCINE) LOCK FLUSH IV SOLN 100 UNIT/ML 100 UNIT/ML
500 SOLUTION INTRAVENOUS PRN
Status: CANCELLED | OUTPATIENT
Start: 2023-02-22

## 2023-02-22 RX ORDER — SODIUM CHLORIDE 0.9 % (FLUSH) 0.9 %
5-40 SYRINGE (ML) INJECTION PRN
Status: CANCELLED | OUTPATIENT
Start: 2023-02-22

## 2023-02-22 RX ORDER — SODIUM CHLORIDE 9 MG/ML
25 INJECTION, SOLUTION INTRAVENOUS PRN
Status: CANCELLED | OUTPATIENT
Start: 2023-02-22

## 2023-02-22 RX ORDER — 0.9 % SODIUM CHLORIDE 0.9 %
500 INTRAVENOUS SOLUTION INTRAVENOUS ONCE
Status: CANCELLED | OUTPATIENT
Start: 2023-02-22 | End: 2023-02-22

## 2023-02-22 RX ORDER — HEPARIN SODIUM (PORCINE) LOCK FLUSH IV SOLN 100 UNIT/ML 100 UNIT/ML
500 SOLUTION INTRAVENOUS PRN
Status: DISCONTINUED | OUTPATIENT
Start: 2023-02-22 | End: 2023-02-23 | Stop reason: HOSPADM

## 2023-02-22 RX ADMIN — Medication 500 UNITS: at 14:40

## 2023-02-22 RX ADMIN — SODIUM CHLORIDE, PRESERVATIVE FREE 10 ML: 5 INJECTION INTRAVENOUS at 14:40

## 2023-02-22 RX ADMIN — SODIUM CHLORIDE, PRESERVATIVE FREE 10 ML: 5 INJECTION INTRAVENOUS at 13:20

## 2023-02-22 RX ADMIN — SODIUM CHLORIDE, PRESERVATIVE FREE 20 ML: 5 INJECTION INTRAVENOUS at 13:21

## 2023-02-22 RX ADMIN — SODIUM CHLORIDE 500 ML: 9 INJECTION, SOLUTION INTRAVENOUS at 13:30

## 2023-02-22 NOTE — PROGRESS NOTES
Patient assessed for the following post IV fluids:    Dizziness   No  Lightheadedness  No      Acute nausea/vomiting No  Headache   No  Chest pain/pressure  No  Rash/itching   No  Shortness of breath  No    Patient tolerated IV hydration without any complications. Last vital signs:   BP (!) 135/93   Pulse 87   Temp 98.3 °F (36.8 °C) (Oral)   Resp 16   Ht 5' 1\" (1.549 m)   Wt 143 lb 1.6 oz (64.9 kg)   SpO2 97%   BMI 27.04 kg/m²     Patient instructed to return to clinic Friday for 1 hour fluids per Dr. Ori Daniel. Patient instructed if experience any of the above symptoms following today's infusion, she is to notify MD immediately or go to the emergency department. Discharge instructions given to patient. Verbalizes understanding. Motorized wheelchair off unit per self, accompanied by family, with belongings.

## 2023-02-22 NOTE — PROGRESS NOTES
Patient here for IV hydration of 500 ml over 1 hr's,  Due to poor oral intake. Per Dr. Feliciano Lira.

## 2023-02-22 NOTE — PLAN OF CARE
Problem: Chronic Conditions and Co-morbidities  Goal: Patient's chronic conditions and co-morbidity symptoms are monitored and maintained or improved  Outcome: Adequate for Discharge  Flowsheets (Taken 2/22/2023 1636)  Care Plan - Patient's Chronic Conditions and Co-Morbidity Symptoms are Monitored and Maintained or Improved: Collaborate with multidisciplinary team to address chronic and comorbid conditions and prevent exacerbation or deterioration     Problem: Chronic Conditions and Co-morbidities  Goal: Patient's chronic conditions and co-morbidity symptoms are monitored and maintained or improved  Outcome: Adequate for Discharge  Flowsheets (Taken 2/22/2023 1636)  Care Plan - Patient's Chronic Conditions and Co-Morbidity Symptoms are Monitored and Maintained or Improved: Collaborate with multidisciplinary team to address chronic and comorbid conditions and prevent exacerbation or deterioration     Problem: Infection - Adult  Goal: Absence of infection at discharge  Outcome: Adequate for Discharge  Flowsheets (Taken 2/22/2023 1636)  Absence of infection at discharge: Monitor all insertion sites i.e., indwelling lines, tubes and drains     Problem: Safety - Adult  Goal: Free from fall injury  Outcome: Adequate for Discharge  Flowsheets (Taken 2/22/2023 1636)  Free From Fall Injury: Instruct family/caregiver on patient safety     Problem: Discharge Planning  Goal: Discharge to home or other facility with appropriate resources  Outcome: Adequate for Discharge  Flowsheets (Taken 2/22/2023 1636)  Discharge to home or other facility with appropriate resources: Identify barriers to discharge with patient and caregiver     Care plan reviewed with patient and family. Patient and family verbalize understanding of the plan of care and contribute to goal setting.

## 2023-02-22 NOTE — DISCHARGE INSTRUCTIONS
Please contact your Oncologist if you have any questions regarding the port flush with lab draw and IV fluids that you received today. Patient instructed if experience any of the symptoms following today's port flush with lab draw and IV fluids to notify MD immediately or go to emergency department.     * dizziness/lightheadedness  *acute nausea/vomiting - not relieved with medication  *headache - not relieved from Tylenol/pain medication  *chest pain/pressure  *rash/itching  *shortness of breath        Drink fluids - 48oz fluids daily  Call if develop fever/ chills/ signs or symptoms of infection

## 2023-02-23 ENCOUNTER — HOSPITAL ENCOUNTER (OUTPATIENT)
Dept: RADIATION ONCOLOGY | Age: 53
Discharge: HOME | End: 2023-02-23
Payer: MEDICARE

## 2023-02-23 VITALS
TEMPERATURE: 98.7 F | HEART RATE: 84 BPM | OXYGEN SATURATION: 95 % | WEIGHT: 136.24 LBS | BODY MASS INDEX: 25.74 KG/M2 | DIASTOLIC BLOOD PRESSURE: 64 MMHG | SYSTOLIC BLOOD PRESSURE: 95 MMHG | RESPIRATION RATE: 16 BRPM

## 2023-02-23 PROCEDURE — 77386 HC NTSTY MODUL RAD TX DLVR CPLX: CPT | Performed by: RADIOLOGY

## 2023-02-23 RX ORDER — 0.9 % SODIUM CHLORIDE 0.9 %
500 INTRAVENOUS SOLUTION INTRAVENOUS ONCE
Status: CANCELLED
Start: 2023-02-24 | End: 2023-02-24

## 2023-02-23 ASSESSMENT — PAIN DESCRIPTION - LOCATION: LOCATION: THROAT

## 2023-02-23 ASSESSMENT — PAIN SCALES - GENERAL: PAINLEVEL_OUTOF10: 10

## 2023-02-23 NOTE — PROGRESS NOTES
[facility]          [address], [address]        [place] [id number]        O: [phone]        F: [phone]       [facility]            Dr. Sukumar Uribe MD MS          Dr. Misty Millan MD PhD    ON TREATMENT VISIT (OTV) NOTE     Date of Service: 2023  Patient ID: Flaquita Vyas   : 1970  MRN: [id number]   Acct Number: [id number]     RADIATION ONCOLOGY ATTENDING:  Sukumar Uribe MD MS    DIAGNOSIS:  Cancer Staging  Cancer, epiglottis (HCC)  Staging form: Larynx - Supraglottis, AJCC 8th Edition  - Clinical stage from 10/6/2022: Stage III (cT3, cN0, cM0) - Signed by Sukumar Uribe MD on 2022      Treatment Area: Head/Neck    Current Total Dose(cGy): 6600  Current Fraction: 33/35  Final/Cumulative Rx. Dose (cGy): 7000    Patient was seen today for weekly visit.     Wt Readings from Last 3 Encounters:   23 136 lb 3.9 oz (61.8 kg)   23 143 lb 1.6 oz (64.9 kg)   23 143 lb 8.3 oz (65.1 kg)       BP 95/64   Pulse 84   Temp 98.7 °F (37.1 °C) (Infrared)   Resp 16   Wt 136 lb 3.9 oz (61.8 kg)   SpO2 95%   BMI 25.74 kg/m²     Lab Results   Component Value Date    WBC 2.4 (L) 2023     2023       Comfort Alteration  Fatigue:Must curtail daily activities even with rest periods and early bedtime, normal for patient    Pain Location: Throat  Pain Intensity (Current): 10 Worst possible pain  Pain Treatment: Opioid  Pain Relief: Pain relieved 75%    Emotional Alteration:   Coping: somewhat effective    Nutritional Alteration  Anorexia: Loss of appetite but able to eat a small amount of food and/or liquids  Nausea: 1-2 episodes of nausea in 24 hours  Vomiting: No vomiting , occasionally  Salivary Glands- Acute: Moderate dryness, thick sticky saliva, markedly altered taste, biotene spray  Taste Disturbance (Dysgeusia): Slightly altered  Dyspepsia/Heartburn: None  Dysphagia/Esophagitis:  Dysphagia, requires pureed, soft or liquid diet    Skin Alteration   Skin reaction: Dry desquamation with or without erythema  Alopecia: No loss    Mucous Membrane Alteration  Mucositis XRT Related: None  Pharynx and Esophagus- Acute: No change over baseline  Voice Changes: Normal    Ventilation Alteration  Cough: Productive  Hemoptysis: None  Dyspnea: Normal  Mucous Quantity/Quality: White/grey foam mucous    CNS Alteration  Level of Consciousness: Alert, responds briskly, appropriately with minimal stimulus  Seizure Activity: None  Insomnia: Occasional difficulty sleeping not interfering with function  Orientation: Oriented in 3 spheres of person, place, and time  Comment:   Speech Impairment: No  Ataxia: Normal    Additional Comments: CeraVe BID and Aquaphor at night. MEDICATIONS:     Current Outpatient Medications   Medication Sig Dispense Refill    lidocaine viscous hcl (XYLOCAINE) 2 % SOLN solution Take every four hours as needed. Follow directions as given in clinic 100 mL 1    Magic Mouthwash (MIRACLE MOUTHWASH) Swish and spit 5 mLs 4 times daily as needed for Irritation 300 mL 1    prochlorperazine (COMPAZINE) 10 MG tablet Take 1 tablet by mouth every 6 hours as needed (as needed for nausea) 60 tablet 3    ondansetron (ZOFRAN) 4 MG tablet Take 1 tablet by mouth every 8 hours as needed for Nausea or Vomiting 20 tablet 1    lidocaine-prilocaine (EMLA) 2.5-2.5 % cream Apply topically as needed. 5 g 4    zolpidem (AMBIEN) 10 MG tablet TAKE 1 TABLET BY MOUTH AT NIGHT      pantoprazole (PROTONIX) 40 MG tablet Take 1 tablet by mouth every morning (before breakfast) 90 tablet 1    ALPRAZolam (XANAX) 1 MG tablet Take 1 mg by mouth as needed. oxyCODONE-acetaminophen (PERCOCET) 5-325 MG per tablet Take 2 tablets by mouth every 8 hours as needed for Pain.       clopidogrel (PLAVIX) 75 MG tablet Take 1 tablet by mouth daily      TRULICITY 1.5 NR/1.1RJ SC injection Inject 1.5 mg into the skin once a week HUMALOG KWIKPEN 100 UNIT/ML SOPN       fentaNYL 37.5 MCG/HR PT72 Apply 1 patch topically. JARDIANCE 25 MG tablet Take 1 tablet by mouth daily      nitroGLYCERIN (NITROSTAT) 0.4 MG SL tablet Place 1 tablet under the tongue every 5 minutes as needed for Chest pain Take 1 tablet for chest pain and repeat after 5 min if no relief, call 911 and take another dose 5 min later. Max of 3 doses in 15 min. (Patient not taking: No sig reported) 25 tablet 3    metFORMIN (GLUCOPHAGE) 500 MG tablet Take 2 tablets by mouth 2 times daily Resume 8/30 60 tablet 3    pioglitazone (ACTOS) 30 MG tablet Take 1 tablet by mouth daily 30 tablet 3    atorvastatin (LIPITOR) 20 MG tablet Take 2 tablets by mouth nightly 30 tablet 3    lisinopril (PRINIVIL;ZESTRIL) 2.5 MG tablet Take 1 tablet by mouth daily 30 tablet 3    metoprolol tartrate (LOPRESSOR) 25 MG tablet Take 1 tablet by mouth 2 times daily 60 tablet 3    isosorbide dinitrate (ISORDIL) 20 MG tablet Take 30 mg by mouth daily      amLODIPine (NORVASC) 5 MG tablet Take 5 mg by mouth daily      budesonide-formoterol (SYMBICORT) 160-4.5 MCG/ACT AERO Inhale 2 puffs into the lungs 2 times daily      pregabalin (LYRICA) 150 MG capsule Take 150 mg by mouth 2 times daily      ALPRAZolam (XANAX) 0.5 MG tablet Take 0.5 mg by mouth 2 times daily as needed. aspirin 81 MG tablet Take 81 mg by mouth daily      albuterol (PROVENTIL HFA) 108 (90 BASE) MCG/ACT inhaler Inhale 2 puffs into the lungs every 4 hours as needed for Wheezing or Shortness of Breath (Space out to every 6 hours as symptoms improve). Space out to every 6 hours as symptoms improve. 1 Inhaler 10     No current facility-administered medications for this encounter. PHYSICAL EXAM:       ECOG: 3 - Symptomatic, >50% in bed, but not bedbound (Capable of only limited self-care, confined to bed or chair 50% or more of waking hours)     General: NAD, AO x 3, Mentation is clear with appropriate affect.   HEENT:  No significant lesions of the oral cavity. Mild to moderate hyperpigmentation of neck with dry desquamation   Thorax:  Unlabored. Patient with wet cough   Abdomen:  Non-distended  Neuro:  Cranial nerves grossly intact; no focal deficits  Skin - treatment portal: SEE above. Chemotherapy Update: Concurrent chemotherapy    Treatment Imaging: CBCT and All imaging reviewed and approved by Dr. Lindsey Duque    ASSESSMENT: Mild to moderate radiation side effects. Responding appropriately to symptomatic management. Patient reports continued, severe throat pain. Currently taking 5mL of Oxycodone Hcl 5 mg / 5 mL every 4 hours (she will bring in remaining amount tomorrow), 2 tablets of Percocet 5-325 every 6-8 hours, and wears a Fentanyl patch 50mcg/hr which she switches out every 3 days (she has 3 patches left). Feels the fentanyl is not the most helpful. States she thinks more fentanyl is being ordered by her pharmacy. New medications, diagnostic results: CXR suggested no pneumonia. Continue treatment as planned. Trial Xylimelts    PLAN: Again reviewed potential side effects of radiation for the patient's treatment. IV fluids planned. Continue local/topical care. Manage cancer pain as discussed with her PCP, Dr. Ja Cardenas current radiation course as prescribed.

## 2023-02-24 ENCOUNTER — APPOINTMENT (OUTPATIENT)
Dept: RADIATION ONCOLOGY | Age: 53
End: 2023-02-24
Payer: MEDICARE

## 2023-02-24 ENCOUNTER — HOSPITAL ENCOUNTER (OUTPATIENT)
Dept: INFUSION THERAPY | Age: 53
Discharge: HOME | End: 2023-02-24

## 2023-02-27 ENCOUNTER — HOSPITAL ENCOUNTER (OUTPATIENT)
Dept: RADIATION ONCOLOGY | Age: 53
Discharge: HOME | End: 2023-02-27
Payer: MEDICARE

## 2023-02-27 PROCEDURE — 77014 CHG CT GUIDANCE RADIATION THERAPY FLDS PLACEMENT: CPT | Performed by: RADIOLOGY

## 2023-02-27 PROCEDURE — 77386 HC NTSTY MODUL RAD TX DLVR CPLX: CPT | Performed by: RADIOLOGY

## 2023-02-28 ENCOUNTER — HOSPITAL ENCOUNTER (OUTPATIENT)
Dept: RADIATION ONCOLOGY | Age: 53
Discharge: HOME | End: 2023-02-28
Payer: MEDICARE

## 2023-02-28 ENCOUNTER — HOSPITAL ENCOUNTER (OUTPATIENT)
Dept: INFUSION THERAPY | Age: 53
Discharge: HOME | End: 2023-02-28
Payer: MEDICARE

## 2023-02-28 VITALS
OXYGEN SATURATION: 96 % | RESPIRATION RATE: 16 BRPM | HEART RATE: 84 BPM | DIASTOLIC BLOOD PRESSURE: 68 MMHG | WEIGHT: 136 LBS | BODY MASS INDEX: 25.68 KG/M2 | HEIGHT: 61 IN | TEMPERATURE: 98.2 F | SYSTOLIC BLOOD PRESSURE: 101 MMHG

## 2023-02-28 DIAGNOSIS — C32.1 MALIGNANT NEOPLASM OF EPIGLOTTIS (HCC): Primary | ICD-10-CM

## 2023-02-28 PROCEDURE — 96360 HYDRATION IV INFUSION INIT: CPT

## 2023-02-28 PROCEDURE — 2580000003 HC RX 258: Performed by: RADIOLOGY

## 2023-02-28 PROCEDURE — 77014 CHG CT GUIDANCE RADIATION THERAPY FLDS PLACEMENT: CPT | Performed by: RADIOLOGY

## 2023-02-28 PROCEDURE — 6360000002 HC RX W HCPCS: Performed by: INTERNAL MEDICINE

## 2023-02-28 PROCEDURE — 2580000003 HC RX 258: Performed by: INTERNAL MEDICINE

## 2023-02-28 PROCEDURE — 77386 HC NTSTY MODUL RAD TX DLVR CPLX: CPT | Performed by: RADIOLOGY

## 2023-02-28 RX ORDER — 0.9 % SODIUM CHLORIDE 0.9 %
500 INTRAVENOUS SOLUTION INTRAVENOUS ONCE
Status: COMPLETED | OUTPATIENT
Start: 2023-02-28 | End: 2023-02-28

## 2023-02-28 RX ORDER — SODIUM CHLORIDE 0.9 % (FLUSH) 0.9 %
5-40 SYRINGE (ML) INJECTION PRN
Status: CANCELLED | OUTPATIENT
Start: 2023-02-28

## 2023-02-28 RX ORDER — SODIUM CHLORIDE 0.9 % (FLUSH) 0.9 %
5-40 SYRINGE (ML) INJECTION PRN
Status: DISCONTINUED | OUTPATIENT
Start: 2023-02-28 | End: 2023-03-01 | Stop reason: HOSPADM

## 2023-02-28 RX ORDER — HEPARIN SODIUM (PORCINE) LOCK FLUSH IV SOLN 100 UNIT/ML 100 UNIT/ML
500 SOLUTION INTRAVENOUS PRN
Status: DISCONTINUED | OUTPATIENT
Start: 2023-02-28 | End: 2023-03-01 | Stop reason: HOSPADM

## 2023-02-28 RX ORDER — SODIUM CHLORIDE 0.9 % (FLUSH) 0.9 %
5-40 SYRINGE (ML) INJECTION PRN
OUTPATIENT
Start: 2023-02-28

## 2023-02-28 RX ORDER — 0.9 % SODIUM CHLORIDE 0.9 %
500 INTRAVENOUS SOLUTION INTRAVENOUS ONCE
Status: CANCELLED
Start: 2023-02-28 | End: 2023-02-28

## 2023-02-28 RX ORDER — HEPARIN SODIUM (PORCINE) LOCK FLUSH IV SOLN 100 UNIT/ML 100 UNIT/ML
500 SOLUTION INTRAVENOUS PRN
Status: CANCELLED | OUTPATIENT
Start: 2023-02-28

## 2023-02-28 RX ORDER — SODIUM CHLORIDE 9 MG/ML
25 INJECTION, SOLUTION INTRAVENOUS PRN
OUTPATIENT
Start: 2023-02-28

## 2023-02-28 RX ADMIN — Medication 500 UNITS: at 11:29

## 2023-02-28 RX ADMIN — SODIUM CHLORIDE 500 ML: 9 INJECTION, SOLUTION INTRAVENOUS at 10:28

## 2023-02-28 RX ADMIN — SODIUM CHLORIDE, PRESERVATIVE FREE 10 ML: 5 INJECTION INTRAVENOUS at 10:28

## 2023-02-28 NOTE — PROGRESS NOTES
Patient tolerated  500 ml fluid bolus over 1 hour without any complications. Discharge instructions given to patient-verbalizes understanding. Ambulated off unit per self with belongings.

## 2023-02-28 NOTE — DISCHARGE INSTRUCTIONS
PATIENT DISCHARGE INSTRUCTIONS    Remember that side effects present at the end of your treatments will improve within a few weeks after the last treatment. Eat well balanced meals even though your treatments are finished. This will help speed the healing process. Continue any special diets prescribed to control side effects until these side effects have been resolved. Get plenty of rest.  If you have experienced fatigue and/or weakness, this may continue for several weeks after your last treatment. Continue with your daily activities according to the way you feel. Continue to be gentle with your skin. Follow your present skin care instructions until your follow-up visit. IF YOU DEVELOP ANY CHANGES IN YOUR SKIN IN THE AREA TREATED WITH RADIATION, PLEASE CALL THE RADIATION ONCOLOGY NURSE -[phone]. Protect your skin from any injury and avoid direct sun exposure in the treatment area. The skin in the treated area may always be more sensitive than the rest of your skin. Always use SPF 27 or higher sun block if you will be in the sun and cannot avoid exposure. Please contact your referring physician for a follow-up appointment in addition to your Radiation Oncology appointment.   Presence of pain: Yes, Pain Location: Throat, Pain Intensity (Current): 10 Worst possible pain, Pain Treatment: Opioid  Medication Taper: No    See Instructions Dated: N/A  Follow up orders: None

## 2023-02-28 NOTE — PLAN OF CARE
Problem: Chronic Conditions and Co-morbidities  Goal: Patient's chronic conditions and co-morbidity symptoms are monitored and maintained or improved  Outcome: Adequate for Discharge  Flowsheets (Taken 2/28/2023 1032)  Care Plan - Patient's Chronic Conditions and Co-Morbidity Symptoms are Monitored and Maintained or Improved: Monitor and assess patient's chronic conditions and comorbid symptoms for stability, deterioration, or improvement  Note: Pt tolerated fluid bolus     Problem: Safety - Adult  Goal: Free from fall injury  Outcome: Adequate for Discharge  Flowsheets (Taken 2/28/2023 1032)  Free From Fall Injury: Instruct family/caregiver on patient safety  Note: No falls occurred with visit today. Problem: Discharge Planning  Goal: Discharge to home or other facility with appropriate resources  Outcome: Adequate for Discharge  Flowsheets (Taken 2/28/2023 1032)  Discharge to home or other facility with appropriate resources: Identify barriers to discharge with patient and caregiver  Note: Verbalize understanding of discharge instructions, follow up appointments, and when to call Physician. Care plan reviewed with patient. Patient verbalizes understanding of the plan of care and contributes to goal setting.

## 2023-02-28 NOTE — DISCHARGE INSTRUCTIONS
Please contact your Oncologist if you have any questions regarding the fluids that you received today. You are instructed to call the office or go to the Emergency Dept. If you experience any of the following symptoms:    Dizziness/lightheadedness   Acute nausea or vomiting-not relieved by medications  Headaches-not relieved by medications  New chest pain or pressure  New rash /itching  New shortness of breath  Fever,chills or signs or symptoms of infection    Make sure you are drinking 48 to 64 ounces of water daily-if you are unable to drink fluids let us know right away.

## 2023-03-07 ENCOUNTER — HOSPITAL ENCOUNTER (OUTPATIENT)
Dept: INFUSION THERAPY | Age: 53
Discharge: HOME | End: 2023-03-07
Payer: MEDICARE

## 2023-03-07 VITALS
BODY MASS INDEX: 25.68 KG/M2 | HEART RATE: 98 BPM | TEMPERATURE: 98.9 F | HEIGHT: 61 IN | DIASTOLIC BLOOD PRESSURE: 70 MMHG | OXYGEN SATURATION: 98 % | RESPIRATION RATE: 16 BRPM | WEIGHT: 136 LBS | SYSTOLIC BLOOD PRESSURE: 120 MMHG

## 2023-03-07 DIAGNOSIS — E86.0 DEHYDRATION: ICD-10-CM

## 2023-03-07 DIAGNOSIS — C32.1 MALIGNANT NEOPLASM OF EPIGLOTTIS (HCC): Primary | ICD-10-CM

## 2023-03-07 PROCEDURE — 2580000003 HC RX 258: Performed by: INTERNAL MEDICINE

## 2023-03-07 PROCEDURE — 96360 HYDRATION IV INFUSION INIT: CPT

## 2023-03-07 PROCEDURE — 2580000003 HC RX 258: Performed by: RADIOLOGY

## 2023-03-07 PROCEDURE — 6360000002 HC RX W HCPCS: Performed by: INTERNAL MEDICINE

## 2023-03-07 RX ORDER — HEPARIN SODIUM (PORCINE) LOCK FLUSH IV SOLN 100 UNIT/ML 100 UNIT/ML
500 SOLUTION INTRAVENOUS PRN
Status: DISCONTINUED | OUTPATIENT
Start: 2023-03-07 | End: 2023-03-08 | Stop reason: HOSPADM

## 2023-03-07 RX ORDER — SODIUM CHLORIDE 9 MG/ML
25 INJECTION, SOLUTION INTRAVENOUS PRN
OUTPATIENT
Start: 2023-03-07

## 2023-03-07 RX ORDER — SODIUM CHLORIDE 0.9 % (FLUSH) 0.9 %
5-40 SYRINGE (ML) INJECTION PRN
OUTPATIENT
Start: 2023-03-07

## 2023-03-07 RX ORDER — HEPARIN SODIUM (PORCINE) LOCK FLUSH IV SOLN 100 UNIT/ML 100 UNIT/ML
500 SOLUTION INTRAVENOUS PRN
OUTPATIENT
Start: 2023-03-07

## 2023-03-07 RX ORDER — SODIUM CHLORIDE 9 MG/ML
INJECTION, SOLUTION INTRAVENOUS CONTINUOUS
Status: CANCELLED
Start: 2023-03-10

## 2023-03-07 RX ORDER — SODIUM CHLORIDE 9 MG/ML
INJECTION, SOLUTION INTRAVENOUS CONTINUOUS
Start: 2023-03-10

## 2023-03-07 RX ORDER — SODIUM CHLORIDE 9 MG/ML
INJECTION, SOLUTION INTRAVENOUS CONTINUOUS
Status: DISCONTINUED | OUTPATIENT
Start: 2023-03-07 | End: 2023-03-08 | Stop reason: HOSPADM

## 2023-03-07 RX ORDER — SODIUM CHLORIDE 0.9 % (FLUSH) 0.9 %
5-40 SYRINGE (ML) INJECTION PRN
Status: DISCONTINUED | OUTPATIENT
Start: 2023-03-07 | End: 2023-03-08 | Stop reason: HOSPADM

## 2023-03-07 RX ORDER — SODIUM CHLORIDE 0.9 % (FLUSH) 0.9 %
5-40 SYRINGE (ML) INJECTION PRN
OUTPATIENT
Start: 2023-03-10

## 2023-03-07 RX ADMIN — SODIUM CHLORIDE: 9 INJECTION, SOLUTION INTRAVENOUS at 15:01

## 2023-03-07 RX ADMIN — SODIUM CHLORIDE, PRESERVATIVE FREE 20 ML: 5 INJECTION INTRAVENOUS at 16:01

## 2023-03-07 RX ADMIN — HEPARIN 500 UNITS: 100 SYRINGE at 16:01

## 2023-03-07 RX ADMIN — SODIUM CHLORIDE, PRESERVATIVE FREE 20 ML: 5 INJECTION INTRAVENOUS at 15:00

## 2023-03-07 NOTE — DISCHARGE SUMMARY
Patient tolerated 500ml of normal saline without any complications. Patient verbalized understanding of discharge instructions. Ambulated off unit per self with belongings.

## 2023-03-07 NOTE — PROGRESS NOTES
Patient here for hydration infusion due to decreased appetite and decrease fluid intake. Denies fever or chills. Lung sounds clear.

## 2023-03-07 NOTE — PROGRESS NOTES
Patient received normal saline hydration over one hour. Patient tolerated well. While here in the outpatient unit patient had sips of Pepsi and a half of a turkey sandwich. Patient educated on eating small meals every few hours and encouraged water intake.

## 2023-03-07 NOTE — PLAN OF CARE
Problem: Chronic Conditions and Co-morbidities  Goal: Patient's chronic conditions and co-morbidity symptoms are monitored and maintained or improved  Outcome: Adequate for Discharge  Flowsheets (Taken 3/7/2023 1701)  Care Plan - Patient's Chronic Conditions and Co-Morbidity Symptoms are Monitored and Maintained or Improved:   Monitor and assess patient's chronic conditions and comorbid symptoms for stability, deterioration, or improvement   Collaborate with multidisciplinary team to address chronic and comorbid conditions and prevent exacerbation or deterioration  Note: Verbalizes understanding to signs and symptoms of dehydration and when to call the Physician. Problem: Chronic Conditions and Co-morbidities  Goal: Patient's chronic conditions and co-morbidity symptoms are monitored and maintained or improved  Outcome: Adequate for Discharge  Flowsheets (Taken 3/7/2023 1701)  Care Plan - Patient's Chronic Conditions and Co-Morbidity Symptoms are Monitored and Maintained or Improved:   Monitor and assess patient's chronic conditions and comorbid symptoms for stability, deterioration, or improvement   Collaborate with multidisciplinary team to address chronic and comorbid conditions and prevent exacerbation or deterioration  Note: Patient received 500ml IV infusion over 1 hr. Patient drank Josselyn Cluck while in OP oncology. Encouraged patient to drink 48 to 64 ounces of fluids everyday. Problem: Safety - Adult  Goal: Free from fall injury  Outcome: Adequate for Discharge  Flowsheets (Taken 3/7/2023 1701)  Free From Fall Injury:   Instruct family/caregiver on patient safety   Based on caregiver fall risk screen, instruct family/caregiver to ask for assistance with transferring infant if caregiver noted to have fall risk factors  Note: Free from falls while in O.P. Oncology.       Problem: Discharge Planning  Goal: Discharge to home or other facility with appropriate resources  Outcome: Adequate for Discharge  Flowsheets (Taken 3/7/2023 1701)  Discharge to home or other facility with appropriate resources:   Identify barriers to discharge with patient and caregiver   Arrange for needed discharge resources and transportation as appropriate   Identify discharge learning needs (meds, wound care, etc)  Note: Verbalize understanding of discharge instructions, follow up appointments, and when to call Physician. Care plan reviewed with patient. Patient  verbalize understanding of the plan of care and contribute to goal setting.

## 2023-03-07 NOTE — DISCHARGE INSTRUCTIONS
Patient tolerated 500ml of normal saline over one hour without any complications. Patient verbalized understanding of discharge instructions. Ambulated off unit per self with belongings.

## 2023-03-10 ENCOUNTER — TELEPHONE (OUTPATIENT)
Dept: ONCOLOGY | Age: 53
End: 2023-03-10

## 2023-03-16 ENCOUNTER — TELEPHONE (OUTPATIENT)
Dept: INTERNAL MEDICINE CLINIC | Age: 53
End: 2023-03-16

## 2023-03-16 NOTE — TELEPHONE ENCOUNTER
A training assignment was received by the [facility]/ [address] [place] [address] to train Blaise on her OmniPod Dash insulin pump. We have been unable to reach her to discuss training for this pump. It is noted in her chart that she is dealing with other health issues. Please discuss the insulin pump with Blaise at your next visit. Is she no longer interested in the pump or is she wishing to postpone its us. Please let us know if we still need to reach out to Blaise about this training. Thank you.

## 2023-03-23 ENCOUNTER — HOSPITAL ENCOUNTER (OUTPATIENT)
Dept: INFUSION THERAPY | Age: 53
Discharge: HOME | End: 2023-03-23
Payer: MEDICARE

## 2023-03-23 ENCOUNTER — CLINICAL DOCUMENTATION (OUTPATIENT)
Dept: CASE MANAGEMENT | Age: 53
End: 2023-03-23

## 2023-03-23 ENCOUNTER — OFFICE VISIT (OUTPATIENT)
Dept: ONCOLOGY | Age: 53
End: 2023-03-23
Payer: MEDICARE

## 2023-03-23 VITALS
HEART RATE: 67 BPM | OXYGEN SATURATION: 98 % | WEIGHT: 136 LBS | HEIGHT: 61 IN | DIASTOLIC BLOOD PRESSURE: 65 MMHG | SYSTOLIC BLOOD PRESSURE: 110 MMHG | RESPIRATION RATE: 16 BRPM | BODY MASS INDEX: 25.68 KG/M2 | TEMPERATURE: 98.4 F

## 2023-03-23 VITALS
HEIGHT: 61 IN | WEIGHT: 136 LBS | SYSTOLIC BLOOD PRESSURE: 110 MMHG | BODY MASS INDEX: 25.68 KG/M2 | TEMPERATURE: 98.4 F | HEART RATE: 67 BPM | OXYGEN SATURATION: 98 % | RESPIRATION RATE: 16 BRPM | DIASTOLIC BLOOD PRESSURE: 65 MMHG

## 2023-03-23 DIAGNOSIS — C32.1 MALIGNANT NEOPLASM OF EPIGLOTTIS (HCC): Primary | ICD-10-CM

## 2023-03-23 DIAGNOSIS — C32.1 CANCER, EPIGLOTTIS (HCC): Primary | ICD-10-CM

## 2023-03-23 DIAGNOSIS — C32.1 CANCER, EPIGLOTTIS (HCC): ICD-10-CM

## 2023-03-23 LAB
ALBUMIN SERPL BCG-MCNC: 3.2 G/DL (ref 3.5–5.1)
ALP SERPL-CCNC: 75 U/L (ref 38–126)
ALT SERPL W/O P-5'-P-CCNC: 12 U/L (ref 11–66)
AST SERPL-CCNC: 15 U/L (ref 5–40)
BILIRUB CONJ SERPL-MCNC: < 0.2 MG/DL (ref 0–0.3)
BILIRUB SERPL-MCNC: 0.2 MG/DL (ref 0.3–1.2)
BUN BLDP-MCNC: 18 MG/DL (ref 8–26)
CHLORIDE BLD-SCNC: 106 MEQ/L (ref 98–109)
CREAT BLD-MCNC: 0.6 MG/DL (ref 0.5–1.2)
GFR SERPL CREATININE-BSD FRML MDRD: > 60 ML/MIN/1.73M2
GLUCOSE BLD-MCNC: 122 MG/DL (ref 70–108)
IONIZED CALCIUM, WHOLE BLOOD: 1.21 MMOL/L (ref 1.12–1.32)
POTASSIUM BLD-SCNC: 3.8 MEQ/L (ref 3.5–4.9)
PROT SERPL-MCNC: 6 G/DL (ref 6.1–8)
SODIUM BLD-SCNC: 141 MEQ/L (ref 138–146)
TOTAL CO2, WHOLE BLOOD: 28 MEQ/L (ref 23–33)

## 2023-03-23 PROCEDURE — G8427 DOCREV CUR MEDS BY ELIG CLIN: HCPCS | Performed by: INTERNAL MEDICINE

## 2023-03-23 PROCEDURE — 3078F DIAST BP <80 MM HG: CPT | Performed by: INTERNAL MEDICINE

## 2023-03-23 PROCEDURE — 99211 OFF/OP EST MAY X REQ PHY/QHP: CPT

## 2023-03-23 PROCEDURE — 99214 OFFICE O/P EST MOD 30 MIN: CPT | Performed by: INTERNAL MEDICINE

## 2023-03-23 PROCEDURE — 1036F TOBACCO NON-USER: CPT | Performed by: INTERNAL MEDICINE

## 2023-03-23 PROCEDURE — 6360000002 HC RX W HCPCS: Performed by: INTERNAL MEDICINE

## 2023-03-23 PROCEDURE — G8484 FLU IMMUNIZE NO ADMIN: HCPCS | Performed by: INTERNAL MEDICINE

## 2023-03-23 PROCEDURE — 80076 HEPATIC FUNCTION PANEL: CPT

## 2023-03-23 PROCEDURE — 2580000003 HC RX 258: Performed by: INTERNAL MEDICINE

## 2023-03-23 PROCEDURE — 3017F COLORECTAL CA SCREEN DOC REV: CPT | Performed by: INTERNAL MEDICINE

## 2023-03-23 PROCEDURE — G8417 CALC BMI ABV UP PARAM F/U: HCPCS | Performed by: INTERNAL MEDICINE

## 2023-03-23 PROCEDURE — 36591 DRAW BLOOD OFF VENOUS DEVICE: CPT

## 2023-03-23 PROCEDURE — 3074F SYST BP LT 130 MM HG: CPT | Performed by: INTERNAL MEDICINE

## 2023-03-23 PROCEDURE — 80047 BASIC METABLC PNL IONIZED CA: CPT

## 2023-03-23 RX ORDER — SODIUM CHLORIDE 0.9 % (FLUSH) 0.9 %
5-40 SYRINGE (ML) INJECTION PRN
Status: DISCONTINUED | OUTPATIENT
Start: 2023-03-23 | End: 2023-03-24 | Stop reason: HOSPADM

## 2023-03-23 RX ORDER — SODIUM CHLORIDE 0.9 % (FLUSH) 0.9 %
5-40 SYRINGE (ML) INJECTION PRN
OUTPATIENT
Start: 2023-03-23

## 2023-03-23 RX ORDER — SODIUM CHLORIDE 9 MG/ML
25 INJECTION, SOLUTION INTRAVENOUS PRN
OUTPATIENT
Start: 2023-03-23

## 2023-03-23 RX ORDER — HEPARIN SODIUM (PORCINE) LOCK FLUSH IV SOLN 100 UNIT/ML 100 UNIT/ML
500 SOLUTION INTRAVENOUS PRN
Status: DISCONTINUED | OUTPATIENT
Start: 2023-03-23 | End: 2023-03-24 | Stop reason: HOSPADM

## 2023-03-23 RX ORDER — HEPARIN SODIUM (PORCINE) LOCK FLUSH IV SOLN 100 UNIT/ML 100 UNIT/ML
500 SOLUTION INTRAVENOUS PRN
OUTPATIENT
Start: 2023-03-23

## 2023-03-23 RX ADMIN — HEPARIN 500 UNITS: 100 SYRINGE at 14:41

## 2023-03-23 RX ADMIN — SODIUM CHLORIDE, PRESERVATIVE FREE 20 ML: 5 INJECTION INTRAVENOUS at 14:41

## 2023-03-23 RX ADMIN — SODIUM CHLORIDE, PRESERVATIVE FREE 10 ML: 5 INJECTION INTRAVENOUS at 14:40

## 2023-03-23 NOTE — PROGRESS NOTES
See Physician's progress note.
Socioeconomic History    Marital status:      Spouse name: Not on file    Number of children: 1    Years of education: Not on file    Highest education level: Not on file   Occupational History    Not on file   Tobacco Use    Smoking status: Former     Packs/day: 0.50     Years: 27.00     Pack years: 13.50     Types: Cigarettes     Quit date: 2014     Years since quittin.5    Smokeless tobacco: Never    Tobacco comments:     e cigs daily use 1/3/22   Vaping Use    Vaping Use: Some days    Substances: Nicotine   Substance and Sexual Activity    Alcohol use: No    Drug use: No    Sexual activity: Not on file   Other Topics Concern    Not on file   Social History Narrative    Not on file     Social Determinants of Health     Financial Resource Strain: Not on file   Food Insecurity: Not on file   Transportation Needs: Not on file   Physical Activity: Not on file   Stress: Not on file   Social Connections: Not on file   Intimate Partner Violence: Not on file   Housing Stability: Not on file        CURRENT MEDICATIONS  Current Outpatient Medications   Medication Sig Dispense Refill    lidocaine viscous hcl (XYLOCAINE) 2 % SOLN solution Take every four hours as needed. Follow directions as given in clinic 100 mL 1    Magic Mouthwash (MIRACLE MOUTHWASH) Swish and spit 5 mLs 4 times daily as needed for Irritation 300 mL 1    prochlorperazine (COMPAZINE) 10 MG tablet Take 1 tablet by mouth every 6 hours as needed (as needed for nausea) 60 tablet 3    ondansetron (ZOFRAN) 4 MG tablet Take 1 tablet by mouth every 8 hours as needed for Nausea or Vomiting 20 tablet 1    lidocaine-prilocaine (EMLA) 2.5-2.5 % cream Apply topically as needed. 5 g 4    pantoprazole (PROTONIX) 40 MG tablet Take 1 tablet by mouth every morning (before breakfast) 90 tablet 1    ALPRAZolam (XANAX) 1 MG tablet Take 1 mg by mouth as needed.       oxyCODONE-acetaminophen (PERCOCET) 5-325 MG per tablet Take 2 tablets by mouth every 8 hours

## 2023-03-23 NOTE — PROGRESS NOTES
Name: Raf Jimenez  : 1970  MRN: [id number]    Oncology Navigation Follow-Up Note    Contact Type:  Medical Oncology    Subjective: appt with ONC    Objective: \"Doing well\". Reports able to drink H2O, gatorade, & even eat a whopper hamburger, with only slight \"throat sticking\". Pt shares Dr. Adilson Delarosa in [facility] requesting [facility] form [facility], & has received what he wanted to date. Dr. Adilson Delarosa scoped pt yesterday & reported lesion is smaller, but has presence of infection. RX prescribed. 136# today  Cont to vape. Natanael discussed Tobacco cessation program & encouraged pt to try to stop. Pt voiced interest & flyer with contact info  provided. Pt looked good today; her affect was bright & cheerful. Her voice is stronger today. Pt to complete AB RX, & Return in 2 months to see ONC. Assistance Needed: deferred    Receptive to [address] [place] [address] Road / Palliative Care:  deferred    Referrals: Tobacco Cessation program    Education: Reiterated the above. Notes: Natanael following to assist & support as needed.       Electronically signed by Monster Acosta RN on 3/23/2023 at 5:20 PM

## 2023-03-23 NOTE — LETTER
March 24, 2023      No referring provider defined for this encounter. Patient: Laila Florez   MR Number: [id number]   YOB: 1970   Date of Visit: 3/23/2023       Dear Sana Moss  Thank you for referring Andrei Vaughn to me for evaluation/treatment. Below are the relevant portions of my assessment and plan of care. I hope you get a copy of this note from 3/23/2023. Jaimee Newberry seems to be coping better. Her laboratory data still shows pancytopenia. BUN and creatinine are reasonable. Total protein is 6 with an albumin of 3.2. Emotionally she seems to be a little bit more stable but is still quite tearful       If you have questions, please do not hesitate to call me. I look forward to following Jaimee Newberry along with you.     Sincerely,        Cesilia Butterfield MD     providers:  Fax [phone]

## 2023-03-23 NOTE — PATIENT INSTRUCTIONS
Reviewed labs and recent medical history. Discussed her throat and gums. She will continue on antibiotic that Dr. Contreras Host ordered. Call with questions or concerns. Return to see MD in 2 months and schedule port flush.

## 2023-03-23 NOTE — PLAN OF CARE
Problem: Chronic Conditions and Co-morbidities  Goal: Patient's chronic conditions and co-morbidity symptoms are monitored and maintained or improved  3/23/2023 1457 by Juanita Beaver RN  Outcome: Adequate for Discharge  3/23/2023 1457 by Juanita Beaver RN  Outcome: Adequate for Discharge  Flowsheets (Taken 3/23/2023 1457)  Care Plan - Patient's Chronic Conditions and Co-Morbidity Symptoms are Monitored and Maintained or Improved: Monitor and assess patient's chronic conditions and comorbid symptoms for stability, deterioration, or improvement     Problem: Safety - Adult  Goal: Free from fall injury  3/23/2023 1457 by Juanita Beaver RN  Outcome: Adequate for Discharge  3/23/2023 1457 by Juanita Beaver RN  Outcome: Adequate for Discharge  Flowsheets (Taken 3/23/2023 1457)  Free From Fall Injury: Instruct family/caregiver on patient safety     Problem: Discharge Planning  Goal: Discharge to home or other facility with appropriate resources  3/23/2023 1457 by Juanita Beaver RN  Outcome: Adequate for Discharge  3/23/2023 1457 by Juanita Beaver RN  Outcome: Adequate for Discharge  Flowsheets (Taken 3/23/2023 1457)  Discharge to home or other facility with appropriate resources: Identify barriers to discharge with patient and caregiver     Problem: Infection - Adult  Goal: Absence of infection at discharge  Outcome: Adequate for Discharge  Flowsheets (Taken 3/23/2023 1457)  Absence of infection at discharge: Monitor all insertion sites i.e., indwelling lines, tubes and drains  Note: Mediport site with no redness or warmth. Skin over port site intact with no signs of breakdown noted. Patient verbalizes signs/symptoms of port infection and when to notify the physician. Care plan reviewed with patient and family. Patient and family verbalize understanding of the plan of care and contribute to goal setting.

## 2023-03-23 NOTE — PROGRESS NOTES
Patient tolerated port flush with lab draw without any complications. Last vital signs:   /65   Pulse 67   Temp 98.4 °F (36.9 °C) (Oral)   Resp 16   Ht 5' 1\" (1.549 m)   Wt 136 lb (61.7 kg)   SpO2 98%   BMI 25.70 kg/m²     Patient instructed if experience any symptoms following today's port flush with lab draw, she is to notify MD immediately or go to the emergency department. Discharge instructions given to patient. Verbalizes understanding. Wheelchair assisted off unit per motorized wheelchair, with belongings.

## 2023-03-27 ENCOUNTER — SOCIAL WORK (OUTPATIENT)
Dept: INFUSION THERAPY | Age: 53
End: 2023-03-27

## 2023-05-25 ENCOUNTER — HOSPITAL ENCOUNTER (OUTPATIENT)
Dept: INFUSION THERAPY | Age: 53
Discharge: HOME | End: 2023-05-25
Payer: MEDICARE

## 2023-05-25 ENCOUNTER — OFFICE VISIT (OUTPATIENT)
Dept: ONCOLOGY | Age: 53
End: 2023-05-25
Payer: MEDICARE

## 2023-05-25 VITALS
WEIGHT: 136 LBS | TEMPERATURE: 98.2 F | HEIGHT: 61 IN | BODY MASS INDEX: 25.68 KG/M2 | OXYGEN SATURATION: 99 % | DIASTOLIC BLOOD PRESSURE: 78 MMHG | RESPIRATION RATE: 16 BRPM | HEART RATE: 90 BPM | SYSTOLIC BLOOD PRESSURE: 139 MMHG

## 2023-05-25 VITALS
HEART RATE: 90 BPM | SYSTOLIC BLOOD PRESSURE: 139 MMHG | DIASTOLIC BLOOD PRESSURE: 78 MMHG | OXYGEN SATURATION: 99 % | HEIGHT: 61 IN | BODY MASS INDEX: 25.68 KG/M2 | TEMPERATURE: 98.2 F | WEIGHT: 136 LBS | RESPIRATION RATE: 16 BRPM

## 2023-05-25 DIAGNOSIS — C32.1 MALIGNANT NEOPLASM OF EPIGLOTTIS (HCC): ICD-10-CM

## 2023-05-25 DIAGNOSIS — C32.1 MALIGNANT NEOPLASM OF EPIGLOTTIS (HCC): Primary | ICD-10-CM

## 2023-05-25 LAB
ABSOLUTE IMMATURE GRANULOCYTE: 0 THOU/MM3 (ref 0–0.07)
ALBUMIN SERPL BCG-MCNC: 3.9 G/DL (ref 3.5–5.1)
ALP SERPL-CCNC: 94 U/L (ref 38–126)
ALT SERPL W/O P-5'-P-CCNC: 11 U/L (ref 11–66)
AST SERPL-CCNC: 13 U/L (ref 5–40)
BASOPHILS ABSOLUTE: 0 THOU/MM3 (ref 0–0.1)
BASOPHILS NFR BLD AUTO: 0 % (ref 0–3)
BILIRUB CONJ SERPL-MCNC: < 0.2 MG/DL (ref 0–0.3)
BILIRUB SERPL-MCNC: 0.2 MG/DL (ref 0.3–1.2)
BUN BLDP-MCNC: 20 MG/DL (ref 8–26)
CHLORIDE BLD-SCNC: 108 MEQ/L (ref 98–109)
CREAT BLD-MCNC: 0.4 MG/DL (ref 0.5–1.2)
EOSINOPHIL NFR BLD AUTO: 4 % (ref 0–4)
EOSINOPHILS ABSOLUTE: 0.2 THOU/MM3 (ref 0–0.4)
ERYTHROCYTE [DISTWIDTH] IN BLOOD BY AUTOMATED COUNT: 11.7 % (ref 11.5–14.5)
GFR SERPL CREATININE-BSD FRML MDRD: > 60 ML/MIN/1.73M2
GLUCOSE BLD-MCNC: 108 MG/DL (ref 70–108)
HCT VFR BLD AUTO: 41.7 % (ref 37–47)
HGB BLD-MCNC: 13.7 GM/DL (ref 12–16)
IMMATURE GRANULOCYTES: 0 %
IONIZED CALCIUM, WHOLE BLOOD: 1.21 MMOL/L (ref 1.12–1.32)
LYMPHOCYTES ABSOLUTE: 0.8 THOU/MM3 (ref 1–4.8)
LYMPHOCYTES NFR BLD AUTO: 17 % (ref 15–47)
MCH RBC QN AUTO: 34 PG (ref 26–33)
MCHC RBC AUTO-ENTMCNC: 32.9 GM/DL (ref 32.2–35.5)
MCV RBC AUTO: 104 FL (ref 81–99)
MONOCYTES ABSOLUTE: 0.4 THOU/MM3 (ref 0.4–1.3)
MONOCYTES NFR BLD AUTO: 8 % (ref 0–12)
NEUTROPHILS NFR BLD AUTO: 70 % (ref 43–75)
PLATELET # BLD AUTO: 319 THOU/MM3 (ref 130–400)
PMV BLD AUTO: 10.1 FL (ref 9.4–12.4)
POTASSIUM BLD-SCNC: 4 MEQ/L (ref 3.5–4.9)
PROT SERPL-MCNC: 6.6 G/DL (ref 6.1–8)
RBC # BLD AUTO: 4.03 MILL/MM3 (ref 4.2–5.4)
SEGMENTED NEUTROPHILS ABSOLUTE COUNT: 3.2 THOU/MM3 (ref 1.8–7.7)
SODIUM BLD-SCNC: 142 MEQ/L (ref 138–146)
TOTAL CO2, WHOLE BLOOD: 29 MEQ/L (ref 23–33)
WBC # BLD AUTO: 4.6 THOU/MM3 (ref 4.8–10.8)

## 2023-05-25 PROCEDURE — 99214 OFFICE O/P EST MOD 30 MIN: CPT | Performed by: INTERNAL MEDICINE

## 2023-05-25 PROCEDURE — 80047 BASIC METABLC PNL IONIZED CA: CPT

## 2023-05-25 PROCEDURE — 85025 COMPLETE CBC W/AUTO DIFF WBC: CPT

## 2023-05-25 PROCEDURE — 6360000002 HC RX W HCPCS: Performed by: INTERNAL MEDICINE

## 2023-05-25 PROCEDURE — 3074F SYST BP LT 130 MM HG: CPT | Performed by: INTERNAL MEDICINE

## 2023-05-25 PROCEDURE — 3078F DIAST BP <80 MM HG: CPT | Performed by: INTERNAL MEDICINE

## 2023-05-25 PROCEDURE — G8428 CUR MEDS NOT DOCUMENT: HCPCS | Performed by: INTERNAL MEDICINE

## 2023-05-25 PROCEDURE — G8417 CALC BMI ABV UP PARAM F/U: HCPCS | Performed by: INTERNAL MEDICINE

## 2023-05-25 PROCEDURE — 1036F TOBACCO NON-USER: CPT | Performed by: INTERNAL MEDICINE

## 2023-05-25 PROCEDURE — 2580000003 HC RX 258: Performed by: INTERNAL MEDICINE

## 2023-05-25 PROCEDURE — 99211 OFF/OP EST MAY X REQ PHY/QHP: CPT

## 2023-05-25 PROCEDURE — 80076 HEPATIC FUNCTION PANEL: CPT

## 2023-05-25 PROCEDURE — 36591 DRAW BLOOD OFF VENOUS DEVICE: CPT

## 2023-05-25 PROCEDURE — 3017F COLORECTAL CA SCREEN DOC REV: CPT | Performed by: INTERNAL MEDICINE

## 2023-05-25 RX ORDER — SODIUM CHLORIDE 0.9 % (FLUSH) 0.9 %
5-40 SYRINGE (ML) INJECTION PRN
Status: DISCONTINUED | OUTPATIENT
Start: 2023-05-25 | End: 2023-05-26 | Stop reason: HOSPADM

## 2023-05-25 RX ORDER — LIDOCAINE HYDROCHLORIDE 20 MG/ML
SOLUTION OROPHARYNGEAL
Qty: 100 ML | Refills: 1 | Status: SHIPPED | OUTPATIENT
Start: 2023-05-25

## 2023-05-25 RX ORDER — HEPARIN SODIUM (PORCINE) LOCK FLUSH IV SOLN 100 UNIT/ML 100 UNIT/ML
500 SOLUTION INTRAVENOUS PRN
Status: DISCONTINUED | OUTPATIENT
Start: 2023-05-25 | End: 2023-05-26 | Stop reason: HOSPADM

## 2023-05-25 RX ORDER — SODIUM CHLORIDE 0.9 % (FLUSH) 0.9 %
5-40 SYRINGE (ML) INJECTION PRN
OUTPATIENT
Start: 2023-05-25

## 2023-05-25 RX ORDER — SODIUM CHLORIDE 9 MG/ML
25 INJECTION, SOLUTION INTRAVENOUS PRN
OUTPATIENT
Start: 2023-05-25

## 2023-05-25 RX ORDER — HEPARIN SODIUM (PORCINE) LOCK FLUSH IV SOLN 100 UNIT/ML 100 UNIT/ML
500 SOLUTION INTRAVENOUS PRN
OUTPATIENT
Start: 2023-05-25

## 2023-05-25 RX ADMIN — HEPARIN 500 UNITS: 100 SYRINGE at 14:32

## 2023-05-25 RX ADMIN — SODIUM CHLORIDE, PRESERVATIVE FREE 10 ML: 5 INJECTION INTRAVENOUS at 14:31

## 2023-05-25 NOTE — PROGRESS NOTES
Patient tolerated  labs without any complications. Discharge instructions given to patient-verbalizes understanding. Ambulated off unit per self with belongings.

## 2023-05-25 NOTE — PLAN OF CARE
Problem: Chronic Conditions and Co-morbidities  Goal: Patient's chronic conditions and co-morbidity symptoms are monitored and maintained or improved  Outcome: Adequate for Discharge  Flowsheets (Taken 5/25/2023 1623)  Care Plan - Patient's Chronic Conditions and Co-Morbidity Symptoms are Monitored and Maintained or Improved: Monitor and assess patient's chronic conditions and comorbid symptoms for stability, deterioration, or improvement     Problem: Safety - Adult  Goal: Free from fall injury  Outcome: Adequate for Discharge  Flowsheets (Taken 5/25/2023 1623)  Free From Fall Injury: Instruct family/caregiver on patient safety     Problem: Discharge Planning  Goal: Discharge to home or other facility with appropriate resources  Outcome: Adequate for Discharge  Flowsheets (Taken 5/25/2023 1623)  Discharge to home or other facility with appropriate resources: Identify barriers to discharge with patient and caregiver     Problem: Infection - Adult  Goal: Absence of infection at discharge  Outcome: Adequate for Discharge  Flowsheets (Taken 5/25/2023 1623)  Absence of infection at discharge: Monitor all insertion sites i.e., indwelling lines, tubes and drains  Note: Mediport site with no redness or warmth. Skin over port site intact with no signs of breakdown noted. Patient verbalizes signs/symptoms of port infection and when to notify the physician. Care plan reviewed with patient. Patient verbalize understanding of the plan of care and contribute to goal setting.

## 2023-07-06 ENCOUNTER — TELEPHONE (OUTPATIENT)
Dept: CASE MANAGEMENT | Age: 53
End: 2023-07-06

## 2023-07-06 NOTE — TELEPHONE ENCOUNTER
Natanael received phone call from pt's sig other, sharing pt was in [facility] for SOB. Sig other unable to offer any additional information. Natanael reminded Gene that pt is scheduled for scans on 7/7 at 3p;  Ladarius verbalized he wasn't sure if pt would make it to get the scans d/t ?transportation. Natanael phoned pt, no answer. VM left with Natanael requesting pt call if she is in need of transportation for her scans tomorrow so [address] [employer] could be coordinated, if needed. Natanael notified Earline Miller, triage team of pt visit to [facility] to obtain records.

## 2023-07-07 ENCOUNTER — TELEPHONE (OUTPATIENT)
Dept: ONCOLOGY | Age: 53
End: 2023-07-07

## 2023-07-07 NOTE — TELEPHONE ENCOUNTER
Tried calling [facility] for records for pts recent hospital visit. LM for medical records to fax us information.

## 2023-07-11 ENCOUNTER — HOSPITAL ENCOUNTER (OUTPATIENT)
Dept: CT IMAGING | Age: 53
Discharge: HOME | End: 2023-07-11
Attending: INTERNAL MEDICINE
Payer: COMMERCIAL

## 2023-07-11 DIAGNOSIS — C32.1 MALIGNANT NEOPLASM OF EPIGLOTTIS (HCC): ICD-10-CM

## 2023-07-11 LAB
CREAT BLD-MCNC: 0.6 MG/DL (ref 0.5–1.2)
GFR SERPL CREATININE-BSD FRML MDRD: > 60 ML/MIN/1.73M2

## 2023-07-11 PROCEDURE — 70491 CT SOFT TISSUE NECK W/DYE: CPT

## 2023-07-11 PROCEDURE — 6360000004 HC RX CONTRAST MEDICATION: Performed by: INTERNAL MEDICINE

## 2023-07-11 PROCEDURE — 71260 CT THORAX DX C+: CPT

## 2023-07-11 RX ADMIN — IOPAMIDOL 100 ML: 755 INJECTION, SOLUTION INTRAVENOUS at 15:24

## 2023-07-17 ENCOUNTER — HOSPITAL ENCOUNTER (OUTPATIENT)
Dept: INFUSION THERAPY | Age: 53
Discharge: HOME | End: 2023-07-17
Payer: COMMERCIAL

## 2023-07-17 ENCOUNTER — CLINICAL DOCUMENTATION (OUTPATIENT)
Dept: CASE MANAGEMENT | Age: 53
End: 2023-07-17

## 2023-07-17 ENCOUNTER — OFFICE VISIT (OUTPATIENT)
Dept: ONCOLOGY | Age: 53
End: 2023-07-17
Payer: COMMERCIAL

## 2023-07-17 VITALS
DIASTOLIC BLOOD PRESSURE: 85 MMHG | HEART RATE: 98 BPM | WEIGHT: 133 LBS | OXYGEN SATURATION: 97 % | BODY MASS INDEX: 25.11 KG/M2 | RESPIRATION RATE: 16 BRPM | HEIGHT: 61 IN | SYSTOLIC BLOOD PRESSURE: 144 MMHG | TEMPERATURE: 98.5 F

## 2023-07-17 VITALS
TEMPERATURE: 98.5 F | RESPIRATION RATE: 16 BRPM | HEIGHT: 61 IN | BODY MASS INDEX: 25.11 KG/M2 | DIASTOLIC BLOOD PRESSURE: 85 MMHG | SYSTOLIC BLOOD PRESSURE: 144 MMHG | WEIGHT: 133 LBS | HEART RATE: 98 BPM | OXYGEN SATURATION: 97 %

## 2023-07-17 DIAGNOSIS — C32.1 MALIGNANT NEOPLASM OF EPIGLOTTIS (HCC): Primary | ICD-10-CM

## 2023-07-17 LAB
ABSOLUTE IMMATURE GRANULOCYTE: 0.02 THOU/MM3 (ref 0–0.07)
ALBUMIN SERPL BCG-MCNC: 3.6 G/DL (ref 3.5–5.1)
ALP SERPL-CCNC: 109 U/L (ref 38–126)
ALT SERPL W/O P-5'-P-CCNC: 11 U/L (ref 11–66)
AST SERPL-CCNC: 12 U/L (ref 5–40)
BASOPHILS ABSOLUTE: 0 THOU/MM3 (ref 0–0.1)
BASOPHILS NFR BLD AUTO: 1 % (ref 0–3)
BILIRUB CONJ SERPL-MCNC: < 0.2 MG/DL (ref 0–0.3)
BILIRUB SERPL-MCNC: 0.2 MG/DL (ref 0.3–1.2)
BUN BLDP-MCNC: 23 MG/DL (ref 8–26)
CHLORIDE BLD-SCNC: 103 MEQ/L (ref 98–109)
CREAT BLD-MCNC: 0.6 MG/DL (ref 0.5–1.2)
EOSINOPHIL NFR BLD AUTO: 2 % (ref 0–4)
EOSINOPHILS ABSOLUTE: 0.2 THOU/MM3 (ref 0–0.4)
ERYTHROCYTE [DISTWIDTH] IN BLOOD BY AUTOMATED COUNT: 11.8 % (ref 11.5–14.5)
GFR SERPL CREATININE-BSD FRML MDRD: > 60 ML/MIN/1.73M2
GLUCOSE BLD-MCNC: 176 MG/DL (ref 70–108)
HCT VFR BLD AUTO: 41.8 % (ref 37–47)
HGB BLD-MCNC: 13.9 GM/DL (ref 12–16)
IMMATURE GRANULOCYTES: 0 %
IONIZED CALCIUM, WHOLE BLOOD: 1.21 MMOL/L (ref 1.12–1.32)
LYMPHOCYTES ABSOLUTE: 1.3 THOU/MM3 (ref 1–4.8)
LYMPHOCYTES NFR BLD AUTO: 15 % (ref 15–47)
MCH RBC QN AUTO: 32.5 PG (ref 26–33)
MCHC RBC AUTO-ENTMCNC: 33.3 GM/DL (ref 32.2–35.5)
MCV RBC AUTO: 98 FL (ref 81–99)
MONOCYTES ABSOLUTE: 0.6 THOU/MM3 (ref 0.4–1.3)
MONOCYTES NFR BLD AUTO: 7 % (ref 0–12)
NEUTROPHILS NFR BLD AUTO: 75 % (ref 43–75)
PLATELET # BLD AUTO: 422 THOU/MM3 (ref 130–400)
PMV BLD AUTO: 10.1 FL (ref 9.4–12.4)
POTASSIUM BLD-SCNC: 3.9 MEQ/L (ref 3.5–4.9)
PROT SERPL-MCNC: 6.9 G/DL (ref 6.1–8)
RBC # BLD AUTO: 4.28 MILL/MM3 (ref 4.2–5.4)
SEGMENTED NEUTROPHILS ABSOLUTE COUNT: 6.4 THOU/MM3 (ref 1.8–7.7)
SODIUM BLD-SCNC: 142 MEQ/L (ref 138–146)
TOTAL CO2, WHOLE BLOOD: 30 MEQ/L (ref 23–33)
WBC # BLD AUTO: 8.5 THOU/MM3 (ref 4.8–10.8)

## 2023-07-17 PROCEDURE — 85025 COMPLETE CBC W/AUTO DIFF WBC: CPT

## 2023-07-17 PROCEDURE — 36591 DRAW BLOOD OFF VENOUS DEVICE: CPT

## 2023-07-17 PROCEDURE — 2580000003 HC RX 258: Performed by: INTERNAL MEDICINE

## 2023-07-17 PROCEDURE — 6360000002 HC RX W HCPCS: Performed by: INTERNAL MEDICINE

## 2023-07-17 PROCEDURE — 99211 OFF/OP EST MAY X REQ PHY/QHP: CPT

## 2023-07-17 PROCEDURE — 3074F SYST BP LT 130 MM HG: CPT | Performed by: INTERNAL MEDICINE

## 2023-07-17 PROCEDURE — 99214 OFFICE O/P EST MOD 30 MIN: CPT | Performed by: INTERNAL MEDICINE

## 2023-07-17 PROCEDURE — 80047 BASIC METABLC PNL IONIZED CA: CPT

## 2023-07-17 PROCEDURE — 3078F DIAST BP <80 MM HG: CPT | Performed by: INTERNAL MEDICINE

## 2023-07-17 PROCEDURE — 80076 HEPATIC FUNCTION PANEL: CPT

## 2023-07-17 RX ORDER — HEPARIN 100 UNIT/ML
500 SYRINGE INTRAVENOUS PRN
Status: DISCONTINUED | OUTPATIENT
Start: 2023-07-17 | End: 2023-07-18 | Stop reason: HOSPADM

## 2023-07-17 RX ORDER — SODIUM CHLORIDE 9 MG/ML
25 INJECTION, SOLUTION INTRAVENOUS PRN
OUTPATIENT
Start: 2023-07-17

## 2023-07-17 RX ORDER — SODIUM CHLORIDE 0.9 % (FLUSH) 0.9 %
5-40 SYRINGE (ML) INJECTION PRN
Status: DISCONTINUED | OUTPATIENT
Start: 2023-07-17 | End: 2023-07-18 | Stop reason: HOSPADM

## 2023-07-17 RX ORDER — SODIUM CHLORIDE 0.9 % (FLUSH) 0.9 %
5-40 SYRINGE (ML) INJECTION PRN
OUTPATIENT
Start: 2023-07-17

## 2023-07-17 RX ORDER — HEPARIN 100 UNIT/ML
500 SYRINGE INTRAVENOUS PRN
OUTPATIENT
Start: 2023-07-17

## 2023-07-17 RX ADMIN — HEPARIN 500 UNITS: 100 SYRINGE at 15:25

## 2023-07-17 RX ADMIN — SODIUM CHLORIDE, PRESERVATIVE FREE 20 ML: 5 INJECTION INTRAVENOUS at 14:16

## 2023-07-17 RX ADMIN — SODIUM CHLORIDE, PRESERVATIVE FREE 10 ML: 5 INJECTION INTRAVENOUS at 14:15

## 2023-07-17 NOTE — PATIENT INSTRUCTIONS
Reviewed labs and recent medical history. Discussed the findings of her CT Scans and the concern for the new nodule/area of concern in her trachea. Also discussed the new lung nodule/inflamatory area that needs follow up in 3 months. Order placed for this. She will have follow up with her ENT on Thursday 7/20/23. Copies of reports provided to patient. Call Dr. John Salmon after the visit to update. Return to see MD in 1 month for follow up and port flush.

## 2023-07-17 NOTE — PROGRESS NOTES
pulmonary nodule had decreased in size now measuring 15 x 20 previously 23 x 30. The surrounding cluster of left lower lobe nodules have all decreased in size measuring 1 to 2 mm and previously had been 7 mm. There is a new 17 x 17 mm groundglass opacity in the right lower lobe with no focal consolidation pleural effusion or pneumothorax. There were no acute findings in the abdomen. DJD was seen in the thoracic spine. CT scans of soft tissues of the neck showed a 4.2 x 3.1 cm area of abnormal tissue density in the larynx causing severe narrowing of the airway. Repeat PET scan was recommended. Soft tissues of the nasopharynx were normal as was the oral pharynx. The oral cavity and floor mouth were normal.  Thyroid gland, submandibular glands and parotid glands were acceptable. There is a small lymph node in the posterior triangle of the neck on the left side. The apices were not suspicious. The patient has an appointment to see her ENT physician this week. She is obviously worried. She has lost 3 pounds. She says that she has had no infection or bleeding. She has had no nausea or vomiting and her appetite is okay. She says that she has had no problems with constipation or diarrhea. She says that her pain is well controlled. MONITORING PARAMETERS    Physical examinations, CAT scans PET scans endoscopies    PAST MEDICAL HISTORY  Past Medical History:   Diagnosis Date    Above knee amputation of left lower extremity (HCC)     Blood circulation, collateral     CAD (coronary artery disease)     Diabetes mellitus ([address])     Hyperlipidemia     Hypertension     MDRO (multiple drug resistant organisms) resistance     MRSA wound 2013    MI, old 01/01/2007    Obesity (BMI 30-39. 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  Negative

## 2023-07-17 NOTE — PROGRESS NOTES
Name: Naomi Ren  : 1970  MRN: [id number]    Oncology Navigation Follow-Up Note    Contact Type:  Medical Oncology    Subjective: appt with ONC    Objective:  +Challenges with breathing at time. CT Soft tissue of Neck 23  IMPRESSION:     1. There is a 4.2 x 3.1 cm area of abnormal soft tissue density in the larynx causing severe narrowing of the airway. Repeat PET scan may be helpful for better evaluation. .  2. Slightly increased density in the skin and subcutaneous soft tissues consistent with changes of radiation therapy. 3. Port-A-Cath overlying the right hemithorax. 4. Small lymph nodes mainly in the posterior triangles the neck on the left side. .      CT Chest 23: IMPRESSION:  1. The dominant left lower lobe subpleural nodule has decreased in size now measuring 15 x 20 mm (previously measured up to 23 x 30 mm). The surrounding cluster of smaller left lower lobe satellite nodules have all decreased in size now measuring 1 to 2   mm (previously measured up to 7 mm). A new 17 x 17 mm groundglass opacity in the right lower lobe is nonspecific and could be infectious/inflammatory. A 3 month follow-up CT thorax is advised to assess for resolution. 2. Please see CT soft tissue neck performed the same day and dictated separately for additional assessment. ONC POC:  -Keep appt with Dr Iad Kaur- [facility] on   -Natanael push films to [facility] for Dr Ida Kaur review--> Irl Asa, Radiology notified. Natanael phoned Dr Michelle Dorantes, spoke with Shabana Yanez to update on CT films pushed for Dr Ida Kaur review. Shabana Yanez informs Dr Ida Kaur will be scoping pt at appt. Copies of CT reports sent with pt. -REPEAT CT Chest in Oct  -Return 1 month  -Pt to call & provide update after appt with Dr Ida Kaur.        Assistance Needed: denies    Receptive to Advanced Care Planning / Palliative Care:  deferred    Referrals: N/A    Education: POC reiterated    Notes: Natanael following to assist.    Electronically signed by Vanita Tracy RN

## 2023-07-17 NOTE — PLAN OF CARE
Problem: Chronic Conditions and Co-morbidities  Goal: Patient's chronic conditions and co-morbidity symptoms are monitored and maintained or improved  Outcome: Adequate for Discharge  Flowsheets (Taken 7/17/2023 1529)  Care Plan - Patient's Chronic Conditions and Co-Morbidity Symptoms are Monitored and Maintained or Improved: Monitor and assess patient's chronic conditions and comorbid symptoms for stability, deterioration, or improvement     Problem: Safety - Adult  Goal: Free from fall injury  Outcome: Adequate for Discharge  Flowsheets (Taken 7/17/2023 1529)  Free From Fall Injury: Instruct family/caregiver on patient safety     Problem: Discharge Planning  Goal: Discharge to home or other facility with appropriate resources  Outcome: Adequate for Discharge  Flowsheets (Taken 7/17/2023 1529)  Discharge to home or other facility with appropriate resources: Identify barriers to discharge with patient and caregiver     Problem: Infection - Adult  Goal: Absence of infection at discharge  Outcome: Adequate for Discharge  Flowsheets (Taken 7/17/2023 1529)  Absence of infection at discharge: Monitor all insertion sites i.e., indwelling lines, tubes and drains  Note: Mediport site with no redness or warmth. Skin over port site intact with no signs of breakdown noted. Patient verbalizes signs/symptoms of port infection and when to notify the physician. Care plan reviewed with patient and son. Patient and son verbalize understanding of the plan of care and contribute to goal setting.

## 2023-07-17 NOTE — DISCHARGE INSTRUCTIONS
Physician's instructions:  Reviewed labs and recent medical history. Discussed the findings of her CT Scans and the concern for the new nodule/area of concern in her trachea. Also discussed the new lung nodule/inflamatory area that needs follow up in 3 months. Order placed for this. She will have follow up with her ENT on Thursday 7/20/23. Copies of reports provided to patient. Call Dr. Josie Padilla after the visit to update. Return to see MD in 1 month for follow up and port flush.

## 2023-07-21 ENCOUNTER — TELEPHONE (OUTPATIENT)
Dept: ONCOLOGY | Age: 53
End: 2023-07-21

## 2023-07-21 ENCOUNTER — TELEPHONE (OUTPATIENT)
Dept: CASE MANAGEMENT | Age: 53
End: 2023-07-21

## 2023-07-21 NOTE — TELEPHONE ENCOUNTER
Natanael received call from pt requesting ONC return call to her at: [phone]. Pt wants to share info from her appt with Dr Linda Walker & also asked for a \"strong antibiotic to clear up a throat infection. \"    Message to [facility]. re: the above.

## 2023-07-21 NOTE — TELEPHONE ENCOUNTER
Patient called to update you on doctor appt and to request an antibiotic. She is wanting you to call her at [phone].

## 2023-07-24 ENCOUNTER — TELEPHONE (OUTPATIENT)
Dept: ONCOLOGY | Age: 53
End: 2023-07-24

## 2023-07-24 NOTE — TELEPHONE ENCOUNTER
Received call from patient that she stated you wanted to discuss with her what Dr. Radha Butler had said at her last appointment. Patient is requesting a call back when you can. I called his office to request last office note to be faxed here. Thanks!

## 2023-07-26 ENCOUNTER — TELEPHONE (OUTPATIENT)
Dept: ONCOLOGY | Age: 53
End: 2023-07-26

## 2023-07-26 NOTE — TELEPHONE ENCOUNTER
Patient's boyfriend, Ladarius, calling and talking about upcoming appointment confirmation. He stated patient is having problems breathing and that DR. Gurpreet Mackenzie wanted to put in a trach possibly and she was not happy about that. I discussed with him that you spoke to [facility] yesterday about plan of care and she verbalized understanding to you. I also instructed the patient's boyfriend that if her breathing is too bad- she needs to call 911 or go to the ED- he verbalized understanding.

## 2023-07-26 NOTE — TELEPHONE ENCOUNTER
Oncology Social Work    Date: 11/21/2022  Time: 9:20AM  Name: Zhang Mayberry  MRN: [id number]     Contact Type: Telephone    Note:    called the patient back regarding transportation and feeding tube questions.  left a voicemail as there was no answer.  explained that Flaquita's patient navigator is looking into how long her treatments are going to be scheduled for, and if 2pm would work for her to be on the schedule. If not,  explained that there are other options for transportation (ie [employer], [employer], etc).  explained that at [facility] appointment this afternoon with her feeding tube placement, that Jeremias Ingram should ask them questions on how to use the feeding tube, how to flush it, and any other questions that she may have before leaving to make sure she understands.  suggested that Jeremias Ingram ask the staff for the dietary services number, that way if she does have questions she has a number that she can call while the [facility] center dietitian is not in the office this week.  also explained that the dietitian did leave a bag of supplies at the [facility] for the patient to  for her feeding tube. The patient was encouraged to call back if she has any questions or needs that arise.  also encouraged the patient to contact her nurse navigator, Digna, as well if she has any questions or problems.  will continue to follow up as needed. TAMIKA Arroyo, [facility]  Oncology Social Worker    Electronically signed by Lupe Hou.  TAMIKA Vyas, [place] on 11/21/2022 at 9:28 AM Nursing home

## 2023-08-03 ENCOUNTER — APPOINTMENT (OUTPATIENT)
Dept: GENERAL RADIOLOGY | Age: 53
End: 2023-08-03
Payer: MEDICARE

## 2023-08-03 ENCOUNTER — HOSPITAL ENCOUNTER (EMERGENCY)
Age: 53
Discharge: HOME | End: 2023-08-03
Attending: STUDENT IN AN ORGANIZED HEALTH CARE EDUCATION/TRAINING PROGRAM
Payer: MEDICARE

## 2023-08-03 VITALS
BODY MASS INDEX: 22.67 KG/M2 | RESPIRATION RATE: 18 BRPM | HEART RATE: 96 BPM | TEMPERATURE: 98.1 F | OXYGEN SATURATION: 96 % | SYSTOLIC BLOOD PRESSURE: 101 MMHG | DIASTOLIC BLOOD PRESSURE: 63 MMHG | WEIGHT: 120 LBS

## 2023-08-03 DIAGNOSIS — R06.89 DIFFICULTY BREATHING: Primary | ICD-10-CM

## 2023-08-03 LAB
ANION GAP SERPL CALC-SCNC: 17 MEQ/L (ref 8–16)
BASOPHILS ABSOLUTE: 0 THOU/MM3 (ref 0–0.1)
BASOPHILS NFR BLD AUTO: 0.1 %
BUN SERPL-MCNC: 19 MG/DL (ref 7–22)
CALCIUM SERPL-MCNC: 8.9 MG/DL (ref 8.5–10.5)
CHLORIDE SERPL-SCNC: 99 MEQ/L (ref 98–111)
CO2 SERPL-SCNC: 23 MEQ/L (ref 23–33)
CREAT SERPL-MCNC: 0.7 MG/DL (ref 0.4–1.2)
DEPRECATED RDW RBC AUTO: 46.1 FL (ref 35–45)
EOSINOPHIL NFR BLD AUTO: 0 %
EOSINOPHILS ABSOLUTE: 0 THOU/MM3 (ref 0–0.4)
ERYTHROCYTE [DISTWIDTH] IN BLOOD BY AUTOMATED COUNT: 12.4 % (ref 11.5–14.5)
GFR SERPL CREATININE-BSD FRML MDRD: > 60 ML/MIN/1.73M2
GLUCOSE SERPL-MCNC: 217 MG/DL (ref 70–108)
HCT VFR BLD AUTO: 43.1 % (ref 37–47)
HGB BLD-MCNC: 13.8 GM/DL (ref 12–16)
IMM GRANULOCYTES # BLD AUTO: 0.06 THOU/MM3 (ref 0–0.07)
IMM GRANULOCYTES NFR BLD AUTO: 0.5 %
LACTIC ACID, SEPSIS: 1 MMOL/L (ref 0.5–1.9)
LYMPHOCYTES ABSOLUTE: 0.2 THOU/MM3 (ref 1–4.8)
LYMPHOCYTES NFR BLD AUTO: 1.8 %
MCH RBC QN AUTO: 32.2 PG (ref 26–33)
MCHC RBC AUTO-ENTMCNC: 32 GM/DL (ref 32.2–35.5)
MCV RBC AUTO: 100.5 FL (ref 81–99)
MONOCYTES ABSOLUTE: 0.1 THOU/MM3 (ref 0.4–1.3)
MONOCYTES NFR BLD AUTO: 0.7 %
NEUTROPHILS NFR BLD AUTO: 96.9 %
NRBC BLD AUTO-RTO: 0 /100 WBC
OSMOLALITY SERPL CALC.SUM OF ELEC: 286.4 MOSMOL/KG (ref 275–300)
PLATELET # BLD AUTO: 373 THOU/MM3 (ref 130–400)
PMV BLD AUTO: 10.9 FL (ref 9.4–12.4)
POTASSIUM SERPL-SCNC: 3.9 MEQ/L (ref 3.5–5.2)
RBC # BLD AUTO: 4.29 MILL/MM3 (ref 4.2–5.4)
SEGMENTED NEUTROPHILS ABSOLUTE COUNT: 10.9 THOU/MM3 (ref 1.8–7.7)
SODIUM SERPL-SCNC: 139 MEQ/L (ref 135–145)
TROPONIN, HIGH SENSITIVITY: 16 NG/L (ref 0–12)
WBC # BLD AUTO: 11.2 THOU/MM3 (ref 4.8–10.8)

## 2023-08-03 PROCEDURE — 84484 ASSAY OF TROPONIN QUANT: CPT

## 2023-08-03 PROCEDURE — 93010 ELECTROCARDIOGRAM REPORT: CPT | Performed by: INTERNAL MEDICINE

## 2023-08-03 PROCEDURE — 83605 ASSAY OF LACTIC ACID: CPT

## 2023-08-03 PROCEDURE — 94640 AIRWAY INHALATION TREATMENT: CPT

## 2023-08-03 PROCEDURE — 2500000003 HC RX 250 WO HCPCS: Performed by: STUDENT IN AN ORGANIZED HEALTH CARE EDUCATION/TRAINING PROGRAM

## 2023-08-03 PROCEDURE — 93005 ELECTROCARDIOGRAM TRACING: CPT | Performed by: EMERGENCY MEDICINE

## 2023-08-03 PROCEDURE — 99285 EMERGENCY DEPT VISIT HI MDM: CPT

## 2023-08-03 PROCEDURE — 6370000000 HC RX 637 (ALT 250 FOR IP): Performed by: STUDENT IN AN ORGANIZED HEALTH CARE EDUCATION/TRAINING PROGRAM

## 2023-08-03 PROCEDURE — 71046 X-RAY EXAM CHEST 2 VIEWS: CPT

## 2023-08-03 PROCEDURE — 85025 COMPLETE CBC W/AUTO DIFF WBC: CPT

## 2023-08-03 PROCEDURE — 94761 N-INVAS EAR/PLS OXIMETRY MLT: CPT

## 2023-08-03 PROCEDURE — 36415 COLL VENOUS BLD VENIPUNCTURE: CPT

## 2023-08-03 PROCEDURE — 80048 BASIC METABOLIC PNL TOTAL CA: CPT

## 2023-08-03 RX ORDER — IPRATROPIUM BROMIDE AND ALBUTEROL SULFATE 2.5; .5 MG/3ML; MG/3ML
2 SOLUTION RESPIRATORY (INHALATION) ONCE
Status: COMPLETED | OUTPATIENT
Start: 2023-08-03 | End: 2023-08-03

## 2023-08-03 RX ORDER — ALBUTEROL SULFATE 2.5 MG/3ML
2.5 SOLUTION RESPIRATORY (INHALATION) ONCE
Status: DISCONTINUED | OUTPATIENT
Start: 2023-08-03 | End: 2023-08-03

## 2023-08-03 RX ORDER — GUAIFENESIN 200 MG/10ML
200 LIQUID ORAL ONCE
Status: COMPLETED | OUTPATIENT
Start: 2023-08-03 | End: 2023-08-03

## 2023-08-03 RX ORDER — IPRATROPIUM BROMIDE AND ALBUTEROL SULFATE 2.5; .5 MG/3ML; MG/3ML
1 SOLUTION RESPIRATORY (INHALATION) EVERY 4 HOURS
Qty: 252 ML | Refills: 0 | Status: SHIPPED | OUTPATIENT
Start: 2023-08-03 | End: 2023-08-17

## 2023-08-03 RX ADMIN — GUAIFENESIN 200 MG: 200 SOLUTION ORAL at 17:23

## 2023-08-03 RX ADMIN — IPRATROPIUM BROMIDE AND ALBUTEROL SULFATE 2 DOSE: .5; 3 SOLUTION RESPIRATORY (INHALATION) at 16:43

## 2023-08-03 NOTE — DISCHARGE INSTRUCTIONS
If you develop worsening shortness of breath is not responsive to the nebs please return to the ED for evaluation. Otherwise at this time I recommend following up with your primary care provider and following up with your provider at the [facility].

## 2023-08-03 NOTE — ED TRIAGE NOTES
Pt sent in by Dr. Bashir Bryson for possible emergency trach. Pt reports last night sob increased. Pt reports sob for months. She reports ulcers after radiation. Pt reports she feels like she can not get mucous out of throat. She reports she is to have trach placed 08/24. She reports she was told to use breathing treatments until seen. Pt reports she does not have ant breathing treatment at home it has not been set up by PCP. Pt reports mucous is making it hard to breathe. She reports she was recently seen at [facility] for same issue. Pt oxygenating well.  Will monitor

## 2023-08-03 NOTE — ED NOTES
Respiratory paged. Pt sitting in wheelchair. Pt willing to wait on home treatment.  Will monitor      Blanca Hardin RN  08/03/23 9883

## 2023-08-03 NOTE — ED NOTES
Pt resting in bed. Lab at bedside. Pt requesting medication for mucous. MD. Aware.  Will monitor      Melo Palmer RN  08/03/23 0367

## 2023-08-04 LAB
EKG ATRIAL RATE: 101 BPM
EKG P-R INTERVAL: 104 MS
EKG Q-T INTERVAL: 372 MS
EKG QRS DURATION: 104 MS
EKG QTC CALCULATION (BAZETT): 482 MS
EKG R AXIS: 53 DEGREES
EKG T AXIS: -102 DEGREES
EKG VENTRICULAR RATE: 101 BPM

## 2023-08-04 NOTE — PROGRESS NOTES
RT instructed and explained home nebulizer setup. Patient given all of the paperwork and information needed.

## 2023-08-10 ENCOUNTER — TELEPHONE (OUTPATIENT)
Dept: ONCOLOGY | Age: 53
End: 2023-08-10

## 2023-08-10 NOTE — TELEPHONE ENCOUNTER
The patient see you on 8/15/23. She called and said she is having a lot of throat pain lots of mucus and feels like she can't breathe. She is wanting to know if you could call her something in for her until you see her?     Thanks in advance

## 2023-08-25 ENCOUNTER — HOSPITAL ENCOUNTER (EMERGENCY)
Age: 53
Discharge: HOME | End: 2023-08-25
Attending: EMERGENCY MEDICINE
Payer: MEDICARE

## 2023-08-25 VITALS
TEMPERATURE: 98 F | DIASTOLIC BLOOD PRESSURE: 80 MMHG | RESPIRATION RATE: 18 BRPM | OXYGEN SATURATION: 99 % | SYSTOLIC BLOOD PRESSURE: 130 MMHG | HEART RATE: 70 BPM

## 2023-08-25 DIAGNOSIS — Z43.0 TRACHEOSTOMY CARE (HCC): Primary | ICD-10-CM

## 2023-08-25 PROCEDURE — 31720 CLEARANCE OF AIRWAYS: CPT

## 2023-08-25 PROCEDURE — 99282 EMERGENCY DEPT VISIT SF MDM: CPT

## 2023-08-25 PROCEDURE — 94761 N-INVAS EAR/PLS OXIMETRY MLT: CPT

## 2023-08-25 ASSESSMENT — PAIN - FUNCTIONAL ASSESSMENT: PAIN_FUNCTIONAL_ASSESSMENT: NONE - DENIES PAIN

## 2023-08-26 NOTE — ED PROVIDER NOTES

## 2023-08-26 NOTE — ED NOTES
Pt resting in bed, respirations even and unlabored. No needs expressed at this time. Call light within reach.  [place], [place]  08/25/23 8517

## 2023-08-26 NOTE — ED TRIAGE NOTES
Pt presents to the ED from home with complaints of wanting her trach suctioned out. Pt states it just got bad tonight. States she did a breathing tx. Pt states she would also like some suction equipment to go home on.

## 2023-08-26 NOTE — DISCHARGE INSTRUCTIONS
You came to the ED requesting trach care after your surgical procedure. You are still waiting on your suction machine and other equipment. You feel better after the suction. You are encouraged to follow-up with your PCP as to when you will be getting your suction equipment. Unfortunately, the ED does not carry any of such equipment. If symptoms worsen within a timeframe, do not hesitate to come back to the ED.

## 2023-08-29 ENCOUNTER — TELEPHONE (OUTPATIENT)
Dept: ONCOLOGY | Age: 53
End: 2023-08-29

## 2023-09-09 ENCOUNTER — HOSPITAL ENCOUNTER (INPATIENT)
Age: 53
LOS: 3 days | Discharge: HOME HEALTH | DRG: 206 | End: 2023-09-12
Attending: EMERGENCY MEDICINE | Admitting: INTERNAL MEDICINE
Payer: MEDICARE

## 2023-09-09 ENCOUNTER — APPOINTMENT (OUTPATIENT)
Dept: CT IMAGING | Age: 53
DRG: 206 | End: 2023-09-09
Payer: MEDICARE

## 2023-09-09 DIAGNOSIS — R10.84 GENERALIZED ABDOMINAL PAIN: ICD-10-CM

## 2023-09-09 DIAGNOSIS — E87.8 ELECTROLYTE IMBALANCE: ICD-10-CM

## 2023-09-09 DIAGNOSIS — E16.2 HYPOGLYCEMIA: Primary | ICD-10-CM

## 2023-09-09 DIAGNOSIS — E61.1 IRON DEFICIENCY: ICD-10-CM

## 2023-09-09 DIAGNOSIS — Z43.1 ATTENTION TO G-TUBE (HCC): ICD-10-CM

## 2023-09-09 LAB
ALBUMIN SERPL BCG-MCNC: 2.9 G/DL (ref 3.5–5.1)
ALP SERPL-CCNC: 136 U/L (ref 38–126)
ALT SERPL W/O P-5'-P-CCNC: 17 U/L (ref 11–66)
ANION GAP SERPL CALC-SCNC: 12 MEQ/L (ref 8–16)
AST SERPL-CCNC: 46 U/L (ref 5–40)
BASOPHILS ABSOLUTE: 0 THOU/MM3 (ref 0–0.1)
BASOPHILS NFR BLD AUTO: 0.3 %
BILIRUB CONJ SERPL-MCNC: < 0.2 MG/DL (ref 0–0.3)
BILIRUB SERPL-MCNC: 0.3 MG/DL (ref 0.3–1.2)
BUN SERPL-MCNC: 13 MG/DL (ref 7–22)
CALCIUM SERPL-MCNC: 9 MG/DL (ref 8.5–10.5)
CHLORIDE SERPL-SCNC: 93 MEQ/L (ref 98–111)
CO2 SERPL-SCNC: 29 MEQ/L (ref 23–33)
CREAT SERPL-MCNC: 0.3 MG/DL (ref 0.4–1.2)
DEPRECATED MEAN GLUCOSE BLD GHB EST-ACNC: 186 MG/DL (ref 70–126)
DEPRECATED RDW RBC AUTO: 47.5 FL (ref 35–45)
EOSINOPHIL NFR BLD AUTO: 0.3 %
EOSINOPHILS ABSOLUTE: 0 THOU/MM3 (ref 0–0.4)
ERYTHROCYTE [DISTWIDTH] IN BLOOD BY AUTOMATED COUNT: 13.2 % (ref 11.5–14.5)
GFR SERPL CREATININE-BSD FRML MDRD: > 60 ML/MIN/1.73M2
GLUCOSE BLD STRIP.AUTO-MCNC: 107 MG/DL (ref 70–108)
GLUCOSE BLD STRIP.AUTO-MCNC: 177 MG/DL (ref 70–108)
GLUCOSE BLD STRIP.AUTO-MCNC: 37 MG/DL (ref 70–108)
GLUCOSE BLD STRIP.AUTO-MCNC: 51 MG/DL (ref 70–108)
GLUCOSE SERPL-MCNC: 37 MG/DL (ref 70–108)
HBA1C MFR BLD HPLC: 8.2 % (ref 4.4–6.4)
HCT VFR BLD AUTO: 37.2 % (ref 37–47)
HGB BLD-MCNC: 12 GM/DL (ref 12–16)
IMM GRANULOCYTES # BLD AUTO: 0.07 THOU/MM3 (ref 0–0.07)
IMM GRANULOCYTES NFR BLD AUTO: 0.6 %
LACTIC ACID, SEPSIS: 0.6 MMOL/L (ref 0.5–1.9)
LIPASE SERPL-CCNC: 24 U/L (ref 5.6–51.3)
LYMPHOCYTES ABSOLUTE: 0.4 THOU/MM3 (ref 1–4.8)
LYMPHOCYTES NFR BLD AUTO: 3.6 %
MAGNESIUM SERPL-MCNC: 1.8 MG/DL (ref 1.6–2.4)
MCH RBC QN AUTO: 31.8 PG (ref 26–33)
MCHC RBC AUTO-ENTMCNC: 32.3 GM/DL (ref 32.2–35.5)
MCV RBC AUTO: 98.7 FL (ref 81–99)
MONOCYTES ABSOLUTE: 0.6 THOU/MM3 (ref 0.4–1.3)
MONOCYTES NFR BLD AUTO: 4.9 %
NEUTROPHILS NFR BLD AUTO: 90.3 %
NRBC BLD AUTO-RTO: 0 /100 WBC
OSMOLALITY SERPL CALC.SUM OF ELEC: 264.9 MOSMOL/KG (ref 275–300)
PLATELET # BLD AUTO: 558 THOU/MM3 (ref 130–400)
PMV BLD AUTO: 10.1 FL (ref 9.4–12.4)
POTASSIUM SERPL-SCNC: 3.2 MEQ/L (ref 3.5–5.2)
PROT SERPL-MCNC: 6.5 G/DL (ref 6.1–8)
RBC # BLD AUTO: 3.77 MILL/MM3 (ref 4.2–5.4)
SEGMENTED NEUTROPHILS ABSOLUTE COUNT: 10.7 THOU/MM3 (ref 1.8–7.7)
SODIUM SERPL-SCNC: 134 MEQ/L (ref 135–145)
TROPONIN, HIGH SENSITIVITY: 18 NG/L (ref 0–12)
WBC # BLD AUTO: 11.8 THOU/MM3 (ref 4.8–10.8)

## 2023-09-09 PROCEDURE — 80053 COMPREHEN METABOLIC PANEL: CPT

## 2023-09-09 PROCEDURE — 2580000003 HC RX 258: Performed by: STUDENT IN AN ORGANIZED HEALTH CARE EDUCATION/TRAINING PROGRAM

## 2023-09-09 PROCEDURE — 83036 HEMOGLOBIN GLYCOSYLATED A1C: CPT

## 2023-09-09 PROCEDURE — 1200000003 HC TELEMETRY R&B

## 2023-09-09 PROCEDURE — 99285 EMERGENCY DEPT VISIT HI MDM: CPT

## 2023-09-09 PROCEDURE — 84484 ASSAY OF TROPONIN QUANT: CPT

## 2023-09-09 PROCEDURE — 82948 REAGENT STRIP/BLOOD GLUCOSE: CPT

## 2023-09-09 PROCEDURE — 85025 COMPLETE CBC W/AUTO DIFF WBC: CPT

## 2023-09-09 PROCEDURE — 36415 COLL VENOUS BLD VENIPUNCTURE: CPT

## 2023-09-09 PROCEDURE — 94760 N-INVAS EAR/PLS OXIMETRY 1: CPT

## 2023-09-09 PROCEDURE — 6370000000 HC RX 637 (ALT 250 FOR IP): Performed by: STUDENT IN AN ORGANIZED HEALTH CARE EDUCATION/TRAINING PROGRAM

## 2023-09-09 PROCEDURE — 93005 ELECTROCARDIOGRAM TRACING: CPT | Performed by: STUDENT IN AN ORGANIZED HEALTH CARE EDUCATION/TRAINING PROGRAM

## 2023-09-09 PROCEDURE — 83605 ASSAY OF LACTIC ACID: CPT

## 2023-09-09 PROCEDURE — 96374 THER/PROPH/DIAG INJ IV PUSH: CPT

## 2023-09-09 PROCEDURE — 82248 BILIRUBIN DIRECT: CPT

## 2023-09-09 PROCEDURE — 96361 HYDRATE IV INFUSION ADD-ON: CPT

## 2023-09-09 PROCEDURE — 74177 CT ABD & PELVIS W/CONTRAST: CPT

## 2023-09-09 PROCEDURE — 83735 ASSAY OF MAGNESIUM: CPT

## 2023-09-09 PROCEDURE — 6360000002 HC RX W HCPCS: Performed by: STUDENT IN AN ORGANIZED HEALTH CARE EDUCATION/TRAINING PROGRAM

## 2023-09-09 PROCEDURE — 2700000000 HC OXYGEN THERAPY PER DAY

## 2023-09-09 PROCEDURE — 83690 ASSAY OF LIPASE: CPT

## 2023-09-09 PROCEDURE — 6360000004 HC RX CONTRAST MEDICATION: Performed by: STUDENT IN AN ORGANIZED HEALTH CARE EDUCATION/TRAINING PROGRAM

## 2023-09-09 RX ORDER — SODIUM CHLORIDE 0.9 % (FLUSH) 0.9 %
10 SYRINGE (ML) INJECTION PRN
Status: DISCONTINUED | OUTPATIENT
Start: 2023-09-09 | End: 2023-09-12 | Stop reason: HOSPADM

## 2023-09-09 RX ORDER — MORPHINE SULFATE 4 MG/ML
4 INJECTION, SOLUTION INTRAMUSCULAR; INTRAVENOUS ONCE
Status: COMPLETED | OUTPATIENT
Start: 2023-09-09 | End: 2023-09-09

## 2023-09-09 RX ORDER — ACETAMINOPHEN 650 MG/1
650 SUPPOSITORY RECTAL EVERY 6 HOURS PRN
Status: DISCONTINUED | OUTPATIENT
Start: 2023-09-09 | End: 2023-09-12 | Stop reason: HOSPADM

## 2023-09-09 RX ORDER — DEXTROSE MONOHYDRATE 100 MG/ML
INJECTION, SOLUTION INTRAVENOUS CONTINUOUS PRN
Status: DISCONTINUED | OUTPATIENT
Start: 2023-09-09 | End: 2023-09-12 | Stop reason: HOSPADM

## 2023-09-09 RX ORDER — ALBUTEROL SULFATE 90 UG/1
2 AEROSOL, METERED RESPIRATORY (INHALATION) EVERY 4 HOURS PRN
Status: DISCONTINUED | OUTPATIENT
Start: 2023-09-09 | End: 2023-09-12 | Stop reason: HOSPADM

## 2023-09-09 RX ORDER — ISOSORBIDE DINITRATE 10 MG/1
10 TABLET ORAL 2 TIMES DAILY
Status: DISCONTINUED | OUTPATIENT
Start: 2023-09-09 | End: 2023-09-12 | Stop reason: HOSPADM

## 2023-09-09 RX ORDER — DEXTROSE MONOHYDRATE 100 MG/ML
INJECTION, SOLUTION INTRAVENOUS CONTINUOUS PRN
Status: DISCONTINUED | OUTPATIENT
Start: 2023-09-09 | End: 2023-09-09 | Stop reason: SDUPTHER

## 2023-09-09 RX ORDER — IPRATROPIUM BROMIDE AND ALBUTEROL SULFATE 2.5; .5 MG/3ML; MG/3ML
1 SOLUTION RESPIRATORY (INHALATION)
Status: DISCONTINUED | OUTPATIENT
Start: 2023-09-09 | End: 2023-09-09

## 2023-09-09 RX ORDER — SODIUM CHLORIDE 0.9 % (FLUSH) 0.9 %
5-40 SYRINGE (ML) INJECTION EVERY 12 HOURS SCHEDULED
Status: DISCONTINUED | OUTPATIENT
Start: 2023-09-09 | End: 2023-09-12 | Stop reason: HOSPADM

## 2023-09-09 RX ORDER — FENTANYL 75 UG/H
1 PATCH TRANSDERMAL
Status: DISCONTINUED | OUTPATIENT
Start: 2023-09-09 | End: 2023-09-12 | Stop reason: HOSPADM

## 2023-09-09 RX ORDER — AMLODIPINE BESYLATE 5 MG/1
5 TABLET ORAL DAILY
Status: DISCONTINUED | OUTPATIENT
Start: 2023-09-10 | End: 2023-09-12 | Stop reason: HOSPADM

## 2023-09-09 RX ORDER — ONDANSETRON 2 MG/ML
4 INJECTION INTRAMUSCULAR; INTRAVENOUS EVERY 6 HOURS PRN
Status: DISCONTINUED | OUTPATIENT
Start: 2023-09-09 | End: 2023-09-12 | Stop reason: HOSPADM

## 2023-09-09 RX ORDER — SODIUM CHLORIDE 9 MG/ML
INJECTION, SOLUTION INTRAVENOUS PRN
Status: DISCONTINUED | OUTPATIENT
Start: 2023-09-09 | End: 2023-09-12 | Stop reason: HOSPADM

## 2023-09-09 RX ORDER — ATORVASTATIN CALCIUM 40 MG/1
40 TABLET, FILM COATED ORAL NIGHTLY
Status: DISCONTINUED | OUTPATIENT
Start: 2023-09-09 | End: 2023-09-12 | Stop reason: HOSPADM

## 2023-09-09 RX ORDER — 0.9 % SODIUM CHLORIDE 0.9 %
500 INTRAVENOUS SOLUTION INTRAVENOUS ONCE
Status: COMPLETED | OUTPATIENT
Start: 2023-09-09 | End: 2023-09-09

## 2023-09-09 RX ORDER — IBUPROFEN 600 MG/1
1 TABLET ORAL PRN
Status: DISCONTINUED | OUTPATIENT
Start: 2023-09-09 | End: 2023-09-12 | Stop reason: HOSPADM

## 2023-09-09 RX ORDER — EVOLOCUMAB 140 MG/ML
2 INJECTION, SOLUTION SUBCUTANEOUS
COMMUNITY
Start: 2023-09-05

## 2023-09-09 RX ORDER — OXYCODONE HYDROCHLORIDE AND ACETAMINOPHEN 5; 325 MG/1; MG/1
2 TABLET ORAL EVERY 8 HOURS PRN
Status: DISCONTINUED | OUTPATIENT
Start: 2023-09-09 | End: 2023-09-12 | Stop reason: HOSPADM

## 2023-09-09 RX ORDER — POTASSIUM CHLORIDE 7.45 MG/ML
10 INJECTION INTRAVENOUS PRN
Status: DISCONTINUED | OUTPATIENT
Start: 2023-09-09 | End: 2023-09-12 | Stop reason: HOSPADM

## 2023-09-09 RX ORDER — CLOPIDOGREL BISULFATE 75 MG/1
75 TABLET ORAL DAILY
Status: DISCONTINUED | OUTPATIENT
Start: 2023-09-10 | End: 2023-09-12 | Stop reason: HOSPADM

## 2023-09-09 RX ORDER — FINERENONE 20 MG/1
20 TABLET, FILM COATED ORAL DAILY
COMMUNITY
Start: 2023-08-16

## 2023-09-09 RX ORDER — PANTOPRAZOLE SODIUM 40 MG/1
40 TABLET, DELAYED RELEASE ORAL
Status: DISCONTINUED | OUTPATIENT
Start: 2023-09-10 | End: 2023-09-12 | Stop reason: HOSPADM

## 2023-09-09 RX ORDER — LISINOPRIL 2.5 MG/1
2.5 TABLET ORAL DAILY
Status: DISCONTINUED | OUTPATIENT
Start: 2023-09-10 | End: 2023-09-12 | Stop reason: HOSPADM

## 2023-09-09 RX ORDER — PREGABALIN 75 MG/1
150 CAPSULE ORAL 2 TIMES DAILY
Status: DISCONTINUED | OUTPATIENT
Start: 2023-09-09 | End: 2023-09-12 | Stop reason: HOSPADM

## 2023-09-09 RX ORDER — HEPARIN SODIUM 5000 [USP'U]/ML
5000 INJECTION, SOLUTION INTRAVENOUS; SUBCUTANEOUS EVERY 8 HOURS SCHEDULED
Status: DISCONTINUED | OUTPATIENT
Start: 2023-09-09 | End: 2023-09-10

## 2023-09-09 RX ORDER — POTASSIUM CHLORIDE 20 MEQ/1
40 TABLET, EXTENDED RELEASE ORAL PRN
Status: DISCONTINUED | OUTPATIENT
Start: 2023-09-09 | End: 2023-09-12 | Stop reason: HOSPADM

## 2023-09-09 RX ORDER — ONDANSETRON 4 MG/1
4 TABLET, ORALLY DISINTEGRATING ORAL EVERY 8 HOURS PRN
Status: DISCONTINUED | OUTPATIENT
Start: 2023-09-09 | End: 2023-09-12 | Stop reason: HOSPADM

## 2023-09-09 RX ORDER — POLYETHYLENE GLYCOL 3350 17 G/17G
17 POWDER, FOR SOLUTION ORAL DAILY PRN
Status: DISCONTINUED | OUTPATIENT
Start: 2023-09-09 | End: 2023-09-12 | Stop reason: HOSPADM

## 2023-09-09 RX ORDER — ASPIRIN 81 MG/1
81 TABLET, CHEWABLE ORAL DAILY
Status: DISCONTINUED | OUTPATIENT
Start: 2023-09-10 | End: 2023-09-12 | Stop reason: HOSPADM

## 2023-09-09 RX ORDER — ACETAMINOPHEN 325 MG/1
650 TABLET ORAL EVERY 6 HOURS PRN
Status: DISCONTINUED | OUTPATIENT
Start: 2023-09-09 | End: 2023-09-12 | Stop reason: HOSPADM

## 2023-09-09 RX ORDER — ENOXAPARIN SODIUM 100 MG/ML
40 INJECTION SUBCUTANEOUS DAILY
Status: CANCELLED | OUTPATIENT
Start: 2023-09-09

## 2023-09-09 RX ADMIN — DEXTROSE MONOHYDRATE 250 ML: 100 INJECTION, SOLUTION INTRAVENOUS at 15:28

## 2023-09-09 RX ADMIN — PREGABALIN 150 MG: 75 CAPSULE ORAL at 23:13

## 2023-09-09 RX ADMIN — SODIUM CHLORIDE, PRESERVATIVE FREE 10 ML: 5 INJECTION INTRAVENOUS at 23:13

## 2023-09-09 RX ADMIN — ISOSORBIDE DINITRATE 10 MG: 10 TABLET ORAL at 23:44

## 2023-09-09 RX ADMIN — Medication 16 G: at 23:43

## 2023-09-09 RX ADMIN — OXYCODONE AND ACETAMINOPHEN 2 TABLET: 5; 325 TABLET ORAL at 23:44

## 2023-09-09 RX ADMIN — MORPHINE SULFATE 4 MG: 4 INJECTION, SOLUTION INTRAMUSCULAR; INTRAVENOUS at 14:10

## 2023-09-09 RX ADMIN — METOPROLOL TARTRATE 25 MG: 25 TABLET, FILM COATED ORAL at 23:44

## 2023-09-09 RX ADMIN — SODIUM CHLORIDE 500 ML: 9 INJECTION, SOLUTION INTRAVENOUS at 14:16

## 2023-09-09 RX ADMIN — ATORVASTATIN CALCIUM 40 MG: 40 TABLET, FILM COATED ORAL at 23:44

## 2023-09-09 RX ADMIN — IOPAMIDOL 80 ML: 755 INJECTION, SOLUTION INTRAVENOUS at 17:00

## 2023-09-09 ASSESSMENT — PAIN SCALES - GENERAL
PAINLEVEL_OUTOF10: 0
PAINLEVEL_OUTOF10: 0
PAINLEVEL_OUTOF10: 6
PAINLEVEL_OUTOF10: 6

## 2023-09-09 ASSESSMENT — ENCOUNTER SYMPTOMS
VOMITING: 0
COLOR CHANGE: 0
TROUBLE SWALLOWING: 0
ABDOMINAL PAIN: 1
NAUSEA: 0
DIARRHEA: 0
CONSTIPATION: 0
SHORTNESS OF BREATH: 0
BACK PAIN: 0
EYES NEGATIVE: 1

## 2023-09-09 ASSESSMENT — PAIN DESCRIPTION - LOCATION
LOCATION: ABDOMEN
LOCATION: ABDOMEN

## 2023-09-09 ASSESSMENT — PAIN - FUNCTIONAL ASSESSMENT: PAIN_FUNCTIONAL_ASSESSMENT: NONE - DENIES PAIN

## 2023-09-09 NOTE — ED NOTES
Pt medicated per MAR. Suctioned per request. Family at bedside. Call light within reach.      Roderick Ibanez RN  09/09/23 1531

## 2023-09-09 NOTE — ED NOTES
Pt resting in bed with family at bedside. Pt more alert. Denies any needs at this time. Call light within reach.      Michael Ramirez RN  09/09/23 6140

## 2023-09-09 NOTE — ED NOTES
**This is a Medical/PA/APRN Student Note and is charted for educational purposes. The non-physician staff attested note is not to be used for billing purposes, chart documentation or to guide patient care. Please see the supervising physician/PA/APRN modifications/attestation for treatment plan/chart documentation/suggestions. This note has been reviewed and feedback has been provided to the student. **      MEDICAL STUDENT NOTE    Chief Complaint   Patient presents with    Tracheostomy Tube Change     History obtained from the patient and family. TARI Canales is a 47 y.o. female, c/o tracheostomy tube change due to it falling out last night. She also states she is having 9/10 pain in her abdomen, back, and L thigh. She doesn't have a speaking valve on the tracheostomy tube so it was difficult to obtain a full history. She pointed at her RLQ and G tube saying the pain is located there. From family: G tube feeding is supposed to run for 7 hours but has been running for only 1 hour and shutting off. This has been happening for the past few days and she had not been able to get the full nutrition from it. Review of Systems        Past Medical History:   Diagnosis Date    Above knee amputation of left lower extremity ([address])     Blood circulation, collateral     CAD (coronary artery disease)     Diabetes mellitus ([address])     Hyperlipidemia     Hypertension     MDRO (multiple drug resistant organisms) resistance     MRSA wound     MI, old 2007    Obesity (BMI 30-39. 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

## 2023-09-09 NOTE — ED NOTES
Pt to ED c/o trach fell out last night. Pt placed inner canula in stoma pta. Pt oxygen saturation 95% on room air. Pt on 10L NRB for comfort. Pt shows no signs of distress. Monitor applied.       Marilia MAYS RN  09/09/23 3623

## 2023-09-09 NOTE — H&P
Accessed By: Doretha Roca 09/09/23 1411     Gastrostomy/Enterostomy/Jejunostomy Tube Percutaneous Endoscopic Gastrostomy (PEG) LUQ (Active)     PT/OT/SLP: N/A  Admit to: 8A-14    General Medicine History and Physical     History provided by: Patient and son  Patient presents from: Home    Chief Complaint:  Tracheostomy problems     History of Present Illness:  Kofi Curry is a 47 y.o. female with a PMH of SCC of the epiglottis, DM, CAD s/p stents, HTN, HLD, chronic pain, former smoker, and Hx of left lower extremity AKA who presented with problems with tracheostomy. Patient states she accidentally pulled put the speaking valve when she was sleeping. She also had a feeding tube placed a few weeks ago and is having trouble with it. Also has abdominal pain in that area. She has history of SCC and feeding tube was placed to help with nutrition. Patient can eat and drink normally as well. The feeding tube runs at night. She reports tube being clogged up at times. She was noted to have hypoglycemia of 37 in ED. She reports checking her sugar at home 1 to 2 times a day and it normally ranges in the 120-140s. She has had some lightheadedness and dizziness at home lately. She denies having any chest pain, shortness of breath, abdominal pain, nausea, vomiting, fever, or chills. Summarized ED course: Initial vital signs included temperature 97.5, RR 22, P 75, /73, on non-rebreather mask- SpO2 95.      Admission Labs:  BMP: Sodium 134, Potassium 3.2, Chloride 93, CO2 29, Glucose 37, BUN 13, Creatinine 0.3, Calcium 9, AG 12, osmolality 264.9, GFR >60  CBC: WBC 11.8, Hgb 12, Hct 37.2, Platelets 991  Hepatic function panel: Albumin 2.9, Alkaline Phosphatase 136, AST 46, ALT 17, Total Bilirubin 0.3, Direct Bilirubin <0.2, total protein 6.5  Lactic acid 0.6  Magnesium 1.8  Lipase 24  Troponin 18    Admission Diagnostics/Imaging:  EKG: Sinus rhythm with short VT, vent rate 68, Qtc 501    Past Medical History Past Surgical History

## 2023-09-09 NOTE — ED NOTES
Pt to be transported to [address] [place] [address] spoke with GENTRY Sauer Prior to departure. No concerns voiced by the patient at this time.       Verne Hamman  09/09/23 1493

## 2023-09-10 PROBLEM — R10.84 GENERALIZED ABDOMINAL PAIN: Status: ACTIVE | Noted: 2023-09-10

## 2023-09-10 PROBLEM — E87.8 ELECTROLYTE IMBALANCE: Status: ACTIVE | Noted: 2023-09-10

## 2023-09-10 LAB
ANION GAP SERPL CALC-SCNC: 10 MEQ/L (ref 8–16)
BUN SERPL-MCNC: 11 MG/DL (ref 7–22)
CALCIUM SERPL-MCNC: 8.4 MG/DL (ref 8.5–10.5)
CHLORIDE SERPL-SCNC: 98 MEQ/L (ref 98–111)
CO2 SERPL-SCNC: 31 MEQ/L (ref 23–33)
CREAT SERPL-MCNC: 0.6 MG/DL (ref 0.4–1.2)
DEPRECATED RDW RBC AUTO: 48.9 FL (ref 35–45)
EKG ATRIAL RATE: 68 BPM
EKG P-R INTERVAL: 110 MS
EKG Q-T INTERVAL: 472 MS
EKG QRS DURATION: 118 MS
EKG QTC CALCULATION (BAZETT): 501 MS
EKG R AXIS: 25 DEGREES
EKG T AXIS: -81 DEGREES
EKG VENTRICULAR RATE: 68 BPM
ERYTHROCYTE [DISTWIDTH] IN BLOOD BY AUTOMATED COUNT: 13.5 % (ref 11.5–14.5)
GFR SERPL CREATININE-BSD FRML MDRD: > 60 ML/MIN/1.73M2
GLUCOSE BLD STRIP.AUTO-MCNC: 102 MG/DL (ref 70–108)
GLUCOSE BLD STRIP.AUTO-MCNC: 106 MG/DL (ref 70–108)
GLUCOSE BLD STRIP.AUTO-MCNC: 142 MG/DL (ref 70–108)
GLUCOSE BLD STRIP.AUTO-MCNC: 145 MG/DL (ref 70–108)
GLUCOSE BLD STRIP.AUTO-MCNC: 164 MG/DL (ref 70–108)
GLUCOSE BLD STRIP.AUTO-MCNC: 223 MG/DL (ref 70–108)
GLUCOSE BLD STRIP.AUTO-MCNC: 225 MG/DL (ref 70–108)
GLUCOSE BLD STRIP.AUTO-MCNC: 69 MG/DL (ref 70–108)
GLUCOSE BLD STRIP.AUTO-MCNC: 83 MG/DL (ref 70–108)
GLUCOSE BLD STRIP.AUTO-MCNC: 85 MG/DL (ref 70–108)
GLUCOSE BLD STRIP.AUTO-MCNC: 90 MG/DL (ref 70–108)
GLUCOSE SERPL-MCNC: 40 MG/DL (ref 70–108)
HCT VFR BLD AUTO: 34.1 % (ref 37–47)
HGB BLD-MCNC: 10.8 GM/DL (ref 12–16)
MCH RBC QN AUTO: 31.4 PG (ref 26–33)
MCHC RBC AUTO-ENTMCNC: 31.7 GM/DL (ref 32.2–35.5)
MCV RBC AUTO: 99.1 FL (ref 81–99)
PHOSPHATE SERPL-MCNC: 5.5 MG/DL (ref 2.4–4.7)
PLATELET # BLD AUTO: 515 THOU/MM3 (ref 130–400)
PMV BLD AUTO: 10.1 FL (ref 9.4–12.4)
POTASSIUM SERPL-SCNC: 3.5 MEQ/L (ref 3.5–5.2)
RBC # BLD AUTO: 3.44 MILL/MM3 (ref 4.2–5.4)
SODIUM SERPL-SCNC: 139 MEQ/L (ref 135–145)
WBC # BLD AUTO: 7.8 THOU/MM3 (ref 4.8–10.8)

## 2023-09-10 PROCEDURE — 84100 ASSAY OF PHOSPHORUS: CPT

## 2023-09-10 PROCEDURE — 6370000000 HC RX 637 (ALT 250 FOR IP): Performed by: STUDENT IN AN ORGANIZED HEALTH CARE EDUCATION/TRAINING PROGRAM

## 2023-09-10 PROCEDURE — 6360000002 HC RX W HCPCS: Performed by: INTERNAL MEDICINE

## 2023-09-10 PROCEDURE — 6370000000 HC RX 637 (ALT 250 FOR IP): Performed by: INTERNAL MEDICINE

## 2023-09-10 PROCEDURE — 85027 COMPLETE CBC AUTOMATED: CPT

## 2023-09-10 PROCEDURE — 36415 COLL VENOUS BLD VENIPUNCTURE: CPT

## 2023-09-10 PROCEDURE — 2700000000 HC OXYGEN THERAPY PER DAY

## 2023-09-10 PROCEDURE — 94640 AIRWAY INHALATION TREATMENT: CPT

## 2023-09-10 PROCEDURE — 82948 REAGENT STRIP/BLOOD GLUCOSE: CPT

## 2023-09-10 PROCEDURE — 1200000003 HC TELEMETRY R&B

## 2023-09-10 PROCEDURE — 80048 BASIC METABOLIC PNL TOTAL CA: CPT

## 2023-09-10 PROCEDURE — 2580000003 HC RX 258: Performed by: STUDENT IN AN ORGANIZED HEALTH CARE EDUCATION/TRAINING PROGRAM

## 2023-09-10 PROCEDURE — 6370000000 HC RX 637 (ALT 250 FOR IP)

## 2023-09-10 PROCEDURE — 92610 EVALUATE SWALLOWING FUNCTION: CPT

## 2023-09-10 PROCEDURE — 84681 ASSAY OF C-PEPTIDE: CPT

## 2023-09-10 PROCEDURE — 92526 ORAL FUNCTION THERAPY: CPT

## 2023-09-10 RX ORDER — ENOXAPARIN SODIUM 100 MG/ML
40 INJECTION SUBCUTANEOUS DAILY
Status: DISCONTINUED | OUTPATIENT
Start: 2023-09-10 | End: 2023-09-12 | Stop reason: HOSPADM

## 2023-09-10 RX ORDER — ALPRAZOLAM 0.5 MG/1
0.5 TABLET ORAL 2 TIMES DAILY PRN
Status: DISCONTINUED | OUTPATIENT
Start: 2023-09-10 | End: 2023-09-12 | Stop reason: HOSPADM

## 2023-09-10 RX ORDER — ZOLPIDEM TARTRATE 10 MG/1
10 TABLET ORAL NIGHTLY
Status: DISCONTINUED | OUTPATIENT
Start: 2023-09-10 | End: 2023-09-12 | Stop reason: HOSPADM

## 2023-09-10 RX ADMIN — ALPRAZOLAM 0.5 MG: 0.5 TABLET ORAL at 20:40

## 2023-09-10 RX ADMIN — METOPROLOL TARTRATE 25 MG: 25 TABLET, FILM COATED ORAL at 07:56

## 2023-09-10 RX ADMIN — CARBIDOPA AND LEVODOPA 1 TABLET: 10; 100 TABLET ORAL at 20:46

## 2023-09-10 RX ADMIN — LISINOPRIL 2.5 MG: 2.5 TABLET ORAL at 07:56

## 2023-09-10 RX ADMIN — OXYCODONE AND ACETAMINOPHEN 2 TABLET: 5; 325 TABLET ORAL at 16:56

## 2023-09-10 RX ADMIN — ZOLPIDEM TARTRATE 10 MG: 10 TABLET ORAL at 20:40

## 2023-09-10 RX ADMIN — POTASSIUM CHLORIDE 40 MEQ: 1500 TABLET, EXTENDED RELEASE ORAL at 00:17

## 2023-09-10 RX ADMIN — ACETAMINOPHEN 650 MG: 325 TABLET ORAL at 20:39

## 2023-09-10 RX ADMIN — CLOPIDOGREL BISULFATE 75 MG: 75 TABLET ORAL at 07:55

## 2023-09-10 RX ADMIN — ONDANSETRON 4 MG: 4 TABLET, ORALLY DISINTEGRATING ORAL at 20:39

## 2023-09-10 RX ADMIN — Medication 16 G: at 08:08

## 2023-09-10 RX ADMIN — CARBIDOPA AND LEVODOPA 1 TABLET: 10; 100 TABLET ORAL at 14:49

## 2023-09-10 RX ADMIN — ALPRAZOLAM 0.5 MG: 0.5 TABLET ORAL at 07:55

## 2023-09-10 RX ADMIN — PREGABALIN 150 MG: 75 CAPSULE ORAL at 20:40

## 2023-09-10 RX ADMIN — SODIUM CHLORIDE, PRESERVATIVE FREE 10 ML: 5 INJECTION INTRAVENOUS at 20:46

## 2023-09-10 RX ADMIN — MOMETASONE FUROATE AND FORMOTEROL FUMARATE DIHYDRATE 2 PUFF: 200; 5 AEROSOL RESPIRATORY (INHALATION) at 08:18

## 2023-09-10 RX ADMIN — ASPIRIN 81 MG 81 MG: 81 TABLET ORAL at 07:55

## 2023-09-10 RX ADMIN — PREGABALIN 150 MG: 75 CAPSULE ORAL at 07:55

## 2023-09-10 RX ADMIN — ATORVASTATIN CALCIUM 40 MG: 40 TABLET, FILM COATED ORAL at 20:46

## 2023-09-10 RX ADMIN — PANTOPRAZOLE SODIUM 40 MG: 40 TABLET, DELAYED RELEASE ORAL at 06:51

## 2023-09-10 RX ADMIN — ENOXAPARIN SODIUM 40 MG: 100 INJECTION SUBCUTANEOUS at 07:56

## 2023-09-10 RX ADMIN — OXYCODONE AND ACETAMINOPHEN 2 TABLET: 5; 325 TABLET ORAL at 07:55

## 2023-09-10 RX ADMIN — ISOSORBIDE DINITRATE 10 MG: 10 TABLET ORAL at 07:56

## 2023-09-10 RX ADMIN — CARBIDOPA AND LEVODOPA 1 TABLET: 10; 100 TABLET ORAL at 08:06

## 2023-09-10 RX ADMIN — MOMETASONE FUROATE AND FORMOTEROL FUMARATE DIHYDRATE 2 PUFF: 200; 5 AEROSOL RESPIRATORY (INHALATION) at 17:45

## 2023-09-10 RX ADMIN — SODIUM CHLORIDE, PRESERVATIVE FREE 10 ML: 5 INJECTION INTRAVENOUS at 08:03

## 2023-09-10 RX ADMIN — AMLODIPINE BESYLATE 5 MG: 5 TABLET ORAL at 07:55

## 2023-09-10 ASSESSMENT — PAIN DESCRIPTION - DESCRIPTORS
DESCRIPTORS: ACHING
DESCRIPTORS: ACHING
DESCRIPTORS: ACHING;DISCOMFORT
DESCRIPTORS: DISCOMFORT

## 2023-09-10 ASSESSMENT — PAIN SCALES - GENERAL
PAINLEVEL_OUTOF10: 8
PAINLEVEL_OUTOF10: 8
PAINLEVEL_OUTOF10: 9
PAINLEVEL_OUTOF10: 9

## 2023-09-10 ASSESSMENT — PAIN DESCRIPTION - LOCATION
LOCATION: GENERALIZED

## 2023-09-10 ASSESSMENT — PAIN - FUNCTIONAL ASSESSMENT: PAIN_FUNCTIONAL_ASSESSMENT: PREVENTS OR INTERFERES SOME ACTIVE ACTIVITIES AND ADLS

## 2023-09-10 NOTE — PLAN OF CARE
Problem: Discharge Planning  Goal: Discharge to home or other facility with appropriate resources  9/10/2023 0323 by Delmer Mcconnell RN  Outcome: Progressing  9/10/2023 0322 by Delmer Mcconnell RN  Outcome: Progressing  Flowsheets (Taken 9/9/2023 2300)  Discharge to home or other facility with appropriate resources: Identify barriers to discharge with patient and caregiver     Problem: Pain  Goal: Verbalizes/displays adequate comfort level or baseline comfort level  9/10/2023 0323 by Delmer Mcconnell RN  Outcome: Progressing  9/10/2023 0322 by Delmer Mcconnell RN  Outcome: Progressing     Problem: Respiratory - Adult  Goal: Achieves optimal ventilation and oxygenation  Outcome: Progressing  Flowsheets (Taken 9/10/2023 0323)  Achieves optimal ventilation and oxygenation:   Assess for changes in respiratory status   Assess for changes in mentation and behavior     Problem: Skin/Tissue Integrity - Adult  Goal: Skin integrity remains intact  Outcome: Progressing  Flowsheets (Taken 9/10/2023 0323)  Skin Integrity Remains Intact: Monitor for areas of redness and/or skin breakdown     Problem: Musculoskeletal - Adult  Goal: Return mobility to safest level of function  Outcome: Progressing  Flowsheets (Taken 9/10/2023 0323)  Return Mobility to Safest Level of Function: Assist with transfers and ambulation using safe patient handling equipment as needed  Goal: Maintain proper alignment of affected body part  Outcome: Progressing  Flowsheets (Taken 9/10/2023 0323)  Maintain proper alignment of affected body part: Support and protect limb and body alignment per provider's orders     Problem: Infection - Adult  Goal: Absence of infection at discharge  Outcome: Progressing  Flowsheets (Taken 9/10/2023 0323)  Absence of infection at discharge:   Monitor lab/diagnostic results   Assess and monitor for signs and symptoms of infection     Problem: Hematologic - Adult  Goal: Maintains hematologic stability  Outcome:

## 2023-09-10 NOTE — PLAN OF CARE
Problem: Respiratory - Adult  Goal: Achieves optimal ventilation and oxygenation  9/10/2023 1014 by Gisell Fagan RCP  Outcome: Progressing  9/10/2023 0323 by Leeanna Cardenas RN  Outcome: Progressing  Flowsheets (Taken 9/10/2023 0323)  Achieves optimal ventilation and oxygenation:   Assess for changes in respiratory status   Assess for changes in mentation and behavior

## 2023-09-10 NOTE — CARE COORDINATION
09/10/23 1316   Service Assessment   Patient Orientation Alert and Oriented   Cognition Alert   History Provided By Patient   Primary Caregiver Self   Accompanied By/Relationship friend Paul Leonidas Family Members;Friends/Neighbors   Patient's Healthcare Decision Maker is: Legal Next of Kin   PCP Verified by CM Yes   Last Visit to PCP Within last 3 months   Prior Functional Level Independent in ADLs/IADLs   Current Functional Level Independent in ADLs/IADLs   Can patient return to prior living arrangement Yes   Ability to make needs known: Good   Family able to assist with home care needs: Yes   Would you like for me to discuss the discharge plan with any other family members/significant others, and if so, who? No   Financial Resources Medicare;Medicaid   Community Resources ECF/Home Care   Discharge Planning   Type of Residence House   Living Arrangements Children   Current Services Prior To Admission Durable Medical Equipment;Home Care;Oxygen Therapy   Current DME Prior to Arrival Wheelchair;Oxygen Therapy (Comment); Trach Suction Supplies   Potential Assistance Needed Durable Medical Equipment   Potential DME Needed Wheelchair   DME Ordered? No   Potential Assistance Purchasing Medications No   Type of [address]   Patient expects to be discharged to: [facility]   Transition of Care Consult (CM Consult) Discharge Planning   Services At/After Discharge Home Health   Mode of Transport at Discharge Other (see comment)  (family)   Confirm Follow Up Transport Family     Patient Goals/Plan/Treatment Preferences: From home with her son. She states she is current with [facility] for nursing services, SW consulted. She has a powerchair (she purchased), a wheelchair (she bought without using insurance), suction for her trach, O2 from [employer] that she wears at 50%. She voiced she sees her PCP monthly.      She states her wheelchair is broken and she

## 2023-09-11 ENCOUNTER — APPOINTMENT (OUTPATIENT)
Dept: GENERAL RADIOLOGY | Age: 53
DRG: 206 | End: 2023-09-11
Payer: MEDICARE

## 2023-09-11 LAB
ALBUMIN SERPL BCG-MCNC: 2.7 G/DL (ref 3.5–5.1)
ALP SERPL-CCNC: 119 U/L (ref 38–126)
ALT SERPL W/O P-5'-P-CCNC: 16 U/L (ref 11–66)
ANION GAP SERPL CALC-SCNC: 9 MEQ/L (ref 8–16)
AST SERPL-CCNC: 32 U/L (ref 5–40)
BILIRUB CONJ SERPL-MCNC: < 0.2 MG/DL (ref 0–0.3)
BILIRUB SERPL-MCNC: 0.2 MG/DL (ref 0.3–1.2)
BUN SERPL-MCNC: 21 MG/DL (ref 7–22)
C PEPTIDE SERPL-MCNC: 0.3 NG/ML (ref 1.1–4.4)
CALCIUM SERPL-MCNC: 8.3 MG/DL (ref 8.5–10.5)
CHLORIDE SERPL-SCNC: 92 MEQ/L (ref 98–111)
CO2 SERPL-SCNC: 32 MEQ/L (ref 23–33)
CREAT SERPL-MCNC: 0.7 MG/DL (ref 0.4–1.2)
DEPRECATED RDW RBC AUTO: 49.9 FL (ref 35–45)
ERYTHROCYTE [DISTWIDTH] IN BLOOD BY AUTOMATED COUNT: 13.5 % (ref 11.5–14.5)
GFR SERPL CREATININE-BSD FRML MDRD: > 60 ML/MIN/1.73M2
GLUCOSE BLD STRIP.AUTO-MCNC: 164 MG/DL (ref 70–108)
GLUCOSE BLD STRIP.AUTO-MCNC: 200 MG/DL (ref 70–108)
GLUCOSE BLD STRIP.AUTO-MCNC: 225 MG/DL (ref 70–108)
GLUCOSE SERPL-MCNC: 120 MG/DL (ref 70–108)
HCT VFR BLD AUTO: 34.4 % (ref 37–47)
HGB BLD-MCNC: 10.8 GM/DL (ref 12–16)
MAGNESIUM SERPL-MCNC: 2.1 MG/DL (ref 1.6–2.4)
MCH RBC QN AUTO: 31.8 PG (ref 26–33)
MCHC RBC AUTO-ENTMCNC: 31.4 GM/DL (ref 32.2–35.5)
MCV RBC AUTO: 101.2 FL (ref 81–99)
PLATELET # BLD AUTO: 505 THOU/MM3 (ref 130–400)
PMV BLD AUTO: 10.3 FL (ref 9.4–12.4)
POTASSIUM SERPL-SCNC: 3.9 MEQ/L (ref 3.5–5.2)
PROT SERPL-MCNC: 6 G/DL (ref 6.1–8)
RBC # BLD AUTO: 3.4 MILL/MM3 (ref 4.2–5.4)
SODIUM SERPL-SCNC: 133 MEQ/L (ref 135–145)
WBC # BLD AUTO: 11.8 THOU/MM3 (ref 4.8–10.8)

## 2023-09-11 PROCEDURE — 74230 X-RAY XM SWLNG FUNCJ C+: CPT

## 2023-09-11 PROCEDURE — 92611 MOTION FLUOROSCOPY/SWALLOW: CPT

## 2023-09-11 PROCEDURE — 6370000000 HC RX 637 (ALT 250 FOR IP): Performed by: STUDENT IN AN ORGANIZED HEALTH CARE EDUCATION/TRAINING PROGRAM

## 2023-09-11 PROCEDURE — 82948 REAGENT STRIP/BLOOD GLUCOSE: CPT

## 2023-09-11 PROCEDURE — 92526 ORAL FUNCTION THERAPY: CPT

## 2023-09-11 PROCEDURE — 80053 COMPREHEN METABOLIC PANEL: CPT

## 2023-09-11 PROCEDURE — 6370000000 HC RX 637 (ALT 250 FOR IP): Performed by: INTERNAL MEDICINE

## 2023-09-11 PROCEDURE — 83735 ASSAY OF MAGNESIUM: CPT

## 2023-09-11 PROCEDURE — 2580000003 HC RX 258: Performed by: STUDENT IN AN ORGANIZED HEALTH CARE EDUCATION/TRAINING PROGRAM

## 2023-09-11 PROCEDURE — 94761 N-INVAS EAR/PLS OXIMETRY MLT: CPT

## 2023-09-11 PROCEDURE — 36415 COLL VENOUS BLD VENIPUNCTURE: CPT

## 2023-09-11 PROCEDURE — 82248 BILIRUBIN DIRECT: CPT

## 2023-09-11 PROCEDURE — 2500000003 HC RX 250 WO HCPCS: Performed by: INTERNAL MEDICINE

## 2023-09-11 PROCEDURE — 85027 COMPLETE CBC AUTOMATED: CPT

## 2023-09-11 PROCEDURE — 94640 AIRWAY INHALATION TREATMENT: CPT

## 2023-09-11 PROCEDURE — 1200000003 HC TELEMETRY R&B

## 2023-09-11 PROCEDURE — 6370000000 HC RX 637 (ALT 250 FOR IP)

## 2023-09-11 RX ADMIN — METOPROLOL TARTRATE 25 MG: 25 TABLET, FILM COATED ORAL at 10:52

## 2023-09-11 RX ADMIN — CARBIDOPA AND LEVODOPA 1 TABLET: 10; 100 TABLET ORAL at 15:49

## 2023-09-11 RX ADMIN — ALPRAZOLAM 0.5 MG: 0.5 TABLET ORAL at 10:41

## 2023-09-11 RX ADMIN — ATORVASTATIN CALCIUM 40 MG: 40 TABLET, FILM COATED ORAL at 20:30

## 2023-09-11 RX ADMIN — ASPIRIN 81 MG 81 MG: 81 TABLET ORAL at 10:51

## 2023-09-11 RX ADMIN — BARIUM SULFATE 25 ML: 400 SUSPENSION ORAL at 10:24

## 2023-09-11 RX ADMIN — CARBIDOPA AND LEVODOPA 1 TABLET: 10; 100 TABLET ORAL at 10:51

## 2023-09-11 RX ADMIN — MOMETASONE FUROATE AND FORMOTEROL FUMARATE DIHYDRATE 2 PUFF: 200; 5 AEROSOL RESPIRATORY (INHALATION) at 17:39

## 2023-09-11 RX ADMIN — BARIUM SULFATE 50 ML: 0.81 POWDER, FOR SUSPENSION ORAL at 10:24

## 2023-09-11 RX ADMIN — PREGABALIN 150 MG: 75 CAPSULE ORAL at 20:30

## 2023-09-11 RX ADMIN — OXYCODONE AND ACETAMINOPHEN 2 TABLET: 5; 325 TABLET ORAL at 20:29

## 2023-09-11 RX ADMIN — AMLODIPINE BESYLATE 5 MG: 5 TABLET ORAL at 10:52

## 2023-09-11 RX ADMIN — PREGABALIN 150 MG: 75 CAPSULE ORAL at 10:52

## 2023-09-11 RX ADMIN — OXYCODONE AND ACETAMINOPHEN 2 TABLET: 5; 325 TABLET ORAL at 10:41

## 2023-09-11 RX ADMIN — PANTOPRAZOLE SODIUM 40 MG: 40 TABLET, DELAYED RELEASE ORAL at 10:52

## 2023-09-11 RX ADMIN — ISOSORBIDE DINITRATE 10 MG: 10 TABLET ORAL at 10:52

## 2023-09-11 RX ADMIN — SODIUM CHLORIDE, PRESERVATIVE FREE 10 ML: 5 INJECTION INTRAVENOUS at 10:54

## 2023-09-11 RX ADMIN — LISINOPRIL 2.5 MG: 2.5 TABLET ORAL at 10:52

## 2023-09-11 RX ADMIN — CARBIDOPA AND LEVODOPA 1 TABLET: 10; 100 TABLET ORAL at 20:29

## 2023-09-11 RX ADMIN — SODIUM CHLORIDE, PRESERVATIVE FREE 10 ML: 5 INJECTION INTRAVENOUS at 20:30

## 2023-09-11 RX ADMIN — MOMETASONE FUROATE AND FORMOTEROL FUMARATE DIHYDRATE 2 PUFF: 200; 5 AEROSOL RESPIRATORY (INHALATION) at 08:22

## 2023-09-11 RX ADMIN — BARIUM SULFATE 20 ML: 400 SUSPENSION ORAL at 10:24

## 2023-09-11 RX ADMIN — BARIUM SULFATE 15 ML: 400 PASTE ORAL at 10:23

## 2023-09-11 RX ADMIN — ALPRAZOLAM 0.5 MG: 0.5 TABLET ORAL at 20:29

## 2023-09-11 RX ADMIN — CLOPIDOGREL BISULFATE 75 MG: 75 TABLET ORAL at 10:52

## 2023-09-11 ASSESSMENT — PAIN SCALES - GENERAL: PAINLEVEL_OUTOF10: 8

## 2023-09-11 ASSESSMENT — PAIN DESCRIPTION - LOCATION: LOCATION: GENERALIZED

## 2023-09-11 NOTE — PLAN OF CARE
Problem: Respiratory - Adult  Goal: Achieves optimal ventilation and oxygenation  Note: Patient receiving inhaler to maintain and manage respiratory status.

## 2023-09-11 NOTE — CARE COORDINATION
9/11/23, 2:48 PM EDT    [place] [address] [address] day: 2  Location: 8A-14/014-A Reason for admit: Electrolyte imbalance [E87.8]  Hypoglycemia [E16.2]  Generalized abdominal pain [R10.84]  Attention to G-tube [facility]) [Z43.1]   Procedure:   9/9 CT A/P: Constipation and ileus. Gastrostomy tube. 2. Cholelithiasis. Mild atelectasis/pneumonia left lung base. 3. Markedly distended urinary bladder  9/12 MBS: Laryngeal penetration and aspiration as above. Barriers to Discharge: Hospitalist. Pureed Diet. Hypoglycemia protocol. Percocet prn. POC glucose checks. Latest 164. WBC 11.8; hgb 10.8. PCP: Seven Maxwell MD  Readmission Risk Score: 19.8%  Patient Goals/Plan/Treatment Preferences: Plan to return home with son. Current with [facility] for nursing and [facility] for tube feed. Has a power chair and manual wheelchair (purchased without using insurance); however the manual wheelchair is broken. Attending provider notified of patient's request for new order for manual wheelchair. Has suction for her trach and O2 from [employer] that she wears at 50%. SW following. Declines needs.

## 2023-09-11 NOTE — PLAN OF CARE
Problem: Discharge Planning  Goal: Discharge to home or other facility with appropriate resources  Outcome: Progressing       Consult received. Please see SW note dated 9/11.

## 2023-09-11 NOTE — CARE COORDINATION
DISCHARGE PLANNING EVALUATION  9/11/23, 3:40 PM EDT    Reason for Referral: Current [facility]   Mental Status: Answered questions appropriately   Decision Making: Independent   Family/Social/Home Environment: Lives at home with her son, however he does work a lot per patient. Current Services including food security, transportation and housekeeping:  Current [facility] for RN services and [facility] for tube feeds. Current Equipment: electric wheelchair, manual wheelchair  Payment Source: Medicare, Medicaid  Concerns or Barriers to Discharge: None  Post-acute [facility]) provider list was provided to patient. Patient was informed of their freedom to choose [facility] provider. Discussed and offered to show the patient the relevant [facility] Providers quality and resource use measures on Medicare Compare web site via computer based on patient's goals of care and treatment preferences. Questions regarding selection process were answered. Teach Back Method used with Kelley Whitfield regarding care plan and discharge planning  Patient verbalized understanding of the plan of care and contribute to goal setting. Patient goals, treatment preferences and discharge plan: Return to home with current [facility] services, son will transport home.      Electronically signed by NAYA Duvall on 9/11/2023 at 3:40 PM

## 2023-09-12 VITALS
DIASTOLIC BLOOD PRESSURE: 72 MMHG | RESPIRATION RATE: 16 BRPM | HEART RATE: 78 BPM | OXYGEN SATURATION: 99 % | BODY MASS INDEX: 25.79 KG/M2 | WEIGHT: 136.6 LBS | TEMPERATURE: 97.4 F | HEIGHT: 61 IN | SYSTOLIC BLOOD PRESSURE: 100 MMHG

## 2023-09-12 LAB
ANION GAP SERPL CALC-SCNC: 8 MEQ/L (ref 8–16)
BUN SERPL-MCNC: 22 MG/DL (ref 7–22)
CALCIUM SERPL-MCNC: 8.2 MG/DL (ref 8.5–10.5)
CHLORIDE SERPL-SCNC: 101 MEQ/L (ref 98–111)
CO2 SERPL-SCNC: 29 MEQ/L (ref 23–33)
CORTIS SERPL-MCNC: 12.03 UG/DL
CORTISOL COLLECTION INFO: NORMAL
CREAT SERPL-MCNC: 0.7 MG/DL (ref 0.4–1.2)
DEPRECATED RDW RBC AUTO: 55.5 FL (ref 35–45)
ERYTHROCYTE [DISTWIDTH] IN BLOOD BY AUTOMATED COUNT: 13.6 % (ref 11.5–14.5)
FERRITIN SERPL IA-MCNC: 323 NG/ML (ref 10–291)
FOLATE SERPL-MCNC: 5.8 NG/ML (ref 4.8–24.2)
GFR SERPL CREATININE-BSD FRML MDRD: > 60 ML/MIN/1.73M2
GLUCOSE BLD STRIP.AUTO-MCNC: 157 MG/DL (ref 70–108)
GLUCOSE SERPL-MCNC: 117 MG/DL (ref 70–108)
HCT VFR BLD AUTO: 36.4 % (ref 37–47)
HGB BLD-MCNC: 10.5 GM/DL (ref 12–16)
IRON SATN MFR SERPL: 11 % (ref 20–50)
IRON SERPL-MCNC: 18 UG/DL (ref 50–170)
MCH RBC QN AUTO: 32.3 PG (ref 26–33)
MCHC RBC AUTO-ENTMCNC: 28.8 GM/DL (ref 32.2–35.5)
MCV RBC AUTO: 112 FL (ref 81–99)
PATHOLOGIST REVIEW: ABNORMAL
PLATELET # BLD AUTO: 429 THOU/MM3 (ref 130–400)
PMV BLD AUTO: 10.5 FL (ref 9.4–12.4)
POTASSIUM SERPL-SCNC: 4.5 MEQ/L (ref 3.5–5.2)
RBC # BLD AUTO: 3.25 MILL/MM3 (ref 4.2–5.4)
SCAN OF BLOOD SMEAR: NORMAL
SODIUM SERPL-SCNC: 138 MEQ/L (ref 135–145)
T4 FREE SERPL-MCNC: 1.06 NG/DL (ref 0.93–1.76)
TIBC SERPL-MCNC: 162 UG/DL (ref 171–450)
TSH SERPL DL<=0.005 MIU/L-ACNC: 4.63 UIU/ML (ref 0.4–4.2)
VIT B12 SERPL-MCNC: 1066 PG/ML (ref 211–911)
WBC # BLD AUTO: 12.4 THOU/MM3 (ref 4.8–10.8)

## 2023-09-12 PROCEDURE — 83550 IRON BINDING TEST: CPT

## 2023-09-12 PROCEDURE — 84443 ASSAY THYROID STIM HORMONE: CPT

## 2023-09-12 PROCEDURE — 82728 ASSAY OF FERRITIN: CPT

## 2023-09-12 PROCEDURE — 80048 BASIC METABOLIC PNL TOTAL CA: CPT

## 2023-09-12 PROCEDURE — 85027 COMPLETE CBC AUTOMATED: CPT

## 2023-09-12 PROCEDURE — 84439 ASSAY OF FREE THYROXINE: CPT

## 2023-09-12 PROCEDURE — 6360000002 HC RX W HCPCS: Performed by: STUDENT IN AN ORGANIZED HEALTH CARE EDUCATION/TRAINING PROGRAM

## 2023-09-12 PROCEDURE — 83540 ASSAY OF IRON: CPT

## 2023-09-12 PROCEDURE — 94640 AIRWAY INHALATION TREATMENT: CPT

## 2023-09-12 PROCEDURE — 82948 REAGENT STRIP/BLOOD GLUCOSE: CPT

## 2023-09-12 PROCEDURE — 6370000000 HC RX 637 (ALT 250 FOR IP)

## 2023-09-12 PROCEDURE — 6370000000 HC RX 637 (ALT 250 FOR IP): Performed by: STUDENT IN AN ORGANIZED HEALTH CARE EDUCATION/TRAINING PROGRAM

## 2023-09-12 PROCEDURE — 82533 TOTAL CORTISOL: CPT

## 2023-09-12 PROCEDURE — 82607 VITAMIN B-12: CPT

## 2023-09-12 PROCEDURE — 82746 ASSAY OF FOLIC ACID SERUM: CPT

## 2023-09-12 PROCEDURE — 36415 COLL VENOUS BLD VENIPUNCTURE: CPT

## 2023-09-12 RX ORDER — FERROUS SULFATE 300 MG/5ML
300 LIQUID (ML) ORAL DAILY
Qty: 300 ML | Refills: 1 | COMMUNITY
Start: 2023-09-12 | End: 2024-01-10

## 2023-09-12 RX ORDER — ALBUTEROL SULFATE 90 UG/1
2 AEROSOL, METERED RESPIRATORY (INHALATION)
Status: DISCONTINUED | OUTPATIENT
Start: 2023-09-12 | End: 2023-09-12 | Stop reason: HOSPADM

## 2023-09-12 RX ORDER — HEPARIN 100 UNIT/ML
500 SYRINGE INTRAVENOUS ONCE
Status: COMPLETED | OUTPATIENT
Start: 2023-09-12 | End: 2023-09-12

## 2023-09-12 RX ADMIN — OXYCODONE AND ACETAMINOPHEN 2 TABLET: 5; 325 TABLET ORAL at 06:38

## 2023-09-12 RX ADMIN — CARBIDOPA AND LEVODOPA 1 TABLET: 10; 100 TABLET ORAL at 10:35

## 2023-09-12 RX ADMIN — HEPARIN 500 UNITS: 100 SYRINGE at 12:09

## 2023-09-12 RX ADMIN — ASPIRIN 81 MG 81 MG: 81 TABLET ORAL at 10:43

## 2023-09-12 RX ADMIN — ALBUTEROL SULFATE 2 PUFF: 90 AEROSOL, METERED RESPIRATORY (INHALATION) at 07:54

## 2023-09-12 RX ADMIN — MOMETASONE FUROATE AND FORMOTEROL FUMARATE DIHYDRATE 2 PUFF: 200; 5 AEROSOL RESPIRATORY (INHALATION) at 07:54

## 2023-09-12 RX ADMIN — PREGABALIN 150 MG: 75 CAPSULE ORAL at 10:35

## 2023-09-12 RX ADMIN — CLOPIDOGREL BISULFATE 75 MG: 75 TABLET ORAL at 10:43

## 2023-09-12 RX ADMIN — PANTOPRAZOLE SODIUM 40 MG: 40 TABLET, DELAYED RELEASE ORAL at 06:38

## 2023-09-12 ASSESSMENT — PAIN DESCRIPTION - LOCATION: LOCATION: GENERALIZED

## 2023-09-12 ASSESSMENT — PAIN SCALES - GENERAL
PAINLEVEL_OUTOF10: 9
PAINLEVEL_OUTOF10: 0
PAINLEVEL_OUTOF10: 8

## 2023-09-12 NOTE — PLAN OF CARE
Problem: Discharge Planning  Goal: Discharge to home or other facility with appropriate resources  9/12/2023 0016 by Audie Hill RN  Outcome: Progressing  Flowsheets (Taken 9/12/2023 0016)  Discharge to home or other facility with appropriate resources: Identify barriers to discharge with patient and caregiver  9/11/2023 1539 by NAYA Garcia  Outcome: Progressing     Problem: Pain  Goal: Verbalizes/displays adequate comfort level or baseline comfort level  Outcome: Progressing  Flowsheets (Taken 9/12/2023 0016)  Verbalizes/displays adequate comfort level or baseline comfort level:   Encourage patient to monitor pain and request assistance   Assess pain using appropriate pain scale     Problem: Respiratory - Adult  Goal: Achieves optimal ventilation and oxygenation  Outcome: Progressing  Flowsheets (Taken 9/12/2023 0016)  Achieves optimal ventilation and oxygenation:   Assess for changes in respiratory status   Assess for changes in mentation and behavior     Problem: Skin/Tissue Integrity - Adult  Goal: Skin integrity remains intact  Outcome: Progressing  Flowsheets (Taken 9/12/2023 0016)  Skin Integrity Remains Intact: Monitor for areas of redness and/or skin breakdown     Problem: Hematologic - Adult  Goal: Maintains hematologic stability  Outcome: Progressing  Flowsheets (Taken 9/12/2023 0016)  Maintains hematologic stability: Assess for signs and symptoms of bleeding or hemorrhage     Problem: Safety - Adult  Goal: Free from fall injury  Outcome: Progressing  Flowsheets (Taken 9/12/2023 0016)  Free From Fall Injury: Instruct family/caregiver on patient safety   Care plan reviewed with patient. Patient verbalize understanding of the plan of care and contribute to goal setting.

## 2023-09-12 NOTE — CARE COORDINATION
9/12/23, 1:06 PM EDT    DISCHARGE PLANNING EVALUATION    Spoke with Joanna at [facility], informed patient discharged today.

## 2023-09-12 NOTE — PLAN OF CARE
DME Wheelchair    [place] was evaluated today and a DME order was entered for a standard wheelchair because she requires this to successfully complete daily living tasks of personal cares. A standard manual wheelchair is necessary due to patient's impaired ambulation and mobility restrictions and would be unable to resolve these daily living tasks using a cane or walker. The patient is capable of using a standard wheelchair safely in their home and can maneuver within their home with adequate access. There is a caregiver available to provide necessary assistance. The need for this equipment was discussed with the patient and she understands, is in agreement, and has not expressed an unwillingness to use the wheelchair. [place] can self propel a wheelchair and needs anti-rollback device because of ramps. [place] requires elevating legrest due to a musculoskeletal condition or the presence of a cast or brace which prevents 90 degree flexion at the knee.

## 2023-09-12 NOTE — DISCHARGE SUMMARY
Hospital Medicine Discharge Summary      Patient Identification:   Parveen Castillo   : 1970  MRN: [id number]   Account: [de-identified]      Patient's PCP: Cornelia Medrano MD    Admit Date: 2023     Discharge Date: 2023    Admitting Physician: No admitting provider for patient encounter. Discharge Physician: Amadeo Oh DO     Discharge Diagnoses:    #Hypoglycemia: Secondary to multiple diabetic medications. Patient came in for tracheostomy issues and was noted to be hypoglycemic at 37. She has history of diabetes and is on many diabetic medications including Trulicty 1.5 mg, Humalog 100 unit, Jardiance 25 mg, Metformin 500 mg BID, and Actos 30 mg. She reports being on these medications for many years. Patient had some lightheadedness and dizziness. Received apple juice in ED and sugar improved to 177. Another episode of hypoglycemia yesterday morning, 40. Current sugars above 120. Hgb A1c from 23 was 8.2. C-peptide 0.3. 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 Post-treatment CT concerning for residual disease. Patient follows with Dr. Alexey Lundy and Dr. Richard Ruiz for Oncology/Radiation Oncology. Follows with Dr. Amber Jackman for ENT who recommended total laryngectomy and patient was going to proceed with this. However looks like patient had an emergent trach done on 23 with Dr. Jaylin Robles MD at [facility] in [place], [place]. She reports today pulling out the speaking valve while she was sleeping and therefore came to ED. Speaking valve in place. Plan:   -Trach dislodgement resolved.   -Follow up with ENT on discharge   -Patient may benefit from additional education of her trach and how to properly

## 2023-09-12 NOTE — PLAN OF CARE
Problem: Respiratory - Adult  Goal: Clear lung sounds  Description: Clear lung sounds  9/12/2023 0759 by Leny Vasquez RCP  Outcome: Progressing     Problem: Respiratory - Adult  Goal: Clear lung sounds  Description: Clear lung sounds  9/12/2023 0759 by Leny Vasquez RCP  Outcome: Progressing     Problem: Discharge Planning  Goal: Discharge to home or other facility with appropriate resources  9/12/2023 1105 by Nat Alvarez RN  Outcome: Adequate for Discharge  9/12/2023 1105 by Nat Alvarez RN  Outcome: Adequate for Discharge  9/12/2023 0016 by Kody Frey RN  Outcome: Progressing  Flowsheets (Taken 9/12/2023 0016)  Discharge to home or other facility with appropriate resources: Identify barriers to discharge with patient and caregiver     Problem: Pain  Goal: Verbalizes/displays adequate comfort level or baseline comfort level  9/12/2023 1105 by Nat Alvarez RN  Outcome: Adequate for Discharge  9/12/2023 1105 by Nat Alvarez RN  Outcome: Adequate for Discharge  9/12/2023 0016 by Kody Frey RN  Outcome: Progressing  Flowsheets (Taken 9/12/2023 0016)  Verbalizes/displays adequate comfort level or baseline comfort level:   Encourage patient to monitor pain and request assistance   Assess pain using appropriate pain scale     Problem: Respiratory - Adult  Goal: Achieves optimal ventilation and oxygenation  9/12/2023 1105 by Nat Alvarez RN  Outcome: Adequate for Discharge  9/12/2023 1105 by Nat Alvarez RN  Outcome: Adequate for Discharge  9/12/2023 0016 by Kody Frey RN  Outcome: Progressing  Flowsheets (Taken 9/12/2023 0016)  Achieves optimal ventilation and oxygenation:   Assess for changes in respiratory status   Assess for changes in mentation and behavior     Problem: Skin/Tissue Integrity - Adult  Goal: Skin integrity remains intact  9/12/2023 1105 by Nat Alvarez RN  Outcome: Adequate for Discharge  9/12/2023 1105 by Nat Alvarez RN  Outcome: Adequate for Discharge  Flowsheets

## 2023-09-12 NOTE — CARE COORDINATION
9/12/23, 12:59 PM EDT    Patient goals/plan/ treatment preferences discussed by  and . Patient goals/plan/ treatment preferences reviewed with patient/ family. Patient/ family verbalize understanding of discharge plan and are in agreement with goal/plan/treatment preferences. Understanding was demonstrated using the teach back method. AVS provided by RN at time of discharge, which includes all necessary medical information pertaining to the patients current course of illness, treatment, post-discharge goals of care, and treatment preferences. Services At/After Discharge: DME       IMM Letter  IMM Letter given to Patient/Family/Significant other/Guardian/POA/by[de-identified] Estelle Ralph RN CM  IMM Letter date given[de-identified] 09/12/23  IMM Letter time given[de-identified] 1150     Pt verbalized understanding and gave permission for possible discharge within 4 hours of receiving IMM. Patient was discharged by primary RN with Rx for wheelchair. Patient will obtain new wheelchair at preferred DME provider.

## 2023-09-21 ENCOUNTER — TELEPHONE (OUTPATIENT)
Dept: ONCOLOGY | Age: 53
End: 2023-09-21

## 2023-09-21 ENCOUNTER — HOSPITAL ENCOUNTER (OUTPATIENT)
Dept: INFUSION THERAPY | Age: 53
End: 2023-09-21

## 2023-09-21 NOTE — TELEPHONE ENCOUNTER
LVM that appt on 10/11/23 has been rescheduled to 10/16/23,due to CT scan on 10/12/23 and ride was arranged for this day. Letter mailed with this info.

## 2023-09-22 ENCOUNTER — TELEPHONE (OUTPATIENT)
Dept: CASE MANAGEMENT | Age: 53
End: 2023-09-22

## 2023-09-22 NOTE — TELEPHONE ENCOUNTER
Natanael received call from pt's sig other, Ladarius. He inquires about transportation that will accomodate pt's motorized w/c for pt's appts for CT scan & with ONC. Natanael notes \"transportation arranged\". Natanael will FU with  next week to verify transportation that has been arranged.

## 2023-09-25 ENCOUNTER — TELEPHONE (OUTPATIENT)
Dept: CASE MANAGEMENT | Age: 53
End: 2023-09-25

## 2023-09-25 NOTE — TELEPHONE ENCOUNTER
Natanael notified by  that the transportation coordinated was a cab. Pt is a double amputee, therefore, a cab to transport will not be something that can accommodate her. Natanael call to [facility]; spoke with Elizabeth Suárez & explained the above. Elizabeth Suárez shared that stretcher transport are not available from pt's homes. Natanael emphasized the double amputee challenge that presents the difficulty with transportation arrangements. Elizabeth Suárez will speak with her supervisor/manager re: the above. Natanael call to pt's sig other, Ladarius. The above shared that Natanael is working to coordinate the transport for appts. Pt's handicap accessible [employer] needs driven by pt's son, who has a job with long hours, therefore he is not available to drive the pt in her [place] to the appts. Natanael will update pt/sig other after management discussion at [address] [place] [address].

## 2023-09-26 NOTE — PROGRESS NOTES
PM     09/18/2023 05:02 PM    .3 09/18/2023 05:02 PM    MCH 31.8 09/18/2023 05:02 PM    MCHC 31.1 09/18/2023 05:02 PM    RDW 11.8 07/17/2023 02:15 PM    NRBC 0 09/18/2023 05:02 PM    SEGSPCT 81.0 09/18/2023 05:02 PM    MONOPCT 6.7 09/18/2023 05:02 PM    MONOSABS 0.6 09/18/2023 05:02 PM    LYMPHSABS 0.7 09/18/2023 05:02 PM    EOSABS 0.2 09/18/2023 05:02 PM    BASOSABS 0.1 09/18/2023 05:02 PM     Lab Results   Component Value Date/Time    SEGSABS 7.2 09/18/2023 05:02 PM       CMP:    Lab Results   Component Value Date/Time     09/18/2023 05:02 PM    K 4.5 09/18/2023 05:02 PM    K 4.8 11/26/2022 02:58 AM    CL 97 09/18/2023 05:02 PM    CO2 31 09/18/2023 05:02 PM    BUN 25 09/18/2023 05:02 PM    CREATININE 0.5 09/18/2023 05:02 PM    LABGLOM >60 09/18/2023 05:02 PM    GLUCOSE 125 09/18/2023 05:02 PM    PROT 6.0 09/11/2023 05:10 AM    LABALBU 2.7 09/11/2023 05:10 AM    CALCIUM 9.3 09/18/2023 05:02 PM    BILITOT 0.2 09/11/2023 05:10 AM    ALKPHOS 119 09/11/2023 05:10 AM    AST 32 09/11/2023 05:10 AM    ALT 16 09/11/2023 05:10 AM       BMP:    Lab Results   Component Value Date/Time     09/18/2023 05:02 PM    K 4.5 09/18/2023 05:02 PM    K 4.8 11/26/2022 02:58 AM    CL 97 09/18/2023 05:02 PM    CO2 31 09/18/2023 05:02 PM    BUN 25 09/18/2023 05:02 PM    LABALBU 2.7 09/11/2023 05:10 AM    CREATININE 0.5 09/18/2023 05:02 PM    CALCIUM 9.3 09/18/2023 05:02 PM    LABGLOM >60 09/18/2023 05:02 PM    GLUCOSE 125 09/18/2023 05:02 PM       Magnesium:    Lab Results   Component Value Date/Time    MG 2.1 09/11/2023 05:10 AM     PT/INR:    Lab Results   Component Value Date/Time    INR 1.03 09/06/2016 12:29 PM     TSH:    Lab Results   Component Value Date/Time    TSH 4.630 09/12/2023 06:18 AM     VITAMIN B12: No components found for: \"B12\"  FOLATE:    Lab Results   Component Value Date/Time    FOLATE 5.8 09/12/2023 08:41 AM     IRON:    Lab Results   Component Value Date/Time    IRON 18 09/12/2023 08:41 AM

## 2023-10-16 ENCOUNTER — HOSPITAL ENCOUNTER (OUTPATIENT)
Dept: RADIATION ONCOLOGY | Age: 53
Discharge: HOME | End: 2023-10-16
Payer: MEDICARE

## 2023-10-16 ENCOUNTER — HOSPITAL ENCOUNTER (OUTPATIENT)
Dept: INFUSION THERAPY | Age: 53
Discharge: HOME | End: 2023-10-16
Payer: MEDICARE

## 2023-10-16 ENCOUNTER — CLINICAL DOCUMENTATION (OUTPATIENT)
Dept: CASE MANAGEMENT | Age: 53
End: 2023-10-16

## 2023-10-16 ENCOUNTER — OFFICE VISIT (OUTPATIENT)
Dept: ONCOLOGY | Age: 53
End: 2023-10-16
Payer: MEDICARE

## 2023-10-16 VITALS
SYSTOLIC BLOOD PRESSURE: 118 MMHG | DIASTOLIC BLOOD PRESSURE: 68 MMHG | RESPIRATION RATE: 16 BRPM | OXYGEN SATURATION: 98 % | HEIGHT: 61 IN | TEMPERATURE: 99 F | BODY MASS INDEX: 25.81 KG/M2 | HEART RATE: 86 BPM

## 2023-10-16 VITALS
BODY MASS INDEX: 25.51 KG/M2 | SYSTOLIC BLOOD PRESSURE: 118 MMHG | RESPIRATION RATE: 16 BRPM | HEART RATE: 86 BPM | DIASTOLIC BLOOD PRESSURE: 68 MMHG | WEIGHT: 135 LBS | TEMPERATURE: 99 F | OXYGEN SATURATION: 98 %

## 2023-10-16 VITALS
SYSTOLIC BLOOD PRESSURE: 118 MMHG | RESPIRATION RATE: 16 BRPM | BODY MASS INDEX: 25.81 KG/M2 | DIASTOLIC BLOOD PRESSURE: 68 MMHG | TEMPERATURE: 99 F | HEIGHT: 61 IN | OXYGEN SATURATION: 98 % | HEART RATE: 86 BPM

## 2023-10-16 DIAGNOSIS — C32.1 MALIGNANT NEOPLASM OF EPIGLOTTIS (HCC): Primary | ICD-10-CM

## 2023-10-16 DIAGNOSIS — C32.1 MALIGNANT NEOPLASM OF EPIGLOTTIS (HCC): ICD-10-CM

## 2023-10-16 PROCEDURE — 3017F COLORECTAL CA SCREEN DOC REV: CPT | Performed by: INTERNAL MEDICINE

## 2023-10-16 PROCEDURE — 6360000002 HC RX W HCPCS: Performed by: INTERNAL MEDICINE

## 2023-10-16 PROCEDURE — 99214 OFFICE O/P EST MOD 30 MIN: CPT | Performed by: INTERNAL MEDICINE

## 2023-10-16 PROCEDURE — 3078F DIAST BP <80 MM HG: CPT | Performed by: INTERNAL MEDICINE

## 2023-10-16 PROCEDURE — G8484 FLU IMMUNIZE NO ADMIN: HCPCS | Performed by: INTERNAL MEDICINE

## 2023-10-16 PROCEDURE — 99212 OFFICE O/P EST SF 10 MIN: CPT | Performed by: RADIOLOGY

## 2023-10-16 PROCEDURE — G8417 CALC BMI ABV UP PARAM F/U: HCPCS | Performed by: INTERNAL MEDICINE

## 2023-10-16 PROCEDURE — 99213 OFFICE O/P EST LOW 20 MIN: CPT

## 2023-10-16 PROCEDURE — 3074F SYST BP LT 130 MM HG: CPT | Performed by: INTERNAL MEDICINE

## 2023-10-16 PROCEDURE — 1036F TOBACCO NON-USER: CPT | Performed by: INTERNAL MEDICINE

## 2023-10-16 PROCEDURE — 2580000003 HC RX 258: Performed by: INTERNAL MEDICINE

## 2023-10-16 PROCEDURE — 96523 IRRIG DRUG DELIVERY DEVICE: CPT

## 2023-10-16 PROCEDURE — G8428 CUR MEDS NOT DOCUMENT: HCPCS | Performed by: INTERNAL MEDICINE

## 2023-10-16 RX ORDER — SODIUM CHLORIDE 9 MG/ML
25 INJECTION, SOLUTION INTRAVENOUS PRN
OUTPATIENT
Start: 2023-10-16

## 2023-10-16 RX ORDER — HEPARIN 100 UNIT/ML
500 SYRINGE INTRAVENOUS PRN
OUTPATIENT
Start: 2023-10-16

## 2023-10-16 RX ORDER — SODIUM CHLORIDE 0.9 % (FLUSH) 0.9 %
5-40 SYRINGE (ML) INJECTION PRN
Status: DISCONTINUED | OUTPATIENT
Start: 2023-10-16 | End: 2023-10-17 | Stop reason: HOSPADM

## 2023-10-16 RX ORDER — HEPARIN 100 UNIT/ML
500 SYRINGE INTRAVENOUS PRN
Status: DISCONTINUED | OUTPATIENT
Start: 2023-10-16 | End: 2023-10-17 | Stop reason: HOSPADM

## 2023-10-16 RX ORDER — SODIUM CHLORIDE 0.9 % (FLUSH) 0.9 %
5-40 SYRINGE (ML) INJECTION PRN
OUTPATIENT
Start: 2023-10-16

## 2023-10-16 RX ADMIN — SODIUM CHLORIDE, PRESERVATIVE FREE 10 ML: 5 INJECTION INTRAVENOUS at 13:36

## 2023-10-16 RX ADMIN — SODIUM CHLORIDE, PRESERVATIVE FREE 10 ML: 5 INJECTION INTRAVENOUS at 13:35

## 2023-10-16 RX ADMIN — HEPARIN 500 UNITS: 100 SYRINGE at 13:36

## 2023-10-16 ASSESSMENT — PAIN DESCRIPTION - LOCATION: LOCATION: BACK;THROAT

## 2023-10-16 ASSESSMENT — ENCOUNTER SYMPTOMS
COUGH: 1
BLOOD IN STOOL: 0
RECTAL PAIN: 0
BACK PAIN: 1
VOICE CHANGE: 0
NAUSEA: 0
SHORTNESS OF BREATH: 0
TROUBLE SWALLOWING: 1
SINUS PAIN: 0
SORE THROAT: 1
SINUS PRESSURE: 0
FACIAL SWELLING: 0
ABDOMINAL PAIN: 0

## 2023-10-16 ASSESSMENT — PAIN SCALES - GENERAL: PAINLEVEL_OUTOF10: 8

## 2023-10-16 NOTE — DISCHARGE INSTRUCTIONS
You had labs drawn via [employer] while in Outpatient Oncology clinic, Please call us at [phone] if you have any questions or concerns about your visit or medications that you received today. It is important that you drink 48-64 ounces of water everyday.

## 2023-10-16 NOTE — PROGRESS NOTES
[facility]           [address] [place] [address], [address] [place] [address] Faby Quintero Click: [phone]        F: [phone]       [email or web address]            FOLLOW UP NOTE    Date of Service: 10/16/2023  Patient ID: Latonia Xiao   : 1970  MRN: [id number]   Acct Number: [de-identified]       DATE OF SERVICE: 10/16/2023   LOCATION: [facility]  PROVIDER: Jacinta Grady MD MS    FOLLOW UP PHYSICIANS: Dr. Kari Talbot ([facility])    ASSESSMENT AND PLAN:   Cancer Staging   Cancer, epiglottis ([address] [facility])  Staging form: Larynx - Supraglottis, AJCC 8th Edition  - Clinical stage from 10/6/2022: Stage III (cT3, cN0, cM0) - Signed by Daphne Schafer MD on 2022      -Mrs. Abena Lozoya presents today for follow up regarding previously treated head and neck cancer  -She continues to have difficulty with PO intake, PEG dependent  -Recent tracheostomy placement, improved respiration status  -Seen by outside ENT in [facility], recent biopsy negative, however persistent symptoms are concerning  -Continues to follow with [facility] locally, at [address]  -Scheduled for total laryngectomy at [facility], non-functional larynx vs persistent disease  -Will await final pathology from surgery, however patient states that she does not wish to pursue any adjuvant treatment  -Continue care as per all other treating physicians    Will consider final follow up schedule going forward following final pathology. HPI:  Oncology History Overview Note   Latonia Xiao is a 47 y.o. female      HISTORY:    10/21/22 As per Dr. Rosie Villalobos (medical oncology) note,                  SCOPES:    3/22/23 FLEXIBLE LARYNGOSCOPY, DR. Rogelio Caballero          IMAGIN/31/22 CT ST NECK WO CON        22 CT ST NECK W CON        11/3/22 PET/CT      1. FDG avid right hypopharyngeal lesion corresponding to known malignancy. 2. FDG avid left lung densities and nodules highly suspicious for malignancy.    3. FDG avid left hilar

## 2023-10-16 NOTE — PLAN OF CARE
Problem: Chronic Conditions and Co-morbidities  Goal: Patient's chronic conditions and co-morbidity symptoms are monitored and maintained or improved  Outcome: Progressing  Flowsheets (Taken 10/16/2023 1636)  Care Plan - Patient's Chronic Conditions and Co-Morbidity Symptoms are Monitored and Maintained or Improved: Monitor and assess patient's chronic conditions and comorbid symptoms for stability, deterioration, or improvement  Note: Discussed indications for labs      Problem: Safety - Adult  Goal: Free from fall injury  Outcome: Progressing  Flowsheets (Taken 10/16/2023 1636)  Free From Fall Injury: Instruct family/caregiver on patient safety  Note: No falls occurred with visit today. Problem: Discharge Planning  Goal: Discharge to home or other facility with appropriate resources  Outcome: Progressing  Flowsheets (Taken 10/16/2023 1636)  Discharge to home or other facility with appropriate resources: Identify barriers to discharge with patient and caregiver  Note: Verbalize understanding of discharge instructions, follow up appointments, and when to call Physician. Care plan reviewed with patient. Patient verbalizes understanding of the plan of care and contributes to goal setting.

## 2023-10-16 NOTE — PROGRESS NOTES
Name: Latonia Xiao  : 1970  MRN: [id number]    Oncology Navigation Follow-Up Note    Contact Type:  Medical Oncology    Subjective: appt with ONC- new to Dr Antonia Oakley    Objective:     Audra Butler POC:  -Audra Butler reviewed PET results  -Pt scheduled for surgery at [facility] on 10/26 with anticipated IP admission 1-2 weeks. Pt with questions about copious secretions she has & how she will manage them after the surgery-->ONC deferred pt to [address]. -RX Magic mouthwash  -FU at ONC ofce 4 weeks post op to review pathology. Assistance Needed: denies    Receptive to Advanced Care Planning / Palliative Care:  deferred    Referrals: N/A    Education: POC reiterated    Notes: Natanael following to assist as needed.     Electronically signed by Cory Florian RN on 10/16/2023 at 4:35 PM

## 2023-10-23 DIAGNOSIS — R10.12 LEFT UPPER QUADRANT ABDOMINAL PAIN: ICD-10-CM

## 2023-10-24 RX ORDER — PANTOPRAZOLE SODIUM 40 MG/1
40 TABLET, DELAYED RELEASE ORAL
Qty: 90 TABLET | Refills: 1 | Status: SHIPPED | OUTPATIENT
Start: 2023-10-24

## 2023-10-29 ENCOUNTER — CLINICAL DOCUMENTATION (OUTPATIENT)
Facility: HOSPITAL | Age: 53
End: 2023-10-29

## 2023-11-07 ENCOUNTER — CLINICAL DOCUMENTATION (OUTPATIENT)
Dept: RADIATION ONCOLOGY | Age: 53
End: 2023-11-07

## 2023-11-07 NOTE — PROGRESS NOTES
Radiation Oncology  Brief Note    Leo Villatoro presented to out clinic today for an unscheduled visit. 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 She was subsequently referred to ENT surgery at [facility] and is now status post total laryngectomy/cervical neck dissection with free flap reconstruction on 10/26/2023, performed by Dr. Areli Sethi, final pathology is pending. Her next follow up at [facility] is on 10/13/2023. Today she presents with concerns regarding erythema in the reconstructed neck and intermittent difficulty with respiration. She has a patent stoma. Vital signs noted mild tachycardia, otherwise stable. The area of reconstruction appears mildly/moderately erythematous, with noted edema, on exam. The patient appears otherwise stable, no acute distress. Unable to speak, written communication only. I expressed to Ms. Wally Ramirez, that our clinic is ill prepared to assess and/or provide the appropriate work up for her current complaints. I instructed Ms. Wally Ramirez to present to the [facility]'s Emergency Department, so that she can be evaluated properly and to determine if transfer to [facility] for more specialized care is warranted. She states that she would prefer to present to the ED tomorrow, so that she can get her affairs in order at home prior to presenting to the ED. I instructed her to call 911 or seek care immediately if she were to experience any worsening of her symptoms, she stated that she understands.     Veronica Ferro MD  Radiation Oncology

## 2023-11-21 ENCOUNTER — TELEPHONE (OUTPATIENT)
Dept: ONCOLOGY | Age: 53
End: 2023-11-21

## 2023-12-04 ENCOUNTER — HOSPITAL ENCOUNTER (OUTPATIENT)
Dept: INFUSION THERAPY | Age: 53
Discharge: HOME | End: 2023-12-04

## 2024-01-23 ENCOUNTER — HOSPITAL ENCOUNTER (OUTPATIENT)
Dept: CT IMAGING | Age: 54
Discharge: HOME | End: 2024-01-23
Attending: RADIOLOGY

## 2024-01-23 DIAGNOSIS — Z00.6 EXAMINATION FOR NORMAL COMPARISON OR CONTROL IN CLINICAL RESEARCH: ICD-10-CM

## 2024-01-26 ENCOUNTER — HOSPITAL ENCOUNTER (OUTPATIENT)
Dept: RADIATION ONCOLOGY | Age: 54
Discharge: HOME | End: 2024-01-26
Payer: MEDICARE

## 2024-01-26 ENCOUNTER — SOCIAL WORK (OUTPATIENT)
Dept: RADIATION ONCOLOGY | Age: 54
End: 2024-01-26

## 2024-01-26 VITALS
WEIGHT: 144 LBS | OXYGEN SATURATION: 96 % | RESPIRATION RATE: 16 BRPM | SYSTOLIC BLOOD PRESSURE: 131 MMHG | HEART RATE: 88 BPM | BODY MASS INDEX: 27.21 KG/M2 | TEMPERATURE: 98 F | DIASTOLIC BLOOD PRESSURE: 72 MMHG

## 2024-01-26 PROCEDURE — 99212 OFFICE O/P EST SF 10 MIN: CPT | Performed by: RADIOLOGY

## 2024-01-26 ASSESSMENT — ENCOUNTER SYMPTOMS
TROUBLE SWALLOWING: 0
ABDOMINAL PAIN: 0
SORE THROAT: 0
SINUS PRESSURE: 0
NAUSEA: 0
SHORTNESS OF BREATH: 0
BACK PAIN: 1
FACIAL SWELLING: 0
VOMITING: 0
COUGH: 0
SINUS PAIN: 0
RECTAL PAIN: 0
VOICE CHANGE: 1
BLOOD IN STOOL: 0

## 2024-01-26 ASSESSMENT — PAIN SCALES - GENERAL: PAINLEVEL_OUTOF10: 6

## 2024-01-26 ASSESSMENT — PAIN DESCRIPTION - LOCATION: LOCATION: GENERALIZED

## 2024-01-26 NOTE — PROGRESS NOTES
[facility]           [address], [address]        [place], [place] [address]        O: [phone]        F: [phone]       Conversation Media            FOLLOW UP NOTE    Date of Service: 2024  Patient ID: Flaquita Vyas   : 1970  MRN: [id number]   Acct Number: [id number]       DATE OF SERVICE: 2024   LOCATION: [facility]  PROVIDER: Sukumar Uribe MD MS    FOLLOW UP PHYSICIANS:  RENÉE Lim ([facility])    ASSESSMENT AND PLAN:   Cancer Staging   Cancer, epiglottis (HCC)  Staging form: Larynx - Supraglottis, AJCC 8th Edition  - Clinical stage from 10/6/2022: Stage III (cT3, cN0, cM0) - Signed by Sukumar Uribe MD on 2022      ***    {RTCMTH:69353}    HPI:  {HPIONCHX:02376}    INTERVAL HISTORY:  Flaquita Vyas is a 53 y.o. female is presenting today for regularly scheduled follow up regarding the above mentioned oncologic history.    Review of Systems   Constitutional:  Negative for activity change, appetite change, fatigue and unexpected weight change.   HENT:  Positive for voice change. Negative for congestion, ear pain, facial swelling, hearing loss, nosebleeds, sinus pressure, sinus pain, sore throat, tinnitus and trouble swallowing.    Eyes:  Negative for visual disturbance.   Respiratory:  Negative for cough and shortness of breath.    Cardiovascular:  Negative for chest pain.   Gastrointestinal:  Negative for abdominal pain, blood in stool, nausea, rectal pain and vomiting.   Genitourinary:  Negative for dysuria, frequency and urgency.   Musculoskeletal:  Positive for arthralgias, back pain and neck pain. Negative for myalgias and neck stiffness.   Neurological:  Positive for speech difficulty. Negative for dizziness, facial asymmetry, weakness, light-headedness, numbness and headaches.   Hematological:  Negative for adenopathy.   Psychiatric/Behavioral:  Negative for confusion.        CHAPERONE: Gene    PAIN:

## 2024-01-29 DIAGNOSIS — R13.19 DYSPHAGIA DUE TO LARYNGECTOMY: Primary | ICD-10-CM

## 2024-01-29 DIAGNOSIS — C32.1 MALIGNANT NEOPLASM OF EPIGLOTTIS (HCC): ICD-10-CM

## 2024-01-29 DIAGNOSIS — Z90.02 DYSPHAGIA DUE TO LARYNGECTOMY: Primary | ICD-10-CM

## 2024-02-01 DIAGNOSIS — C32.1 MALIGNANT NEOPLASM OF EPIGLOTTIS (HCC): Primary | ICD-10-CM

## 2024-02-19 DIAGNOSIS — C32.1 MALIGNANT NEOPLASM OF EPIGLOTTIS (HCC): Primary | ICD-10-CM

## 2024-03-05 ENCOUNTER — TELEPHONE (OUTPATIENT)
Dept: ENT CLINIC | Age: 54
End: 2024-03-05

## 2024-03-05 NOTE — TELEPHONE ENCOUNTER
Patient is scheduled with Dr Block on 4/26/2024, as a new patient, to establish care closer to home. Patient's boyfriend call the office today asking if there was any way patient could be sooner because she is having a lot of throat issues. I spoke with AKHIL Galvan and she stated that since patient is established at the [facility] with an ENT it would be best for her to make an appointment there while she waits to see Dr Block. We currently did not have any sooner openings so this would be her best option. Patient's boyfriend said \"well we're going to have to do something because she's having a lot of problems.\" I again recommended patient schedule with [facility] and also recommended she go to the ED or urgent care if needed. I also added patient to our waitlist in case we get a cancellation.

## 2024-03-16 NOTE — PROGRESS NOTES
tongue every 8 hours as needed for Nausea or Vomiting     Dispense:  90 tablet     Refill:  3        [place]ARRS:  Controlled Substance Monitoring:    Acute and Chronic Pain Monitoring:        No data to display                 6) Follow Up:  Return in about 3 months (around 6/20/2024) for follow up and port flush.     Jazmin Gresham [facility]

## 2024-03-19 ENCOUNTER — HOSPITAL ENCOUNTER (OUTPATIENT)
Dept: SPEECH THERAPY | Age: 54
Setting detail: THERAPIES SERIES
Discharge: HOME | End: 2024-03-19
Payer: MEDICARE

## 2024-03-19 PROCEDURE — 92610 EVALUATE SWALLOWING FUNCTION: CPT | Performed by: SPEECH-LANGUAGE PATHOLOGIST

## 2024-03-19 NOTE — PROGRESS NOTES
history of radiation to the neck  Areas for Improvement: Impaired swallow function and Impaired communication  Specific Interventions Next Treatment: Evaluate communication/speech    Activity/Treatment Tolerance:  [x]  Patient tolerated treatment well  []  Patient limited by fatigue  []  Patient limited by pain   []  Patient limited by other medical complications  []  Other:     Patient Education:   [x]  HEP/Education Completed: Plan of Care, Goals, diet recommendation, swallowing strategies, HEP (effortful swallows) - complete 2 x per day, 25 repetitions, effects of radiation on swallowing, plan to complete assessment of communication once the referring provider is in agreement  []  No new Education completed  []  Reviewed Prior HEP      [x]  Patient and patient's significant other verbalized and/or demonstrated understanding of education provided.  []  Patient unable to verbalize and/or demonstrate understanding of education provided.  Will continue education.  []  Barriers to learning:     GOALS:  Patient Goal: Swallow better and be able to communicate easier with others    Short Term Goals:  Time Frame for Short-Term Goals: 4 weeks    SHORT TERM GOAL #1: Patient will tolerate a soft diet with thin liquids with use of swallowing strategies with min cues to safely maintain adequate nutrition and hydration.  INTERVENTIONS: to be completed in subsequent sessions    SHORT TERM GOAL #2: Patient will complete effortful swallows x 25 with good effort to improve overall pharyngeal strength and assist the bolus in passing through the pharynx.  INTERVENTIONS: to be completed in subsequent sessions    SHORT TERM GOAL #3: Complete assessment of patient's communication/speech and determine the best form of communication with family, friends and medical personnel.  INTERVENTIONS: to be completed in subsequent sessions      Long Term Goals:  Time Frame for Long-Term Goals: 6 weeks    LONG-TERM GOAL #1: Patient will tolerate a

## 2024-03-20 ENCOUNTER — OFFICE VISIT (OUTPATIENT)
Dept: ONCOLOGY | Age: 54
End: 2024-03-20
Payer: MEDICARE

## 2024-03-20 ENCOUNTER — HOSPITAL ENCOUNTER (OUTPATIENT)
Dept: INFUSION THERAPY | Age: 54
Discharge: HOME | End: 2024-03-20
Payer: MEDICARE

## 2024-03-20 VITALS
DIASTOLIC BLOOD PRESSURE: 65 MMHG | SYSTOLIC BLOOD PRESSURE: 140 MMHG | RESPIRATION RATE: 18 BRPM | OXYGEN SATURATION: 96 % | TEMPERATURE: 98.1 F | HEART RATE: 86 BPM

## 2024-03-20 VITALS
BODY MASS INDEX: 28.7 KG/M2 | SYSTOLIC BLOOD PRESSURE: 140 MMHG | HEART RATE: 86 BPM | TEMPERATURE: 98.1 F | HEIGHT: 61 IN | OXYGEN SATURATION: 96 % | WEIGHT: 152 LBS | DIASTOLIC BLOOD PRESSURE: 65 MMHG | RESPIRATION RATE: 18 BRPM

## 2024-03-20 DIAGNOSIS — C32.1 MALIGNANT NEOPLASM OF EPIGLOTTIS (HCC): Primary | ICD-10-CM

## 2024-03-20 DIAGNOSIS — E89.0 POSTOPERATIVE HYPOTHYROIDISM: ICD-10-CM

## 2024-03-20 DIAGNOSIS — G62.9 PERIPHERAL POLYNEUROPATHY: ICD-10-CM

## 2024-03-20 LAB
ABSOLUTE IMMATURE GRANULOCYTE: 0.01 THOU/MM3 (ref 0–0.07)
ALBUMIN SERPL BCG-MCNC: 3.9 G/DL (ref 3.5–5.1)
ALP SERPL-CCNC: 155 U/L (ref 38–126)
ALT SERPL W/O P-5'-P-CCNC: 22 U/L (ref 11–66)
AST SERPL-CCNC: 18 U/L (ref 5–40)
BASOPHILS ABSOLUTE: 0 THOU/MM3 (ref 0–0.1)
BASOPHILS NFR BLD AUTO: 0 % (ref 0–3)
BILIRUB CONJ SERPL-MCNC: < 0.2 MG/DL (ref 0–0.3)
BILIRUB SERPL-MCNC: 0.3 MG/DL (ref 0.3–1.2)
BUN BLDP-MCNC: 21 MG/DL (ref 8–26)
CHLORIDE BLD-SCNC: 99 MEQ/L (ref 98–109)
CREAT BLD-MCNC: 0.5 MG/DL (ref 0.5–1.2)
EOSINOPHIL NFR BLD AUTO: 0 % (ref 0–4)
EOSINOPHILS ABSOLUTE: 0 THOU/MM3 (ref 0–0.4)
ERYTHROCYTE [DISTWIDTH] IN BLOOD BY AUTOMATED COUNT: 12.8 % (ref 11.5–14.5)
FERRITIN SERPL IA-MCNC: 110 NG/ML (ref 10–291)
GFR SERPL CREATININE-BSD FRML MDRD: > 60 ML/MIN/1.73M2
GLUCOSE BLD-MCNC: 479 MG/DL (ref 70–108)
HCT VFR BLD AUTO: 36.7 % (ref 37–47)
HGB BLD-MCNC: 12.3 GM/DL (ref 12–16)
HGB RETIC QN AUTO: 34.7 PG (ref 28.2–35.7)
IMM RETICS NFR: 9.8 % (ref 3–15.9)
IMMATURE GRANULOCYTES: 0 %
IONIZED CALCIUM, WHOLE BLOOD: 1.17 MMOL/L (ref 1.12–1.32)
IRON SATN MFR SERPL: 33 % (ref 20–50)
IRON SERPL-MCNC: 80 UG/DL (ref 50–170)
LYMPHOCYTES ABSOLUTE: 0.7 THOU/MM3 (ref 1–4.8)
LYMPHOCYTES NFR BLD AUTO: 9 % (ref 15–47)
MAGNESIUM SERPL-MCNC: 2 MG/DL (ref 1.6–2.4)
MCH RBC QN AUTO: 31.6 PG (ref 26–33)
MCHC RBC AUTO-ENTMCNC: 33.5 GM/DL (ref 32.2–35.5)
MCV RBC AUTO: 94 FL (ref 81–99)
MONOCYTES ABSOLUTE: 0.4 THOU/MM3 (ref 0.4–1.3)
MONOCYTES NFR BLD AUTO: 5 % (ref 0–12)
NEUTROPHILS NFR BLD AUTO: 87 % (ref 43–75)
PLATELET # BLD AUTO: 329 THOU/MM3 (ref 130–400)
PMV BLD AUTO: 10.6 FL (ref 9.4–12.4)
POTASSIUM BLD-SCNC: 4.3 MEQ/L (ref 3.5–4.9)
PROT SERPL-MCNC: 7.2 G/DL (ref 6.1–8)
RBC # BLD AUTO: 3.89 MILL/MM3 (ref 4.2–5.4)
RETICS # AUTO: 69 THOU/MM3 (ref 20–115)
RETICS/RBC NFR AUTO: 1.8 % (ref 0.5–2)
SEGMENTED NEUTROPHILS ABSOLUTE COUNT: 7.3 THOU/MM3 (ref 1.8–7.7)
SODIUM BLD-SCNC: 135 MEQ/L (ref 138–146)
TIBC SERPL-MCNC: 239 UG/DL (ref 171–450)
TOTAL CO2, WHOLE BLOOD: 27 MEQ/L (ref 23–33)
TSH SERPL DL<=0.005 MIU/L-ACNC: 8.63 UIU/ML (ref 0.4–4.2)
WBC # BLD AUTO: 8.4 THOU/MM3 (ref 4.8–10.8)

## 2024-03-20 PROCEDURE — 36591 DRAW BLOOD OFF VENOUS DEVICE: CPT

## 2024-03-20 PROCEDURE — 83921 ORGANIC ACID SINGLE QUANT: CPT

## 2024-03-20 PROCEDURE — 1036F TOBACCO NON-USER: CPT | Performed by: INTERNAL MEDICINE

## 2024-03-20 PROCEDURE — 83550 IRON BINDING TEST: CPT

## 2024-03-20 PROCEDURE — G8484 FLU IMMUNIZE NO ADMIN: HCPCS | Performed by: INTERNAL MEDICINE

## 2024-03-20 PROCEDURE — 3078F DIAST BP <80 MM HG: CPT | Performed by: INTERNAL MEDICINE

## 2024-03-20 PROCEDURE — 99214 OFFICE O/P EST MOD 30 MIN: CPT | Performed by: INTERNAL MEDICINE

## 2024-03-20 PROCEDURE — 99211 OFF/OP EST MAY X REQ PHY/QHP: CPT

## 2024-03-20 PROCEDURE — 80047 BASIC METABLC PNL IONIZED CA: CPT

## 2024-03-20 PROCEDURE — 3017F COLORECTAL CA SCREEN DOC REV: CPT | Performed by: INTERNAL MEDICINE

## 2024-03-20 PROCEDURE — 84443 ASSAY THYROID STIM HORMONE: CPT

## 2024-03-20 PROCEDURE — 84439 ASSAY OF FREE THYROXINE: CPT

## 2024-03-20 PROCEDURE — 3077F SYST BP >= 140 MM HG: CPT | Performed by: INTERNAL MEDICINE

## 2024-03-20 PROCEDURE — G8427 DOCREV CUR MEDS BY ELIG CLIN: HCPCS | Performed by: INTERNAL MEDICINE

## 2024-03-20 PROCEDURE — 85046 RETICYTE/HGB CONCENTRATE: CPT

## 2024-03-20 PROCEDURE — 6360000002 HC RX W HCPCS: Performed by: INTERNAL MEDICINE

## 2024-03-20 PROCEDURE — 83540 ASSAY OF IRON: CPT

## 2024-03-20 PROCEDURE — 82746 ASSAY OF FOLIC ACID SERUM: CPT

## 2024-03-20 PROCEDURE — 36415 COLL VENOUS BLD VENIPUNCTURE: CPT

## 2024-03-20 PROCEDURE — 82607 VITAMIN B-12: CPT

## 2024-03-20 PROCEDURE — 83735 ASSAY OF MAGNESIUM: CPT

## 2024-03-20 PROCEDURE — 80076 HEPATIC FUNCTION PANEL: CPT

## 2024-03-20 PROCEDURE — 85025 COMPLETE CBC W/AUTO DIFF WBC: CPT

## 2024-03-20 PROCEDURE — 2580000003 HC RX 258: Performed by: INTERNAL MEDICINE

## 2024-03-20 PROCEDURE — 82728 ASSAY OF FERRITIN: CPT

## 2024-03-20 PROCEDURE — G8417 CALC BMI ABV UP PARAM F/U: HCPCS | Performed by: INTERNAL MEDICINE

## 2024-03-20 RX ORDER — SODIUM CHLORIDE 0.9 % (FLUSH) 0.9 %
5-40 SYRINGE (ML) INJECTION PRN
OUTPATIENT
Start: 2024-03-20

## 2024-03-20 RX ORDER — HEPARIN 100 UNIT/ML
500 SYRINGE INTRAVENOUS PRN
OUTPATIENT
Start: 2024-03-20

## 2024-03-20 RX ORDER — PREGABALIN 150 MG/1
150 CAPSULE ORAL 2 TIMES DAILY
Qty: 60 CAPSULE | Refills: 5 | Status: SHIPPED | OUTPATIENT
Start: 2024-03-20 | End: 2024-09-16

## 2024-03-20 RX ORDER — SODIUM CHLORIDE 9 MG/ML
25 INJECTION, SOLUTION INTRAVENOUS PRN
Status: CANCELLED | OUTPATIENT
Start: 2024-03-20

## 2024-03-20 RX ORDER — SODIUM CHLORIDE 9 MG/ML
25 INJECTION, SOLUTION INTRAVENOUS PRN
OUTPATIENT
Start: 2024-03-20

## 2024-03-20 RX ORDER — SODIUM CHLORIDE 0.9 % (FLUSH) 0.9 %
5-40 SYRINGE (ML) INJECTION PRN
Status: DISCONTINUED | OUTPATIENT
Start: 2024-03-20 | End: 2024-03-21 | Stop reason: HOSPADM

## 2024-03-20 RX ORDER — HEPARIN 100 UNIT/ML
500 SYRINGE INTRAVENOUS PRN
Status: DISCONTINUED | OUTPATIENT
Start: 2024-03-20 | End: 2024-03-21 | Stop reason: HOSPADM

## 2024-03-20 RX ORDER — ONDANSETRON 8 MG/1
8 TABLET, ORALLY DISINTEGRATING ORAL EVERY 8 HOURS PRN
Qty: 90 TABLET | Refills: 3 | Status: SHIPPED | OUTPATIENT
Start: 2024-03-20 | End: 2024-07-18

## 2024-03-20 RX ORDER — SODIUM CHLORIDE 0.9 % (FLUSH) 0.9 %
5-40 SYRINGE (ML) INJECTION PRN
Status: CANCELLED | OUTPATIENT
Start: 2024-03-20

## 2024-03-20 RX ORDER — HEPARIN SODIUM (PORCINE) LOCK FLUSH IV SOLN 100 UNIT/ML 100 UNIT/ML
500 SOLUTION INTRAVENOUS PRN
Status: CANCELLED | OUTPATIENT
Start: 2024-03-20

## 2024-03-20 RX ORDER — PROCHLORPERAZINE MALEATE 10 MG
10 TABLET ORAL EVERY 6 HOURS PRN
Qty: 60 TABLET | Refills: 3 | Status: SHIPPED | OUTPATIENT
Start: 2024-03-20

## 2024-03-20 RX ADMIN — SODIUM CHLORIDE, PRESERVATIVE FREE 20 ML: 5 INJECTION INTRAVENOUS at 15:45

## 2024-03-20 RX ADMIN — SODIUM CHLORIDE, PRESERVATIVE FREE 10 ML: 5 INJECTION INTRAVENOUS at 15:44

## 2024-03-20 RX ADMIN — HEPARIN 500 UNITS: 100 SYRINGE at 15:45

## 2024-03-20 NOTE — PROGRESS NOTES
Patient tolerated lab draw without any complications.  Last vital signs  BP (!) 140/65   Pulse 86   Temp 98.1 °F (36.7 °C) (Oral)   Resp 18   SpO2 96%     Patient instructed if they experience any of the above symptoms following today's visit, he/she is to notify the Physician or go to the Emergency Dept.    Discharge instructions given to patient, Verbalizes understanding. Ambulated off unit per self in stable condition with all belongings.

## 2024-03-20 NOTE — PLAN OF CARE
Problem: Chronic Conditions and Co-morbidities  Goal: Patient's chronic conditions and co-morbidity symptoms are monitored and maintained or improved  Outcome: Adequate for Discharge  Flowsheets (Taken 3/20/2024 1542)  Care Plan - Patient's Chronic Conditions and Co-Morbidity Symptoms are Monitored and Maintained or Improved:   Monitor and assess patient's chronic conditions and comorbid symptoms for stability, deterioration, or improvement   Collaborate with multidisciplinary team to address chronic and comorbid conditions and prevent exacerbation or deterioration  Note: Patient verbalizes understanding to verbal information given on lab draw,action and possible side effects. Aware to call MD if develop complications.        Problem: Safety - Adult  Goal: Free from fall injury  Outcome: Adequate for Discharge  Flowsheets (Taken 3/20/2024 1542)  Free From Fall Injury: Instruct family/caregiver on patient safety     Problem: Discharge Planning  Goal: Discharge to home or other facility with appropriate resources  Outcome: Adequate for Discharge  Flowsheets (Taken 3/20/2024 1542)  Discharge to home or other facility with appropriate resources: Identify barriers to discharge with patient and caregiver     Problem: Infection - Adult  Goal: Absence of infection at discharge  Outcome: Adequate for Discharge  Flowsheets (Taken 3/20/2024 1542)  Absence of infection at discharge: Monitor all insertion sites i.e., indwelling lines, tubes and drains  Note: Mediport site with no redness or warmth. Skin over port site intact with no signs of breakdown noted. Patient verbalizes signs/symptoms of port infection and when to notify the physician.     Care plan reviewed with patient and family.  Patient and family verbalize understanding of the plan of care and contribute to goal setting.

## 2024-03-20 NOTE — DISCHARGE INSTRUCTIONS
Please contact your Oncologist if you have any questions regarding the port flush that you received today.    You are instructed to call the office or go to the Emergency Dept. If you experience any of the following symptoms:    Dizziness/lightheadedness   Acute nausea or vomiting-not relieved by medications  Headaches-not relieved by medications  New chest pain or pressure  New rash /itching  New shortness of breath  Fever,chills or signs or symptoms of infection    Make sure you are drinking 48 to 64 ounces of water daily-if you are unable to drink fluids let us know right away.    Physician Instructions       1) pt needs her port flushed and the bunch of labs checked (we will call her (Gene) with results later this week)  2) port flush needs scheduled later in the day Q6-12 weeks  3) Dr Kolb consult per when she wants  4) meds sent to [facility]  5) 3 month follow up with midlevel and port flush, possible labs (pending labs today)- maybe the same day as Dr Uribe follow up

## 2024-03-20 NOTE — PATIENT INSTRUCTIONS
1) pt needs her port flushed and the bunch of labs checked (we will call her (Gene) with results later this week)  2) port flush needs scheduled later in the day Q6-12 weeks  3) Dr Kolb consult per when she wants  4) meds sent to [facility]  5) 3 month follow up with midlevel and port flush, possible labs (pending labs today)- maybe the same day as Dr Uribe follow up

## 2024-03-21 DIAGNOSIS — C32.1 MALIGNANT NEOPLASM OF EPIGLOTTIS (HCC): ICD-10-CM

## 2024-03-21 LAB
FOLATE SERPL-MCNC: 13.4 NG/ML (ref 4.8–24.2)
T4 FREE SERPL-MCNC: 1.05 NG/DL (ref 0.93–1.68)
VIT B12 SERPL-MCNC: 1082 PG/ML (ref 211–911)

## 2024-03-21 RX ORDER — LIDOCAINE HYDROCHLORIDE 20 MG/ML
SOLUTION OROPHARYNGEAL
Qty: 100 ML | Refills: 5 | Status: SHIPPED | OUTPATIENT
Start: 2024-03-21

## 2024-03-25 LAB — METHYLMALONATE SERPL-SCNC: NORMAL

## 2024-04-05 NOTE — DISCHARGE INSTRUCTIONS
Reviewed labs and recent medical history. Okay to treat today 1/19/23  Return next Thursday for treatment, 1/26/23  See MD in 2 weeks with next cycle. Please contact your Oncologist if you have any questions regarding the chemotherapy Taxol/Carboplatin that you received today. Patient instructed if experience any of the symptoms following today's chemotherapy / to notify MD immediately or go to emergency department.     * dizziness/lightheadedness  *acute nausea/vomiting - not relieved with medication  *headache - not relieved from Tylenol/pain medication  *chest pain/pressure  *rash/itching  *shortness of breath        Drink fluids - 48oz fluids daily  Call if develop fever/ chills/ signs or symptoms of infection endometrium

## 2024-04-24 DIAGNOSIS — R13.19 DYSPHAGIA DUE TO LARYNGECTOMY: Primary | ICD-10-CM

## 2024-04-24 DIAGNOSIS — Z90.02 DYSPHAGIA DUE TO LARYNGECTOMY: Primary | ICD-10-CM

## 2024-04-26 ENCOUNTER — OFFICE VISIT (OUTPATIENT)
Dept: ENT CLINIC | Age: 54
End: 2024-04-26
Payer: MEDICARE

## 2024-04-26 VITALS
HEART RATE: 76 BPM | SYSTOLIC BLOOD PRESSURE: 110 MMHG | TEMPERATURE: 97.3 F | RESPIRATION RATE: 16 BRPM | HEIGHT: 61 IN | OXYGEN SATURATION: 96 % | DIASTOLIC BLOOD PRESSURE: 72 MMHG | BODY MASS INDEX: 28.74 KG/M2

## 2024-04-26 DIAGNOSIS — Z90.02 DYSPHAGIA DUE TO LARYNGECTOMY: Primary | ICD-10-CM

## 2024-04-26 DIAGNOSIS — R13.19 DYSPHAGIA DUE TO LARYNGECTOMY: Primary | ICD-10-CM

## 2024-04-26 PROCEDURE — 3017F COLORECTAL CA SCREEN DOC REV: CPT | Performed by: OTOLARYNGOLOGY

## 2024-04-26 PROCEDURE — 3074F SYST BP LT 130 MM HG: CPT | Performed by: OTOLARYNGOLOGY

## 2024-04-26 PROCEDURE — 99204 OFFICE O/P NEW MOD 45 MIN: CPT | Performed by: OTOLARYNGOLOGY

## 2024-04-26 PROCEDURE — G8427 DOCREV CUR MEDS BY ELIG CLIN: HCPCS | Performed by: OTOLARYNGOLOGY

## 2024-04-26 PROCEDURE — 3078F DIAST BP <80 MM HG: CPT | Performed by: OTOLARYNGOLOGY

## 2024-04-26 PROCEDURE — G8417 CALC BMI ABV UP PARAM F/U: HCPCS | Performed by: OTOLARYNGOLOGY

## 2024-04-26 PROCEDURE — 1036F TOBACCO NON-USER: CPT | Performed by: OTOLARYNGOLOGY

## 2024-04-26 NOTE — PROGRESS NOTES
[facility] [place] [facility]  [facility] EAR, NOSE AND THROAT  [address]  [facility] [address]  Dept: [phone]  Dept Fax: [phone]  Loc: [phone]    Falquita Vyas is a 53 y.o. female who was referred by Sukumar Uribe, * for:  Chief Complaint   Patient presents with    New Patient     NP here to establish care.  She had having trouble with her throat being sore since the stoma.   She is having challenges swallowing and getting food down.     .    HPI:     Flaquita Vyas is a 53 y.o. female who presents today for establishment of care locally.  She had a laryngectomy.  She is having difficulty swallowing.  Food hangs up just above the stoma.  There may be lack of peristalsis but an esophagram would be useful to look for strictures etc.  History:     Allergies   Allergen Reactions    Shellfish-Derived Products Anaphylaxis     \"Throat closes up\"    Shrimp Flavor Swelling     Current Outpatient Medications   Medication Sig Dispense Refill    lidocaine viscous hcl (XYLOCAINE) 2 % SOLN solution Take every four hours as needed. Follow directions as given in clinic 100 mL 5    pregabalin (LYRICA) 150 MG capsule Take 1 capsule by mouth 2 times daily for 180 days. Max Daily Amount: 300 mg 60 capsule 5    prochlorperazine (COMPAZINE) 10 MG tablet Take 1 tablet by mouth every 6 hours as needed (as needed for nausea) 60 tablet 3    ondansetron (ZOFRAN-ODT) 8 MG TBDP disintegrating tablet Place 1 tablet under the tongue every 8 hours as needed for Nausea or Vomiting 90 tablet 3    ferrous sulfate 300 (60 Fe) MG/5ML syrup Take 5 mLs by mouth daily 300 mL 1    carbidopa-levodopa (SINEMET)  MG per tablet Take 1 tablet by mouth 3 times daily      REPATHA SURECLICK 140 MG/ML SOAJ Inject 2 mLs as directed every 14 days      KERENDIA 20 MG TABS Take 20 mg by mouth daily      lidocaine-prilocaine (EMLA) 2.5-2.5 % cream Apply topically as needed. 5 g 4    zolpidem (AMBIEN) 10 MG

## 2024-04-29 NOTE — PROGRESS NOTES
Oncology Social Work    Date: 3/27/2023  Time: 2:12 PM  Name: Wendy Wesley  MRN: [id number]     Contact Type: Follow-up    Note:   Situation: This staff contacted Wendy Wesley via phone support to introduce myself as their assigned Oncology Social Worker. Background:  González Elizondo was diagnosed with Epiglottis cancer. She was referred to this staff via her Nurse Navigator Jessica Bourgeois) for financial assistance if possible. Assessment: González Elizondo requested assistance from this staff to see if there are any funds that can help her with her monthly bills. She is currently on Social Security Disability, receiving $937/mo. Needless to say she is struggling to make ends meet. -  This staff left a message on her denitrified phone number encouraging her to call me back and we will further discuss her needs at that time. 2:44p 3/27/23 - Spoke with González Elizondo on the phone to discuss her desire for assistance in looking at financial aid opportunities. She will be bringing in her outstanding bills for us to use to complete the Financial Assistance Form for her medical bills. We will also review her utility bills to see if there is any funding sources available to help her out at this time. Recommendation: Follow-up will be initiated by González Elizondo based on need.  provided her with my contact information and will remain available for support.         TAMIKA Regan, NAYA, RENATE  Oncology Social Worker      Electronically signed by TAMIKA Regan, NAYA, RENATE on 3/27/2023 at 2:12 PM  + moderate

## 2024-05-07 NOTE — PROGRESS NOTES
[facility]  Therapy Contact Note      Date: 2024  Patient Name: Flaquita Vyas        MRN: [id number]    Account Number: [id number]  : 1970  (53 y.o.)  Gender: female         Spoke to patient's boyfriend regarding transportation. They do not need it for the eval and will set up future appts with [employer]. Patient's boyfriend was given my contact information for future assistance if needed.    Faiza Gaines, Rehab Tech

## 2024-05-09 ENCOUNTER — HOSPITAL ENCOUNTER (OUTPATIENT)
Dept: SPEECH THERAPY | Age: 54
Setting detail: THERAPIES SERIES
Discharge: HOME | End: 2024-05-09

## 2024-05-10 ENCOUNTER — HOSPITAL ENCOUNTER (OUTPATIENT)
Dept: GENERAL RADIOLOGY | Age: 54
Discharge: HOME | End: 2024-05-10
Attending: OTOLARYNGOLOGY
Payer: MEDICARE

## 2024-05-10 DIAGNOSIS — R13.19 DYSPHAGIA DUE TO LARYNGECTOMY: ICD-10-CM

## 2024-05-10 DIAGNOSIS — Z90.02 DYSPHAGIA DUE TO LARYNGECTOMY: ICD-10-CM

## 2024-05-10 PROCEDURE — 6370000000 HC RX 637 (ALT 250 FOR IP): Performed by: OTOLARYNGOLOGY

## 2024-05-10 PROCEDURE — 74220 X-RAY XM ESOPHAGUS 1CNTRST: CPT

## 2024-05-10 PROCEDURE — 2500000003 HC RX 250 WO HCPCS: Performed by: OTOLARYNGOLOGY

## 2024-05-10 RX ADMIN — BARIUM SULFATE 140 ML: 980 POWDER, FOR SUSPENSION ORAL at 10:55

## 2024-05-10 RX ADMIN — BARIUM SULFATE 40 ML: 0.6 SUSPENSION ORAL at 10:55

## 2024-05-10 RX ADMIN — ANTACID/ANTIFLATULENT 1 EACH: 380; 550; 10; 10 GRANULE, EFFERVESCENT ORAL at 10:55

## 2024-06-04 ENCOUNTER — OFFICE VISIT (OUTPATIENT)
Dept: ENT CLINIC | Age: 54
End: 2024-06-04
Payer: MEDICARE

## 2024-06-04 VITALS
SYSTOLIC BLOOD PRESSURE: 122 MMHG | HEIGHT: 61 IN | BODY MASS INDEX: 28.74 KG/M2 | DIASTOLIC BLOOD PRESSURE: 70 MMHG | HEART RATE: 79 BPM | RESPIRATION RATE: 16 BRPM | OXYGEN SATURATION: 97 % | TEMPERATURE: 97.3 F

## 2024-06-04 DIAGNOSIS — R13.19 DYSPHAGIA DUE TO LARYNGECTOMY: Primary | ICD-10-CM

## 2024-06-04 DIAGNOSIS — Z90.02 DYSPHAGIA DUE TO LARYNGECTOMY: Primary | ICD-10-CM

## 2024-06-04 PROCEDURE — 3078F DIAST BP <80 MM HG: CPT | Performed by: OTOLARYNGOLOGY

## 2024-06-04 PROCEDURE — 99214 OFFICE O/P EST MOD 30 MIN: CPT | Performed by: OTOLARYNGOLOGY

## 2024-06-04 PROCEDURE — 3074F SYST BP LT 130 MM HG: CPT | Performed by: OTOLARYNGOLOGY

## 2024-06-04 PROCEDURE — 1036F TOBACCO NON-USER: CPT | Performed by: OTOLARYNGOLOGY

## 2024-06-04 PROCEDURE — 3017F COLORECTAL CA SCREEN DOC REV: CPT | Performed by: OTOLARYNGOLOGY

## 2024-06-04 PROCEDURE — G8427 DOCREV CUR MEDS BY ELIG CLIN: HCPCS | Performed by: OTOLARYNGOLOGY

## 2024-06-04 PROCEDURE — G8417 CALC BMI ABV UP PARAM F/U: HCPCS | Performed by: OTOLARYNGOLOGY

## 2024-06-04 NOTE — PROGRESS NOTES
[facility] [place] [facility]  [facility] EAR, NOSE AND THROAT  [address]  [address]  [facility] [address]  Dept: [phone]  Dept Fax: [phone]  Loc: [phone]    Flaquita Vyas is a 53 y.o. female who was referred by No ref. provider found for:  Chief Complaint   Patient presents with    Follow-up     Follow up after Esophagram.   She communicated with me by note that its the food that gets stuck, not the liquid.   .    HPI:     Flaquita Vyas is a 53 y.o. female who presents today for follow-up on her esophagram.  She still has food hanging up when she swallows.  She is status post total laryngectomy.  Radiologist did not describe any esophageal strictures however review of the images suggests tightness at the bottom of the neopharynx.    History:     Allergies   Allergen Reactions    Shellfish-Derived Products Anaphylaxis     \"Throat closes up\"    Shrimp Flavor Swelling     Current Outpatient Medications   Medication Sig Dispense Refill    lidocaine viscous hcl (XYLOCAINE) 2 % SOLN solution Take every four hours as needed. Follow directions as given in clinic 100 mL 5    pregabalin (LYRICA) 150 MG capsule Take 1 capsule by mouth 2 times daily for 180 days. Max Daily Amount: 300 mg 60 capsule 5    prochlorperazine (COMPAZINE) 10 MG tablet Take 1 tablet by mouth every 6 hours as needed (as needed for nausea) 60 tablet 3    ondansetron (ZOFRAN-ODT) 8 MG TBDP disintegrating tablet Place 1 tablet under the tongue every 8 hours as needed for Nausea or Vomiting 90 tablet 3    carbidopa-levodopa (SINEMET)  MG per tablet Take 1 tablet by mouth 3 times daily      REPATHA SURECLICK 140 MG/ML SOAJ Inject 2 mLs as directed every 14 days      KERENDIA 20 MG TABS Take 20 mg by mouth daily      lidocaine-prilocaine (EMLA) 2.5-2.5 % cream Apply topically as needed. 5 g 4    zolpidem (AMBIEN) 10 MG tablet TAKE 1 TABLET BY MOUTH AT NIGHT      ALPRAZolam (XANAX) 1 MG tablet Take 1 tablet by mouth

## 2024-06-10 ENCOUNTER — TELEPHONE (OUTPATIENT)
Dept: WOUND CARE | Age: 54
End: 2024-06-10

## 2024-06-17 ENCOUNTER — HOSPITAL ENCOUNTER (OUTPATIENT)
Dept: WOUND CARE | Age: 54
Discharge: HOME | End: 2024-06-17
Attending: NURSE PRACTITIONER

## 2024-06-24 ENCOUNTER — TELEPHONE (OUTPATIENT)
Dept: ENT CLINIC | Age: 54
End: 2024-06-24

## 2024-06-24 NOTE — TELEPHONE ENCOUNTER
Spoke with Patients  about a voicemail he left stating that Flaquita was having throat issues. I referred him to call [facility] about the issues going on with Flaquita's throat since [facility] was the last to see her. Patient  verbalized understanding and thanked me.

## 2024-06-25 ENCOUNTER — HOSPITAL ENCOUNTER (OUTPATIENT)
Dept: CT IMAGING | Age: 54
Discharge: HOME | End: 2024-06-25
Attending: RADIOLOGY
Payer: MEDICARE

## 2024-06-25 DIAGNOSIS — C32.1 MALIGNANT NEOPLASM OF EPIGLOTTIS (HCC): ICD-10-CM

## 2024-06-25 LAB
CREAT BLD-MCNC: 0.9 MG/DL (ref 0.5–1.2)
GFR SERPL CREATININE-BSD FRML MDRD: 76 ML/MIN/1.73M2

## 2024-06-25 PROCEDURE — 70491 CT SOFT TISSUE NECK W/DYE: CPT

## 2024-06-25 PROCEDURE — 82565 ASSAY OF CREATININE: CPT

## 2024-06-25 PROCEDURE — 6360000004 HC RX CONTRAST MEDICATION: Performed by: RADIOLOGY

## 2024-06-25 RX ADMIN — IOPAMIDOL 100 ML: 755 INJECTION, SOLUTION INTRAVENOUS at 16:10

## 2024-06-25 NOTE — TELEPHONE ENCOUNTER
Patient's boyfriend, Ladarius, who is on HIPAA, called in stating patient went to the appointment at [facility]. He said they discussed the throat stretching that Dr Block recommended. The doctor down there does not do that. Patient's boyfriend wanted to know if they could get in to see Dr Block and have him do it. I told Ladarius that unfortunately,  Dr Block does not do it either. He then asked about Dr Coughlin. I told him he has done it on occasion. I looked and informed Ladarius that patient already has an appointment scheduled withDr Coughlin on 09/16/24. Told him that could be discussed at the appointment. Ladarius commented that September is a long way off. I verbalized understanding and told him that Dr Coughlin is a very busy physician and currently that would be my soonest appointment. I told him I could place patient on the wait list so if a sooner appointment comes open we could try to get her in.  Patient's boyfriend verbalized understanding and thanked me.

## 2024-06-28 ENCOUNTER — HOSPITAL ENCOUNTER (OUTPATIENT)
Dept: RADIATION ONCOLOGY | Age: 54
Discharge: HOME | End: 2024-06-28
Payer: MEDICARE

## 2024-06-28 ENCOUNTER — OFFICE VISIT (OUTPATIENT)
Dept: ONCOLOGY | Age: 54
End: 2024-06-28
Payer: MEDICARE

## 2024-06-28 ENCOUNTER — HOSPITAL ENCOUNTER (OUTPATIENT)
Dept: INFUSION THERAPY | Age: 54
Discharge: HOME | End: 2024-06-28
Payer: MEDICARE

## 2024-06-28 VITALS — DIASTOLIC BLOOD PRESSURE: 95 MMHG | TEMPERATURE: 97.9 F | HEART RATE: 80 BPM | SYSTOLIC BLOOD PRESSURE: 136 MMHG

## 2024-06-28 VITALS
OXYGEN SATURATION: 90 % | DIASTOLIC BLOOD PRESSURE: 95 MMHG | TEMPERATURE: 97.9 F | SYSTOLIC BLOOD PRESSURE: 136 MMHG | WEIGHT: 151 LBS | RESPIRATION RATE: 16 BRPM | HEART RATE: 80 BPM | BODY MASS INDEX: 28.53 KG/M2

## 2024-06-28 VITALS
WEIGHT: 151 LBS | RESPIRATION RATE: 16 BRPM | HEIGHT: 61 IN | TEMPERATURE: 97.9 F | DIASTOLIC BLOOD PRESSURE: 95 MMHG | BODY MASS INDEX: 28.51 KG/M2 | HEART RATE: 80 BPM | OXYGEN SATURATION: 90 % | SYSTOLIC BLOOD PRESSURE: 136 MMHG

## 2024-06-28 DIAGNOSIS — C32.1 MALIGNANT NEOPLASM OF EPIGLOTTIS (HCC): ICD-10-CM

## 2024-06-28 DIAGNOSIS — C32.1 MALIGNANT NEOPLASM OF EPIGLOTTIS (HCC): Primary | ICD-10-CM

## 2024-06-28 DIAGNOSIS — L98.9 SKIN LESION: Primary | ICD-10-CM

## 2024-06-28 DIAGNOSIS — E89.0 POSTOPERATIVE HYPOTHYROIDISM: ICD-10-CM

## 2024-06-28 LAB
ALBUMIN SERPL BCG-MCNC: 3.6 G/DL (ref 3.5–5.1)
ALP SERPL-CCNC: 99 U/L (ref 38–126)
ALT SERPL W/O P-5'-P-CCNC: 18 U/L (ref 11–66)
AST SERPL-CCNC: 22 U/L (ref 5–40)
BASOPHILS ABSOLUTE: 0 THOU/MM3 (ref 0–0.1)
BASOPHILS NFR BLD AUTO: 1 % (ref 0–3)
BILIRUB CONJ SERPL-MCNC: < 0.1 MG/DL (ref 0.1–13.8)
BILIRUB SERPL-MCNC: 0.2 MG/DL (ref 0.3–1.2)
BUN BLDP-MCNC: 27 MG/DL (ref 8–26)
CHLORIDE BLD-SCNC: 100 MEQ/L (ref 98–109)
CREAT BLD-MCNC: 0.6 MG/DL (ref 0.5–1.2)
EOSINOPHIL NFR BLD AUTO: 7 % (ref 0–4)
EOSINOPHILS ABSOLUTE: 0.3 THOU/MM3 (ref 0–0.4)
ERYTHROCYTE [DISTWIDTH] IN BLOOD BY AUTOMATED COUNT: 13.4 % (ref 11.5–14.5)
GFR SERPL CREATININE-BSD FRML MDRD: > 90 ML/MIN/1.73M2
GLUCOSE BLD-MCNC: 238 MG/DL (ref 70–108)
HCT VFR BLD AUTO: 36.5 % (ref 37–47)
HGB BLD-MCNC: 11.8 GM/DL (ref 12–16)
IMMATURE GRANULOCYTES %: 0 %
IMMATURE GRANULOCYTES ABSOLUTE: 0 THOU/MM3 (ref 0–0.07)
IONIZED CALCIUM, WHOLE BLOOD: 1.14 MMOL/L (ref 1.12–1.32)
LYMPHOCYTES ABSOLUTE: 1.2 THOU/MM3 (ref 1–4.8)
LYMPHOCYTES NFR BLD AUTO: 31 % (ref 15–47)
MCH RBC QN AUTO: 32.1 PG (ref 26–33)
MCHC RBC AUTO-ENTMCNC: 32.3 GM/DL (ref 32.2–35.5)
MCV RBC AUTO: 99 FL (ref 81–99)
MONOCYTES ABSOLUTE: 0.4 THOU/MM3 (ref 0.4–1.3)
MONOCYTES NFR BLD AUTO: 10 % (ref 0–12)
NEUTROPHILS ABSOLUTE: 2 THOU/MM3 (ref 1.8–7.7)
NEUTROPHILS NFR BLD AUTO: 51 % (ref 43–75)
PLATELET # BLD AUTO: 310 THOU/MM3 (ref 130–400)
PMV BLD AUTO: 10 FL (ref 9.4–12.4)
POTASSIUM BLD-SCNC: 4 MEQ/L (ref 3.5–4.9)
PROT SERPL-MCNC: 5.9 G/DL (ref 6.1–8)
RBC # BLD AUTO: 3.68 MILL/MM3 (ref 4.2–5.4)
SODIUM BLD-SCNC: 138 MEQ/L (ref 138–146)
T4 FREE SERPL-MCNC: 0.58 NG/DL (ref 0.93–1.68)
TOTAL CO2, WHOLE BLOOD: 31 MEQ/L (ref 23–33)
TSH SERPL DL<=0.005 MIU/L-ACNC: 57.3 UIU/ML (ref 0.4–4.2)
WBC # BLD AUTO: 3.9 THOU/MM3 (ref 4.8–10.8)

## 2024-06-28 PROCEDURE — 6360000002 HC RX W HCPCS: Performed by: INTERNAL MEDICINE

## 2024-06-28 PROCEDURE — 85025 COMPLETE CBC W/AUTO DIFF WBC: CPT

## 2024-06-28 PROCEDURE — 2580000003 HC RX 258: Performed by: INTERNAL MEDICINE

## 2024-06-28 PROCEDURE — 99214 OFFICE O/P EST MOD 30 MIN: CPT | Performed by: PHYSICIAN ASSISTANT

## 2024-06-28 PROCEDURE — 84443 ASSAY THYROID STIM HORMONE: CPT

## 2024-06-28 PROCEDURE — 80047 BASIC METABLC PNL IONIZED CA: CPT

## 2024-06-28 PROCEDURE — G8427 DOCREV CUR MEDS BY ELIG CLIN: HCPCS | Performed by: PHYSICIAN ASSISTANT

## 2024-06-28 PROCEDURE — 3017F COLORECTAL CA SCREEN DOC REV: CPT | Performed by: PHYSICIAN ASSISTANT

## 2024-06-28 PROCEDURE — 3080F DIAST BP >= 90 MM HG: CPT | Performed by: PHYSICIAN ASSISTANT

## 2024-06-28 PROCEDURE — 1036F TOBACCO NON-USER: CPT | Performed by: PHYSICIAN ASSISTANT

## 2024-06-28 PROCEDURE — 99212 OFFICE O/P EST SF 10 MIN: CPT | Performed by: RADIOLOGY

## 2024-06-28 PROCEDURE — 99211 OFF/OP EST MAY X REQ PHY/QHP: CPT

## 2024-06-28 PROCEDURE — 36591 DRAW BLOOD OFF VENOUS DEVICE: CPT

## 2024-06-28 PROCEDURE — 84439 ASSAY OF FREE THYROXINE: CPT

## 2024-06-28 PROCEDURE — 80076 HEPATIC FUNCTION PANEL: CPT

## 2024-06-28 PROCEDURE — G8417 CALC BMI ABV UP PARAM F/U: HCPCS | Performed by: PHYSICIAN ASSISTANT

## 2024-06-28 PROCEDURE — 3075F SYST BP GE 130 - 139MM HG: CPT | Performed by: PHYSICIAN ASSISTANT

## 2024-06-28 RX ORDER — SODIUM CHLORIDE 0.9 % (FLUSH) 0.9 %
5-40 SYRINGE (ML) INJECTION PRN
OUTPATIENT
Start: 2024-06-28

## 2024-06-28 RX ORDER — SODIUM CHLORIDE 9 MG/ML
25 INJECTION, SOLUTION INTRAVENOUS PRN
OUTPATIENT
Start: 2024-06-28

## 2024-06-28 RX ORDER — HEPARIN 100 UNIT/ML
500 SYRINGE INTRAVENOUS PRN
Status: DISCONTINUED | OUTPATIENT
Start: 2024-06-28 | End: 2024-06-29 | Stop reason: HOSPADM

## 2024-06-28 RX ORDER — PROCHLORPERAZINE MALEATE 10 MG
10 TABLET ORAL EVERY 6 HOURS PRN
Qty: 60 TABLET | Refills: 3 | Status: SHIPPED | OUTPATIENT
Start: 2024-06-28

## 2024-06-28 RX ORDER — ONDANSETRON 8 MG/1
8 TABLET, ORALLY DISINTEGRATING ORAL EVERY 8 HOURS PRN
Qty: 90 TABLET | Refills: 3 | Status: SHIPPED | OUTPATIENT
Start: 2024-06-28 | End: 2024-10-26

## 2024-06-28 RX ORDER — HEPARIN 100 UNIT/ML
500 SYRINGE INTRAVENOUS PRN
OUTPATIENT
Start: 2024-06-28

## 2024-06-28 RX ORDER — LIDOCAINE AND PRILOCAINE 25; 25 MG/G; MG/G
CREAM TOPICAL
Qty: 5 G | Refills: 4 | Status: SHIPPED | OUTPATIENT
Start: 2024-06-28

## 2024-06-28 RX ORDER — SODIUM CHLORIDE 0.9 % (FLUSH) 0.9 %
5-40 SYRINGE (ML) INJECTION PRN
Status: DISCONTINUED | OUTPATIENT
Start: 2024-06-28 | End: 2024-06-29 | Stop reason: HOSPADM

## 2024-06-28 RX ADMIN — Medication 500 UNITS: at 12:22

## 2024-06-28 RX ADMIN — SODIUM CHLORIDE, PRESERVATIVE FREE 30 ML: 5 INJECTION INTRAVENOUS at 11:40

## 2024-06-28 ASSESSMENT — ENCOUNTER SYMPTOMS
ABDOMINAL PAIN: 0
RECTAL PAIN: 0
SINUS PRESSURE: 0
SORE THROAT: 0
SINUS PAIN: 0
COUGH: 0
FACIAL SWELLING: 0
VOICE CHANGE: 0
BACK PAIN: 1
SHORTNESS OF BREATH: 0
BLOOD IN STOOL: 0
TROUBLE SWALLOWING: 1
NAUSEA: 1

## 2024-06-28 ASSESSMENT — PAIN SCALES - GENERAL
PAINLEVEL_OUTOF10: 7
PAINLEVEL_OUTOF10: 7

## 2024-06-28 ASSESSMENT — PAIN DESCRIPTION - LOCATION
LOCATION: BACK;KNEE
LOCATION: BACK;KNEE

## 2024-06-28 NOTE — PLAN OF CARE
Problem: Chronic Conditions and Co-morbidities  Goal: Patient's chronic conditions and co-morbidity symptoms are monitored and maintained or improved  Outcome: Adequate for Discharge  Flowsheets (Taken 6/28/2024 1354)  Care Plan - Patient's Chronic Conditions and Co-Morbidity Symptoms are Monitored and Maintained or Improved:   Monitor and assess patient's chronic conditions and comorbid symptoms for stability, deterioration, or improvement   Collaborate with multidisciplinary team to address chronic and comorbid conditions and prevent exacerbation or deterioration     Problem: Safety - Adult  Goal: Free from fall injury  Outcome: Adequate for Discharge  Flowsheets (Taken 6/28/2024 1354)  Free From Fall Injury: Instruct family/caregiver on patient safety     Problem: Discharge Planning  Goal: Discharge to home or other facility with appropriate resources  Outcome: Adequate for Discharge  Flowsheets (Taken 6/28/2024 1354)  Discharge to home or other facility with appropriate resources: Identify barriers to discharge with patient and caregiver     Problem: Infection - Adult  Goal: Absence of infection at discharge  Outcome: Adequate for Discharge  Flowsheets (Taken 6/28/2024 1354)  Absence of infection at discharge:   Assess and monitor for signs and symptoms of infection   Monitor lab/diagnostic results   Monitor all insertion sites i.e., indwelling lines, tubes and drains  Note: Mediport site with no redness or warmth. Skin over port site intact with no signs of breakdown noted. Patient verbalizes signs/symptoms of port infection and when to notify the physician.     Care plan reviewed with patient. Patient verbalizes understanding of the plan of care and contributes to goal setting.

## 2024-06-28 NOTE — PROGRESS NOTES
[facility]           [address]        [place], [place] [address]        O: [phone]        F: [phone]       LuckyPennie            FOLLOW UP NOTE    Date of Service: 2024  Patient ID: Flaquita Vyas   : 1970  MRN: [id number]   Acct Number: [id number]       DATE OF SERVICE: 2024   LOCATION: [facility]  PROVIDER: Sukumar Uribe MD MS    FOLLOW UP PHYSICIANS: RENÉE Lim ([facility]) [facility]    ASSESSMENT AND PLAN:   Cancer Staging   Cancer, epiglottis (HCC)  Staging form: Larynx - Supraglottis, AJCC 8th Edition  - Clinical stage from 10/6/2022: Stage III (cT3, cN0, cM0) - Signed by Sukumar Uribe MD on 2022      ***    {RTCMTH:03311}    HPI:  {HPIONCHX:[id number]}    INTERVAL HISTORY:  Flaquita Vyas is a 54 y.o. female is presenting today for regularly scheduled follow up regarding the above mentioned oncologic history.    Review of Systems   Constitutional:  Positive for fatigue. Negative for activity change, appetite change and unexpected weight change.   HENT:  Positive for trouble swallowing. Negative for congestion, ear pain, facial swelling, hearing loss, nosebleeds, sinus pressure, sinus pain, sore throat, tinnitus and voice change.    Eyes:  Negative for visual disturbance.   Respiratory:  Negative for cough and shortness of breath.    Cardiovascular:  Negative for chest pain.   Gastrointestinal:  Positive for nausea. Negative for abdominal pain, blood in stool and rectal pain.   Genitourinary:  Negative for dysuria, frequency and urgency.   Musculoskeletal:  Positive for arthralgias and back pain. Negative for neck pain and neck stiffness.   Neurological:  Positive for headaches. Negative for dizziness, facial asymmetry, speech difficulty, weakness, light-headedness and numbness.   Hematological:  Negative for adenopathy.   Psychiatric/Behavioral:  Negative for confusion.

## 2024-06-28 NOTE — PROGRESS NOTES
Patient tolerated lab draw without any complications.  Denies dizziness, lightheadedness, acute nausea or vomiting, headache, heart palpitations, rash/itching or increased SOB.    Last vital signs  BP (!) 136/95   Pulse 80   Temp 97.9 °F (36.6 °C) (Oral)     Patient instructed if they experience any of the above symptoms following today's visit, he/she is to notify the Physician or go to the Emergency Dept.    Discharge instructions given to patient, Verbalizes understanding. Ambulated off unit per self in stable condition with all belongings.

## 2024-06-28 NOTE — PATIENT INSTRUCTIONS
Return to clinic in 3 months with available MD  Labs on RTC  Continue mediport flushes  Referral to Dermatology for left ear lesion  Please call for questions or concerns.

## 2024-06-28 NOTE — DISCHARGE INSTRUCTIONS
Please contact your Oncologist if you have any questions regarding the line/lab that you received today.      Patient instructed if experience any of the symptoms following today's line/lab to notify MD immediately or go to emergency department.    * dizziness/lightheadedness  *acute nausea/vomiting - not relieved with medication  *headache - not relieved from Tylenol/pain medication  *chest pain/pressure  *rash/itching  *shortness of breath        Drink fluids - 48oz fluids daily  Call if develop fever/ chills/ signs or symptoms of infection     Physician's instructions:  Instructions  Return in about 3 months (around 9/28/2024).  Return to clinic in 3 months with available MD  Labs on RTC  Continue mediport flushes  Referral to Dermatology for left ear lesion  Please call for questions or concerns.

## 2024-06-28 NOTE — PROGRESS NOTES
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  That is post chemoradiation completed 2/2023, s/p Direct laryngoscopy, salvage total laryngectomy, thyroid isthmusectomy, cricopharyngeus myotomy, pharyngoplasty, stomaplasty 10/26/2023 with no residual disease     cT3 cN0 cM0 = stage III, p16 negative    2. Head and neck cancer follow-up    H&P, (including complete head and neck exam; mirror and fiberoptic exam) first year every 1 to 3 months, second year every 2 to 6 months, years 3-5 every 4 to 8 months, greater than 5 years every 12 months.  -Thyroid stimulating hormone every 6 to 12 months if neck radiated  -FDG PET/CT should be performed within 3 to 6 months of definitive radiation or chemoradiation for assessment of treatment response to identify any residual tumor.  Dental evaluation for oral cavity and sites exposed to significant intraoral radiation     -Speech hearing swallowing eval's and rehab as clinically indicated     -Nutritional evaluation rehab as clinically indicated until nutritional status is stabilized     -Ongoing surveillance for depression     -Smoking cessation alcohol counseling clinically indicated     -Lymphedema evaluation rehab as clinically indicated     Recent follow ups:  -She had follow up with ENT on 6/4/24 and has follow up with Dr. Coughlin on 9/16/2024.  -She saw [facility], Dr. Martin, on 6/10/24 with direct examination.   -CT of soft tissue of neck ordered per Rad Onc

## 2024-07-01 ENCOUNTER — TELEPHONE (OUTPATIENT)
Dept: ONCOLOGY | Age: 54
End: 2024-07-01

## 2024-07-01 NOTE — TELEPHONE ENCOUNTER
----- Message from Katia Rodríguez PA-C sent at 7/1/2024  8:15 AM EDT -----  Per chart review, the patient has been prescribed Synthroid per her PCP. Can you please verify if the patient is still on Synthroid? Recommend PCP follow up due to abnormal TSH/free T4. Thank you.

## 2024-07-01 NOTE — TELEPHONE ENCOUNTER
Pt's SO called back and states pt has been taking levothyroxine. Message left with Dr. Martin's office @ [facility] who is the prescriber of this medication.

## 2024-07-01 NOTE — TELEPHONE ENCOUNTER
Attempted to call significant other, LM asking he call back to let us know if pt is taking levothyroxine & if so who is prescribing? Office number given.

## 2024-07-02 DIAGNOSIS — H61.92 SKIN LESION OF LEFT EAR: Primary | ICD-10-CM

## 2024-07-02 DIAGNOSIS — R13.19 DYSPHAGIA DUE TO LARYNGECTOMY: Primary | ICD-10-CM

## 2024-07-02 DIAGNOSIS — Z90.02 DYSPHAGIA DUE TO LARYNGECTOMY: Primary | ICD-10-CM

## 2024-07-03 NOTE — PROGRESS NOTES
[facility]  Therapy Contact Note      Date: 7/3/2024  Patient Name: Flaquita Vyas        MRN: [id number]    Account Number: [id number]  : 1970  (54 y.o.)  Gender: female       Patient's boyfriend called and stated patient will need transportation. [employer] has been contacted by this Rehab Tech to transport patient on [phone]. [employer] will contact patient's boyfriend with a ride confirmation.     Faiza Gaines, Rehab Tech

## 2024-07-08 ENCOUNTER — TELEPHONE (OUTPATIENT)
Dept: WOUND CARE | Age: 54
End: 2024-07-08

## 2024-07-08 NOTE — TELEPHONE ENCOUNTER
Ladarius ALVARADO Called stating patient needs to be seen sooner that her toes are looking worse. He states patient peeled off some skin last night and it looks like there is a hole. Advised family member that patient should go to the ER or an urgent care to be seen sooner. Ladarius voiced his understanding.

## 2024-07-22 ENCOUNTER — HOSPITAL ENCOUNTER (OUTPATIENT)
Dept: WOUND CARE | Age: 54
Discharge: HOME | End: 2024-07-22
Attending: NURSE PRACTITIONER
Payer: MEDICARE

## 2024-07-22 VITALS
HEART RATE: 97 BPM | RESPIRATION RATE: 18 BRPM | SYSTOLIC BLOOD PRESSURE: 143 MMHG | OXYGEN SATURATION: 98 % | DIASTOLIC BLOOD PRESSURE: 98 MMHG | TEMPERATURE: 98 F

## 2024-07-22 DIAGNOSIS — I73.9 PERIPHERAL ARTERIAL DISEASE (HCC): ICD-10-CM

## 2024-07-22 DIAGNOSIS — L97.519 SKIN ULCER OF THIRD TOE, RIGHT, WITH UNSPECIFIED SEVERITY (HCC): Primary | ICD-10-CM

## 2024-07-22 PROCEDURE — 99214 OFFICE O/P EST MOD 30 MIN: CPT

## 2024-07-22 RX ORDER — CLOBETASOL PROPIONATE 0.5 MG/G
OINTMENT TOPICAL ONCE
OUTPATIENT
Start: 2024-07-22 | End: 2024-07-22

## 2024-07-22 RX ORDER — LIDOCAINE HYDROCHLORIDE 20 MG/ML
JELLY TOPICAL ONCE
OUTPATIENT
Start: 2024-07-22 | End: 2024-07-22

## 2024-07-22 RX ORDER — GINSENG 100 MG
CAPSULE ORAL ONCE
OUTPATIENT
Start: 2024-07-22 | End: 2024-07-22

## 2024-07-22 RX ORDER — LIDOCAINE 50 MG/G
OINTMENT TOPICAL ONCE
OUTPATIENT
Start: 2024-07-22 | End: 2024-07-22

## 2024-07-22 RX ORDER — CLOBETASOL PROPIONATE 0.5 MG/G
OINTMENT TOPICAL ONCE
Status: CANCELLED | OUTPATIENT
Start: 2024-07-22 | End: 2024-07-22

## 2024-07-22 RX ORDER — LIDOCAINE HYDROCHLORIDE 20 MG/ML
JELLY TOPICAL ONCE
Status: CANCELLED | OUTPATIENT
Start: 2024-07-22 | End: 2024-07-22

## 2024-07-22 RX ORDER — LIDOCAINE 40 MG/G
CREAM TOPICAL ONCE
OUTPATIENT
Start: 2024-07-22 | End: 2024-07-22

## 2024-07-22 RX ORDER — IBUPROFEN 200 MG
TABLET ORAL ONCE
Status: CANCELLED | OUTPATIENT
Start: 2024-07-22 | End: 2024-07-22

## 2024-07-22 RX ORDER — TRIAMCINOLONE ACETONIDE 1 MG/G
OINTMENT TOPICAL ONCE
OUTPATIENT
Start: 2024-07-22 | End: 2024-07-22

## 2024-07-22 RX ORDER — DOXYCYCLINE HYCLATE 100 MG
100 TABLET ORAL 2 TIMES DAILY
Qty: 42 TABLET | Refills: 0 | Status: SHIPPED | OUTPATIENT
Start: 2024-07-22 | End: 2024-08-12

## 2024-07-22 RX ORDER — BACITRACIN ZINC AND POLYMYXIN B SULFATE 500; 1000 [USP'U]/G; [USP'U]/G
OINTMENT TOPICAL ONCE
OUTPATIENT
Start: 2024-07-22 | End: 2024-07-22

## 2024-07-22 RX ORDER — BETAMETHASONE DIPROPIONATE 0.5 MG/G
CREAM TOPICAL ONCE
Status: CANCELLED | OUTPATIENT
Start: 2024-07-22 | End: 2024-07-22

## 2024-07-22 RX ORDER — LIDOCAINE HYDROCHLORIDE 40 MG/ML
SOLUTION TOPICAL ONCE
OUTPATIENT
Start: 2024-07-22 | End: 2024-07-22

## 2024-07-22 RX ORDER — PIOGLITAZONEHYDROCHLORIDE 45 MG/1
45 TABLET ORAL DAILY
COMMUNITY

## 2024-07-22 RX ORDER — IBUPROFEN 200 MG
TABLET ORAL ONCE
OUTPATIENT
Start: 2024-07-22 | End: 2024-07-22

## 2024-07-22 RX ORDER — BETAMETHASONE DIPROPIONATE 0.5 MG/G
CREAM TOPICAL ONCE
OUTPATIENT
Start: 2024-07-22 | End: 2024-07-22

## 2024-07-22 RX ORDER — BACITRACIN ZINC AND POLYMYXIN B SULFATE 500; 1000 [USP'U]/G; [USP'U]/G
OINTMENT TOPICAL ONCE
Status: CANCELLED | OUTPATIENT
Start: 2024-07-22 | End: 2024-07-22

## 2024-07-22 RX ORDER — GENTAMICIN SULFATE 1 MG/G
OINTMENT TOPICAL ONCE
OUTPATIENT
Start: 2024-07-22 | End: 2024-07-22

## 2024-07-22 RX ORDER — SODIUM CHLOR/HYPOCHLOROUS ACID 0.033 %
SOLUTION, IRRIGATION IRRIGATION ONCE
Status: CANCELLED | OUTPATIENT
Start: 2024-07-22 | End: 2024-07-22

## 2024-07-22 RX ORDER — AMITRIPTYLINE HYDROCHLORIDE 100 MG/1
100 TABLET ORAL NIGHTLY
COMMUNITY

## 2024-07-22 RX ORDER — LIDOCAINE 50 MG/G
OINTMENT TOPICAL ONCE
Status: CANCELLED | OUTPATIENT
Start: 2024-07-22 | End: 2024-07-22

## 2024-07-22 RX ORDER — GENTAMICIN SULFATE 1 MG/G
OINTMENT TOPICAL ONCE
Status: CANCELLED | OUTPATIENT
Start: 2024-07-22 | End: 2024-07-22

## 2024-07-22 RX ORDER — TRIAMCINOLONE ACETONIDE 1 MG/G
OINTMENT TOPICAL ONCE
Status: CANCELLED | OUTPATIENT
Start: 2024-07-22 | End: 2024-07-22

## 2024-07-22 RX ORDER — LIDOCAINE HYDROCHLORIDE 40 MG/ML
SOLUTION TOPICAL ONCE
Status: CANCELLED | OUTPATIENT
Start: 2024-07-22 | End: 2024-07-22

## 2024-07-22 RX ORDER — SODIUM CHLOR/HYPOCHLOROUS ACID 0.033 %
SOLUTION, IRRIGATION IRRIGATION ONCE
OUTPATIENT
Start: 2024-07-22 | End: 2024-07-22

## 2024-07-22 RX ORDER — LIDOCAINE 40 MG/G
CREAM TOPICAL ONCE
Status: CANCELLED | OUTPATIENT
Start: 2024-07-22 | End: 2024-07-22

## 2024-07-22 RX ORDER — GINSENG 100 MG
CAPSULE ORAL ONCE
Status: CANCELLED | OUTPATIENT
Start: 2024-07-22 | End: 2024-07-22

## 2024-07-22 RX ORDER — PHENTERMINE HYDROCHLORIDE 37.5 MG/1
37.5 CAPSULE ORAL EVERY MORNING
COMMUNITY

## 2024-07-22 NOTE — CONSULTS
[facility]         Consult and Procedure Note      Flaquita Vyas  MEDICAL RECORD NUMBER:  [id number]  AGE: 54 y.o.   GENDER: female  : 1970  EPISODE DATE:  2024    Subjective:     Chief Complaint   Patient presents with    Wound Check     Right foot          HISTORY of PRESENT ILLNESS HPI     Flaquita Vyas is a 54 y.o. female referred by Dr. Tang due to ongoing ulcerations of the right lower extremity.  Patient does have a prior history of a left above-the-knee amputation.  Patient reported multiple ulcerations of the left leg with failed IV antibiotics x 3 months as the reason she ended up having a left above-the-knee amputation.  Patient has dry gangrene of the right third toe with an area also on the great toe.  Patient previously had a lateral right heel ulcer previously but now has a stable scab.  There is no actual drainage noted today.  There is redness and swelling noted to the right second and third toe.  Patient recently had an x-ray at [facility] of the right lower extremity.  No evidence of osteomyelitis noted.  There was significant calcification noted due to underlying atherosclerotic buildup.  Patient has diminished pulses in office.  Patient will be sent out for updated ABIs to assess for blood flow of the right lower extremity.  Patient will be started on doxycycline today.  Patient to treat dry gangrene areas with Betadine twice daily.  Patient will follow back up with us again in 2 to 3 weeks for reevaluation.      History of Wound Context: Dry gangrene of the right third toe and great toe.  No active drainage noted.  Dry gangrene x 1 month.  Wound/Ulcer Pain Timing/Severity: mild  Quality of pain: tender  Severity:  3 / 10   Modifying Factors: Pain worsens with pressure applied to the right lower extremity.  Associated Signs/Symptoms: edema, erythema, and pain        PAST MEDICAL HISTORY        Diagnosis Date    Above knee amputation of left

## 2024-07-22 NOTE — PLAN OF CARE
Problem: Wound:  Goal: Will show signs of wound healing; wound closure and no evidence of infection  Description: Will show signs of wound healing; wound closure and no evidence of infection  Outcome: Progressing   Seen today for right foot wounds. See AVS for discharge   Care plan reviewed with patient and friend.  Patient and friend verbalize understanding of the plan of care and contribute to goal setting.

## 2024-07-22 NOTE — PROGRESS NOTES
ANALISA studies ordered for patient, called Gene and gave him the date and time. All questions answered.

## 2024-07-22 NOTE — PATIENT INSTRUCTIONS
Visit Discharge/Physician Orders:  - Mechanical Debridement was completed today by using a saline and gauze.   -antibiotic sent to your pharmacy Doxycycline- drink plenty of water and avoid the sun, this medication will make you burn easily   -need vascular studies done-please have these done as soon as you can     Wound Location: right toes     Dressing orders:     1) Gather wound care supplies and arrange on clean table.     2) Wash your hands with soap and water or use alcohol based hand  for 20 seconds (sing \"Happy Birthday\" twice).    3) Cleanse wounds with normal saline or wound cleanser and gauze. Pat dry with clean gauze.    4) Right toes: paint with betadine daily     Keep all dressings clean & dry.    Follow up visit:   2-3 Weeks Monday August 12th at 10:30 am     Supplies:    Duration of dressings:     We have sent your supply order to the following company:    If you don't receive the items you were expecting or don't know what the items are that you received, call the company where the order was sent.   If you are unable to obtain wound supplies, continue to use the supplies you have available until you are able to reach us. It is most important to keep the wound covered at all times.  It is YOUR responsibility to make sure that supplies are re-ordered before you run out. Re-order telephone numbers are included in each package.     Keep next scheduled appointment. Please give 24 hour notice if unable to keep appointment. [phone]    If you experience any of the following, please call the Wound Care Service during business hours: Monday through Friday 8:00 am - 4:30 pm  ([phone]).   *Increase in pain   *Temperature over 101   *Increase in drainage from your wound or a foul odor   *Uncontrolled swelling   *Need for compression bandage changes due to slippage, breakthrough drainage    If you need medical attention outside of business hours, please contact your Primary Care Doctor or go to

## 2024-07-25 ENCOUNTER — HOSPITAL ENCOUNTER (OUTPATIENT)
Dept: INTERVENTIONAL RADIOLOGY/VASCULAR | Age: 54
Discharge: HOME | End: 2024-07-27
Payer: MEDICARE

## 2024-07-25 DIAGNOSIS — I73.9 PERIPHERAL ARTERIAL DISEASE (HCC): ICD-10-CM

## 2024-07-25 DIAGNOSIS — L97.519 SKIN ULCER OF THIRD TOE, RIGHT, WITH UNSPECIFIED SEVERITY (HCC): ICD-10-CM

## 2024-07-25 PROCEDURE — 93922 UPR/L XTREMITY ART 2 LEVELS: CPT

## 2024-07-30 ENCOUNTER — TELEPHONE (OUTPATIENT)
Dept: WOUND CARE | Age: 54
End: 2024-07-30

## 2024-07-30 DIAGNOSIS — L97.519 SKIN ULCER OF THIRD TOE, RIGHT, WITH UNSPECIFIED SEVERITY (HCC): Primary | ICD-10-CM

## 2024-07-30 DIAGNOSIS — I73.9 PERIPHERAL ARTERIAL DISEASE (HCC): ICD-10-CM

## 2024-07-30 DIAGNOSIS — I73.9 PERIPHERAL VASCULAR DISEASE (HCC): ICD-10-CM

## 2024-07-30 NOTE — TELEPHONE ENCOUNTER
Pt's significant other called stating patient is having a lot of swelling and can not get her shoes on and wanted an order for an offloading shoe. Provider notified and order given for a fracture boot provider also requested an order by placed for angiogram based on patient ANALISA results. Orders placed and patients significant other notified. IR to reach out to patient to schedule intervention.

## 2024-07-31 ENCOUNTER — TELEPHONE (OUTPATIENT)
Dept: WOUND CARE | Age: 54
End: 2024-07-31

## 2024-07-31 DIAGNOSIS — L97.519 SKIN ULCER OF THIRD TOE, RIGHT, WITH UNSPECIFIED SEVERITY (HCC): Primary | ICD-10-CM

## 2024-07-31 DIAGNOSIS — L97.519 SKIN ULCER OF THIRD TOE, RIGHT, WITH UNSPECIFIED SEVERITY (HCC): ICD-10-CM

## 2024-07-31 DIAGNOSIS — I73.9 PERIPHERAL VASCULAR DISEASE (HCC): Primary | ICD-10-CM

## 2024-07-31 DIAGNOSIS — I73.9 PERIPHERAL VASCULAR DISEASE (HCC): ICD-10-CM

## 2024-08-01 ENCOUNTER — HOSPITAL ENCOUNTER (EMERGENCY)
Age: 54
Discharge: HOME | End: 2024-08-01
Payer: MEDICARE

## 2024-08-01 ENCOUNTER — APPOINTMENT (OUTPATIENT)
Dept: GENERAL RADIOLOGY | Age: 54
End: 2024-08-01
Payer: MEDICARE

## 2024-08-01 VITALS
DIASTOLIC BLOOD PRESSURE: 116 MMHG | SYSTOLIC BLOOD PRESSURE: 154 MMHG | BODY MASS INDEX: 27.94 KG/M2 | TEMPERATURE: 99.7 F | RESPIRATION RATE: 18 BRPM | HEART RATE: 90 BPM | WEIGHT: 148 LBS | OXYGEN SATURATION: 98 % | HEIGHT: 61 IN

## 2024-08-01 DIAGNOSIS — L97.519 SKIN ULCER OF THIRD TOE, RIGHT, WITH UNSPECIFIED SEVERITY (HCC): Primary | ICD-10-CM

## 2024-08-01 DIAGNOSIS — I73.9 PVD (PERIPHERAL VASCULAR DISEASE) (HCC): ICD-10-CM

## 2024-08-01 DIAGNOSIS — M79.604 RIGHT LEG PAIN: ICD-10-CM

## 2024-08-01 DIAGNOSIS — E08.42 DIABETIC POLYNEUROPATHY ASSOCIATED WITH DIABETES MELLITUS DUE TO UNDERLYING CONDITION (HCC): ICD-10-CM

## 2024-08-01 DIAGNOSIS — G89.29 OTHER CHRONIC PAIN: ICD-10-CM

## 2024-08-01 LAB
ANION GAP SERPL CALC-SCNC: 11 MEQ/L (ref 8–16)
BASOPHILS ABSOLUTE: 0 THOU/MM3 (ref 0–0.1)
BASOPHILS NFR BLD AUTO: 0.3 %
BUN SERPL-MCNC: 27 MG/DL (ref 7–22)
CALCIUM SERPL-MCNC: 9.7 MG/DL (ref 8.5–10.5)
CHLORIDE SERPL-SCNC: 95 MEQ/L (ref 98–111)
CO2 SERPL-SCNC: 29 MEQ/L (ref 23–33)
CREAT SERPL-MCNC: 0.8 MG/DL (ref 0.4–1.2)
DEPRECATED RDW RBC AUTO: 41.9 FL (ref 35–45)
EOSINOPHIL NFR BLD AUTO: 0.9 %
EOSINOPHILS ABSOLUTE: 0.1 THOU/MM3 (ref 0–0.4)
ERYTHROCYTE [DISTWIDTH] IN BLOOD BY AUTOMATED COUNT: 12 % (ref 11.5–14.5)
GFR SERPL CREATININE-BSD FRML MDRD: 88 ML/MIN/1.73M2
GLUCOSE SERPL-MCNC: 319 MG/DL (ref 70–108)
HCT VFR BLD AUTO: 36.5 % (ref 37–47)
HGB BLD-MCNC: 12.1 GM/DL (ref 12–16)
IMM GRANULOCYTES # BLD AUTO: 0.03 THOU/MM3 (ref 0–0.07)
IMM GRANULOCYTES NFR BLD AUTO: 0.3 %
LACTATE SERPL-SCNC: 1.4 MMOL/L (ref 0.5–2)
LYMPHOCYTES ABSOLUTE: 1.2 THOU/MM3 (ref 1–4.8)
LYMPHOCYTES NFR BLD AUTO: 13.4 %
MCH RBC QN AUTO: 31.6 PG (ref 26–33)
MCHC RBC AUTO-ENTMCNC: 33.2 GM/DL (ref 32.2–35.5)
MCV RBC AUTO: 95.3 FL (ref 81–99)
MONOCYTES ABSOLUTE: 0.7 THOU/MM3 (ref 0.4–1.3)
MONOCYTES NFR BLD AUTO: 7.4 %
NEUTROPHILS ABSOLUTE: 6.8 THOU/MM3 (ref 1.8–7.7)
NEUTROPHILS NFR BLD AUTO: 77.7 %
NRBC BLD AUTO-RTO: 0 /100 WBC
OSMOLALITY SERPL CALC.SUM OF ELEC: 287.5 MOSMOL/KG (ref 275–300)
PLATELET # BLD AUTO: 407 THOU/MM3 (ref 130–400)
PMV BLD AUTO: 10.9 FL (ref 9.4–12.4)
POTASSIUM SERPL-SCNC: 4.7 MEQ/L (ref 3.5–5.2)
PROCALCITONIN SERPL IA-MCNC: 0.1 NG/ML (ref 0.01–0.09)
RBC # BLD AUTO: 3.83 MILL/MM3 (ref 4.2–5.4)
SODIUM SERPL-SCNC: 135 MEQ/L (ref 135–145)
WBC # BLD AUTO: 8.8 THOU/MM3 (ref 4.8–10.8)

## 2024-08-01 PROCEDURE — 80048 BASIC METABOLIC PNL TOTAL CA: CPT

## 2024-08-01 PROCEDURE — 36415 COLL VENOUS BLD VENIPUNCTURE: CPT

## 2024-08-01 PROCEDURE — 85025 COMPLETE CBC W/AUTO DIFF WBC: CPT

## 2024-08-01 PROCEDURE — 73630 X-RAY EXAM OF FOOT: CPT

## 2024-08-01 PROCEDURE — 96374 THER/PROPH/DIAG INJ IV PUSH: CPT

## 2024-08-01 PROCEDURE — 84145 PROCALCITONIN (PCT): CPT

## 2024-08-01 PROCEDURE — 83605 ASSAY OF LACTIC ACID: CPT

## 2024-08-01 PROCEDURE — 96375 TX/PRO/DX INJ NEW DRUG ADDON: CPT

## 2024-08-01 PROCEDURE — 99284 EMERGENCY DEPT VISIT MOD MDM: CPT

## 2024-08-01 PROCEDURE — 6360000002 HC RX W HCPCS: Performed by: NURSE PRACTITIONER

## 2024-08-01 PROCEDURE — 87040 BLOOD CULTURE FOR BACTERIA: CPT

## 2024-08-01 RX ORDER — ONDANSETRON 2 MG/ML
4 INJECTION INTRAMUSCULAR; INTRAVENOUS ONCE
Status: COMPLETED | OUTPATIENT
Start: 2024-08-01 | End: 2024-08-01

## 2024-08-01 RX ORDER — MORPHINE SULFATE 4 MG/ML
4 INJECTION, SOLUTION INTRAMUSCULAR; INTRAVENOUS ONCE
Status: COMPLETED | OUTPATIENT
Start: 2024-08-01 | End: 2024-08-01

## 2024-08-01 RX ADMIN — MORPHINE SULFATE 4 MG: 4 INJECTION, SOLUTION INTRAMUSCULAR; INTRAVENOUS at 20:28

## 2024-08-01 RX ADMIN — ONDANSETRON 4 MG: 2 INJECTION INTRAMUSCULAR; INTRAVENOUS at 20:28

## 2024-08-01 RX ADMIN — HYDROMORPHONE HYDROCHLORIDE 1 MG: 1 INJECTION, SOLUTION INTRAMUSCULAR; INTRAVENOUS; SUBCUTANEOUS at 21:33

## 2024-08-01 ASSESSMENT — PAIN SCALES - GENERAL: PAINLEVEL_OUTOF10: 10

## 2024-08-01 ASSESSMENT — PAIN DESCRIPTION - ORIENTATION: ORIENTATION: RIGHT

## 2024-08-01 ASSESSMENT — PAIN DESCRIPTION - PAIN TYPE: TYPE: ACUTE PAIN

## 2024-08-01 ASSESSMENT — PAIN - FUNCTIONAL ASSESSMENT: PAIN_FUNCTIONAL_ASSESSMENT: 0-10

## 2024-08-01 ASSESSMENT — PAIN DESCRIPTION - FREQUENCY: FREQUENCY: CONTINUOUS

## 2024-08-01 ASSESSMENT — PAIN DESCRIPTION - LOCATION: LOCATION: TOE (COMMENT WHICH ONE)

## 2024-08-01 NOTE — ED NOTES
Patient presents with c/o decreased circulation and discoloration to the third right toe. Patient states pain is constant and 10 out of 10. Patient noticed impaired skin integrity approximately 2 months ago and toe has since gotten progressively worse. Patient also has wound present on greater right toe. Patient states pain on the fourth toe is just as severe and swelling of entire foot has increased.

## 2024-08-02 ENCOUNTER — HOSPITAL ENCOUNTER (OUTPATIENT)
Dept: SPEECH THERAPY | Age: 54
Setting detail: THERAPIES SERIES
Discharge: HOME | End: 2024-08-02

## 2024-08-02 NOTE — CONSULTS
[facility]  Podiatric Medicine and Surgery Consultation Note      Flaquita Vyas  Medical record number:  [id number]  Age: 54 y.o.   Gender: female  : 1970  Episode date:  2024    Subjective      History of present illness    Patient is a 54 y.o. female with a history of PVD, CAD, DM type 2, HLD, HTN, and prior left AKA seen in ED today on behalf of Dr Roy. Patient appeared pleasant, was oriented to person, place and time and in no acute distress. Relates that she has a dry gangrenous wound to her right 3rd digit with surrounding redness and swelling. Relates pain to her right forefoot. She is being followed by Gustabo Ferrera in wound care and is scheduled to have vascular studies done this week. She was also recently placed on a course of doxycycline that has since finished. Has been dressing wound twice daily with betadine. Patient denies any N/V/F/C/SOB or CP. No other pedal concerns.              Past medical history        Diagnosis Date    Above knee amputation of left lower extremity (HCC)     Blood circulation, collateral     CAD (coronary artery disease)     Diabetes mellitus (HCC)     Hyperlipidemia     Hypertension     MDRO (multiple drug resistant organisms) resistance     MRSA wound     MI, old 2007    Obesity (BMI 30-39.9)     Status post skin graft 01/15/2014    Amniox - Dr Maddox     Throat cancer ([facility]) 10/2022    squamous cell carcinoma of the epiglottis       Past surgical history        Procedure Laterality Date    ANGIOPLASTY Left 2014    left leg     SECTION  1998    CORONARY ANGIOPLASTY WITH STENT PLACEMENT      [facility]    CORONARY ANGIOPLASTY WITH STENT PLACEMENT      3 stents in [place]    CORONARY ANGIOPLASTY WITH STENT PLACEMENT  2016    debbi, 1 stent to RCA    CT NEEDLE BIOPSY LUNG PERCUTANEOUS  2022    CT NEEDLE BIOPSY LUNG PERCUTANEOUS 2022 [facility] CT SCAN    ESOPHAGOGASTRODUODENOSCOPY  10/06/2022    Annika

## 2024-08-02 NOTE — DISCHARGE INSTRUCTIONS
Call central scheduling in the morning to schedule your DVT study.    Follow-up as directed above.  Make sure you call and keep your appointment with Dr. Doll  Continue your home pain med regimen.

## 2024-08-03 LAB
BACTERIA BLD AEROBE CULT: NORMAL
BACTERIA BLD AEROBE CULT: NORMAL

## 2024-08-05 NOTE — ED PROVIDER NOTES
[facility] EMERGENCY DEPT      EMERGENCY MEDICINE     Pt Name: Flaquita Vyas  MRN: [id number]  Birthdate 1970  Date of evaluation: 8/1/2024  Provider: EVELIO Ely - CNP    CHIEF COMPLAINT       Chief Complaint   Patient presents with    Toe Pain     HISTORY OF PRESENT ILLNESS   Flaquita Vyas is a pleasant 54 y.o. female who presents to the emergency department from home with c/o right foot pain.  The patient is a left AKA.  She has an extensive hx of DM and PAD/PVD.  She reports pain, swelling and a wound on the third toe.  There is also an ulcer on the first tone.  Pain is not controlled by home pain regimen.  Patient takes a lot of pain meds from her PCP.  No fevers but pain is just much worse.  She saw Seth previously.        History is obtained from:  patient  PASTMEDICAL HISTORY     Past Medical History:   Diagnosis Date    Above knee amputation of left lower extremity ([facility])     Blood circulation, collateral     CAD (coronary artery disease)     Diabetes mellitus ([facility])     Hyperlipidemia     Hypertension     MDRO (multiple drug resistant organisms) resistance     MRSA wound 2013    MI, old 01/01/2007    Obesity (BMI 30-39.9)     Status post skin graft 01/15/2014    Amniox - Dr Maddox     Throat cancer ([facility]) 10/2022    squamous cell carcinoma of the epiglottis       Patient Active Problem List   Diagnosis Code    Cellulitis of leg, left L03.116    Gangrene of foot ([facility]) I96    Heel ulcer due to DM ([facility]) E11.621, L97.409    DM type 2 (diabetes mellitus, type 2) ([facility]) E11.9    Cellulitis and abscess of leg, except foot L03.119, L02.419    Attention to G-tube ([facility]) Z43.1    Coronary artery disease involving native coronary artery of native heart with angina pectoris ([facility]) I25.119    Peripheral vascular disease ([facility]) - planned above knee amputation of left leg I73.9    Essential hypertension I10    Multiple open wounds of lower leg  left lower leg S81.809A    Cellulitis of left lower extremity

## 2024-08-06 ENCOUNTER — HOSPITAL ENCOUNTER (OUTPATIENT)
Dept: INTERVENTIONAL RADIOLOGY/VASCULAR | Age: 54
Discharge: HOME | End: 2024-08-08
Payer: MEDICARE

## 2024-08-06 DIAGNOSIS — M79.604 RIGHT LEG PAIN: ICD-10-CM

## 2024-08-06 LAB
BACTERIA BLD AEROBE CULT: NORMAL
BACTERIA BLD AEROBE CULT: NORMAL

## 2024-08-06 PROCEDURE — 93971 EXTREMITY STUDY: CPT

## 2024-08-08 ENCOUNTER — TELEPHONE (OUTPATIENT)
Dept: WOUND CARE | Age: 54
End: 2024-08-08

## 2024-08-08 ENCOUNTER — HOSPITAL ENCOUNTER (OUTPATIENT)
Dept: INTERVENTIONAL RADIOLOGY/VASCULAR | Age: 54
Discharge: HOME | End: 2024-08-08
Attending: RADIOLOGY | Admitting: RADIOLOGY
Payer: MEDICARE

## 2024-08-08 VITALS
DIASTOLIC BLOOD PRESSURE: 77 MMHG | SYSTOLIC BLOOD PRESSURE: 126 MMHG | WEIGHT: 152.34 LBS | HEART RATE: 89 BPM | HEIGHT: 61 IN | RESPIRATION RATE: 24 BRPM | TEMPERATURE: 98.6 F | OXYGEN SATURATION: 95 % | BODY MASS INDEX: 28.76 KG/M2

## 2024-08-08 DIAGNOSIS — L97.519 SKIN ULCER OF THIRD TOE, RIGHT, WITH UNSPECIFIED SEVERITY (HCC): ICD-10-CM

## 2024-08-08 DIAGNOSIS — I73.9 PERIPHERAL VASCULAR DISEASE (HCC): ICD-10-CM

## 2024-08-08 DIAGNOSIS — I73.9 PERIPHERAL ARTERIAL DISEASE (HCC): ICD-10-CM

## 2024-08-08 LAB
APTT PPP: 33.2 SECONDS (ref 22–38)
CREAT SERPL-MCNC: 0.6 MG/DL (ref 0.4–1.2)
DEPRECATED RDW RBC AUTO: 40.3 FL (ref 35–45)
ERYTHROCYTE [DISTWIDTH] IN BLOOD BY AUTOMATED COUNT: 11.9 % (ref 11.5–14.5)
GFR SERPL CREATININE-BSD FRML MDRD: > 90 ML/MIN/1.73M2
HCT VFR BLD AUTO: 34.7 % (ref 37–47)
HGB BLD-MCNC: 11.6 GM/DL (ref 12–16)
INR PPP: 1.21 (ref 0.85–1.13)
MCH RBC QN AUTO: 31.4 PG (ref 26–33)
MCHC RBC AUTO-ENTMCNC: 33.4 GM/DL (ref 32.2–35.5)
MCV RBC AUTO: 94 FL (ref 81–99)
PLATELET # BLD AUTO: 409 THOU/MM3 (ref 130–400)
PMV BLD AUTO: 10.5 FL (ref 9.4–12.4)
RBC # BLD AUTO: 3.69 MILL/MM3 (ref 4.2–5.4)
WBC # BLD AUTO: 7.7 THOU/MM3 (ref 4.8–10.8)

## 2024-08-08 PROCEDURE — 36246 INS CATH ABD/L-EXT ART 2ND: CPT

## 2024-08-08 PROCEDURE — 6360000004 HC RX CONTRAST MEDICATION: Performed by: RADIOLOGY

## 2024-08-08 PROCEDURE — 85610 PROTHROMBIN TIME: CPT

## 2024-08-08 PROCEDURE — 75630 X-RAY AORTA LEG ARTERIES: CPT

## 2024-08-08 PROCEDURE — 2580000003 HC RX 258: Performed by: RADIOLOGY

## 2024-08-08 PROCEDURE — 2709999900 IR ANGIOGRAM EXTREMITY RIGHT

## 2024-08-08 PROCEDURE — 85027 COMPLETE CBC AUTOMATED: CPT

## 2024-08-08 PROCEDURE — 85730 THROMBOPLASTIN TIME PARTIAL: CPT

## 2024-08-08 PROCEDURE — 36415 COLL VENOUS BLD VENIPUNCTURE: CPT

## 2024-08-08 PROCEDURE — 82565 ASSAY OF CREATININE: CPT

## 2024-08-08 PROCEDURE — 6360000002 HC RX W HCPCS: Performed by: RADIOLOGY

## 2024-08-08 PROCEDURE — 2500000003 HC RX 250 WO HCPCS: Performed by: RADIOLOGY

## 2024-08-08 PROCEDURE — 36245 INS CATH ABD/L-EXT ART 1ST: CPT

## 2024-08-08 RX ORDER — LIDOCAINE HYDROCHLORIDE 20 MG/ML
INJECTION, SOLUTION EPIDURAL; INFILTRATION; INTRACAUDAL; PERINEURAL PRN
Status: COMPLETED | OUTPATIENT
Start: 2024-08-08 | End: 2024-08-08

## 2024-08-08 RX ORDER — MIDAZOLAM HYDROCHLORIDE 1 MG/ML
1 INJECTION INTRAMUSCULAR; INTRAVENOUS ONCE
Status: COMPLETED | OUTPATIENT
Start: 2024-08-08 | End: 2024-08-08

## 2024-08-08 RX ORDER — MIDAZOLAM HYDROCHLORIDE 1 MG/ML
INJECTION INTRAMUSCULAR; INTRAVENOUS PRN
Status: COMPLETED | OUTPATIENT
Start: 2024-08-08 | End: 2024-08-08

## 2024-08-08 RX ORDER — SODIUM CHLORIDE 450 MG/100ML
INJECTION, SOLUTION INTRAVENOUS CONTINUOUS
Status: DISCONTINUED | OUTPATIENT
Start: 2024-08-08 | End: 2024-08-08 | Stop reason: HOSPADM

## 2024-08-08 RX ORDER — VERAPAMIL HYDROCHLORIDE 2.5 MG/ML
INJECTION, SOLUTION INTRAVENOUS PRN
Status: COMPLETED | OUTPATIENT
Start: 2024-08-08 | End: 2024-08-08

## 2024-08-08 RX ORDER — DIPHENHYDRAMINE HYDROCHLORIDE 50 MG/ML
50 INJECTION INTRAMUSCULAR; INTRAVENOUS
Status: DISCONTINUED | OUTPATIENT
Start: 2024-08-08 | End: 2024-08-08 | Stop reason: HOSPADM

## 2024-08-08 RX ORDER — FENTANYL CITRATE 50 UG/ML
50 INJECTION, SOLUTION INTRAMUSCULAR; INTRAVENOUS ONCE
Status: COMPLETED | OUTPATIENT
Start: 2024-08-08 | End: 2024-08-08

## 2024-08-08 RX ORDER — FENTANYL CITRATE 50 UG/ML
INJECTION, SOLUTION INTRAMUSCULAR; INTRAVENOUS PRN
Status: COMPLETED | OUTPATIENT
Start: 2024-08-08 | End: 2024-08-08

## 2024-08-08 RX ORDER — HEPARIN SODIUM 1000 [USP'U]/ML
INJECTION, SOLUTION INTRAVENOUS; SUBCUTANEOUS PRN
Status: COMPLETED | OUTPATIENT
Start: 2024-08-08 | End: 2024-08-08

## 2024-08-08 RX ADMIN — FENTANYL CITRATE 25 MCG: 50 INJECTION, SOLUTION INTRAMUSCULAR; INTRAVENOUS at 08:45

## 2024-08-08 RX ADMIN — MIDAZOLAM 0.5 MG: 1 INJECTION INTRAMUSCULAR; INTRAVENOUS at 08:28

## 2024-08-08 RX ADMIN — IOPAMIDOL 80 ML: 612 INJECTION, SOLUTION INTRAVENOUS at 09:22

## 2024-08-08 RX ADMIN — Medication 200 MCG: at 08:55

## 2024-08-08 RX ADMIN — MIDAZOLAM 1 MG: 1 INJECTION INTRAMUSCULAR; INTRAVENOUS at 08:21

## 2024-08-08 RX ADMIN — FENTANYL CITRATE 50 MCG: 50 INJECTION, SOLUTION INTRAMUSCULAR; INTRAVENOUS at 08:21

## 2024-08-08 RX ADMIN — FENTANYL CITRATE 25 MCG: 50 INJECTION, SOLUTION INTRAMUSCULAR; INTRAVENOUS at 08:28

## 2024-08-08 RX ADMIN — HEPARIN SODIUM 2500 UNITS: 1000 INJECTION INTRAVENOUS; SUBCUTANEOUS at 08:55

## 2024-08-08 RX ADMIN — WATER 125 MG: 1 INJECTION INTRAMUSCULAR; INTRAVENOUS; SUBCUTANEOUS at 08:03

## 2024-08-08 RX ADMIN — FENTANYL CITRATE 50 MCG: 50 INJECTION, SOLUTION INTRAMUSCULAR; INTRAVENOUS at 08:11

## 2024-08-08 RX ADMIN — SODIUM CHLORIDE: 4.5 INJECTION, SOLUTION INTRAVENOUS at 06:39

## 2024-08-08 RX ADMIN — MIDAZOLAM 1 MG: 1 INJECTION INTRAMUSCULAR; INTRAVENOUS at 08:11

## 2024-08-08 RX ADMIN — MIDAZOLAM 0.5 MG: 1 INJECTION INTRAMUSCULAR; INTRAVENOUS at 08:45

## 2024-08-08 RX ADMIN — DIPHENHYDRAMINE HYDROCHLORIDE 50 MG: 50 INJECTION INTRAMUSCULAR; INTRAVENOUS at 08:03

## 2024-08-08 RX ADMIN — VERAPAMIL HYDROCHLORIDE 2.5 MG: 2.5 INJECTION, SOLUTION INTRAVENOUS at 08:55

## 2024-08-08 RX ADMIN — LIDOCAINE HYDROCHLORIDE 25 ML: 20 INJECTION, SOLUTION EPIDURAL; INFILTRATION; INTRACAUDAL; PERINEURAL at 09:22

## 2024-08-08 NOTE — TELEPHONE ENCOUNTER
Andie with central scheduling contacted the office, states that Dr. Montoya is requesting an order from Gustabo Ferrera for an arteriogram with pedal access, message left with Gustabo notifying him of request.

## 2024-08-08 NOTE — H&P
[facility]  Sedation/Analgesia History & Physical    Pt Name: Flaquita Vyas  MRN: [id number]  YOB: 1970  Provider Performing Procedure: Mao Montoya MD, MD  Primary Care Physician: Alberto Tang MD    Formulation and discussion of sedation / procedure plans, risks, benefits, side effects and alternatives with patient and/or responsible adult completed.    PRE-PROCEDURE   DNR-CCA/DNR-CC []Yes [x]No  Brief History/Pre-Procedure Diagnosis: peripheral vascular disease           MEDICAL HISTORY  []CAD/Valve  []Liver Disease  []Lung Disease []Diabetes  []Hypertension []Renal Disease  []Additional information:       has a past medical history of Above knee amputation of left lower extremity (HCC), Blood circulation, collateral, CAD (coronary artery disease), Diabetes mellitus (HCC), Hyperlipidemia, Hypertension, MDRO (multiple drug resistant organisms) resistance, MI, old, Obesity (BMI 30-39.9), Status post skin graft, and Throat cancer ([facility]).    SURGICAL HISTORY   has a past surgical history that includes  section (1998); Coronary angioplasty with stent (); Coronary angioplasty with stent; angioplasty (Left, 2014); vascular surgery; other surgical history (2015); other surgical history (Left, 2016); Coronary angioplasty with stent (2016); Esophagogastroduodenoscopy (10/06/2022); Port Surgery (N/A, 2022); Gastrostomy tube placement (N/A, 2022); and CT NEEDLE BIOPSY LUNG PERCUTANEOUS (2022).  Additional information:       ALLERGIES   Allergies as of 2024 - Fully Reviewed 2024   Allergen Reaction Noted    Shellfish-derived products Anaphylaxis 02/15/2016    Shrimp flavor Swelling 2015     Additional information:       MEDICATIONS   Coumadin Use Last 5 Days [x]No []Yes  Antiplatelet drug therapy use last 5 days  [x]No []Yes  Other anticoagulant use last 5 days  [x]No []Yes    Current Facility-Administered  mouth 2 times daily for 180 days. Max Daily Amount: 300 mg 3/20/24 9/16/24  Jazmin Gresham MD PhD   ferrous sulfate 300 (60 Fe) MG/5ML syrup Take 5 mLs by mouth daily 9/12/23 4/26/24  Claudia Gay DO   carbidopa-levodopa (SINEMET)  MG per tablet Take 1 tablet by mouth 3 times daily 9/5/23   Provider, Historical, MD   REPATHA SURECLICK 140 MG/ML SOAJ Inject 2 mLs as directed every 14 days 9/5/23   Melvin Padron MD   KERENDIA 20 MG TABS Take 20 mg by mouth daily 8/16/23   Melvin Padron MD   zolpidem (AMBIEN) 10 MG tablet TAKE 1 TABLET BY MOUTH AT NIGHT 11/8/22   Melvin Padron MD   ALPRAZolam (XANAX) 1 MG tablet Take 1 tablet by mouth as needed. 10/17/22   Melvin Padron MD   oxyCODONE-acetaminophen (PERCOCET) 5-325 MG per tablet Take 2 tablets by mouth every 8 hours as needed for Pain.    Melvin Padron MD   clopidogrel (PLAVIX) 75 MG tablet Take 1 tablet by mouth daily 7/28/22   Melvin Padron MD   fentaNYL 37.5 MCG/HR PT72 Apply 1 patch topically. 10/6/22   Melvin Padron MD   nitroGLYCERIN (NITROSTAT) 0.4 MG SL tablet Place 1 tablet under the tongue every 5 minutes as needed for Chest pain Take 1 tablet for chest pain and repeat after 5 min if no relief, call 911 and take another dose 5 min later. Max of 3 doses in 15 min. 9/6/16   Kary Goncalves MD   metFORMIN (GLUCOPHAGE) 500 MG tablet Take 2 tablets by mouth 2 times daily Resume 8/30 8/29/16   Teddy Fischer MD   atorvastatin (LIPITOR) 20 MG tablet Take 2 tablets by mouth nightly 8/29/16   Teddy Fischer MD   lisinopril (PRINIVIL;ZESTRIL) 2.5 MG tablet Take 1 tablet by mouth daily 8/29/16   Teddy Fischer MD   metoprolol tartrate (LOPRESSOR) 25 MG tablet Take 1 tablet by mouth 2 times daily 8/29/16   Teddy Fischer MD   isosorbide dinitrate (ISORDIL) 20 MG tablet Take 1.5 tablets by mouth daily    Provider, MD Melvin   budesonide-formoterol (SYMBICORT) 160-4.5 MCG/ACT AERO Inhale 2  puffs into the lungs 2 times daily    ProviderMelvin MD   aspirin 81 MG tablet Take 1 tablet by mouth daily    ProviderMelvin MD   albuterol (PROVENTIL HFA) 108 (90 BASE) MCG/ACT inhaler Inhale 2 puffs into the lungs every 4 hours as needed for Wheezing or Shortness of Breath (Space out to every 6 hours as symptoms improve). Space out to every 6 hours as symptoms improve. 7/29/14   Jacinto Quintanilla MD     Additional information:       VITAL SIGNS   Vitals:    08/08/24 0805   BP: (!) 143/75   Pulse: 90   Resp: 27   Temp:    SpO2: 97%       PHYSICAL:   Heart:  [x]Regular rate and rhythm  []Other:    Lungs:  [x]Clear    []Other:    Abdomen: [x]Soft    []Other:    Mental Status: [x]Alert & Oriented  []Other:      PLANNED PROCEDURE   []Biospy [x]Arteriogram              []Drainage   []Mediport Insertion  []Fistulogram []IV access       []Vertebroplasty / Augmentation  []IVC filter []Dialysis catheter []Biliary drainage  []Other: []CAPD Catheter []Nephrostomy Tube / Stent  SEDATION  Planned agent:[x]Midazolam []Meperidine [x]Sublimaze []Dilaudid []Morphine     []Diazepam  []Other:     ASA Classification:  []1 [x]2 []3 []4 []5  Class 1: A normal healthy patient  Class 2: Pt with mild to moderate systemic disease  Class 3: Severe systemic disease or disturbance  Class 4: Severe systemic disorders that are already life threatening.  Class 5: Moribund pt with little chances of survival, for more than 24 hours.  Mallampati I Airway Classification:   []1 [x]2 []3 []4    [x]Pre-procedure diagnostic studies complete and results available.   Comment:    [x]Previous sedation/anesthesia experiences assessed.   Comment:    [x]The patient is an appropriate candidate to undergo the planned procedure sedation and anesthesia. (Refer to nursing sedation/analgesia documentation record)  [x]Formulation and discussion of sedation/procedure plan, risks, and expectations with patient and/or responsible adult

## 2024-08-08 NOTE — OP NOTE
Department of Radiology  Post Procedure Progress Note      Pre-Procedure Diagnosis:  peripheral vascular disease     Procedure Performed:  aortofemoral arteriogram     Anesthesia: local / versed and fentanyl    Findings: successful, no intervention at this time     Immediate Complications:  None    Estimated Blood Loss: minimal    SEE DICTATED PROCEDURE NOTE FOR COMPLETE DETAILS.    Mao Montoya MD   8/8/2024 9:20 AM

## 2024-08-08 NOTE — H&P
Formulation and discussion of sedation / procedure plans, risks, benefits, side effects and alternatives with patient and/or responsible adult completed.    History and Physical reviewed and unchanged.    Electronically signed by Mao Montoya MD on 8/8/24 at 8:12 AM EDT

## 2024-08-08 NOTE — PROGRESS NOTES
0743 Patient received in IR for arteriogram procedure.  0748 This procedure has been fully reviewed with the patient and written informed consent has been obtained.  0756 Patient prepped for procedure.  0817 Procedure started with Dr. Montoya.  0830 Unable to obtain femoral access. Pt left arm prepped for radial access.   0853 Access to left radial artery with use of sonosite.   0918 Procedure completed; patient tolerated well. Vasc band device deployed to left radial artery with 10 ml of air instilled.   0924 Patient on bed; comfort ensured. Left vasc band remains no bleeding or oozing noted.  0927 Dr Montoya assessing patient's right foot with ultrasound.   0930 Report called to Magdalena RINALDI on 2E.  0935 Patient taken to 2E via bed. Left vasc band remains dry and intact with area soft. Pt alert and oriented x3; follows commands. Skin pink, warm, and dry. Respirations easy, regular, and nonlabored.

## 2024-08-08 NOTE — PROGRESS NOTES
0585 Pt arrived via own w/c with family. Admission in progress.   Scabbed area to right big toe, 3rd toe blackened.   0737 Pt to IR via bed with transport.   0940 Pt returned from IR via bed, monitor attached.   1237 Pt discharged in stable condition via own wheelchair with all belongings with son to drive. Armboard and bandaid intact to left wrist puncture site. No bleeding or hematoma to site.

## 2024-08-08 NOTE — DISCHARGE INSTRUCTIONS
Discharge Instructions for Radial Catherization  1.  Take it easy for 3-4 days.  2.  No driving for 2 days.  3.  No lifting of 5 lbs or more for 5 days with the affected arm.  4.  May shower after 24 hours.  5.  Remove arm board after 24 hours.  6.  Apply a band aid to the insertion site daily for 5 days.  Wash site daily with soap and water.  7.  No creams, ointments, or powders near the insertion site.   8.  No tub baths, swimming, hot tubs, or hand washing dishes for 1 week.  9.  Watch for signs of infection (redness, warmth, swelling, or pus drainage) or coolness of extremity and call physician if this occurs  10.  If bleeding occurs from insertion site, apply pressure and call 911.   11. Watch for numbness/tingling- if this occurs go to the Emergency Room immediately.  12. Apply Ice for 15 minutes with an hour break in between and alternate with heat compress for 15 minutes to reduce swelling for 3-5 days.

## 2024-08-12 ENCOUNTER — HOSPITAL ENCOUNTER (OUTPATIENT)
Dept: WOUND CARE | Age: 54
Discharge: HOME | End: 2024-08-12
Attending: NURSE PRACTITIONER
Payer: MEDICARE

## 2024-08-12 VITALS
SYSTOLIC BLOOD PRESSURE: 133 MMHG | OXYGEN SATURATION: 95 % | RESPIRATION RATE: 20 BRPM | TEMPERATURE: 97.8 F | DIASTOLIC BLOOD PRESSURE: 79 MMHG | HEART RATE: 111 BPM

## 2024-08-12 DIAGNOSIS — I96 GANGRENE OF FOOT (HCC): ICD-10-CM

## 2024-08-12 DIAGNOSIS — I73.9 PERIPHERAL VASCULAR DISEASE (HCC): ICD-10-CM

## 2024-08-12 DIAGNOSIS — I73.9 PERIPHERAL ARTERIAL DISEASE (HCC): ICD-10-CM

## 2024-08-12 DIAGNOSIS — L97.519 SKIN ULCER OF THIRD TOE, RIGHT, WITH UNSPECIFIED SEVERITY (HCC): Primary | ICD-10-CM

## 2024-08-12 PROCEDURE — 99214 OFFICE O/P EST MOD 30 MIN: CPT

## 2024-08-12 RX ORDER — TRIAMCINOLONE ACETONIDE 1 MG/G
OINTMENT TOPICAL ONCE
OUTPATIENT
Start: 2024-08-12 | End: 2024-08-12

## 2024-08-12 RX ORDER — CLOBETASOL PROPIONATE 0.5 MG/G
OINTMENT TOPICAL ONCE
OUTPATIENT
Start: 2024-08-12 | End: 2024-08-12

## 2024-08-12 RX ORDER — GINSENG 100 MG
CAPSULE ORAL ONCE
OUTPATIENT
Start: 2024-08-12 | End: 2024-08-12

## 2024-08-12 RX ORDER — LIDOCAINE HYDROCHLORIDE 40 MG/ML
SOLUTION TOPICAL ONCE
OUTPATIENT
Start: 2024-08-12 | End: 2024-08-12

## 2024-08-12 RX ORDER — LIDOCAINE 50 MG/G
OINTMENT TOPICAL ONCE
OUTPATIENT
Start: 2024-08-12 | End: 2024-08-12

## 2024-08-12 RX ORDER — LIDOCAINE HYDROCHLORIDE 20 MG/ML
JELLY TOPICAL ONCE
OUTPATIENT
Start: 2024-08-12 | End: 2024-08-12

## 2024-08-12 RX ORDER — LIDOCAINE 40 MG/G
CREAM TOPICAL ONCE
OUTPATIENT
Start: 2024-08-12 | End: 2024-08-12

## 2024-08-12 RX ORDER — GENTAMICIN SULFATE 1 MG/G
OINTMENT TOPICAL ONCE
OUTPATIENT
Start: 2024-08-12 | End: 2024-08-12

## 2024-08-12 RX ORDER — SODIUM CHLOR/HYPOCHLOROUS ACID 0.033 %
SOLUTION, IRRIGATION IRRIGATION ONCE
OUTPATIENT
Start: 2024-08-12 | End: 2024-08-12

## 2024-08-12 RX ORDER — IBUPROFEN 200 MG
TABLET ORAL ONCE
OUTPATIENT
Start: 2024-08-12 | End: 2024-08-12

## 2024-08-12 RX ORDER — BETAMETHASONE DIPROPIONATE 0.5 MG/G
CREAM TOPICAL ONCE
OUTPATIENT
Start: 2024-08-12 | End: 2024-08-12

## 2024-08-12 RX ORDER — BACITRACIN ZINC AND POLYMYXIN B SULFATE 500; 1000 [USP'U]/G; [USP'U]/G
OINTMENT TOPICAL ONCE
OUTPATIENT
Start: 2024-08-12 | End: 2024-08-12

## 2024-08-12 ASSESSMENT — PAIN DESCRIPTION - LOCATION: LOCATION: FOOT

## 2024-08-12 ASSESSMENT — PAIN DESCRIPTION - ORIENTATION: ORIENTATION: RIGHT

## 2024-08-12 ASSESSMENT — PAIN SCALES - GENERAL: PAINLEVEL_OUTOF10: 9

## 2024-08-12 NOTE — PATIENT INSTRUCTIONS
Visit Discharge/Physician Orders:  - Take antibiotics as prescribed, new prescription sent into pharmacy.  - Arteriogram ordered - August 27th, arrive at 7:30 am to [address] [place] [address] at [facility]. Nothing to eat or drink 4 hours prior. Aspirin and plavix are okay but no other blood thinners 5 days prior to procedure. Need a .     Wound Location: Right toes     Dressing orders:     1) Gather wound care supplies and arrange on clean table.     2) Wash your hands with soap and water or use alcohol based hand  for 20 seconds (sing \"Happy Birthday\" twice).    3) Cleanse wounds with normal saline or wound cleanser and gauze. Pat dry with clean gauze.    4) Right toes- Apply betadine moist gauze then dry gauze dressing. Change daily     Keep all dressings clean & dry.    Follow up visit:  Monday September 9th at 10:30 am     Supplies:  - Mechanical Debridement was completed today by using a saline and gauze.     Duration of dressings: 30 days    We have sent your supply order to the following company:  [employer] Phone # [phone]   If you don't receive the items you were expecting or don't know what the items are that you received, call the company where the order was sent.   If you are unable to obtain wound supplies, continue to use the supplies you have available until you are able to reach us. It is most important to keep the wound covered at all times.  It is YOUR responsibility to make sure that supplies are re-ordered before you run out. Re-order telephone numbers are included in each package.     Keep next scheduled appointment. Please give 24 hour notice if unable to keep appointment. [phone]    If you experience any of the following, please call the Wound Care Service during business hours: Monday through Friday 8:00 am - 4:30 pm  ([phone]).   *Increase in pain   *Temperature over 101   *Increase in drainage from your wound or a foul odor   *Uncontrolled swelling   *Need for compression

## 2024-08-12 NOTE — PLAN OF CARE
Problem: Wound:  Goal: Will show signs of wound healing; wound closure and no evidence of infection  Description: Will show signs of wound healing; wound closure and no evidence of infection  Outcome: Progressing   Patient presents to wound clinic for follow up of right toe wounds. Discharge instructions/dressing orders per AVS. Follow up appointment scheduled.    Care plan reviewed with patient and family.  Patient and family verbalize understanding of the plan of care and contribute to goal setting.

## 2024-08-12 NOTE — PROGRESS NOTES
[facility]         Consult and Procedure Note      Flaquita Vyas  MEDICAL RECORD NUMBER:  [id number]  AGE: 54 y.o.   GENDER: female  : 1970  EPISODE DATE:  2024    Subjective:     Chief Complaint   Patient presents with    Wound Check     Right foot         HISTORY of PRESENT ILLNESS HPI     Flaquita Vyas is a 54 y.o. female referred by Dr. Tang due to ongoing ulcerations of the right lower extremity.  Patient does have a prior history of a left above-the-knee amputation.  Patient reported multiple ulcerations of the left leg with failed IV antibiotics x 3 months as the reason she ended up having a left above-the-knee amputation.  Patient has dry gangrene of the right third toe with an area also on the great toe.  Patient previously had a lateral right heel ulcer previously but now has a stable scab.  There is no actual drainage noted today.  There is redness and swelling noted to the right second and third toe.  Patient recently had an x-ray at [facility] of the right lower extremity.  No evidence of osteomyelitis noted.  There was significant calcification noted due to underlying atherosclerotic buildup.  Patient has diminished pulses in office.  Patient will be sent out for updated ABIs to assess for blood flow of the right lower extremity.  Patient will be started on doxycycline today.  Patient to treat dry gangrene areas with Betadine twice daily.  Patient will follow back up with us again in 2 to 3 weeks for reevaluation.    24  Patient is a pleasant 54-year-old female following up for significant PAD of the right lower extremity with dry gangrene still noted to toes 2 and 3 of the right foot.  Patient recently was seen by Dr. Montoya for an aortofemoral angiogram with runoff procedure.  Total occlusion left common femoral artery and left external iliac artery. Short segment 90% stenosis at the origin of the left internal iliac artery. Total occlusion

## 2024-08-12 NOTE — PROGRESS NOTES
Wound Care Supplies      Supply Company:     [employer] [address] [place] [address] f: [phone] f: [phone] p: [phone] [email or web address]      Ordering Center:   [facility]  [address] [facility] [address] [place] [address]  [phone]  WOUND CARE Dept: [phone]   FAX NUMBER [phone]    Patient Information:      Flaquita Vyas  [address] [place] [address]   [phone]   : 1970  AGE: 54 y.o.     GENDER: female   EPISODE DATE: 2024    Insurance:      PRIMARY INSURANCE:  Plan: HUMANA GOLD PLUS HMO  Coverage: HUMANA MEDICARE  Effective Date: 2023  Group Number: [unfilled]  Subscriber Number: [id number] - (Medicare Managed)    Payer/Plan Subscr  Sex Relation Sub. Ins. ID Effective Group Num   1. HUMANA MEDICA* FLAQUITA VYAS 1970 Female Self [id number]                                   [address] [address]   2. [place] -* FLAQUITA VYAS 1970 Female Self [id number]                                    [address]       Patient Wound Information:      Problem List Items Addressed This Visit          Circulatory    Peripheral vascular disease (HCC) - planned above knee amputation of left leg    Relevant Orders    IR INTERVENTIONAL RADIOLOGY PROCEDURE REQUEST       Other    Gangrene of foot (HCC)    Relevant Orders    IR INTERVENTIONAL RADIOLOGY PROCEDURE REQUEST    Skin ulcer of third toe of right foot (HCC) - Primary    Relevant Orders    IR INTERVENTIONAL RADIOLOGY PROCEDURE REQUEST    Initiate Outpatient Wound Care Protocol     Other Visit Diagnoses       Peripheral arterial disease (HCC)        Relevant Orders    IR INTERVENTIONAL RADIOLOGY PROCEDURE REQUEST            WOUNDS REQUIRING DRESSING SUPPLIES:     Wound 24 Toe (Comment  which one) Right great toe (Active)   Wound Image   24 1051   Wound Etiology Arterial 24 1051   Dressing Status New dressing applied 24 1051   Wound Cleansed

## 2024-08-27 ENCOUNTER — HOSPITAL ENCOUNTER (OUTPATIENT)
Dept: INTERVENTIONAL RADIOLOGY/VASCULAR | Age: 54
Discharge: HOME | End: 2024-08-27
Attending: RADIOLOGY | Admitting: RADIOLOGY
Payer: MEDICARE

## 2024-08-27 VITALS
WEIGHT: 150 LBS | SYSTOLIC BLOOD PRESSURE: 120 MMHG | RESPIRATION RATE: 18 BRPM | DIASTOLIC BLOOD PRESSURE: 70 MMHG | HEIGHT: 61 IN | OXYGEN SATURATION: 98 % | TEMPERATURE: 98.4 F | HEART RATE: 88 BPM | BODY MASS INDEX: 28.32 KG/M2

## 2024-08-27 DIAGNOSIS — L97.519 SKIN ULCER OF THIRD TOE, RIGHT, WITH UNSPECIFIED SEVERITY (HCC): ICD-10-CM

## 2024-08-27 DIAGNOSIS — I73.9 PERIPHERAL VASCULAR DISEASE (HCC): ICD-10-CM

## 2024-08-27 DIAGNOSIS — I73.9 PERIPHERAL ARTERIAL DISEASE (HCC): ICD-10-CM

## 2024-08-27 DIAGNOSIS — I96 GANGRENE OF FOOT (HCC): ICD-10-CM

## 2024-08-27 LAB
APTT PPP: 31.5 SECONDS (ref 22–38)
B-HCG SERPL QL: NEGATIVE
CREAT SERPL-MCNC: 1 MG/DL (ref 0.4–1.2)
DEPRECATED RDW RBC AUTO: 40.8 FL (ref 35–45)
ERYTHROCYTE [DISTWIDTH] IN BLOOD BY AUTOMATED COUNT: 11.9 % (ref 11.5–14.5)
GFR SERPL CREATININE-BSD FRML MDRD: 67 ML/MIN/1.73M2
GLUCOSE BLD STRIP.AUTO-MCNC: 300 MG/DL (ref 70–108)
HCT VFR BLD AUTO: 35.1 % (ref 37–47)
HGB BLD-MCNC: 11.4 GM/DL (ref 12–16)
INR PPP: 1.2 (ref 0.85–1.13)
MCH RBC QN AUTO: 30.6 PG (ref 26–33)
MCHC RBC AUTO-ENTMCNC: 32.5 GM/DL (ref 32.2–35.5)
MCV RBC AUTO: 94.1 FL (ref 81–99)
PLATELET # BLD AUTO: 433 THOU/MM3 (ref 130–400)
PMV BLD AUTO: 10.4 FL (ref 9.4–12.4)
RBC # BLD AUTO: 3.73 MILL/MM3 (ref 4.2–5.4)
WBC # BLD AUTO: 7.4 THOU/MM3 (ref 4.8–10.8)

## 2024-08-27 PROCEDURE — 6360000004 HC RX CONTRAST MEDICATION: Performed by: RADIOLOGY

## 2024-08-27 PROCEDURE — 85027 COMPLETE CBC AUTOMATED: CPT

## 2024-08-27 PROCEDURE — 2580000003 HC RX 258: Performed by: RADIOLOGY

## 2024-08-27 PROCEDURE — C1725 CATH, TRANSLUMIN NON-LASER: HCPCS

## 2024-08-27 PROCEDURE — 82948 REAGENT STRIP/BLOOD GLUCOSE: CPT

## 2024-08-27 PROCEDURE — 6360000002 HC RX W HCPCS: Performed by: RADIOLOGY

## 2024-08-27 PROCEDURE — 2709999900 IR ANGIOGRAM EXTREMITY RIGHT

## 2024-08-27 PROCEDURE — 82565 ASSAY OF CREATININE: CPT

## 2024-08-27 PROCEDURE — 2500000003 HC RX 250 WO HCPCS: Performed by: RADIOLOGY

## 2024-08-27 PROCEDURE — 85730 THROMBOPLASTIN TIME PARTIAL: CPT

## 2024-08-27 PROCEDURE — 37224 HC FEM POP TERRITORY PLASTY: CPT

## 2024-08-27 PROCEDURE — 85610 PROTHROMBIN TIME: CPT

## 2024-08-27 PROCEDURE — 36415 COLL VENOUS BLD VENIPUNCTURE: CPT

## 2024-08-27 PROCEDURE — 37228 HC TIB PER TERRITORY PLASTY: CPT

## 2024-08-27 PROCEDURE — 84703 CHORIONIC GONADOTROPIN ASSAY: CPT

## 2024-08-27 RX ORDER — FENTANYL CITRATE 50 UG/ML
INJECTION, SOLUTION INTRAMUSCULAR; INTRAVENOUS PRN
Status: COMPLETED | OUTPATIENT
Start: 2024-08-27 | End: 2024-08-27

## 2024-08-27 RX ORDER — VERAPAMIL HYDROCHLORIDE 2.5 MG/ML
INJECTION, SOLUTION INTRAVENOUS PRN
Status: COMPLETED | OUTPATIENT
Start: 2024-08-27 | End: 2024-08-27

## 2024-08-27 RX ORDER — HEPARIN SODIUM 1000 [USP'U]/ML
INJECTION, SOLUTION INTRAVENOUS; SUBCUTANEOUS PRN
Status: COMPLETED | OUTPATIENT
Start: 2024-08-27 | End: 2024-08-27

## 2024-08-27 RX ORDER — MIDAZOLAM HYDROCHLORIDE 1 MG/ML
INJECTION INTRAMUSCULAR; INTRAVENOUS PRN
Status: COMPLETED | OUTPATIENT
Start: 2024-08-27 | End: 2024-08-27

## 2024-08-27 RX ORDER — FENTANYL CITRATE 50 UG/ML
50 INJECTION, SOLUTION INTRAMUSCULAR; INTRAVENOUS ONCE
Status: DISCONTINUED | OUTPATIENT
Start: 2024-08-27 | End: 2024-08-27 | Stop reason: HOSPADM

## 2024-08-27 RX ORDER — DIPHENHYDRAMINE HYDROCHLORIDE 50 MG/ML
50 INJECTION INTRAMUSCULAR; INTRAVENOUS ONCE
Status: COMPLETED | OUTPATIENT
Start: 2024-08-27 | End: 2024-08-27

## 2024-08-27 RX ORDER — LIDOCAINE HYDROCHLORIDE 20 MG/ML
INJECTION, SOLUTION EPIDURAL; INFILTRATION; INTRACAUDAL; PERINEURAL PRN
Status: COMPLETED | OUTPATIENT
Start: 2024-08-27 | End: 2024-08-27

## 2024-08-27 RX ORDER — SODIUM CHLORIDE 450 MG/100ML
INJECTION, SOLUTION INTRAVENOUS CONTINUOUS
Status: DISCONTINUED | OUTPATIENT
Start: 2024-08-27 | End: 2024-08-27 | Stop reason: HOSPADM

## 2024-08-27 RX ORDER — IOPAMIDOL 612 MG/ML
100 INJECTION, SOLUTION INTRAVASCULAR ONCE
Status: DISCONTINUED | OUTPATIENT
Start: 2024-08-27 | End: 2024-08-27 | Stop reason: HOSPADM

## 2024-08-27 RX ORDER — IOPAMIDOL 612 MG/ML
INJECTION, SOLUTION INTRAVASCULAR PRN
Status: COMPLETED | OUTPATIENT
Start: 2024-08-27 | End: 2024-08-27

## 2024-08-27 RX ORDER — MIDAZOLAM HYDROCHLORIDE 1 MG/ML
1 INJECTION INTRAMUSCULAR; INTRAVENOUS ONCE
Status: DISCONTINUED | OUTPATIENT
Start: 2024-08-27 | End: 2024-08-27 | Stop reason: HOSPADM

## 2024-08-27 RX ADMIN — LIDOCAINE HYDROCHLORIDE 2 ML: 20 INJECTION, SOLUTION EPIDURAL; INFILTRATION; INTRACAUDAL; PERINEURAL at 11:12

## 2024-08-27 RX ADMIN — FENTANYL CITRATE 50 MCG: 50 INJECTION, SOLUTION INTRAMUSCULAR; INTRAVENOUS at 09:57

## 2024-08-27 RX ADMIN — Medication 200 MCG: at 10:08

## 2024-08-27 RX ADMIN — VERAPAMIL HYDROCHLORIDE 2.5 MG: 2.5 INJECTION, SOLUTION INTRAVENOUS at 10:08

## 2024-08-27 RX ADMIN — HEPARIN SODIUM 1000 UNITS: 1000 INJECTION INTRAVENOUS; SUBCUTANEOUS at 10:24

## 2024-08-27 RX ADMIN — MIDAZOLAM 1 MG: 1 INJECTION INTRAMUSCULAR; INTRAVENOUS at 09:57

## 2024-08-27 RX ADMIN — HEPARIN SODIUM 1000 UNITS: 1000 INJECTION INTRAVENOUS; SUBCUTANEOUS at 11:04

## 2024-08-27 RX ADMIN — SODIUM CHLORIDE: 4.5 INJECTION, SOLUTION INTRAVENOUS at 08:22

## 2024-08-27 RX ADMIN — MIDAZOLAM 1 MG: 1 INJECTION INTRAMUSCULAR; INTRAVENOUS at 10:42

## 2024-08-27 RX ADMIN — FENTANYL CITRATE 50 MCG: 50 INJECTION, SOLUTION INTRAMUSCULAR; INTRAVENOUS at 10:42

## 2024-08-27 RX ADMIN — WATER 125 MG: 1 INJECTION INTRAMUSCULAR; INTRAVENOUS; SUBCUTANEOUS at 08:51

## 2024-08-27 RX ADMIN — HEPARIN SODIUM 2720 UNITS: 1000 INJECTION INTRAVENOUS; SUBCUTANEOUS at 10:08

## 2024-08-27 RX ADMIN — IOPAMIDOL 65 ML: 612 INJECTION, SOLUTION INTRAVENOUS at 11:12

## 2024-08-27 RX ADMIN — DIPHENHYDRAMINE HYDROCHLORIDE 50 MG: 50 INJECTION INTRAMUSCULAR; INTRAVENOUS at 08:59

## 2024-08-27 NOTE — H&P
Formulation and discussion of sedation / procedure plans, risks, benefits, side effects and alternatives with patient and/or responsible adult completed.    History and Physical reviewed and unchanged.    Electronically signed by Mao Montoya MD on 8/27/24 at 7:43 AM EDT

## 2024-08-27 NOTE — FLOWSHEET NOTE
08/27/24 1445   AVS Reviewed   AVS & discharge instructions reviewed with patient and/or representative? Yes   Reviewed instructions with Patient;Other (name and relationship in comment)  (friend. Liam)   Level of Understanding Questions answered;Teach back completed;Verbalized understanding

## 2024-08-27 NOTE — H&P
[facility]  Sedation/Analgesia History & Physical    Pt Name: Flaquita Vyas  MRN: [id number]  YOB: 1970  Provider Performing Procedure: Mao Montoya MD, MD  Primary Care Physician: Alberto Tang MD    Formulation and discussion of sedation / procedure plans, risks, benefits, side effects and alternatives with patient and/or responsible adult completed.    PRE-PROCEDURE   DNR-CCA/DNR-CC []Yes [x]No  Brief History/Pre-Procedure Diagnosis: peripheral vascular disease           MEDICAL HISTORY  []CAD/Valve  []Liver Disease  []Lung Disease []Diabetes  []Hypertension []Renal Disease  []Additional information:       has a past medical history of Above knee amputation of left lower extremity (HCC), Blood circulation, collateral, CAD (coronary artery disease), Diabetes mellitus (HCC), Hyperlipidemia, Hypertension, MDRO (multiple drug resistant organisms) resistance, MI, old, Obesity (BMI 30-39.9), Status post skin graft, and Throat cancer ([facility]).    SURGICAL HISTORY   has a past surgical history that includes  section (1998); Coronary angioplasty with stent (); Coronary angioplasty with stent; angioplasty (Left, 2014); vascular surgery; other surgical history (2015); other surgical history (Left, 2016); Coronary angioplasty with stent (2016); Esophagogastroduodenoscopy (10/06/2022); Port Surgery (N/A, 2022); Gastrostomy tube placement (N/A, 2022); and CT NEEDLE BIOPSY LUNG PERCUTANEOUS (2022).  Additional information:       ALLERGIES   Allergies as of 2024 - Fully Reviewed 2024   Allergen Reaction Noted    Shellfish-derived products Anaphylaxis 02/15/2016    Shrimp flavor Swelling 2015     Additional information:       MEDICATIONS   Coumadin Use Last 5 Days [x]No []Yes  Antiplatelet drug therapy use last 5 days  []No [x]Yes  Other anticoagulant use last 5 days  [x]No []Yes    Current Facility-Administered

## 2024-08-27 NOTE — PROGRESS NOTES
0730 Pt admitted to  2E07 per her motor wheelchair for peripheral angiogram.  Pt NPO. Patient accompanied by friend, Ladarius.  Brought here by , Tito her cousin  Vital signs obtained.  Assessment and data collection initiated.   Oriented to room.   Policies and procedures for 2E explained   All questions answered with no further questions at this time.   Fall prevention and safety precautions discussed with patient.   Right second and third toe is necrotic / black

## 2024-08-27 NOTE — PROGRESS NOTES
0925 Patient received in IR for arteriogram procedure. Family stayed on 2E.  0932 This procedure has been fully reviewed with the patient and written informed consent has been obtained.  0944 Patient prepped for procedure.  0959 Procedure started with Dr. Montoya.  1004 Access to right pedal artery with use of sonosite.   1020 Angioplasty of common femoral/SFA using Sapello 18 4 x 200 balloon.   1023 Angioplasty of SFA using Sapello 18 4 x 200 balloon.   1035 Angioplasty of anterior tibial using Sapello 18 2 x 200 balloon.   1040 Angioplasty of anterior tibial using Sapello 18 3 x 200 balloon.   1044 Angioplasty of SFA using Sapello 18 5 x 200 balloon.   1058 Angioplasty of anterior tibial using Sapello 18 4 x 40 balloon.   1112 Procedure completed; patient tolerated well.   1115 Sheath removed and vasc band applied with 8ml air instilled.    1123 Patient on bed; comfort ensured. Right foot vasc band without oozing or bleeding noted.   1128 Patient taken to 2E via bed. Right foot vasc band intact with no oozing or bleeding noted. Pt alert and oriented x3; follows commands. Skin pink, warm, and dry. Respirations easy, regular, and nonlabored. Bedside report given to Gaiv RINALDI on 2E.

## 2024-08-27 NOTE — OP NOTE
Department of Radiology  Post Procedure Progress Note      Pre-Procedure Diagnosis:  peripheral vascular disease , gangrenous toes right foot     Procedure Performed:  right leg angio, recanalize and angioplasty occluded SFA via pedal approach     Anesthesia: local / versed and fentanyl    Findings: successful    Immediate Complications:  None    Estimated Blood Loss: minimal    SEE DICTATED PROCEDURE NOTE FOR COMPLETE DETAILS.    Mao Montoya MD   8/27/2024 11:13 AM

## 2024-08-27 NOTE — DISCHARGE INSTRUCTIONS
Minimal activity for the rest of the day.  May resume moderate to normal activity the following day, no heavy lifting (10 lbs or more) for 48 hours.      POST RADIAL  Take it easy for 3-4 days and limit vigorous activity (contact sports) for 2 weeks after the procedure.   No driving for 2 days after the procedure.  No lifting of 5 lbs. or more for 5 days with the affected arm.  You can shower after 24 hours. Remove the band aid after 24 hours.   Apply a clean band aid to the insertion site for 5 days after the procedure. Wash the site with soap and water daily.  No creams, ointments, or powders near the insertion site.   No bath tubs, swimming, hot tubs or hand - washing dishes for 1 week after the procedure.  Watch for signs of infection (redness, warmth, swelling, pus drainage) or if there is coolness of the extremity. Call the physician if this occurs.   If there is bleeding from the incision, apply pressure to it and call 911.  Watch for numbness / tingling. If this occurs, go to the Emergency Room as soon as possible.           this.     Put ice or a cold pack on the area for 10 to 20 minutes at a time to help with soreness or swelling. Put a thin cloth between the ice and your skin.     You may shower 24 to 48 hours after the procedure, if your doctor okays it. Pat the incision dry.     Do not soak the catheter site until it is healed. Don't take a bath for 1 week, or until your doctor tells you it is okay.     Watch for bleeding from the site. A small amount of blood (up to the size of a quarter) on the bandage can be normal.     If you are bleeding, lie down and press on the area for 15 minutes to try to make it stop. If the bleeding does not stop, call your doctor or seek immediate medical care.   Follow-up care is a key part of your treatment and safety. Be sure to make and go to all appointments, and call your doctor if you are having problems. It's also a good idea to know your test results and keep a list of the medicines you take.  When should you call for help?   Call 911 anytime you think you may need emergency care. For example, call if:    You passed out (lost consciousness).     You have severe trouble breathing.     You have sudden chest pain and shortness of breath, or you cough up blood.   Call your doctor now or seek immediate medical care if:    You are bleeding from the area where the catheter was put in your artery.     You have a fast-growing, painful lump at the catheter site.     You have signs of infection, such as:  Increased pain, swelling, warmth, or redness.  Red streaks leading from the incision.  Pus draining from the incision.  A fever.   Watch closely for any changes in your health, and be sure to contact your doctor if:    You don't get better as expected.   Where can you learn more?  Go to https://www.[employer].net/patientEd and enter F688 to learn more about \"Angiogram: What to Expect at Home.\"  Current as of: July 26, 2023  Content Version: 14.1  © 0849-1282 Healthwise, [facility].   Care

## 2024-08-29 ENCOUNTER — HOSPITAL ENCOUNTER (OUTPATIENT)
Dept: INTERVENTIONAL RADIOLOGY/VASCULAR | Age: 54
Discharge: HOME | End: 2024-08-29
Payer: MEDICARE

## 2024-08-29 ENCOUNTER — HOSPITAL ENCOUNTER (OUTPATIENT)
Dept: INTERVENTIONAL RADIOLOGY/VASCULAR | Age: 54
Discharge: HOME | End: 2024-08-31
Payer: MEDICARE

## 2024-08-29 DIAGNOSIS — I73.9 PERIPHERAL VASCULAR DISEASE (HCC): ICD-10-CM

## 2024-08-29 DIAGNOSIS — I73.9 PERIPHERAL ARTERIAL DISEASE (HCC): ICD-10-CM

## 2024-08-29 DIAGNOSIS — I73.9 PVD (PERIPHERAL VASCULAR DISEASE) (HCC): ICD-10-CM

## 2024-08-29 PROCEDURE — 99212 OFFICE O/P EST SF 10 MIN: CPT

## 2024-08-29 PROCEDURE — 93971 EXTREMITY STUDY: CPT

## 2024-09-06 ENCOUNTER — TELEPHONE (OUTPATIENT)
Dept: WOUND CARE | Age: 54
End: 2024-09-06

## 2024-09-06 NOTE — TELEPHONE ENCOUNTER
Gene called regarding patients toes worsening. He states the second and third toes are black and it has now extended to the great toe and he said it looks like the skin is being eaten away. He said there is warmth, redness, swelling and a foul odor. Called Gustabo Ferrera CNP and he advised patient go to the ER for evaluation. Updated Gene of this and he said they will bring patient in to ER.

## 2024-09-09 ENCOUNTER — HOSPITAL ENCOUNTER (INPATIENT)
Age: 54
LOS: 4 days | Discharge: HOME HEALTH | End: 2024-09-13
Attending: EMERGENCY MEDICINE | Admitting: STUDENT IN AN ORGANIZED HEALTH CARE EDUCATION/TRAINING PROGRAM
Payer: MEDICARE

## 2024-09-09 ENCOUNTER — APPOINTMENT (OUTPATIENT)
Dept: GENERAL RADIOLOGY | Age: 54
End: 2024-09-09
Payer: MEDICARE

## 2024-09-09 ENCOUNTER — HOSPITAL ENCOUNTER (OUTPATIENT)
Dept: WOUND CARE | Age: 54
Discharge: HOME | End: 2024-09-09
Attending: NURSE PRACTITIONER

## 2024-09-09 DIAGNOSIS — I96 GANGRENE OF TOE OF RIGHT FOOT (HCC): Primary | ICD-10-CM

## 2024-09-09 DIAGNOSIS — I73.9 PVD (PERIPHERAL VASCULAR DISEASE) (HCC): ICD-10-CM

## 2024-09-09 LAB
ALBUMIN SERPL BCG-MCNC: 3.3 G/DL (ref 3.5–5.1)
ALP SERPL-CCNC: 139 U/L (ref 38–126)
ALT SERPL W/O P-5'-P-CCNC: 11 U/L (ref 11–66)
ANION GAP SERPL CALC-SCNC: 12 MEQ/L (ref 8–16)
AST SERPL-CCNC: 12 U/L (ref 5–40)
BASOPHILS ABSOLUTE: 0.1 THOU/MM3 (ref 0–0.1)
BASOPHILS NFR BLD AUTO: 0.7 %
BILIRUB CONJ SERPL-MCNC: < 0.1 MG/DL (ref 0.1–13.8)
BILIRUB SERPL-MCNC: 0.2 MG/DL (ref 0.3–1.2)
BUN SERPL-MCNC: 12 MG/DL (ref 7–22)
CALCIUM SERPL-MCNC: 9.1 MG/DL (ref 8.5–10.5)
CHLORIDE SERPL-SCNC: 99 MEQ/L (ref 98–111)
CO2 SERPL-SCNC: 29 MEQ/L (ref 23–33)
CREAT SERPL-MCNC: 0.8 MG/DL (ref 0.4–1.2)
CRP SERPL-MCNC: 2.52 MG/DL (ref 0–1)
DEPRECATED MEAN GLUCOSE BLD GHB EST-ACNC: 288 MG/DL (ref 70–126)
DEPRECATED RDW RBC AUTO: 42 FL (ref 35–45)
EOSINOPHIL NFR BLD AUTO: 4.2 %
EOSINOPHILS ABSOLUTE: 0.3 THOU/MM3 (ref 0–0.4)
ERYTHROCYTE [DISTWIDTH] IN BLOOD BY AUTOMATED COUNT: 12.3 % (ref 11.5–14.5)
ERYTHROCYTE [SEDIMENTATION RATE] IN BLOOD BY WESTERGREN METHOD: 100 MM/HR (ref 0–20)
GFR SERPL CREATININE-BSD FRML MDRD: 88 ML/MIN/1.73M2
GLUCOSE BLD STRIP.AUTO-MCNC: 203 MG/DL (ref 70–108)
GLUCOSE BLD STRIP.AUTO-MCNC: 228 MG/DL (ref 70–108)
GLUCOSE SERPL-MCNC: 215 MG/DL (ref 70–108)
HBA1C MFR BLD HPLC: 11.6 % (ref 4.4–6.4)
HCT VFR BLD AUTO: 35 % (ref 37–47)
HGB BLD-MCNC: 11.2 GM/DL (ref 12–16)
IMM GRANULOCYTES # BLD AUTO: 0.01 THOU/MM3 (ref 0–0.07)
IMM GRANULOCYTES NFR BLD AUTO: 0.1 %
LACTIC ACID, SEPSIS: 1.2 MMOL/L (ref 0.5–1.9)
LYMPHOCYTES ABSOLUTE: 1.2 THOU/MM3 (ref 1–4.8)
LYMPHOCYTES NFR BLD AUTO: 16.6 %
MCH RBC QN AUTO: 29.7 PG (ref 26–33)
MCHC RBC AUTO-ENTMCNC: 32 GM/DL (ref 32.2–35.5)
MCV RBC AUTO: 92.8 FL (ref 81–99)
MONOCYTES ABSOLUTE: 0.5 THOU/MM3 (ref 0.4–1.3)
MONOCYTES NFR BLD AUTO: 6.3 %
NEUTROPHILS ABSOLUTE: 5.2 THOU/MM3 (ref 1.8–7.7)
NEUTROPHILS NFR BLD AUTO: 72.1 %
NRBC BLD AUTO-RTO: 0 /100 WBC
OSMOLALITY SERPL CALC.SUM OF ELEC: 285.6 MOSMOL/KG (ref 275–300)
PLATELET # BLD AUTO: 385 THOU/MM3 (ref 130–400)
PMV BLD AUTO: 10.6 FL (ref 9.4–12.4)
POTASSIUM SERPL-SCNC: 3.7 MEQ/L (ref 3.5–5.2)
PROT SERPL-MCNC: 6.6 G/DL (ref 6.1–8)
RBC # BLD AUTO: 3.77 MILL/MM3 (ref 4.2–5.4)
SODIUM SERPL-SCNC: 140 MEQ/L (ref 135–145)
WBC # BLD AUTO: 7.2 THOU/MM3 (ref 4.8–10.8)

## 2024-09-09 PROCEDURE — 6360000002 HC RX W HCPCS

## 2024-09-09 PROCEDURE — 2580000003 HC RX 258: Performed by: STUDENT IN AN ORGANIZED HEALTH CARE EDUCATION/TRAINING PROGRAM

## 2024-09-09 PROCEDURE — 6370000000 HC RX 637 (ALT 250 FOR IP): Performed by: STUDENT IN AN ORGANIZED HEALTH CARE EDUCATION/TRAINING PROGRAM

## 2024-09-09 PROCEDURE — 96365 THER/PROPH/DIAG IV INF INIT: CPT

## 2024-09-09 PROCEDURE — 2580000003 HC RX 258

## 2024-09-09 PROCEDURE — 6360000002 HC RX W HCPCS: Performed by: STUDENT IN AN ORGANIZED HEALTH CARE EDUCATION/TRAINING PROGRAM

## 2024-09-09 PROCEDURE — 99223 1ST HOSP IP/OBS HIGH 75: CPT | Performed by: HOSPITALIST

## 2024-09-09 PROCEDURE — 82248 BILIRUBIN DIRECT: CPT

## 2024-09-09 PROCEDURE — 99285 EMERGENCY DEPT VISIT HI MDM: CPT

## 2024-09-09 PROCEDURE — 83605 ASSAY OF LACTIC ACID: CPT

## 2024-09-09 PROCEDURE — 86140 C-REACTIVE PROTEIN: CPT

## 2024-09-09 PROCEDURE — 87040 BLOOD CULTURE FOR BACTERIA: CPT

## 2024-09-09 PROCEDURE — 83036 HEMOGLOBIN GLYCOSYLATED A1C: CPT

## 2024-09-09 PROCEDURE — 80053 COMPREHEN METABOLIC PANEL: CPT

## 2024-09-09 PROCEDURE — 36415 COLL VENOUS BLD VENIPUNCTURE: CPT

## 2024-09-09 PROCEDURE — 85651 RBC SED RATE NONAUTOMATED: CPT

## 2024-09-09 PROCEDURE — 93005 ELECTROCARDIOGRAM TRACING: CPT | Performed by: STUDENT IN AN ORGANIZED HEALTH CARE EDUCATION/TRAINING PROGRAM

## 2024-09-09 PROCEDURE — 1200000000 HC SEMI PRIVATE

## 2024-09-09 PROCEDURE — 96375 TX/PRO/DX INJ NEW DRUG ADDON: CPT

## 2024-09-09 PROCEDURE — 73630 X-RAY EXAM OF FOOT: CPT

## 2024-09-09 PROCEDURE — 85025 COMPLETE CBC W/AUTO DIFF WBC: CPT

## 2024-09-09 PROCEDURE — 82948 REAGENT STRIP/BLOOD GLUCOSE: CPT

## 2024-09-09 RX ORDER — SODIUM CHLORIDE 0.9 % (FLUSH) 0.9 %
5-40 SYRINGE (ML) INJECTION PRN
Status: DISCONTINUED | OUTPATIENT
Start: 2024-09-09 | End: 2024-09-13 | Stop reason: HOSPADM

## 2024-09-09 RX ORDER — OXYCODONE AND ACETAMINOPHEN 5; 325 MG/1; MG/1
2 TABLET ORAL EVERY 8 HOURS PRN
Status: DISCONTINUED | OUTPATIENT
Start: 2024-09-09 | End: 2024-09-09

## 2024-09-09 RX ORDER — ALPRAZOLAM 0.5 MG
0.25 TABLET ORAL ONCE
Status: COMPLETED | OUTPATIENT
Start: 2024-09-09 | End: 2024-09-09

## 2024-09-09 RX ORDER — ALBUTEROL SULFATE 90 UG/1
2 INHALANT RESPIRATORY (INHALATION) EVERY 4 HOURS PRN
Status: DISCONTINUED | OUTPATIENT
Start: 2024-09-09 | End: 2024-09-13 | Stop reason: HOSPADM

## 2024-09-09 RX ORDER — LISINOPRIL 2.5 MG/1
2.5 TABLET ORAL DAILY
Status: DISCONTINUED | OUTPATIENT
Start: 2024-09-10 | End: 2024-09-13 | Stop reason: HOSPADM

## 2024-09-09 RX ORDER — POTASSIUM CHLORIDE 1500 MG/1
40 TABLET, EXTENDED RELEASE ORAL PRN
Status: DISCONTINUED | OUTPATIENT
Start: 2024-09-09 | End: 2024-09-13 | Stop reason: HOSPADM

## 2024-09-09 RX ORDER — ASPIRIN 81 MG/1
81 TABLET, CHEWABLE ORAL DAILY
Status: DISCONTINUED | OUTPATIENT
Start: 2024-09-10 | End: 2024-09-13 | Stop reason: HOSPADM

## 2024-09-09 RX ORDER — INSULIN LISPRO 100 [IU]/ML
0-4 INJECTION, SOLUTION INTRAVENOUS; SUBCUTANEOUS NIGHTLY
Status: DISCONTINUED | OUTPATIENT
Start: 2024-09-09 | End: 2024-09-13 | Stop reason: HOSPADM

## 2024-09-09 RX ORDER — POLYETHYLENE GLYCOL 3350 17 G/17G
17 POWDER, FOR SOLUTION ORAL DAILY PRN
Status: DISCONTINUED | OUTPATIENT
Start: 2024-09-09 | End: 2024-09-13 | Stop reason: HOSPADM

## 2024-09-09 RX ORDER — ACETAMINOPHEN 500 MG
500 TABLET ORAL EVERY 6 HOURS PRN
Status: DISCONTINUED | OUTPATIENT
Start: 2024-09-09 | End: 2024-09-13 | Stop reason: HOSPADM

## 2024-09-09 RX ORDER — INSULIN LISPRO 100 [IU]/ML
0-4 INJECTION, SOLUTION INTRAVENOUS; SUBCUTANEOUS
Status: DISCONTINUED | OUTPATIENT
Start: 2024-09-09 | End: 2024-09-13 | Stop reason: HOSPADM

## 2024-09-09 RX ORDER — CARBIDOPA/LEVODOPA 10MG-100MG
1 TABLET ORAL 3 TIMES DAILY
Status: DISCONTINUED | OUTPATIENT
Start: 2024-09-09 | End: 2024-09-13 | Stop reason: HOSPADM

## 2024-09-09 RX ORDER — ACETAMINOPHEN 650 MG/1
650 SUPPOSITORY RECTAL EVERY 6 HOURS PRN
Status: DISCONTINUED | OUTPATIENT
Start: 2024-09-09 | End: 2024-09-09

## 2024-09-09 RX ORDER — GLUCAGON 1 MG/ML
1 KIT INJECTION PRN
Status: DISCONTINUED | OUTPATIENT
Start: 2024-09-09 | End: 2024-09-13 | Stop reason: HOSPADM

## 2024-09-09 RX ORDER — MAGNESIUM SULFATE IN WATER 40 MG/ML
2000 INJECTION, SOLUTION INTRAVENOUS PRN
Status: DISCONTINUED | OUTPATIENT
Start: 2024-09-09 | End: 2024-09-13 | Stop reason: HOSPADM

## 2024-09-09 RX ORDER — CLOPIDOGREL BISULFATE 75 MG/1
75 TABLET ORAL DAILY
Status: DISCONTINUED | OUTPATIENT
Start: 2024-09-10 | End: 2024-09-13 | Stop reason: HOSPADM

## 2024-09-09 RX ORDER — ZOLPIDEM TARTRATE 5 MG/1
10 TABLET ORAL NIGHTLY
Status: DISCONTINUED | OUTPATIENT
Start: 2024-09-09 | End: 2024-09-13 | Stop reason: HOSPADM

## 2024-09-09 RX ORDER — FENTANYL CITRATE 50 UG/ML
50 INJECTION, SOLUTION INTRAMUSCULAR; INTRAVENOUS ONCE
Status: COMPLETED | OUTPATIENT
Start: 2024-09-09 | End: 2024-09-09

## 2024-09-09 RX ORDER — ATORVASTATIN CALCIUM 40 MG/1
40 TABLET, FILM COATED ORAL NIGHTLY
Status: DISCONTINUED | OUTPATIENT
Start: 2024-09-09 | End: 2024-09-13 | Stop reason: HOSPADM

## 2024-09-09 RX ORDER — SODIUM CHLORIDE 0.9 % (FLUSH) 0.9 %
5-40 SYRINGE (ML) INJECTION EVERY 12 HOURS SCHEDULED
Status: DISCONTINUED | OUTPATIENT
Start: 2024-09-09 | End: 2024-09-13 | Stop reason: HOSPADM

## 2024-09-09 RX ORDER — POTASSIUM CHLORIDE 7.45 MG/ML
10 INJECTION INTRAVENOUS PRN
Status: DISCONTINUED | OUTPATIENT
Start: 2024-09-09 | End: 2024-09-13 | Stop reason: HOSPADM

## 2024-09-09 RX ORDER — ONDANSETRON 4 MG/1
4 TABLET, ORALLY DISINTEGRATING ORAL EVERY 8 HOURS PRN
Status: DISCONTINUED | OUTPATIENT
Start: 2024-09-09 | End: 2024-09-11 | Stop reason: SDUPTHER

## 2024-09-09 RX ORDER — INSULIN GLARGINE 100 [IU]/ML
80 INJECTION, SOLUTION SUBCUTANEOUS 2 TIMES DAILY
Status: DISCONTINUED | OUTPATIENT
Start: 2024-09-09 | End: 2024-09-13 | Stop reason: HOSPADM

## 2024-09-09 RX ORDER — SODIUM CHLORIDE 9 MG/ML
INJECTION, SOLUTION INTRAVENOUS PRN
Status: DISCONTINUED | OUTPATIENT
Start: 2024-09-09 | End: 2024-09-13 | Stop reason: HOSPADM

## 2024-09-09 RX ORDER — ENOXAPARIN SODIUM 100 MG/ML
40 INJECTION SUBCUTANEOUS DAILY
Status: DISCONTINUED | OUTPATIENT
Start: 2024-09-09 | End: 2024-09-13 | Stop reason: HOSPADM

## 2024-09-09 RX ORDER — DEXTROSE MONOHYDRATE 100 MG/ML
INJECTION, SOLUTION INTRAVENOUS CONTINUOUS PRN
Status: DISCONTINUED | OUTPATIENT
Start: 2024-09-09 | End: 2024-09-13 | Stop reason: HOSPADM

## 2024-09-09 RX ORDER — CLINDAMYCIN PHOSPHATE 600 MG/50ML
600 INJECTION, SOLUTION INTRAVENOUS ONCE
Status: COMPLETED | OUTPATIENT
Start: 2024-09-09 | End: 2024-09-09

## 2024-09-09 RX ORDER — PREGABALIN 150 MG/1
150 CAPSULE ORAL 2 TIMES DAILY
Status: DISCONTINUED | OUTPATIENT
Start: 2024-09-09 | End: 2024-09-13 | Stop reason: HOSPADM

## 2024-09-09 RX ORDER — OXYCODONE AND ACETAMINOPHEN 5; 325 MG/1; MG/1
2 TABLET ORAL EVERY 6 HOURS PRN
Status: DISCONTINUED | OUTPATIENT
Start: 2024-09-09 | End: 2024-09-12 | Stop reason: ALTCHOICE

## 2024-09-09 RX ORDER — BUDESONIDE AND FORMOTEROL FUMARATE DIHYDRATE 160; 4.5 UG/1; UG/1
2 AEROSOL RESPIRATORY (INHALATION) 2 TIMES DAILY
Status: DISCONTINUED | OUTPATIENT
Start: 2024-09-09 | End: 2024-09-13 | Stop reason: HOSPADM

## 2024-09-09 RX ORDER — AMITRIPTYLINE HYDROCHLORIDE 100 MG/1
100 TABLET ORAL NIGHTLY
Status: DISCONTINUED | OUTPATIENT
Start: 2024-09-10 | End: 2024-09-13 | Stop reason: HOSPADM

## 2024-09-09 RX ORDER — OXYCODONE AND ACETAMINOPHEN 5; 325 MG/1; MG/1
1 TABLET ORAL EVERY 4 HOURS PRN
Status: DISCONTINUED | OUTPATIENT
Start: 2024-09-09 | End: 2024-09-09

## 2024-09-09 RX ORDER — ACETAMINOPHEN 325 MG/1
650 TABLET ORAL EVERY 6 HOURS PRN
Status: DISCONTINUED | OUTPATIENT
Start: 2024-09-09 | End: 2024-09-09

## 2024-09-09 RX ORDER — OXYCODONE AND ACETAMINOPHEN 5; 325 MG/1; MG/1
1 TABLET ORAL EVERY 6 HOURS PRN
Status: DISCONTINUED | OUTPATIENT
Start: 2024-09-09 | End: 2024-09-12 | Stop reason: ALTCHOICE

## 2024-09-09 RX ORDER — ONDANSETRON 2 MG/ML
4 INJECTION INTRAMUSCULAR; INTRAVENOUS EVERY 6 HOURS PRN
Status: DISCONTINUED | OUTPATIENT
Start: 2024-09-09 | End: 2024-09-11 | Stop reason: SDUPTHER

## 2024-09-09 RX ORDER — METOPROLOL TARTRATE 25 MG/1
25 TABLET, FILM COATED ORAL 2 TIMES DAILY
Status: DISCONTINUED | OUTPATIENT
Start: 2024-09-09 | End: 2024-09-13 | Stop reason: HOSPADM

## 2024-09-09 RX ORDER — ALPRAZOLAM 0.5 MG
1 TABLET ORAL 2 TIMES DAILY PRN
Status: DISCONTINUED | OUTPATIENT
Start: 2024-09-09 | End: 2024-09-13 | Stop reason: HOSPADM

## 2024-09-09 RX ORDER — DESMOPRESSIN ACETATE 4 UG/ML
1 INJECTION, SOLUTION INTRAVENOUS; SUBCUTANEOUS ONCE
Status: DISCONTINUED | OUTPATIENT
Start: 2024-09-09 | End: 2024-09-09

## 2024-09-09 RX ADMIN — PIPERACILLIN AND TAZOBACTAM 4500 MG: 4; .5 INJECTION, POWDER, LYOPHILIZED, FOR SOLUTION INTRAVENOUS at 21:58

## 2024-09-09 RX ADMIN — ATORVASTATIN CALCIUM 40 MG: 40 TABLET, FILM COATED ORAL at 21:45

## 2024-09-09 RX ADMIN — PREGABALIN 150 MG: 150 CAPSULE ORAL at 21:45

## 2024-09-09 RX ADMIN — CLINDAMYCIN PHOSPHATE 600 MG: 600 INJECTION, SOLUTION INTRAVENOUS at 14:54

## 2024-09-09 RX ADMIN — OXYCODONE HYDROCHLORIDE AND ACETAMINOPHEN 2 TABLET: 5; 325 TABLET ORAL at 19:52

## 2024-09-09 RX ADMIN — INSULIN LISPRO 1 UNITS: 100 INJECTION, SOLUTION INTRAVENOUS; SUBCUTANEOUS at 18:48

## 2024-09-09 RX ADMIN — VANCOMYCIN HYDROCHLORIDE 1750 MG: 5 INJECTION, POWDER, LYOPHILIZED, FOR SOLUTION INTRAVENOUS at 17:51

## 2024-09-09 RX ADMIN — ALPRAZOLAM 0.25 MG: 0.5 TABLET ORAL at 16:25

## 2024-09-09 RX ADMIN — ENOXAPARIN SODIUM 40 MG: 100 INJECTION SUBCUTANEOUS at 18:48

## 2024-09-09 RX ADMIN — METFORMIN HYDROCHLORIDE 1000 MG: 500 TABLET ORAL at 21:45

## 2024-09-09 RX ADMIN — CARBIDOPA AND LEVODOPA 1 TABLET: 10; 100 TABLET ORAL at 21:45

## 2024-09-09 RX ADMIN — SODIUM CHLORIDE, PRESERVATIVE FREE 10 ML: 5 INJECTION INTRAVENOUS at 21:45

## 2024-09-09 RX ADMIN — ACETAMINOPHEN 500 MG: 500 TABLET ORAL at 21:45

## 2024-09-09 RX ADMIN — INSULIN GLARGINE 80 UNITS: 100 INJECTION, SOLUTION SUBCUTANEOUS at 21:44

## 2024-09-09 RX ADMIN — ZOLPIDEM TARTRATE 10 MG: 5 TABLET, COATED ORAL at 21:45

## 2024-09-09 RX ADMIN — METOPROLOL TARTRATE 25 MG: 25 TABLET, FILM COATED ORAL at 21:46

## 2024-09-09 RX ADMIN — ALPRAZOLAM 1 MG: 0.5 TABLET ORAL at 19:57

## 2024-09-09 RX ADMIN — FENTANYL CITRATE 50 MCG: 50 INJECTION, SOLUTION INTRAMUSCULAR; INTRAVENOUS at 14:00

## 2024-09-09 ASSESSMENT — PAIN DESCRIPTION - PAIN TYPE
TYPE: CHRONIC PAIN
TYPE: CHRONIC PAIN

## 2024-09-09 ASSESSMENT — PAIN DESCRIPTION - LOCATION
LOCATION: FOOT
LOCATION: FOOT
LOCATION: LEG

## 2024-09-09 ASSESSMENT — PAIN SCALES - GENERAL
PAINLEVEL_OUTOF10: 3
PAINLEVEL_OUTOF10: 10
PAINLEVEL_OUTOF10: 7
PAINLEVEL_OUTOF10: 8
PAINLEVEL_OUTOF10: 0

## 2024-09-09 ASSESSMENT — PAIN DESCRIPTION - ORIENTATION
ORIENTATION: RIGHT

## 2024-09-09 ASSESSMENT — PAIN - FUNCTIONAL ASSESSMENT: PAIN_FUNCTIONAL_ASSESSMENT: 0-10

## 2024-09-09 ASSESSMENT — PAIN DESCRIPTION - DESCRIPTORS
DESCRIPTORS: ACHING;THROBBING
DESCRIPTORS: ACHING;THROBBING

## 2024-09-10 LAB
BASOPHILS ABSOLUTE: 0 THOU/MM3 (ref 0–0.1)
BASOPHILS NFR BLD AUTO: 0.4 %
DEPRECATED RDW RBC AUTO: 42.6 FL (ref 35–45)
EKG ATRIAL RATE: 86 BPM
EKG P AXIS: 71 DEGREES
EKG P-R INTERVAL: 110 MS
EKG Q-T INTERVAL: 332 MS
EKG QRS DURATION: 86 MS
EKG QTC CALCULATION (BAZETT): 397 MS
EKG R AXIS: 76 DEGREES
EKG T AXIS: 77 DEGREES
EKG VENTRICULAR RATE: 86 BPM
EOSINOPHIL NFR BLD AUTO: 4.2 %
EOSINOPHILS ABSOLUTE: 0.3 THOU/MM3 (ref 0–0.4)
ERYTHROCYTE [DISTWIDTH] IN BLOOD BY AUTOMATED COUNT: 12.4 % (ref 11.5–14.5)
GLUCOSE BLD STRIP.AUTO-MCNC: 109 MG/DL (ref 70–108)
GLUCOSE BLD STRIP.AUTO-MCNC: 91 MG/DL (ref 70–108)
GLUCOSE BLD STRIP.AUTO-MCNC: 95 MG/DL (ref 70–108)
GLUCOSE BLD STRIP.AUTO-MCNC: 97 MG/DL (ref 70–108)
HCT VFR BLD AUTO: 32.7 % (ref 37–47)
HGB BLD-MCNC: 10.6 GM/DL (ref 12–16)
IMM GRANULOCYTES # BLD AUTO: 0.03 THOU/MM3 (ref 0–0.07)
IMM GRANULOCYTES NFR BLD AUTO: 0.4 %
LYMPHOCYTES ABSOLUTE: 0.9 THOU/MM3 (ref 1–4.8)
LYMPHOCYTES NFR BLD AUTO: 12.3 %
MCH RBC QN AUTO: 30.4 PG (ref 26–33)
MCHC RBC AUTO-ENTMCNC: 32.4 GM/DL (ref 32.2–35.5)
MCV RBC AUTO: 93.7 FL (ref 81–99)
MONOCYTES ABSOLUTE: 0.6 THOU/MM3 (ref 0.4–1.3)
MONOCYTES NFR BLD AUTO: 7.5 %
NEUTROPHILS ABSOLUTE: 5.7 THOU/MM3 (ref 1.8–7.7)
NEUTROPHILS NFR BLD AUTO: 75.2 %
NRBC BLD AUTO-RTO: 0 /100 WBC
PLATELET # BLD AUTO: 368 THOU/MM3 (ref 130–400)
PMV BLD AUTO: 10.7 FL (ref 9.4–12.4)
RBC # BLD AUTO: 3.49 MILL/MM3 (ref 4.2–5.4)
WBC # BLD AUTO: 7.6 THOU/MM3 (ref 4.8–10.8)

## 2024-09-10 PROCEDURE — 6360000002 HC RX W HCPCS: Performed by: PHYSICIAN ASSISTANT

## 2024-09-10 PROCEDURE — 94761 N-INVAS EAR/PLS OXIMETRY MLT: CPT

## 2024-09-10 PROCEDURE — 85025 COMPLETE CBC W/AUTO DIFF WBC: CPT

## 2024-09-10 PROCEDURE — 6360000002 HC RX W HCPCS: Performed by: STUDENT IN AN ORGANIZED HEALTH CARE EDUCATION/TRAINING PROGRAM

## 2024-09-10 PROCEDURE — 2580000003 HC RX 258: Performed by: STUDENT IN AN ORGANIZED HEALTH CARE EDUCATION/TRAINING PROGRAM

## 2024-09-10 PROCEDURE — 36415 COLL VENOUS BLD VENIPUNCTURE: CPT

## 2024-09-10 PROCEDURE — 6370000000 HC RX 637 (ALT 250 FOR IP): Performed by: STUDENT IN AN ORGANIZED HEALTH CARE EDUCATION/TRAINING PROGRAM

## 2024-09-10 PROCEDURE — 87641 MR-STAPH DNA AMP PROBE: CPT

## 2024-09-10 PROCEDURE — 2700000000 HC OXYGEN THERAPY PER DAY

## 2024-09-10 PROCEDURE — 6370000000 HC RX 637 (ALT 250 FOR IP): Performed by: PHYSICIAN ASSISTANT

## 2024-09-10 PROCEDURE — 82948 REAGENT STRIP/BLOOD GLUCOSE: CPT

## 2024-09-10 PROCEDURE — 93010 ELECTROCARDIOGRAM REPORT: CPT | Performed by: NUCLEAR MEDICINE

## 2024-09-10 PROCEDURE — 94640 AIRWAY INHALATION TREATMENT: CPT

## 2024-09-10 PROCEDURE — 1200000000 HC SEMI PRIVATE

## 2024-09-10 PROCEDURE — 99233 SBSQ HOSP IP/OBS HIGH 50: CPT | Performed by: PHYSICIAN ASSISTANT

## 2024-09-10 PROCEDURE — 2580000003 HC RX 258: Performed by: PHYSICIAN ASSISTANT

## 2024-09-10 RX ORDER — MORPHINE SULFATE 2 MG/ML
1 INJECTION, SOLUTION INTRAMUSCULAR; INTRAVENOUS
Status: DISCONTINUED | OUTPATIENT
Start: 2024-09-10 | End: 2024-09-11 | Stop reason: DRUGHIGH

## 2024-09-10 RX ORDER — LACTOBACILLUS RHAMNOSUS GG 10B CELL
1 CAPSULE ORAL
Status: DISCONTINUED | OUTPATIENT
Start: 2024-09-10 | End: 2024-09-13 | Stop reason: HOSPADM

## 2024-09-10 RX ADMIN — WATER 1 MG: 1 INJECTION INTRAMUSCULAR; INTRAVENOUS; SUBCUTANEOUS at 14:25

## 2024-09-10 RX ADMIN — ALPRAZOLAM 1 MG: 0.5 TABLET ORAL at 22:25

## 2024-09-10 RX ADMIN — PIPERACILLIN AND TAZOBACTAM 3375 MG: 3; .375 INJECTION, POWDER, FOR SOLUTION INTRAVENOUS at 03:50

## 2024-09-10 RX ADMIN — ZOLPIDEM TARTRATE 10 MG: 5 TABLET, COATED ORAL at 21:41

## 2024-09-10 RX ADMIN — PREGABALIN 150 MG: 150 CAPSULE ORAL at 08:25

## 2024-09-10 RX ADMIN — ENOXAPARIN SODIUM 40 MG: 100 INJECTION SUBCUTANEOUS at 08:22

## 2024-09-10 RX ADMIN — Medication 1250 MG: at 11:43

## 2024-09-10 RX ADMIN — AMITRIPTYLINE HYDROCHLORIDE 100 MG: 100 TABLET, FILM COATED ORAL at 21:40

## 2024-09-10 RX ADMIN — Medication 1 CAPSULE: at 14:39

## 2024-09-10 RX ADMIN — PIPERACILLIN AND TAZOBACTAM 3375 MG: 3; .375 INJECTION, POWDER, FOR SOLUTION INTRAVENOUS at 15:34

## 2024-09-10 RX ADMIN — METFORMIN HYDROCHLORIDE 1000 MG: 500 TABLET ORAL at 08:24

## 2024-09-10 RX ADMIN — LISINOPRIL 2.5 MG: 2.5 TABLET ORAL at 08:26

## 2024-09-10 RX ADMIN — ATORVASTATIN CALCIUM 40 MG: 40 TABLET, FILM COATED ORAL at 21:38

## 2024-09-10 RX ADMIN — BUDESONIDE AND FORMOTEROL FUMARATE DIHYDRATE 2 PUFF: 160; 4.5 AEROSOL RESPIRATORY (INHALATION) at 07:36

## 2024-09-10 RX ADMIN — ALPRAZOLAM 1 MG: 0.5 TABLET ORAL at 06:25

## 2024-09-10 RX ADMIN — OXYCODONE HYDROCHLORIDE AND ACETAMINOPHEN 1 TABLET: 5; 325 TABLET ORAL at 05:09

## 2024-09-10 RX ADMIN — ISOSORBIDE DINITRATE 30 MG: 20 TABLET ORAL at 08:25

## 2024-09-10 RX ADMIN — SODIUM CHLORIDE, PRESERVATIVE FREE 10 ML: 5 INJECTION INTRAVENOUS at 08:22

## 2024-09-10 RX ADMIN — OXYCODONE HYDROCHLORIDE AND ACETAMINOPHEN 2 TABLET: 5; 325 TABLET ORAL at 22:25

## 2024-09-10 RX ADMIN — METFORMIN HYDROCHLORIDE 1000 MG: 500 TABLET ORAL at 21:40

## 2024-09-10 RX ADMIN — OXYCODONE HYDROCHLORIDE AND ACETAMINOPHEN 2 TABLET: 5; 325 TABLET ORAL at 16:09

## 2024-09-10 RX ADMIN — PIPERACILLIN AND TAZOBACTAM 3375 MG: 3; .375 INJECTION, POWDER, FOR SOLUTION INTRAVENOUS at 23:30

## 2024-09-10 RX ADMIN — METOPROLOL TARTRATE 25 MG: 25 TABLET, FILM COATED ORAL at 08:26

## 2024-09-10 RX ADMIN — PREGABALIN 150 MG: 150 CAPSULE ORAL at 21:42

## 2024-09-10 RX ADMIN — METOPROLOL TARTRATE 25 MG: 25 TABLET, FILM COATED ORAL at 21:42

## 2024-09-10 RX ADMIN — SODIUM CHLORIDE, PRESERVATIVE FREE 10 ML: 5 INJECTION INTRAVENOUS at 22:25

## 2024-09-10 RX ADMIN — CARBIDOPA AND LEVODOPA 1 TABLET: 10; 100 TABLET ORAL at 14:39

## 2024-09-10 RX ADMIN — ASPIRIN 81 MG 81 MG: 81 TABLET ORAL at 08:25

## 2024-09-10 RX ADMIN — SODIUM CHLORIDE: 9 INJECTION, SOLUTION INTRAVENOUS at 22:28

## 2024-09-10 RX ADMIN — CARBIDOPA AND LEVODOPA 1 TABLET: 10; 100 TABLET ORAL at 21:39

## 2024-09-10 RX ADMIN — ACETAMINOPHEN 500 MG: 500 TABLET ORAL at 06:25

## 2024-09-10 RX ADMIN — CARBIDOPA AND LEVODOPA 1 TABLET: 10; 100 TABLET ORAL at 08:25

## 2024-09-10 ASSESSMENT — PAIN DESCRIPTION - ORIENTATION
ORIENTATION: RIGHT

## 2024-09-10 ASSESSMENT — PAIN - FUNCTIONAL ASSESSMENT
PAIN_FUNCTIONAL_ASSESSMENT: ACTIVITIES ARE NOT PREVENTED
PAIN_FUNCTIONAL_ASSESSMENT: PREVENTS OR INTERFERES SOME ACTIVE ACTIVITIES AND ADLS
PAIN_FUNCTIONAL_ASSESSMENT: ACTIVITIES ARE NOT PREVENTED

## 2024-09-10 ASSESSMENT — PAIN SCALES - GENERAL
PAINLEVEL_OUTOF10: 10
PAINLEVEL_OUTOF10: 10
PAINLEVEL_OUTOF10: 6
PAINLEVEL_OUTOF10: 3
PAINLEVEL_OUTOF10: 10
PAINLEVEL_OUTOF10: 9
PAINLEVEL_OUTOF10: 0
PAINLEVEL_OUTOF10: 6
PAINLEVEL_OUTOF10: 0

## 2024-09-10 ASSESSMENT — PAIN DESCRIPTION - DESCRIPTORS
DESCRIPTORS: THROBBING
DESCRIPTORS: THROBBING
DESCRIPTORS: ACHING
DESCRIPTORS: THROBBING
DESCRIPTORS: ACHING;SORE

## 2024-09-10 ASSESSMENT — PAIN DESCRIPTION - LOCATION
LOCATION: FOOT
LOCATION: TOE (COMMENT WHICH ONE)
LOCATION: FOOT
LOCATION: TOE (COMMENT WHICH ONE)
LOCATION: FOOT
LOCATION: FOOT

## 2024-09-11 ENCOUNTER — ANESTHESIA (OUTPATIENT)
Dept: OPERATING ROOM | Age: 54
End: 2024-09-11
Payer: MEDICARE

## 2024-09-11 ENCOUNTER — ANESTHESIA EVENT (OUTPATIENT)
Dept: OPERATING ROOM | Age: 54
End: 2024-09-11
Payer: MEDICARE

## 2024-09-11 LAB
ANION GAP SERPL CALC-SCNC: 14 MEQ/L (ref 8–16)
BUN SERPL-MCNC: 15 MG/DL (ref 7–22)
CALCIUM SERPL-MCNC: 8.1 MG/DL (ref 8.5–10.5)
CHLORIDE SERPL-SCNC: 103 MEQ/L (ref 98–111)
CO2 SERPL-SCNC: 22 MEQ/L (ref 23–33)
CREAT SERPL-MCNC: 0.6 MG/DL (ref 0.4–1.2)
DEPRECATED RDW RBC AUTO: 42.3 FL (ref 35–45)
ERYTHROCYTE [DISTWIDTH] IN BLOOD BY AUTOMATED COUNT: 12.5 % (ref 11.5–14.5)
GFR SERPL CREATININE-BSD FRML MDRD: > 90 ML/MIN/1.73M2
GLUCOSE BLD STRIP.AUTO-MCNC: 105 MG/DL (ref 70–108)
GLUCOSE BLD STRIP.AUTO-MCNC: 70 MG/DL (ref 70–108)
GLUCOSE BLD STRIP.AUTO-MCNC: 71 MG/DL (ref 70–108)
GLUCOSE BLD STRIP.AUTO-MCNC: 75 MG/DL (ref 70–108)
GLUCOSE BLD STRIP.AUTO-MCNC: 78 MG/DL (ref 70–108)
GLUCOSE BLD STRIP.AUTO-MCNC: 95 MG/DL (ref 70–108)
GLUCOSE SERPL-MCNC: 129 MG/DL (ref 70–108)
HCT VFR BLD AUTO: 29.6 % (ref 37–47)
HGB BLD-MCNC: 9.6 GM/DL (ref 12–16)
MCH RBC QN AUTO: 30.1 PG (ref 26–33)
MCHC RBC AUTO-ENTMCNC: 32.4 GM/DL (ref 32.2–35.5)
MCV RBC AUTO: 92.8 FL (ref 81–99)
MRSA DNA SPEC QL NAA+PROBE: NEGATIVE
PLATELET # BLD AUTO: 343 THOU/MM3 (ref 130–400)
PMV BLD AUTO: 10.6 FL (ref 9.4–12.4)
POTASSIUM SERPL-SCNC: 3.9 MEQ/L (ref 3.5–5.2)
RBC # BLD AUTO: 3.19 MILL/MM3 (ref 4.2–5.4)
SODIUM SERPL-SCNC: 139 MEQ/L (ref 135–145)
VANCOMYCIN SERPL-MCNC: 22.4 UG/ML (ref 0.1–39.9)
WBC # BLD AUTO: 8 THOU/MM3 (ref 4.8–10.8)

## 2024-09-11 PROCEDURE — 2580000003 HC RX 258

## 2024-09-11 PROCEDURE — 99232 SBSQ HOSP IP/OBS MODERATE 35: CPT | Performed by: PHYSICIAN ASSISTANT

## 2024-09-11 PROCEDURE — 0Y6M0ZF DETACHMENT AT RIGHT FOOT, PARTIAL 5TH RAY, OPEN APPROACH: ICD-10-PCS | Performed by: PODIATRIST

## 2024-09-11 PROCEDURE — 82948 REAGENT STRIP/BLOOD GLUCOSE: CPT

## 2024-09-11 PROCEDURE — 0Y6M0ZD DETACHMENT AT RIGHT FOOT, PARTIAL 4TH RAY, OPEN APPROACH: ICD-10-PCS | Performed by: PODIATRIST

## 2024-09-11 PROCEDURE — 1200000000 HC SEMI PRIVATE

## 2024-09-11 PROCEDURE — 6370000000 HC RX 637 (ALT 250 FOR IP): Performed by: STUDENT IN AN ORGANIZED HEALTH CARE EDUCATION/TRAINING PROGRAM

## 2024-09-11 PROCEDURE — 3700000000 HC ANESTHESIA ATTENDED CARE: Performed by: PODIATRIST

## 2024-09-11 PROCEDURE — 6360000002 HC RX W HCPCS: Performed by: STUDENT IN AN ORGANIZED HEALTH CARE EDUCATION/TRAINING PROGRAM

## 2024-09-11 PROCEDURE — 2709999900 HC NON-CHARGEABLE SUPPLY: Performed by: PODIATRIST

## 2024-09-11 PROCEDURE — 6360000002 HC RX W HCPCS: Performed by: PODIATRIST

## 2024-09-11 PROCEDURE — 80202 ASSAY OF VANCOMYCIN: CPT

## 2024-09-11 PROCEDURE — 85027 COMPLETE CBC AUTOMATED: CPT

## 2024-09-11 PROCEDURE — 2500000003 HC RX 250 WO HCPCS: Performed by: REGISTERED NURSE

## 2024-09-11 PROCEDURE — 80048 BASIC METABOLIC PNL TOTAL CA: CPT

## 2024-09-11 PROCEDURE — 36415 COLL VENOUS BLD VENIPUNCTURE: CPT

## 2024-09-11 PROCEDURE — 0Y6M0Z9 DETACHMENT AT RIGHT FOOT, PARTIAL 1ST RAY, OPEN APPROACH: ICD-10-PCS | Performed by: PODIATRIST

## 2024-09-11 PROCEDURE — 2580000003 HC RX 258: Performed by: STUDENT IN AN ORGANIZED HEALTH CARE EDUCATION/TRAINING PROGRAM

## 2024-09-11 PROCEDURE — 3600000003 HC SURGERY LEVEL 3 BASE: Performed by: PODIATRIST

## 2024-09-11 PROCEDURE — 0Y6M0ZC DETACHMENT AT RIGHT FOOT, PARTIAL 3RD RAY, OPEN APPROACH: ICD-10-PCS | Performed by: PODIATRIST

## 2024-09-11 PROCEDURE — 2580000003 HC RX 258: Performed by: PHYSICIAN ASSISTANT

## 2024-09-11 PROCEDURE — 3700000001 HC ADD 15 MINUTES (ANESTHESIA): Performed by: PODIATRIST

## 2024-09-11 PROCEDURE — 3600000013 HC SURGERY LEVEL 3 ADDTL 15MIN: Performed by: PODIATRIST

## 2024-09-11 PROCEDURE — 6360000002 HC RX W HCPCS: Performed by: REGISTERED NURSE

## 2024-09-11 PROCEDURE — 0Y6M0ZB DETACHMENT AT RIGHT FOOT, PARTIAL 2ND RAY, OPEN APPROACH: ICD-10-PCS | Performed by: PODIATRIST

## 2024-09-11 RX ORDER — MORPHINE SULFATE 2 MG/ML
2 INJECTION, SOLUTION INTRAMUSCULAR; INTRAVENOUS
Status: DISCONTINUED | OUTPATIENT
Start: 2024-09-11 | End: 2024-09-13

## 2024-09-11 RX ORDER — SODIUM CHLORIDE 9 MG/ML
INJECTION, SOLUTION INTRAVENOUS
Status: DISCONTINUED | OUTPATIENT
Start: 2024-09-11 | End: 2024-09-11

## 2024-09-11 RX ORDER — ONDANSETRON 4 MG/1
4 TABLET, ORALLY DISINTEGRATING ORAL EVERY 8 HOURS PRN
Status: DISCONTINUED | OUTPATIENT
Start: 2024-09-11 | End: 2024-09-13 | Stop reason: HOSPADM

## 2024-09-11 RX ORDER — ONDANSETRON 2 MG/ML
4 INJECTION INTRAMUSCULAR; INTRAVENOUS EVERY 6 HOURS PRN
Status: DISCONTINUED | OUTPATIENT
Start: 2024-09-11 | End: 2024-09-13 | Stop reason: HOSPADM

## 2024-09-11 RX ORDER — MIDAZOLAM HYDROCHLORIDE 1 MG/ML
INJECTION INTRAMUSCULAR; INTRAVENOUS
Status: DISCONTINUED | OUTPATIENT
Start: 2024-09-11 | End: 2024-09-11 | Stop reason: SDUPTHER

## 2024-09-11 RX ORDER — OXYCODONE HYDROCHLORIDE 5 MG/1
10 TABLET ORAL EVERY 4 HOURS PRN
Status: DISCONTINUED | OUTPATIENT
Start: 2024-09-11 | End: 2024-09-13 | Stop reason: HOSPADM

## 2024-09-11 RX ORDER — FENTANYL CITRATE 50 UG/ML
INJECTION, SOLUTION INTRAMUSCULAR; INTRAVENOUS
Status: DISCONTINUED | OUTPATIENT
Start: 2024-09-11 | End: 2024-09-11 | Stop reason: SDUPTHER

## 2024-09-11 RX ORDER — BUPIVACAINE HYDROCHLORIDE 5 MG/ML
INJECTION, SOLUTION EPIDURAL; INTRACAUDAL PRN
Status: DISCONTINUED | OUTPATIENT
Start: 2024-09-11 | End: 2024-09-11 | Stop reason: ALTCHOICE

## 2024-09-11 RX ORDER — EPHEDRINE SULFATE/0.9% NACL/PF 25 MG/5 ML
SYRINGE (ML) INTRAVENOUS
Status: DISCONTINUED | OUTPATIENT
Start: 2024-09-11 | End: 2024-09-11 | Stop reason: SDUPTHER

## 2024-09-11 RX ORDER — SODIUM CHLORIDE 0.9 % (FLUSH) 0.9 %
5-40 SYRINGE (ML) INJECTION PRN
Status: DISCONTINUED | OUTPATIENT
Start: 2024-09-11 | End: 2024-09-13 | Stop reason: HOSPADM

## 2024-09-11 RX ORDER — SODIUM CHLORIDE 0.9 % (FLUSH) 0.9 %
5-40 SYRINGE (ML) INJECTION EVERY 12 HOURS SCHEDULED
Status: DISCONTINUED | OUTPATIENT
Start: 2024-09-11 | End: 2024-09-13 | Stop reason: HOSPADM

## 2024-09-11 RX ORDER — SODIUM CHLORIDE 9 MG/ML
INJECTION, SOLUTION INTRAVENOUS CONTINUOUS
Status: DISCONTINUED | OUTPATIENT
Start: 2024-09-11 | End: 2024-09-12

## 2024-09-11 RX ORDER — PROPOFOL 10 MG/ML
INJECTION, EMULSION INTRAVENOUS
Status: DISCONTINUED | OUTPATIENT
Start: 2024-09-11 | End: 2024-09-11 | Stop reason: SDUPTHER

## 2024-09-11 RX ORDER — SODIUM CHLORIDE 9 MG/ML
INJECTION, SOLUTION INTRAVENOUS PRN
Status: DISCONTINUED | OUTPATIENT
Start: 2024-09-11 | End: 2024-09-13 | Stop reason: HOSPADM

## 2024-09-11 RX ORDER — DEXTROSE MONOHYDRATE 50 MG/ML
INJECTION, SOLUTION INTRAVENOUS CONTINUOUS
Status: DISCONTINUED | OUTPATIENT
Start: 2024-09-11 | End: 2024-09-12

## 2024-09-11 RX ORDER — OXYCODONE HYDROCHLORIDE 5 MG/1
5 TABLET ORAL EVERY 4 HOURS PRN
Status: DISCONTINUED | OUTPATIENT
Start: 2024-09-11 | End: 2024-09-13 | Stop reason: HOSPADM

## 2024-09-11 RX ADMIN — CARBIDOPA AND LEVODOPA 1 TABLET: 10; 100 TABLET ORAL at 08:48

## 2024-09-11 RX ADMIN — AMITRIPTYLINE HYDROCHLORIDE 100 MG: 100 TABLET, FILM COATED ORAL at 23:37

## 2024-09-11 RX ADMIN — DEXTROSE MONOHYDRATE: 50 INJECTION, SOLUTION INTRAVENOUS at 11:01

## 2024-09-11 RX ADMIN — Medication 25 MG: at 19:26

## 2024-09-11 RX ADMIN — SODIUM CHLORIDE, PRESERVATIVE FREE 10 ML: 5 INJECTION INTRAVENOUS at 08:52

## 2024-09-11 RX ADMIN — PREGABALIN 150 MG: 150 CAPSULE ORAL at 23:38

## 2024-09-11 RX ADMIN — Medication 1250 MG: at 05:31

## 2024-09-11 RX ADMIN — PIPERACILLIN AND TAZOBACTAM 3375 MG: 3; .375 INJECTION, POWDER, FOR SOLUTION INTRAVENOUS at 23:35

## 2024-09-11 RX ADMIN — Medication 25 MG: at 19:21

## 2024-09-11 RX ADMIN — FENTANYL CITRATE 50 MCG: 50 INJECTION, SOLUTION INTRAMUSCULAR; INTRAVENOUS at 19:26

## 2024-09-11 RX ADMIN — PROPOFOL 100 MCG/KG/MIN: 10 INJECTION, EMULSION INTRAVENOUS at 19:08

## 2024-09-11 RX ADMIN — SODIUM CHLORIDE: 9 INJECTION, SOLUTION INTRAVENOUS at 21:49

## 2024-09-11 RX ADMIN — ATORVASTATIN CALCIUM 40 MG: 40 TABLET, FILM COATED ORAL at 23:38

## 2024-09-11 RX ADMIN — ALPRAZOLAM 1 MG: 0.5 TABLET ORAL at 13:08

## 2024-09-11 RX ADMIN — OXYCODONE HYDROCHLORIDE AND ACETAMINOPHEN 2 TABLET: 5; 325 TABLET ORAL at 23:35

## 2024-09-11 RX ADMIN — SODIUM CHLORIDE: 9 INJECTION, SOLUTION INTRAVENOUS at 23:34

## 2024-09-11 RX ADMIN — MIDAZOLAM 2 MG: 1 INJECTION INTRAMUSCULAR; INTRAVENOUS at 19:00

## 2024-09-11 RX ADMIN — CARBIDOPA AND LEVODOPA 1 TABLET: 10; 100 TABLET ORAL at 23:38

## 2024-09-11 RX ADMIN — METOPROLOL TARTRATE 25 MG: 25 TABLET, FILM COATED ORAL at 08:49

## 2024-09-11 RX ADMIN — FENTANYL CITRATE 50 MCG: 50 INJECTION, SOLUTION INTRAMUSCULAR; INTRAVENOUS at 19:08

## 2024-09-11 RX ADMIN — EPHEDRINE SULFATE 25 MG: 5 INJECTION INTRAVENOUS at 19:58

## 2024-09-11 RX ADMIN — PIPERACILLIN AND TAZOBACTAM 3375 MG: 3; .375 INJECTION, POWDER, FOR SOLUTION INTRAVENOUS at 08:51

## 2024-09-11 RX ADMIN — PIPERACILLIN AND TAZOBACTAM 3375 MG: 3; .375 INJECTION, POWDER, FOR SOLUTION INTRAVENOUS at 19:11

## 2024-09-11 RX ADMIN — DEXTROSE MONOHYDRATE 125 ML: 100 INJECTION, SOLUTION INTRAVENOUS at 06:41

## 2024-09-11 RX ADMIN — ISOSORBIDE DINITRATE 30 MG: 20 TABLET ORAL at 08:49

## 2024-09-11 RX ADMIN — CARBIDOPA AND LEVODOPA 1 TABLET: 10; 100 TABLET ORAL at 14:50

## 2024-09-11 RX ADMIN — LISINOPRIL 2.5 MG: 2.5 TABLET ORAL at 08:49

## 2024-09-11 RX ADMIN — PREGABALIN 150 MG: 150 CAPSULE ORAL at 08:49

## 2024-09-11 ASSESSMENT — PAIN DESCRIPTION - ORIENTATION
ORIENTATION: RIGHT
ORIENTATION: RIGHT

## 2024-09-11 ASSESSMENT — PAIN DESCRIPTION - DESCRIPTORS
DESCRIPTORS: ACHING
DESCRIPTORS: ACHING

## 2024-09-11 ASSESSMENT — PAIN - FUNCTIONAL ASSESSMENT
PAIN_FUNCTIONAL_ASSESSMENT: ACTIVITIES ARE NOT PREVENTED
PAIN_FUNCTIONAL_ASSESSMENT: ACTIVITIES ARE NOT PREVENTED

## 2024-09-11 ASSESSMENT — PAIN DESCRIPTION - LOCATION
LOCATION: FOOT
LOCATION: FOOT

## 2024-09-11 ASSESSMENT — PAIN SCALES - GENERAL
PAINLEVEL_OUTOF10: 9
PAINLEVEL_OUTOF10: 5

## 2024-09-11 ASSESSMENT — PAIN DESCRIPTION - ONSET
ONSET: ON-GOING
ONSET: ON-GOING

## 2024-09-11 ASSESSMENT — PAIN DESCRIPTION - PAIN TYPE
TYPE: ACUTE PAIN
TYPE: ACUTE PAIN

## 2024-09-11 ASSESSMENT — PAIN DESCRIPTION - FREQUENCY
FREQUENCY: CONTINUOUS
FREQUENCY: CONTINUOUS

## 2024-09-12 PROBLEM — E44.0 MODERATE MALNUTRITION (HCC): Chronic | Status: ACTIVE | Noted: 2024-09-12

## 2024-09-12 LAB
GLUCOSE BLD STRIP.AUTO-MCNC: 184 MG/DL (ref 70–108)
GLUCOSE BLD STRIP.AUTO-MCNC: 203 MG/DL (ref 70–108)
GLUCOSE BLD STRIP.AUTO-MCNC: 205 MG/DL (ref 70–108)
GLUCOSE BLD STRIP.AUTO-MCNC: 223 MG/DL (ref 70–108)

## 2024-09-12 PROCEDURE — 94640 AIRWAY INHALATION TREATMENT: CPT

## 2024-09-12 PROCEDURE — 97530 THERAPEUTIC ACTIVITIES: CPT

## 2024-09-12 PROCEDURE — 6370000000 HC RX 637 (ALT 250 FOR IP)

## 2024-09-12 PROCEDURE — 6370000000 HC RX 637 (ALT 250 FOR IP): Performed by: PHYSICIAN ASSISTANT

## 2024-09-12 PROCEDURE — 6370000000 HC RX 637 (ALT 250 FOR IP): Performed by: STUDENT IN AN ORGANIZED HEALTH CARE EDUCATION/TRAINING PROGRAM

## 2024-09-12 PROCEDURE — 6360000002 HC RX W HCPCS: Performed by: STUDENT IN AN ORGANIZED HEALTH CARE EDUCATION/TRAINING PROGRAM

## 2024-09-12 PROCEDURE — 99232 SBSQ HOSP IP/OBS MODERATE 35: CPT | Performed by: PHYSICIAN ASSISTANT

## 2024-09-12 PROCEDURE — 6360000002 HC RX W HCPCS

## 2024-09-12 PROCEDURE — 2580000003 HC RX 258: Performed by: STUDENT IN AN ORGANIZED HEALTH CARE EDUCATION/TRAINING PROGRAM

## 2024-09-12 PROCEDURE — 2580000003 HC RX 258

## 2024-09-12 PROCEDURE — 82948 REAGENT STRIP/BLOOD GLUCOSE: CPT

## 2024-09-12 PROCEDURE — 1200000000 HC SEMI PRIVATE

## 2024-09-12 PROCEDURE — 97161 PT EVAL LOW COMPLEX 20 MIN: CPT

## 2024-09-12 RX ADMIN — ENOXAPARIN SODIUM 40 MG: 100 INJECTION SUBCUTANEOUS at 09:09

## 2024-09-12 RX ADMIN — PREGABALIN 150 MG: 150 CAPSULE ORAL at 20:16

## 2024-09-12 RX ADMIN — CLOPIDOGREL BISULFATE 75 MG: 75 TABLET ORAL at 09:10

## 2024-09-12 RX ADMIN — AMITRIPTYLINE HYDROCHLORIDE 100 MG: 100 TABLET, FILM COATED ORAL at 20:16

## 2024-09-12 RX ADMIN — SODIUM CHLORIDE: 9 INJECTION, SOLUTION INTRAVENOUS at 00:53

## 2024-09-12 RX ADMIN — INSULIN GLARGINE 80 UNITS: 100 INJECTION, SOLUTION SUBCUTANEOUS at 09:06

## 2024-09-12 RX ADMIN — ALPRAZOLAM 1 MG: 0.5 TABLET ORAL at 22:46

## 2024-09-12 RX ADMIN — LISINOPRIL 2.5 MG: 2.5 TABLET ORAL at 11:26

## 2024-09-12 RX ADMIN — METOPROLOL TARTRATE 25 MG: 25 TABLET, FILM COATED ORAL at 20:16

## 2024-09-12 RX ADMIN — Medication 1 CAPSULE: at 09:06

## 2024-09-12 RX ADMIN — INSULIN LISPRO 1 UNITS: 100 INJECTION, SOLUTION INTRAVENOUS; SUBCUTANEOUS at 12:42

## 2024-09-12 RX ADMIN — PIPERACILLIN AND TAZOBACTAM 3375 MG: 3; .375 INJECTION, POWDER, FOR SOLUTION INTRAVENOUS at 08:36

## 2024-09-12 RX ADMIN — SODIUM CHLORIDE: 9 INJECTION, SOLUTION INTRAVENOUS at 05:01

## 2024-09-12 RX ADMIN — OXYCODONE HYDROCHLORIDE AND ACETAMINOPHEN 2 TABLET: 5; 325 TABLET ORAL at 11:28

## 2024-09-12 RX ADMIN — PREGABALIN 150 MG: 150 CAPSULE ORAL at 09:06

## 2024-09-12 RX ADMIN — CARBIDOPA AND LEVODOPA 1 TABLET: 10; 100 TABLET ORAL at 20:16

## 2024-09-12 RX ADMIN — MORPHINE SULFATE 2 MG: 2 INJECTION, SOLUTION INTRAMUSCULAR; INTRAVENOUS at 14:11

## 2024-09-12 RX ADMIN — PIPERACILLIN AND TAZOBACTAM 3375 MG: 3; .375 INJECTION, POWDER, FOR SOLUTION INTRAVENOUS at 15:55

## 2024-09-12 RX ADMIN — ASPIRIN 81 MG 81 MG: 81 TABLET ORAL at 09:09

## 2024-09-12 RX ADMIN — METFORMIN HYDROCHLORIDE 1000 MG: 500 TABLET ORAL at 20:16

## 2024-09-12 RX ADMIN — INSULIN GLARGINE 80 UNITS: 100 INJECTION, SOLUTION SUBCUTANEOUS at 20:25

## 2024-09-12 RX ADMIN — OXYCODONE HYDROCHLORIDE 10 MG: 5 TABLET ORAL at 18:51

## 2024-09-12 RX ADMIN — ATORVASTATIN CALCIUM 40 MG: 40 TABLET, FILM COATED ORAL at 20:16

## 2024-09-12 RX ADMIN — ALPRAZOLAM 1 MG: 0.5 TABLET ORAL at 00:56

## 2024-09-12 RX ADMIN — BUDESONIDE AND FORMOTEROL FUMARATE DIHYDRATE 2 PUFF: 160; 4.5 AEROSOL RESPIRATORY (INHALATION) at 19:36

## 2024-09-12 RX ADMIN — INSULIN LISPRO 1 UNITS: 100 INJECTION, SOLUTION INTRAVENOUS; SUBCUTANEOUS at 17:11

## 2024-09-12 RX ADMIN — METOPROLOL TARTRATE 25 MG: 25 TABLET, FILM COATED ORAL at 11:27

## 2024-09-12 RX ADMIN — CARBIDOPA AND LEVODOPA 1 TABLET: 10; 100 TABLET ORAL at 09:06

## 2024-09-12 RX ADMIN — CARBIDOPA AND LEVODOPA 1 TABLET: 10; 100 TABLET ORAL at 14:07

## 2024-09-12 RX ADMIN — METFORMIN HYDROCHLORIDE 1000 MG: 500 TABLET ORAL at 09:06

## 2024-09-12 RX ADMIN — ISOSORBIDE DINITRATE 30 MG: 20 TABLET ORAL at 11:27

## 2024-09-12 RX ADMIN — ZOLPIDEM TARTRATE 10 MG: 5 TABLET, COATED ORAL at 20:16

## 2024-09-12 RX ADMIN — PIPERACILLIN AND TAZOBACTAM 3375 MG: 3; .375 INJECTION, POWDER, FOR SOLUTION INTRAVENOUS at 23:40

## 2024-09-12 RX ADMIN — MORPHINE SULFATE 2 MG: 2 INJECTION, SOLUTION INTRAMUSCULAR; INTRAVENOUS at 20:24

## 2024-09-12 RX ADMIN — ACETAMINOPHEN 500 MG: 500 TABLET ORAL at 20:24

## 2024-09-12 ASSESSMENT — PAIN - FUNCTIONAL ASSESSMENT
PAIN_FUNCTIONAL_ASSESSMENT: ACTIVITIES ARE NOT PREVENTED
PAIN_FUNCTIONAL_ASSESSMENT: PREVENTS OR INTERFERES WITH MANY ACTIVE NOT PASSIVE ACTIVITIES
PAIN_FUNCTIONAL_ASSESSMENT: ACTIVITIES ARE NOT PREVENTED

## 2024-09-12 ASSESSMENT — PAIN DESCRIPTION - ONSET
ONSET: ON-GOING
ONSET: ON-GOING

## 2024-09-12 ASSESSMENT — PAIN DESCRIPTION - DESCRIPTORS
DESCRIPTORS: ACHING

## 2024-09-12 ASSESSMENT — PAIN SCALES - GENERAL
PAINLEVEL_OUTOF10: 9
PAINLEVEL_OUTOF10: 8
PAINLEVEL_OUTOF10: 5
PAINLEVEL_OUTOF10: 9
PAINLEVEL_OUTOF10: 8
PAINLEVEL_OUTOF10: 7
PAINLEVEL_OUTOF10: 10

## 2024-09-12 ASSESSMENT — PAIN DESCRIPTION - LOCATION
LOCATION: FOOT
LOCATION: TOE (COMMENT WHICH ONE)

## 2024-09-12 ASSESSMENT — PAIN DESCRIPTION - FREQUENCY
FREQUENCY: CONTINUOUS
FREQUENCY: CONTINUOUS

## 2024-09-12 ASSESSMENT — PAIN DESCRIPTION - PAIN TYPE
TYPE: ACUTE PAIN
TYPE: ACUTE PAIN;SURGICAL PAIN

## 2024-09-12 ASSESSMENT — PAIN DESCRIPTION - ORIENTATION
ORIENTATION: RIGHT

## 2024-09-13 VITALS
SYSTOLIC BLOOD PRESSURE: 147 MMHG | HEART RATE: 109 BPM | HEIGHT: 61 IN | TEMPERATURE: 98.7 F | BODY MASS INDEX: 28.93 KG/M2 | WEIGHT: 153.22 LBS | DIASTOLIC BLOOD PRESSURE: 78 MMHG | RESPIRATION RATE: 16 BRPM | OXYGEN SATURATION: 95 %

## 2024-09-13 LAB
ANION GAP SERPL CALC-SCNC: 12 MEQ/L (ref 8–16)
BUN SERPL-MCNC: 10 MG/DL (ref 7–22)
CALCIUM SERPL-MCNC: 8.6 MG/DL (ref 8.5–10.5)
CHLORIDE SERPL-SCNC: 103 MEQ/L (ref 98–111)
CO2 SERPL-SCNC: 25 MEQ/L (ref 23–33)
CREAT SERPL-MCNC: 0.7 MG/DL (ref 0.4–1.2)
DEPRECATED RDW RBC AUTO: 44.4 FL (ref 35–45)
ERYTHROCYTE [DISTWIDTH] IN BLOOD BY AUTOMATED COUNT: 12.9 % (ref 11.5–14.5)
GFR SERPL CREATININE-BSD FRML MDRD: > 90 ML/MIN/1.73M2
GLUCOSE BLD STRIP.AUTO-MCNC: 111 MG/DL (ref 70–108)
GLUCOSE BLD STRIP.AUTO-MCNC: 120 MG/DL (ref 70–108)
GLUCOSE BLD STRIP.AUTO-MCNC: 56 MG/DL (ref 70–108)
GLUCOSE BLD STRIP.AUTO-MCNC: 58 MG/DL (ref 70–108)
GLUCOSE SERPL-MCNC: 79 MG/DL (ref 70–108)
HCT VFR BLD AUTO: 27.2 % (ref 37–47)
HGB BLD-MCNC: 8.6 GM/DL (ref 12–16)
MCH RBC QN AUTO: 29.8 PG (ref 26–33)
MCHC RBC AUTO-ENTMCNC: 31.6 GM/DL (ref 32.2–35.5)
MCV RBC AUTO: 94.1 FL (ref 81–99)
PLATELET # BLD AUTO: 340 THOU/MM3 (ref 130–400)
PMV BLD AUTO: 11.1 FL (ref 9.4–12.4)
POTASSIUM SERPL-SCNC: 4.2 MEQ/L (ref 3.5–5.2)
RBC # BLD AUTO: 2.89 MILL/MM3 (ref 4.2–5.4)
SODIUM SERPL-SCNC: 140 MEQ/L (ref 135–145)
WBC # BLD AUTO: 10.2 THOU/MM3 (ref 4.8–10.8)

## 2024-09-13 PROCEDURE — 6370000000 HC RX 637 (ALT 250 FOR IP): Performed by: PHYSICIAN ASSISTANT

## 2024-09-13 PROCEDURE — 6370000000 HC RX 637 (ALT 250 FOR IP)

## 2024-09-13 PROCEDURE — 80048 BASIC METABOLIC PNL TOTAL CA: CPT

## 2024-09-13 PROCEDURE — 99239 HOSP IP/OBS DSCHRG MGMT >30: CPT | Performed by: PHYSICIAN ASSISTANT

## 2024-09-13 PROCEDURE — 85027 COMPLETE CBC AUTOMATED: CPT

## 2024-09-13 PROCEDURE — 6360000002 HC RX W HCPCS: Performed by: PHYSICIAN ASSISTANT

## 2024-09-13 PROCEDURE — 2580000003 HC RX 258: Performed by: STUDENT IN AN ORGANIZED HEALTH CARE EDUCATION/TRAINING PROGRAM

## 2024-09-13 PROCEDURE — 6370000000 HC RX 637 (ALT 250 FOR IP): Performed by: STUDENT IN AN ORGANIZED HEALTH CARE EDUCATION/TRAINING PROGRAM

## 2024-09-13 PROCEDURE — 36415 COLL VENOUS BLD VENIPUNCTURE: CPT

## 2024-09-13 PROCEDURE — 6360000002 HC RX W HCPCS: Performed by: STUDENT IN AN ORGANIZED HEALTH CARE EDUCATION/TRAINING PROGRAM

## 2024-09-13 PROCEDURE — 82948 REAGENT STRIP/BLOOD GLUCOSE: CPT

## 2024-09-13 RX ORDER — HEPARIN 100 UNIT/ML
500 SYRINGE INTRAVENOUS ONCE
Status: COMPLETED | OUTPATIENT
Start: 2024-09-13 | End: 2024-09-13

## 2024-09-13 RX ADMIN — CARBIDOPA AND LEVODOPA 1 TABLET: 10; 100 TABLET ORAL at 09:09

## 2024-09-13 RX ADMIN — PIPERACILLIN AND TAZOBACTAM 3375 MG: 3; .375 INJECTION, POWDER, FOR SOLUTION INTRAVENOUS at 09:03

## 2024-09-13 RX ADMIN — Medication 1 CAPSULE: at 09:09

## 2024-09-13 RX ADMIN — METFORMIN HYDROCHLORIDE 1000 MG: 500 TABLET ORAL at 09:10

## 2024-09-13 RX ADMIN — PREGABALIN 150 MG: 150 CAPSULE ORAL at 09:10

## 2024-09-13 RX ADMIN — OXYCODONE HYDROCHLORIDE 10 MG: 5 TABLET ORAL at 12:21

## 2024-09-13 RX ADMIN — METOPROLOL TARTRATE 25 MG: 25 TABLET, FILM COATED ORAL at 09:09

## 2024-09-13 RX ADMIN — ENOXAPARIN SODIUM 40 MG: 100 INJECTION SUBCUTANEOUS at 09:09

## 2024-09-13 RX ADMIN — DEXTROSE MONOHYDRATE 125 ML: 100 INJECTION, SOLUTION INTRAVENOUS at 06:49

## 2024-09-13 RX ADMIN — CARBIDOPA AND LEVODOPA 1 TABLET: 10; 100 TABLET ORAL at 14:49

## 2024-09-13 RX ADMIN — ASPIRIN 81 MG 81 MG: 81 TABLET ORAL at 09:09

## 2024-09-13 RX ADMIN — HEPARIN 500 UNITS: 100 SYRINGE at 15:19

## 2024-09-13 RX ADMIN — CLOPIDOGREL BISULFATE 75 MG: 75 TABLET ORAL at 09:10

## 2024-09-13 RX ADMIN — LISINOPRIL 2.5 MG: 2.5 TABLET ORAL at 09:10

## 2024-09-13 RX ADMIN — ISOSORBIDE DINITRATE 30 MG: 20 TABLET ORAL at 09:10

## 2024-09-13 ASSESSMENT — PAIN DESCRIPTION - DESCRIPTORS: DESCRIPTORS: ACHING;DISCOMFORT;STABBING;SHARP

## 2024-09-13 ASSESSMENT — PAIN DESCRIPTION - FREQUENCY: FREQUENCY: CONTINUOUS

## 2024-09-13 ASSESSMENT — PAIN SCALES - GENERAL
PAINLEVEL_OUTOF10: 6
PAINLEVEL_OUTOF10: 8

## 2024-09-13 ASSESSMENT — PAIN DESCRIPTION - LOCATION: LOCATION: FOOT

## 2024-09-13 ASSESSMENT — PAIN DESCRIPTION - ORIENTATION: ORIENTATION: RIGHT

## 2024-09-13 ASSESSMENT — PAIN - FUNCTIONAL ASSESSMENT: PAIN_FUNCTIONAL_ASSESSMENT: ACTIVITIES ARE NOT PREVENTED

## 2024-09-13 ASSESSMENT — PAIN DESCRIPTION - ONSET: ONSET: ON-GOING

## 2024-09-14 LAB
BACTERIA BLD AEROBE CULT: NORMAL
BACTERIA BLD AEROBE CULT: NORMAL

## 2024-09-16 ENCOUNTER — CARE COORDINATION (OUTPATIENT)
Dept: CASE MANAGEMENT | Age: 54
End: 2024-09-16

## 2024-09-16 ENCOUNTER — OFFICE VISIT (OUTPATIENT)
Dept: ENT CLINIC | Age: 54
End: 2024-09-16

## 2024-09-16 VITALS
SYSTOLIC BLOOD PRESSURE: 116 MMHG | OXYGEN SATURATION: 94 % | HEIGHT: 61 IN | HEART RATE: 62 BPM | TEMPERATURE: 97.8 F | BODY MASS INDEX: 27.38 KG/M2 | DIASTOLIC BLOOD PRESSURE: 70 MMHG | WEIGHT: 145 LBS

## 2024-09-16 DIAGNOSIS — R13.19 DYSPHAGIA DUE TO LARYNGECTOMY: ICD-10-CM

## 2024-09-16 DIAGNOSIS — Q39.6 ESOPHAGEAL DIVERTICULUM: ICD-10-CM

## 2024-09-16 DIAGNOSIS — R13.19 DYSPHAGIA DUE TO LARYNGECTOMY: Primary | ICD-10-CM

## 2024-09-16 DIAGNOSIS — Z01.818 PRE-OP TESTING: Primary | ICD-10-CM

## 2024-09-16 DIAGNOSIS — R49.1 APHONIA: ICD-10-CM

## 2024-09-16 DIAGNOSIS — Q38.7 PHARYNGEAL POUCH: ICD-10-CM

## 2024-09-16 DIAGNOSIS — Z90.02 DYSPHAGIA DUE TO LARYNGECTOMY: Primary | ICD-10-CM

## 2024-09-16 DIAGNOSIS — K44.9 HIATAL HERNIA: ICD-10-CM

## 2024-09-16 DIAGNOSIS — I96 GANGRENE OF TOE OF RIGHT FOOT (HCC): Primary | ICD-10-CM

## 2024-09-16 DIAGNOSIS — K22.2 ESOPHAGEAL STRICTURE: ICD-10-CM

## 2024-09-16 DIAGNOSIS — Z90.02 DYSPHAGIA DUE TO LARYNGECTOMY: ICD-10-CM

## 2024-09-16 DIAGNOSIS — C32.1 CANCER, EPIGLOTTIS (HCC): ICD-10-CM

## 2024-09-16 PROCEDURE — 1111F DSCHRG MED/CURRENT MED MERGE: CPT | Performed by: FAMILY MEDICINE

## 2024-09-23 ENCOUNTER — CARE COORDINATION (OUTPATIENT)
Dept: CASE MANAGEMENT | Age: 54
End: 2024-09-23

## 2024-09-25 ENCOUNTER — CARE COORDINATION (OUTPATIENT)
Dept: CASE MANAGEMENT | Age: 54
End: 2024-09-25

## 2024-10-01 ENCOUNTER — CARE COORDINATION (OUTPATIENT)
Dept: CASE MANAGEMENT | Age: 54
End: 2024-10-01

## 2024-10-01 NOTE — CARE COORDINATION
Care Transitions Note    Follow Up Call     Patient Current Location:  [place]    Care Transition Nurse contacted the spouse/partner  by telephone. Verified name and  as identifiers.    Additional needs identified to be addressed with provider   No needs identified                 Method of communication with provider: none.    Care Summary Note:  Spoke with SO, Ladarius, said Flaquita is doing ok.  She has moved back to her house but is with Ladarius today.  Denies fever, chills, dyspnea, dizziness, chest pain, n/v/d.  Saw podiatrist last week and will see again on Thurs.  Still had some seepage.  Eating, drinking, sleeping good.  No issues with B&B.  No other issues to report.  Denies any other needs.  No other questions or concerns at this time.  Will continue to follow, agreeable to calls.    Plan of care updates since last contact:  No changes       Advance Care Planning:   Does patient have an Advance Directive: patient declined education.    Medication Review:  No changes since last call.     Remote Patient Monitoring:  Offered patient enrollment in the Remote Patient Monitoring (RPM) program for in-home monitoring: Yes, but did not enroll at this time: already monitoring with home equipment.    Assessments:  Care Transitions Subsequent and Final Call    Subsequent and Final Calls  Do you have any ongoing symptoms?: No  Have your medications changed?: No  Do you have any questions related to your medications?: No  Do you currently have any active services?: Yes  Are you currently active with any services?: Home Health  Do you have any needs or concerns that I can assist you with?: No  Identified Barriers: Lack of Education  Care Transitions Interventions     Transportation Support: Declined   Other Interventions:              Follow Up Appointment:   Reviewed upcoming appointment(s).  Future Appointments         Provider Specialty Dept Phone    10/3/2024 3:00 PM Michael Ann DPM      10/24/2024  2:30 PM STR OUT PT

## 2024-10-09 ENCOUNTER — CARE COORDINATION (OUTPATIENT)
Dept: CASE MANAGEMENT | Age: 54
End: 2024-10-09

## 2024-10-09 NOTE — CARE COORDINATION
Care Transitions Note    Final Call     Patient Current Location:  Home: [address] [place] [address]    Care Transition Nurse contacted the spouse/partner  by telephone. Verified name and  as identifiers.    Patient graduated from the Care Transitions program on 10/9/24.  Patient/family verbalizes confidence in the ability to self-manage at this time. has the ability to self manage at this time..      Advance Care Planning:   Does patient have an Advance Directive: reviewed during previous call, see note. .    Handoff:   Patient was not referred to the ACM team due to no additional needs identified.       Care Summary Note:  Gene returned call, said Flaquita is doing fairly well.  Saw podiatrist yesterday and is pleased with her progress but said it will take more time to heal.  Denies fever, chills, dizziness, dyspnea, chest pain.  Eating, drinking, sleeping good.  No issues with B&B.  No other issues to report.  Denies any other needs.  No other questions or concerns at this time.  Final call, appreciative.    Assessments:  No changes since last call    Upcoming Appointments:    Future Appointments         Provider Specialty Dept Phone    10/24/2024  2:30 PM STR OUT PT ONC INJ RM 05 Infusion Therapy [phone]    10/24/2024 2:45 PM STR OUT PT ONC CMA Infusion Therapy [phone]    10/24/2024 3:00 PM Aparna Curtis MD Oncology [phone]    2024 2:00 PM (Arrive by 1:30 PM) [facility] VASCULAR LAB ROOM 2 Vascular Surgery [phone]    2024 3:30 PM Specials, Str Radiologist; STR SPECIAL PROCEDURE ROOM 1 Radiology Special Procedures [phone]    2024 1:00 PM Sukumar Uribe MD; SCHEDULE, [facility] JULIO C REBECCA NURSE Radiation Oncology [phone]    2024 12:00 PM Colt Coughlin MD Otolaryngology [phone]            Patient has agreed to contact primary care provider and/or specialist for any further questions, concerns, or needs.    Apryl Tian RN        Care

## 2024-10-11 ENCOUNTER — TELEPHONE (OUTPATIENT)
Dept: WOUND CARE | Age: 54
End: 2024-10-11

## 2024-10-11 NOTE — TELEPHONE ENCOUNTER
Patient is no longer following with Gustabo Ferrera CNP. Is now following with Dr. Ann for podiatry care.

## 2024-10-17 ENCOUNTER — CARE COORDINATION (OUTPATIENT)
Dept: CARE COORDINATION | Age: 54
End: 2024-10-17

## 2024-10-17 NOTE — CARE COORDINATION
Ambulatory Care Coordination Note     10/17/2024 10:10 AM     Patient and HIPAA contact, Ladarius,   outreached to attempt by this AC today to offer care management services. [facility] was unable to reach the patient, Ladarius,  by telephone today; left voice message requesting a return phone call to this AC.     ACM: Amy Tolentino RN     Care Summary Note: Initial attempt to reach patient for Care Coordination enrollment s/p recent list referral for assistance with the management of her DM, Hypertension, and healthcare needs.  Patient and her HIPAA contact, Ladarius, were not available at the time of ACM call and generic voicemail messages were left asking them to please return call to [facility] direct number.  Will continue to work to f/u with patient in the future.      PCP/Specialist follow up:   Future Appointments         Provider Specialty Dept Phone    10/24/2024  2:30 PM STR OUT PT ONC INJ RM 05 Infusion Therapy [phone]    10/24/2024 2:45 PM STR OUT PT ONC CMA Infusion Therapy [phone]    10/24/2024 3:00 PM Aparna Curtis MD Oncology [phone]    11/27/2024 2:00 PM (Arrive by 1:30 PM) STR VASCULAR LAB ROOM 2 Vascular Surgery [phone]    11/27/2024 3:30 PM Specials, Str Radiologist; STR SPECIAL PROCEDURE ROOM 1 Radiology Special Procedures [phone]    12/27/2024 1:00 PM Sukumar Uribe MD; SCHEDULE, [facility] N[facility] Rad[facility] On[facility] [phone]    12/30/2024 12:00 PM Colt Coughlin MD Otolaryngology [phone]            Follow Up:   Plan for next [facility] outreach in approximately  3-5 days  to complete:  - outreach attempt to offer care management services.

## 2024-10-18 ENCOUNTER — PREP FOR PROCEDURE (OUTPATIENT)
Dept: ENT CLINIC | Age: 54
End: 2024-10-18

## 2024-10-21 ENCOUNTER — CARE COORDINATION (OUTPATIENT)
Dept: CARE COORDINATION | Age: 54
End: 2024-10-21

## 2024-10-21 SDOH — HEALTH STABILITY: MENTAL HEALTH: HOW OFTEN DO YOU HAVE A DRINK CONTAINING ALCOHOL?: NEVER

## 2024-10-21 SDOH — SOCIAL STABILITY: SOCIAL NETWORK: ARE YOU MARRIED, WIDOWED, DIVORCED, SEPARATED, NEVER MARRIED, OR LIVING WITH A PARTNER?: WIDOWED

## 2024-10-21 SDOH — HEALTH STABILITY: PHYSICAL HEALTH: ON AVERAGE, HOW MANY DAYS PER WEEK DO YOU ENGAGE IN MODERATE TO STRENUOUS EXERCISE (LIKE A BRISK WALK)?: 0 DAYS

## 2024-10-21 SDOH — SOCIAL STABILITY: SOCIAL NETWORK
DO YOU BELONG TO ANY CLUBS OR ORGANIZATIONS SUCH AS CHURCH GROUPS UNIONS, FRATERNAL OR ATHLETIC GROUPS, OR SCHOOL GROUPS?: NO

## 2024-10-21 SDOH — SOCIAL STABILITY: SOCIAL NETWORK: HOW OFTEN DO YOU ATTEND CHURCH OR RELIGIOUS SERVICES?: NEVER

## 2024-10-21 SDOH — ECONOMIC STABILITY: INCOME INSECURITY: IN THE LAST 12 MONTHS, WAS THERE A TIME WHEN YOU WERE NOT ABLE TO PAY THE MORTGAGE OR RENT ON TIME?: NO

## 2024-10-21 SDOH — HEALTH STABILITY: MENTAL HEALTH
STRESS IS WHEN SOMEONE FEELS TENSE, NERVOUS, ANXIOUS, OR CAN'T SLEEP AT NIGHT BECAUSE THEIR MIND IS TROUBLED. HOW STRESSED ARE YOU?: TO SOME EXTENT

## 2024-10-21 SDOH — ECONOMIC STABILITY: FOOD INSECURITY
WITHIN THE PAST 12 MONTHS, THE FOOD YOU BOUGHT JUST DIDN'T LAST AND YOU DIDN'T HAVE MONEY TO GET MORE.: PATIENT UNABLE TO ANSWER

## 2024-10-21 SDOH — ECONOMIC STABILITY: TRANSPORTATION INSECURITY
IN THE PAST 12 MONTHS, HAS THE LACK OF TRANSPORTATION KEPT YOU FROM MEDICAL APPOINTMENTS OR FROM GETTING MEDICATIONS?: NO

## 2024-10-21 SDOH — ECONOMIC STABILITY: INCOME INSECURITY
HOW HARD IS IT FOR YOU TO PAY FOR THE VERY BASICS LIKE FOOD, HOUSING, MEDICAL CARE, AND HEATING?: PATIENT UNABLE TO ANSWER

## 2024-10-21 SDOH — ECONOMIC STABILITY: FOOD INSECURITY
WITHIN THE PAST 12 MONTHS, YOU WORRIED THAT YOUR FOOD WOULD RUN OUT BEFORE YOU GOT MONEY TO BUY MORE.: PATIENT UNABLE TO ANSWER

## 2024-10-21 SDOH — SOCIAL STABILITY: SOCIAL NETWORK: HOW OFTEN DO YOU ATTENT MEETINGS OF THE CLUB OR ORGANIZATION YOU BELONG TO?: NEVER

## 2024-10-21 SDOH — HEALTH STABILITY: PHYSICAL HEALTH: ON AVERAGE, HOW MANY MINUTES DO YOU ENGAGE IN EXERCISE AT THIS LEVEL?: 0 MIN

## 2024-10-21 SDOH — ECONOMIC STABILITY: TRANSPORTATION INSECURITY
IN THE PAST 12 MONTHS, HAS LACK OF TRANSPORTATION KEPT YOU FROM MEETINGS, WORK, OR FROM GETTING THINGS NEEDED FOR DAILY LIVING?: NO

## 2024-10-21 SDOH — SOCIAL STABILITY: SOCIAL NETWORK
IN A TYPICAL WEEK, HOW MANY TIMES DO YOU TALK ON THE PHONE WITH FAMILY, FRIENDS, OR NEIGHBORS?: MORE THAN THREE TIMES A WEEK

## 2024-10-21 SDOH — HEALTH STABILITY: MENTAL HEALTH: HOW MANY STANDARD DRINKS CONTAINING ALCOHOL DO YOU HAVE ON A TYPICAL DAY?: PATIENT DOES NOT DRINK

## 2024-10-21 SDOH — SOCIAL STABILITY: SOCIAL NETWORK: HOW OFTEN DO YOU GET TOGETHER WITH FRIENDS OR RELATIVES?: MORE THAN THREE TIMES A WEEK

## 2024-10-21 NOTE — CARE COORDINATION
Ambulatory Care Coordination Note     10/21/2024 10:15 AM     Patient Current Location:  [place]     This patient was received as a referral from Population health report .    [facility] contacted the  patient and her HIPAA contact/significant other, Ladarius,  by telephone. Verified name and  with spouse/partner as identifiers. Provided introduction to self, and explanation of the ACM role.   Spouse/Partner accepted care management services at this time.          ACM: Amy Tolentino RN     Challenges to be reviewed by the provider   Additional needs identified to be addressed with provider No    none         Method of communication with provider: none.    Has the patient been seen in the ED since your last call? no    Care Summary Note: Patient was called for continued Care Coordination enrollment s/p recent list referral for assistance with management of her DM, Hypertension, and healthcare needs.  Patient was not available at the time of ACM call, but ACM was able to speak with patient's significant other/HIPAA contact, Ladarius.  Ladarius shared patient is unable to talk on the phone d/t her laryngectomy history but patient will text via her phone.  Care Coordination program was briefly reviewed with Ladarius and initial eval information obtained.  Ladarius shared patient lives with her adult son, but he does not provide much assistance to patient.  Patient does have a cousin who she pays at times to assist with household chores.  Ladarius reported he lives approx 13 miles from patient and tries to visit/assist as he is able, but he has his own healthcare concerns.  Ladarius reported patient is independent with her ADLS and she utilizes her DME as needed (has history of leg amputation and partial foot amputation).  Ladarius denied any recent patient falls.  Ladarius shared patient does have trach supplies and home O2 at night and as needed.  Ladarius is unsure of O2 supplier.  Ladarius denied any current resource needs for patient.    Ladarius shared patient does

## 2024-10-28 ENCOUNTER — CARE COORDINATION (OUTPATIENT)
Dept: CARE COORDINATION | Age: 54
End: 2024-10-28

## 2024-10-28 NOTE — CARE COORDINATION
tool to seek urgent or emergent care.  I will notify my provider of any symptoms that indicate a worsening of my condition.    Barriers: overwhelmed by complexity of regimen, stress, lack of education, and communication barriers  Plan for overcoming my barriers: Continued support, education, and monitoring of additional resource needs  Confidence: 6/10  Anticipated Goal Completion Date: 1/20/2025                 PCP/Specialist follow up:   Future Appointments         Provider Specialty Dept Phone    11/5/2024  2:30 PM STR OUT PT ONC INJ RM 05 Infusion Therapy [phone]    11/5/2024 2:45 PM STR OUT PT ONC CMA Infusion Therapy [phone]    11/5/2024 3:00 PM Yrn Cisneros MD Oncology [phone]    11/27/2024 2:00 PM (Arrive by 1:30 PM) [facility] VASCULAR LAB ROOM 2 Vascular Surgery [phone]    11/27/2024 3:30 PM Specials, Str Radiologist; [facility] SPECIAL PROCEDURE ROOM 1 Radiology Special Procedures [phone]    12/27/2024 1:00 PM Sukumar Uribe MD; SCHEDULE, [facility] [phone]    12/30/2024 12:00 PM Colt Coughlin MD Otolaryngology [phone]            Follow Up:   Plan for next ACM outreach in approximately 1 week to complete:  - Disease specific assessments - DM and Hypertension  - Medication review - Unable to review with patient/HIPAA contact  - Advance care planning - Pending review  - Goal progression  - Education   - RPM - will monitor for possible enrollment  - Monitor for additional needs  - Monitor for readiness to discharge from Care Coordination     Caregiver is agreeable to this plan.

## 2024-11-01 ENCOUNTER — CARE COORDINATION (OUTPATIENT)
Dept: CARE COORDINATION | Age: 54
End: 2024-11-01

## 2024-11-04 ENCOUNTER — TELEPHONE (OUTPATIENT)
Dept: ONCOLOGY | Age: 54
End: 2024-11-04

## 2024-11-04 ENCOUNTER — CARE COORDINATION (OUTPATIENT)
Dept: CARE COORDINATION | Age: 54
End: 2024-11-04

## 2024-11-04 NOTE — CARE COORDINATION
Ambulatory Care Coordination Note     11/4/2024 10:45 AM     Patient/HIPAA contact, Ladarius, outreach attempt by this AC today to perform care management follow up . [facility] was unable to reach the  patient or HIPAA contact  by telephone today; left voice message requesting a return phone call to this ACM.     ACM: Amy Tolentino RN     Care Summary Note: Attempted to reach patient/HIPAA contact, Ladarius, for continued Care Coordination follow up and education re: the management of her DM, Hypertension, and healthcare needs.  Patient/HIPAA were unavailable at the time of ACM call, and generic voicemail message was left asking patient/HIPAA contact to please return call to [facility] direct number.  Will continue to work to f/u with patient in the future.     PCP/Specialist follow up:   Future Appointments         Provider Specialty Dept Phone    11/5/2024  2:30 PM STR OUT PT ONC INJ RM 05 Infusion Therapy [phone]    11/5/2024 2:45 PM STR OUT PT ONC CMA Infusion Therapy [phone]    11/5/2024 3:00 PM Aparna Curtis MD Oncology [phone]    11/27/2024 2:00 PM (Arrive by 1:30 PM) STR VASCULAR LAB ROOM 2 Vascular Surgery [phone]    11/27/2024 3:30 PM Specials, Str Radiologist; STR SPECIAL PROCEDURE ROOM 1 Radiology Special Procedures [phone]    12/27/2024 1:00 PM Sukumar Uribe MD; SCHEDULE, [facility] NURSE Radiation Oncology [phone]    12/30/2024 12:00 PM Colt Coughlin MD Otolaryngology [phone]            Follow Up:   Plan for next [facility] outreach in approximately 1 week to complete:  - Disease specific assessments - DM and Hypertension  - Medication review - Unable to review with patient/HIPAA contact  - Advance care planning - Pending review  - Goal progression  - Education   - RPM - will monitor for possible enrollment  - Monitor for additional needs  - Monitor for readiness to discharge from Care Coordination

## 2024-11-04 NOTE — TELEPHONE ENCOUNTER
Patients boyfriend has called in to rescheduled patients appointment. Appointment has been moved a few times. I did inform him about the importance of port maintenance. He states she has a son with medical problems right know and she is taking care of him. Appointment has been rescheduled and he has verbalized an understanding of information given. He states he will talk with her and they may call to reschedule appointment sooner.

## 2024-11-12 ENCOUNTER — CARE COORDINATION (OUTPATIENT)
Dept: CARE COORDINATION | Age: 54
End: 2024-11-12

## 2024-11-12 NOTE — CARE COORDINATION
Attempted to reach Gene (RAMIRO) for continued Care Coordination follow up and education.  Ladarius was unavailable at the time of my call, and a generic voicemail message was left asking him to return my call at [phone].

## 2024-11-19 ENCOUNTER — CARE COORDINATION (OUTPATIENT)
Dept: CARE COORDINATION | Age: 54
End: 2024-11-19

## 2024-11-19 ENCOUNTER — HOSPITAL ENCOUNTER (OUTPATIENT)
Age: 54
Discharge: HOME | End: 2024-11-19
Payer: MEDICARE

## 2024-11-19 ENCOUNTER — HOSPITAL ENCOUNTER (OUTPATIENT)
Dept: GENERAL RADIOLOGY | Age: 54
Discharge: HOME | End: 2024-11-19
Payer: MEDICARE

## 2024-11-19 DIAGNOSIS — Q39.6 ESOPHAGEAL DIVERTICULUM: ICD-10-CM

## 2024-11-19 DIAGNOSIS — R13.19 DYSPHAGIA DUE TO LARYNGECTOMY: ICD-10-CM

## 2024-11-19 DIAGNOSIS — Q38.7 PHARYNGEAL POUCH: ICD-10-CM

## 2024-11-19 DIAGNOSIS — Z90.02 DYSPHAGIA DUE TO LARYNGECTOMY: ICD-10-CM

## 2024-11-19 DIAGNOSIS — R49.1 APHONIA: ICD-10-CM

## 2024-11-19 DIAGNOSIS — Z01.818 PRE-OP TESTING: ICD-10-CM

## 2024-11-19 LAB
ALBUMIN SERPL BCG-MCNC: 3.8 G/DL (ref 3.5–5.1)
ALP SERPL-CCNC: 114 U/L (ref 38–126)
ALT SERPL W/O P-5'-P-CCNC: 15 U/L (ref 11–66)
ANION GAP SERPL CALC-SCNC: 11 MEQ/L (ref 8–16)
AST SERPL-CCNC: 14 U/L (ref 5–40)
BASOPHILS ABSOLUTE: 0.1 THOU/MM3 (ref 0–0.1)
BASOPHILS NFR BLD AUTO: 1 %
BILIRUB SERPL-MCNC: 0.2 MG/DL (ref 0.3–1.2)
BUN SERPL-MCNC: 23 MG/DL (ref 7–22)
CALCIUM SERPL-MCNC: 9.4 MG/DL (ref 8.5–10.5)
CHLORIDE SERPL-SCNC: 97 MEQ/L (ref 98–111)
CO2 SERPL-SCNC: 25 MEQ/L (ref 23–33)
CREAT SERPL-MCNC: 0.6 MG/DL (ref 0.4–1.2)
DEPRECATED RDW RBC AUTO: 48.9 FL (ref 35–45)
EOSINOPHIL NFR BLD AUTO: 3.9 %
EOSINOPHILS ABSOLUTE: 0.2 THOU/MM3 (ref 0–0.4)
ERYTHROCYTE [DISTWIDTH] IN BLOOD BY AUTOMATED COUNT: 14.4 % (ref 11.5–14.5)
GFR SERPL CREATININE-BSD FRML MDRD: > 90 ML/MIN/1.73M2
GLUCOSE SERPL-MCNC: 291 MG/DL (ref 70–108)
HCT VFR BLD AUTO: 41.1 % (ref 37–47)
HGB BLD-MCNC: 13.6 GM/DL (ref 12–16)
IMM GRANULOCYTES # BLD AUTO: 0.01 THOU/MM3 (ref 0–0.07)
IMM GRANULOCYTES NFR BLD AUTO: 0.2 %
LYMPHOCYTES ABSOLUTE: 1.3 THOU/MM3 (ref 1–4.8)
LYMPHOCYTES NFR BLD AUTO: 25.5 %
MCH RBC QN AUTO: 30.7 PG (ref 26–33)
MCHC RBC AUTO-ENTMCNC: 33.1 GM/DL (ref 32.2–35.5)
MCV RBC AUTO: 92.8 FL (ref 81–99)
MONOCYTES ABSOLUTE: 0.3 THOU/MM3 (ref 0.4–1.3)
MONOCYTES NFR BLD AUTO: 5.8 %
NEUTROPHILS ABSOLUTE: 3.2 THOU/MM3 (ref 1.8–7.7)
NEUTROPHILS NFR BLD AUTO: 63.6 %
NRBC BLD AUTO-RTO: 0 /100 WBC
PLATELET # BLD AUTO: 315 THOU/MM3 (ref 130–400)
PMV BLD AUTO: 11.2 FL (ref 9.4–12.4)
POTASSIUM SERPL-SCNC: 4.3 MEQ/L (ref 3.5–5.2)
PROT SERPL-MCNC: 7.3 G/DL (ref 6.1–8)
RBC # BLD AUTO: 4.43 MILL/MM3 (ref 4.2–5.4)
SODIUM SERPL-SCNC: 133 MEQ/L (ref 135–145)
WBC # BLD AUTO: 5.1 THOU/MM3 (ref 4.8–10.8)

## 2024-11-19 PROCEDURE — 36415 COLL VENOUS BLD VENIPUNCTURE: CPT

## 2024-11-19 PROCEDURE — 71046 X-RAY EXAM CHEST 2 VIEWS: CPT

## 2024-11-19 PROCEDURE — 80053 COMPREHEN METABOLIC PANEL: CPT

## 2024-11-19 PROCEDURE — 85025 COMPLETE CBC W/AUTO DIFF WBC: CPT

## 2024-11-19 NOTE — CARE COORDINATION
Ambulatory Care Coordination Note     2024 3:59 PM     Patient Current Location:  [facility] contacted the  patient's HIPAA contacts, Ladarius and Maliha,  by telephone. Verified name and  with  Gene  as identifiers.  Maliha was not available at the time of [facility] call.          ACM: Amy Tolentino RN     Challenges to be reviewed by the provider   Additional needs identified to be addressed with provider No    none         Method of communication with provider: none.    Utilization: Patient has not had any utilization since our last call.     Care Summary Note: Patient's HIPAA contacts, were called for continued Care Coordination follow up and education re: the management of her DM, Hypertension, and healthcare needs as patient is unable to talk on the phone.  Per patient's significant other/HIPAA contact, Ladarius, patient has been doing \"ok\" but is very tired today.  Gene shared patient had pre-op testing completed earlier today for upcoming ENT procedure.  Gene denied any current BS concerns and DM education/zone information was briefly reviewed with Gene, including the importance of keeping BS controlled and DM managed to prevent the development/worsening of complications.  Gene acknowledged understanding.  Gene shared he does not live with patient\ so he does not have specific information, but has been texting with her this afternoon.  Gene will work to f/u with patient and encourage her to reach out to [facility] with any questions, concerns, or needs.  [facility] attempted to reach patient's son/HIPAA contact, Maliha, who lives with patient but he was not available at the time of AC call.  Will continue to work to f/u with patient in the future.  Per Gene patient has PCP f/u scheduled for 2024.     Offered patient enrollment in the Remote Patient Monitoring (RPM) program for in-home monitoring: Yes, but did not enroll at this time: controlled chronic disease management.     Assessments Completed:   No changes since

## 2024-11-26 ENCOUNTER — ANESTHESIA EVENT (OUTPATIENT)
Dept: OPERATING ROOM | Age: 54
End: 2024-11-26
Payer: MEDICARE

## 2024-11-26 ENCOUNTER — TELEPHONE (OUTPATIENT)
Dept: ENT CLINIC | Age: 54
End: 2024-11-26

## 2024-11-26 NOTE — PROGRESS NOTES
Anesthesia Dr Gallegos reviewed and cardiac clearance requested. Notified Chanel at Dr Coughlin office.

## 2024-11-26 NOTE — TELEPHONE ENCOUNTER
Dr Tang office called today to let us know he medically cleared the patient for surgery but marked on our form to have cardiac clearance because he does not prescribe her Plavix. However, it looks like her Plavix came from Oncology in 2022. Pre admission testing will ask Anesthesia if she truly needs a cardiac clearance as she does not have a cardiologist. I did double check and she hasn't been seen by Dr Zhang in years per their office.    Will await response from anesthesia.

## 2024-12-02 ENCOUNTER — TELEPHONE (OUTPATIENT)
Dept: ENT CLINIC | Age: 54
End: 2024-12-02

## 2024-12-02 NOTE — TELEPHONE ENCOUNTER
Flaquita is scheduled for surgery with Dr Coughlin on 12/19/24. She has not been seen by cardiology, however, anesthesia would like her cleared for surgery.  Patient is willing to see anyone in the [facility] heart group.    Surgery:  Robot assisted Pharyngoplasty, esophagoplasty and TEP placement    Please advise.    Thank you!

## 2024-12-03 ENCOUNTER — CARE COORDINATION (OUTPATIENT)
Dept: CARE COORDINATION | Age: 54
End: 2024-12-03

## 2024-12-03 ENCOUNTER — OFFICE VISIT (OUTPATIENT)
Dept: CARDIOLOGY CLINIC | Age: 54
End: 2024-12-03
Payer: MEDICARE

## 2024-12-03 VITALS
HEIGHT: 61 IN | HEART RATE: 90 BPM | SYSTOLIC BLOOD PRESSURE: 102 MMHG | BODY MASS INDEX: 27.38 KG/M2 | DIASTOLIC BLOOD PRESSURE: 60 MMHG | WEIGHT: 145 LBS

## 2024-12-03 DIAGNOSIS — R94.31 ABNORMAL ELECTROCARDIOGRAPHY: ICD-10-CM

## 2024-12-03 DIAGNOSIS — Z01.810 PRE-OPERATIVE CARDIOVASCULAR EXAMINATION: ICD-10-CM

## 2024-12-03 DIAGNOSIS — I73.9 PERIPHERAL VASCULAR DISEASE (HCC): Primary | ICD-10-CM

## 2024-12-03 DIAGNOSIS — I25.83 CORONARY ARTERY DISEASE DUE TO LIPID RICH PLAQUE: ICD-10-CM

## 2024-12-03 DIAGNOSIS — I25.10 CORONARY ARTERY DISEASE DUE TO LIPID RICH PLAQUE: ICD-10-CM

## 2024-12-03 PROCEDURE — 99204 OFFICE O/P NEW MOD 45 MIN: CPT | Performed by: INTERNAL MEDICINE

## 2024-12-03 PROCEDURE — 3078F DIAST BP <80 MM HG: CPT | Performed by: INTERNAL MEDICINE

## 2024-12-03 PROCEDURE — 1036F TOBACCO NON-USER: CPT | Performed by: INTERNAL MEDICINE

## 2024-12-03 PROCEDURE — 3074F SYST BP LT 130 MM HG: CPT | Performed by: INTERNAL MEDICINE

## 2024-12-03 PROCEDURE — G8484 FLU IMMUNIZE NO ADMIN: HCPCS | Performed by: INTERNAL MEDICINE

## 2024-12-03 PROCEDURE — 93000 ELECTROCARDIOGRAM COMPLETE: CPT | Performed by: INTERNAL MEDICINE

## 2024-12-03 PROCEDURE — G8417 CALC BMI ABV UP PARAM F/U: HCPCS | Performed by: INTERNAL MEDICINE

## 2024-12-03 PROCEDURE — 3017F COLORECTAL CA SCREEN DOC REV: CPT | Performed by: INTERNAL MEDICINE

## 2024-12-03 PROCEDURE — G8427 DOCREV CUR MEDS BY ELIG CLIN: HCPCS | Performed by: INTERNAL MEDICINE

## 2024-12-03 NOTE — CARE COORDINATION
Ambulatory Care Coordination Note     12/3/2024 2:34 PM     Patient/HIPAA contact  outreach attempts by this ACM today to perform care management follow up . [facility] was unable to reach the  patient, son, or significant other  by telephone today;   left voice message requesting a return phone call to this ACM.     ACM: Amy Tolentino RN     Care Summary Note: Attempted to reach patient and her HIPAA contacts, Abdirashid and Gene, for continued Care Coordination follow up and education re: the management of her DM, Hypertension, and healthcare needs.  Will continue to work to f/u with patient in the future.     PCP/Specialist follow up:   Future Appointments         Provider Specialty Dept Phone    12/3/2024 3:30 PM Isis Colindres MD Cardiology [phone]    12/4/2024  2:30 PM STR OUT PT ONC INJ RM 05 Infusion Therapy [phone]    12/4/2024 3:00 PM STR OUT PT ONC CMA Infusion Therapy [phone]    12/4/2024 3:00 PM Aparna Curtis MD Oncology [phone]    12/27/2024 1:00 PM Sukumar Uribe MD; SCHEDULE, [facility] NURSE Radiation Oncology [phone]    12/30/2024 12:00 PM Colt Coughlin MD Otolaryngology [phone]            Follow Up:   Plan for next AC outreach in approximately 1 week to complete:  - Disease specific assessments - DM and Hypertension  - Medication review - Unable to review with patient/HIPAA contact  - Advance care planning - Pending review  - Goal progression  - Education   - RPM - will monitor for possible enrollment  - Monitor for additional needs  - Monitor for readiness to discharge from Care Coordination             
in-home monitoring: Yes, but did not enroll at this time: already monitoring with home equipment.     Assessments Completed:   No changes since last call    Medications Reviewed:   Patient denies any changes with medications and reports taking all medications as prescribed.    Advance Care Planning:   Not reviewed during this call     Care Planning:    Goals Addressed                   This Visit's Progress       Care Coordination     Conditions and Symptoms   Improving     I will schedule office visits, as directed by my provider.  I will keep my appointment or reschedule if I have to cancel.  I will notify my provider of any barriers to my plan of care.  I will follow my Zone Management tool to seek urgent or emergent care.  I will notify my provider of any symptoms that indicate a worsening of my condition.    Barriers: overwhelmed by complexity of regimen, stress, lack of education, and communication barriers  Plan for overcoming my barriers: Continued support, education, and monitoring of additional resource needs  Confidence: 6/10  Anticipated Goal Completion Date: 1/20/2025                 PCP/Specialist follow up:   Future Appointments         Provider Specialty Dept Phone    12/4/2024  2:30 PM STR OUT PT ONC INJ RM 05 Infusion Therapy [phone]    12/4/2024 3:00 PM STR OUT PT ONC CMA Infusion Therapy [phone]    12/4/2024 3:00 PM Aparna Curtis MD Oncology [phone]    12/27/2024 1:00 PM Sukumar Uribe MD; SCHEDULE, [facility] NURSE Radiation Oncology [phone]    12/30/2024 12:00 PM Colt Coughlin MD Otolaryngology [phone]            Follow Up:   Plan for next [facility] outreach in approximately 1 week to complete:  - Disease specific assessments - DM and Hypertension  - Medication review - Unable to review with patient/HIPAA contact  - Advance care planning - Pending review  - Goal progression  - Education   - RPM - will monitor for possible enrollment  - Monitor for

## 2024-12-03 NOTE — PROGRESS NOTES
[facility] PHYSICIANS [place] SPECIALTY  [facility] CARDIOLOGY  [address]  [address]  [place] [address]  Dept: [phone]  Dept Fax: [phone]  Loc: [phone]    Visit Date: 12/3/2024  Ms. Vyas is a 54 y.o. female who presented for:  NEW REFERRAL  Preoperative cardiac risk assessment   CAD, PCI Hx  PAD  HPI:   Flaquita Vyas is a pleasant 54 y.o. 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 Patient had left above knee amputation in 2016 and right transmetatarsal amputation on 9/2024. She has h/o CAD, mi/pci IN 2009, STEMI with PCI RCA/LCX in 2016, PAD, Right SFA PTA by Dr Montoya 8/2024. She has lost follow up with cardiology at [facility] (Has seen cardiologist at [place]). Patient writes to communicate (unable to speak) and Is accompanied by her boyfriend. Her FH is positive for HTN. She has h/o epiglottis cancer, laryngectomy. She stopped smoking. The patient is scheduled for pharyngoplasty/esophagoplasty with Dr Coughlin, she was referred for preoperative cardiac risk assessment. Patient denies chest pain. She is wheelchair bound.       Current Outpatient Medications:     Insulin Degludec (TRESIBA FLEXTOUCH SC), Inject 100 Units into the skin in the morning and at bedtime, Disp: , Rfl:     Insulin Lispro (HUMALOG KWIKPEN SC), Inject into the skin as needed, Disp: , Rfl:     pioglitazone (ACTOS) 45 MG tablet, Take 1 tablet by mouth daily, Disp: , Rfl:     amitriptyline (ELAVIL) 100 MG tablet, Take 1 tablet by mouth nightly, Disp: , Rfl:     prochlorperazine (COMPAZINE) 10 MG tablet, Take 1 tablet by mouth every 6 hours as needed (as needed for nausea), Disp: 60 tablet, Rfl: 3

## 2024-12-03 NOTE — PATIENT INSTRUCTIONS
Your assistants today were NADJA Rod and AKHIL Beckman  Your provider today was Dr. Colindres  Phone number: [phone]

## 2024-12-03 NOTE — PROGRESS NOTES
New patient, cardiac clearance.     Patient needs cardiac clearance for robot assisted pharyngoplasty, esophagoplasty, and TEP placement on 12/19/2024 with Dr. Coughlin.     EKG done today.    Denies chest pain, palpitations, dizziness, shortness of breath, and edema.     No cardiac concerns at this time.

## 2024-12-04 ENCOUNTER — TELEPHONE (OUTPATIENT)
Dept: ONCOLOGY | Age: 54
End: 2024-12-04

## 2024-12-04 NOTE — TELEPHONE ENCOUNTER
Patients boyfriend has canceled todays appointment. I asked him about her port and that it really needs to have proper maintenance to work properly and not get infected. He has verbalized an understanding but states she does not have a ride. I explained to him that a nurse may call and talk with them about appointments.

## 2024-12-11 ENCOUNTER — HOSPITAL ENCOUNTER (OUTPATIENT)
Age: 54
Discharge: HOME | End: 2024-12-13
Attending: INTERNAL MEDICINE
Payer: MEDICARE

## 2024-12-11 ENCOUNTER — HOSPITAL ENCOUNTER (OUTPATIENT)
Dept: NUCLEAR MEDICINE | Age: 54
Discharge: HOME | End: 2024-12-11
Attending: INTERNAL MEDICINE
Payer: MEDICARE

## 2024-12-11 ENCOUNTER — HOSPITAL ENCOUNTER (OUTPATIENT)
Dept: INFUSION THERAPY | Age: 54
End: 2024-12-11

## 2024-12-11 DIAGNOSIS — Z01.810 PRE-OPERATIVE CARDIOVASCULAR EXAMINATION: ICD-10-CM

## 2024-12-11 DIAGNOSIS — R94.31 ABNORMAL ELECTROCARDIOGRAPHY: ICD-10-CM

## 2024-12-11 LAB
NUC STRESS EJECTION FRACTION: 41 %
STRESS BASELINE DIAS BP: 76 MMHG
STRESS BASELINE HR: 85 BPM
STRESS BASELINE SYS BP: 132 MMHG
STRESS ESTIMATED WORKLOAD: 1 METS
STRESS PEAK DIAS BP: 76 MMHG
STRESS PEAK SYS BP: 132 MMHG
STRESS PERCENT HR ACHIEVED: 57 %
STRESS POST PEAK HR: 96 BPM
STRESS RATE PRESSURE PRODUCT: NORMAL BPM*MMHG
STRESS STAGE 1 BP: NORMAL MMHG
STRESS STAGE 1 DURATION: 1 MIN:SEC
STRESS STAGE 1 HR: 88 BPM
STRESS STAGE 2 BP: NORMAL MMHG
STRESS STAGE 2 DURATION: 1 MIN:SEC
STRESS STAGE 2 HR: 90 BPM
STRESS STAGE 3 BP: NORMAL MMHG
STRESS STAGE 3 DURATION: 1 MIN:SEC
STRESS STAGE 3 HR: 93 BPM
STRESS STAGE RECOVERY 1 BP: NORMAL MMHG
STRESS STAGE RECOVERY 1 DURATION: 1 MIN:SEC
STRESS STAGE RECOVERY 1 HR: 89 BPM
STRESS STAGE RECOVERY 2 BP: NORMAL MMHG
STRESS STAGE RECOVERY 2 DURATION: 1 MIN:SEC
STRESS STAGE RECOVERY 2 HR: 88 BPM
STRESS STAGE RECOVERY 3 BP: NORMAL MMHG
STRESS STAGE RECOVERY 3 DURATION: 1 MIN:SEC
STRESS STAGE RECOVERY 3 HR: 87 BPM
STRESS STAGE RECOVERY 4 BP: NORMAL MMHG
STRESS STAGE RECOVERY 4 DURATION: 1 MIN:SEC
STRESS STAGE RECOVERY 4 HR: 88 BPM
STRESS TARGET HR: 167 BPM
TID: 1.15

## 2024-12-11 PROCEDURE — 93017 CV STRESS TEST TRACING ONLY: CPT

## 2024-12-11 PROCEDURE — 93018 CV STRESS TEST I&R ONLY: CPT | Performed by: INTERNAL MEDICINE

## 2024-12-11 PROCEDURE — A9500 TC99M SESTAMIBI: HCPCS | Performed by: INTERNAL MEDICINE

## 2024-12-11 PROCEDURE — 3430000000 HC RX DIAGNOSTIC RADIOPHARMACEUTICAL: Performed by: INTERNAL MEDICINE

## 2024-12-11 PROCEDURE — 78452 HT MUSCLE IMAGE SPECT MULT: CPT | Performed by: INTERNAL MEDICINE

## 2024-12-11 PROCEDURE — 6360000002 HC RX W HCPCS: Performed by: INTERNAL MEDICINE

## 2024-12-11 PROCEDURE — 93016 CV STRESS TEST SUPVJ ONLY: CPT | Performed by: INTERNAL MEDICINE

## 2024-12-11 PROCEDURE — 78452 HT MUSCLE IMAGE SPECT MULT: CPT

## 2024-12-11 RX ORDER — REGADENOSON 0.08 MG/ML
0.4 INJECTION, SOLUTION INTRAVENOUS
Status: COMPLETED | OUTPATIENT
Start: 2024-12-11 | End: 2024-12-11

## 2024-12-11 RX ORDER — TETRAKIS(2-METHOXYISOBUTYLISOCYANIDE)COPPER(I) TETRAFLUOROBORATE 1 MG/ML
9.2 INJECTION, POWDER, LYOPHILIZED, FOR SOLUTION INTRAVENOUS
Status: COMPLETED | OUTPATIENT
Start: 2024-12-11 | End: 2024-12-11

## 2024-12-11 RX ORDER — TETRAKIS(2-METHOXYISOBUTYLISOCYANIDE)COPPER(I) TETRAFLUOROBORATE 1 MG/ML
31.9 INJECTION, POWDER, LYOPHILIZED, FOR SOLUTION INTRAVENOUS
Status: COMPLETED | OUTPATIENT
Start: 2024-12-11 | End: 2024-12-11

## 2024-12-11 RX ADMIN — Medication 31.9 MILLICURIE: at 14:23

## 2024-12-11 RX ADMIN — Medication 9.2 MILLICURIE: at 13:30

## 2024-12-11 RX ADMIN — REGADENOSON 0.4 MG: 0.08 INJECTION, SOLUTION INTRAVENOUS at 14:23

## 2024-12-12 ENCOUNTER — TELEPHONE (OUTPATIENT)
Dept: CARDIOLOGY CLINIC | Age: 54
End: 2024-12-12

## 2024-12-12 ENCOUNTER — PREP FOR PROCEDURE (OUTPATIENT)
Dept: ENT CLINIC | Age: 54
End: 2024-12-12

## 2024-12-12 DIAGNOSIS — R94.39 ABNORMAL STRESS TEST: Primary | ICD-10-CM

## 2024-12-12 NOTE — PROGRESS NOTES
PAT call attempted, patient unavailable, left message to please call us back at your earliest convenience; [phone]

## 2024-12-12 NOTE — TELEPHONE ENCOUNTER
----- Message from Dr. Isis Colindres MD sent at 12/12/2024  3:35 PM EST -----  Abnormal study  Iscehmic  Schedule LHC on my IC week  Inform patient

## 2024-12-12 NOTE — TELEPHONE ENCOUNTER
Please review stress test for pre op clearance for Robotic assist pharyngoplasty, esophagoplasty and TEP placement 12/19/24 with Dr Coughlin.  (Form in Dr Linn box)     nterpretation Summary  Show Result Comparison     Stress Combined Conclusion: The study is positive for myocardial ischemia and infarction. Findings suggest a high risk of cardiac events.    Stress Function: Post-stress ejection fraction is 41%.    Perfusion Comments: LV perfusion is abnormal. There is evidence of inducible ischemia.    Perfusion Defect: There is a severe left ventricular stress perfusion defect that is medium in size present in the inferolateral segment(s). The defect appears to be infarction and sy-infarct ischemia. There is a moderate severity left ventricular stress perfusion defect that is small to medium in size present in the anterior and apex segment(s) that is partially reversible. The defect appears to be infarction and sy-infarct ischemia.    Perfusion Conclusion: TID ratio is 1.15.    ECG: Resting ECG demonstrates normal sinus rhythm.    Stress Test: A pharmacological stress test was performed using regadenoson (Lexiscan).       Stress Test: A pharmacological stress test was performed using regadenoson      (Lexiscan).

## 2024-12-12 NOTE — TELEPHONE ENCOUNTER
Call to patient, unable to speak. Gene, On HIPAA states to place order. Taken to scheduling.    Ftsg Text: The defect edges were debeveled with a #15 scalpel blade.  Given the location of the defect, shape of the defect and the proximity to free margins a full thickness skin graft was deemed most appropriate.  Using a sterile surgical marker, the primary defect shape was transferred to the donor site. The area thus outlined was incised deep to adipose tissue with a #15 scalpel blade.  The harvested graft was then trimmed of adipose tissue until only dermis and epidermis was left.  The skin margins of the secondary defect were undermined to an appropriate distance in all directions utilizing iris scissors.  The secondary defect was closed with interrupted buried subcutaneous sutures.  The skin edges were then re-apposed with running  sutures.  The skin graft was then placed in the primary defect and oriented appropriately.

## 2024-12-13 ENCOUNTER — HOSPITAL ENCOUNTER (OUTPATIENT)
Age: 54
Discharge: HOME | End: 2024-12-15
Attending: INTERNAL MEDICINE
Payer: MEDICARE

## 2024-12-13 ENCOUNTER — TELEPHONE (OUTPATIENT)
Dept: CARDIOLOGY CLINIC | Age: 54
End: 2024-12-13

## 2024-12-13 ENCOUNTER — HOSPITAL ENCOUNTER (OUTPATIENT)
Age: 54
Setting detail: OUTPATIENT SURGERY
Discharge: HOME | End: 2024-12-13
Attending: INTERNAL MEDICINE | Admitting: INTERNAL MEDICINE
Payer: MEDICARE

## 2024-12-13 VITALS
HEIGHT: 61 IN | HEART RATE: 92 BPM | TEMPERATURE: 98.7 F | OXYGEN SATURATION: 93 % | WEIGHT: 173.06 LBS | RESPIRATION RATE: 20 BRPM | SYSTOLIC BLOOD PRESSURE: 133 MMHG | DIASTOLIC BLOOD PRESSURE: 76 MMHG | BODY MASS INDEX: 32.67 KG/M2

## 2024-12-13 DIAGNOSIS — R94.39 ABNORMAL STRESS TEST: ICD-10-CM

## 2024-12-13 DIAGNOSIS — I25.83 CORONARY ARTERY DISEASE DUE TO LIPID RICH PLAQUE: ICD-10-CM

## 2024-12-13 DIAGNOSIS — I25.10 CORONARY ARTERY DISEASE DUE TO LIPID RICH PLAQUE: ICD-10-CM

## 2024-12-13 LAB
ABO: NORMAL
ANION GAP SERPL CALC-SCNC: 10 MEQ/L (ref 8–16)
ANTIBODY SCREEN: NORMAL
APTT PPP: 29.3 SECONDS (ref 22–38)
BUN SERPL-MCNC: 11 MG/DL (ref 7–22)
CALCIUM SERPL-MCNC: 8.1 MG/DL (ref 8.5–10.5)
CHLORIDE SERPL-SCNC: 99 MEQ/L (ref 98–111)
CO2 SERPL-SCNC: 27 MEQ/L (ref 23–33)
CREAT SERPL-MCNC: 0.6 MG/DL (ref 0.4–1.2)
DEPRECATED RDW RBC AUTO: 49.3 FL (ref 35–45)
ECHO AO ASC DIAM: 2.9 CM
ECHO AV AREA PEAK VELOCITY: 1.2 CM2
ECHO AV AREA VTI: 1.3 CM2
ECHO AV CUSP MM: 1.2 CM
ECHO AV MEAN GRADIENT: 10 MMHG
ECHO AV MEAN VELOCITY: 1.5 M/S
ECHO AV PEAK GRADIENT: 18 MMHG
ECHO AV PEAK VELOCITY: 2.1 M/S
ECHO AV VELOCITY RATIO: 0.43
ECHO AV VTI: 42.9 CM
ECHO BSA: 1.84 M2
ECHO EST RA PRESSURE: 8 MMHG
ECHO LA AREA 2C: 19.6 CM2
ECHO LA AREA 4C: 21.1 CM2
ECHO LA DIAMETER: 3.8 CM
ECHO LA MAJOR AXIS: 5.7 CM
ECHO LA MINOR AXIS: 5.5 CM
ECHO LA VOL BP: 62 ML (ref 22–52)
ECHO LA VOL MOD A2C: 58 ML (ref 22–52)
ECHO LA VOL MOD A4C: 63 ML (ref 22–52)
ECHO LV E' LATERAL VELOCITY: 6 CM/S
ECHO LV E' SEPTAL VELOCITY: 5 CM/S
ECHO LV EDV A2C: 86 ML
ECHO LV EDV A4C: 111 ML
ECHO LV EJECTION FRACTION A2C: 40 %
ECHO LV EJECTION FRACTION A4C: 39 %
ECHO LV EJECTION FRACTION BIPLANE: 40 % (ref 55–100)
ECHO LV ESV A2C: 51 ML
ECHO LV ESV A4C: 68 ML
ECHO LV FRACTIONAL SHORTENING: 15 % (ref 28–44)
ECHO LV INTERNAL DIMENSION DIASTOLIC: 5.2 CM (ref 3.9–5.3)
ECHO LV INTERNAL DIMENSION SYSTOLIC: 4.4 CM
ECHO LV ISOVOLUMETRIC RELAXATION TIME (IVRT): 63 MS
ECHO LV IVSD: 0.9 CM (ref 0.6–0.9)
ECHO LV MASS 2D: 169 G (ref 67–162)
ECHO LV POSTERIOR WALL DIASTOLIC: 0.9 CM (ref 0.6–0.9)
ECHO LV RELATIVE WALL THICKNESS RATIO: 0.35
ECHO LVOT AREA: 2.8 CM2
ECHO LVOT AV VTI INDEX: 0.46
ECHO LVOT DIAM: 1.9 CM
ECHO LVOT MEAN GRADIENT: 2 MMHG
ECHO LVOT PEAK GRADIENT: 3 MMHG
ECHO LVOT PEAK VELOCITY: 0.9 M/S
ECHO LVOT SV: 56.4 ML
ECHO LVOT VTI: 19.9 CM
ECHO MV A VELOCITY: 0.86 M/S
ECHO MV E DECELERATION TIME (DT): 148 MS
ECHO MV E VELOCITY: 1.08 M/S
ECHO MV E/A RATIO: 1.26
ECHO MV E/E' LATERAL: 18
ECHO MV E/E' RATIO (AVERAGED): 19.8
ECHO MV E/E' SEPTAL: 21.6
ECHO MV REGURGITANT PEAK GRADIENT: 23 MMHG
ECHO MV REGURGITANT PEAK VELOCITY: 2.4 M/S
ECHO PULMONARY ARTERY END DIASTOLIC PRESSURE: 7 MMHG
ECHO PV MAX VELOCITY: 0.7 M/S
ECHO PV PEAK GRADIENT: 2 MMHG
ECHO PV REGURGITANT MAX VELOCITY: 1.3 M/S
ECHO RIGHT VENTRICULAR SYSTOLIC PRESSURE (RVSP): 34 MMHG
ECHO RV INTERNAL DIMENSION: 2.5 CM
ECHO RV TAPSE: 1.6 CM (ref 1.7–?)
ECHO TV E WAVE: 0.8 M/S
ECHO TV REGURGITANT MAX VELOCITY: 2.54 M/S
ECHO TV REGURGITANT PEAK GRADIENT: 26 MMHG
EKG ATRIAL RATE: 94 BPM
EKG P AXIS: 66 DEGREES
EKG P-R INTERVAL: 118 MS
EKG Q-T INTERVAL: 362 MS
EKG QRS DURATION: 78 MS
EKG QTC CALCULATION (BAZETT): 452 MS
EKG R AXIS: 115 DEGREES
EKG T AXIS: 21 DEGREES
EKG VENTRICULAR RATE: 94 BPM
ERYTHROCYTE [DISTWIDTH] IN BLOOD BY AUTOMATED COUNT: 13.9 % (ref 11.5–14.5)
GFR SERPL CREATININE-BSD FRML MDRD: > 90 ML/MIN/1.73M2
GLUCOSE SERPL-MCNC: 203 MG/DL (ref 70–108)
HCT VFR BLD AUTO: 34.6 % (ref 37–47)
HGB BLD-MCNC: 11.2 GM/DL (ref 12–16)
INR PPP: 1.14 (ref 0.85–1.13)
MCH RBC QN AUTO: 31.3 PG (ref 26–33)
MCHC RBC AUTO-ENTMCNC: 32.4 GM/DL (ref 32.2–35.5)
MCV RBC AUTO: 96.6 FL (ref 81–99)
PLATELET # BLD AUTO: 283 THOU/MM3 (ref 130–400)
PMV BLD AUTO: 10.7 FL (ref 9.4–12.4)
POTASSIUM SERPL-SCNC: 4.1 MEQ/L (ref 3.5–5.2)
RBC # BLD AUTO: 3.58 MILL/MM3 (ref 4.2–5.4)
RH FACTOR: NORMAL
SODIUM SERPL-SCNC: 136 MEQ/L (ref 135–145)
WBC # BLD AUTO: 14.8 THOU/MM3 (ref 4.8–10.8)

## 2024-12-13 PROCEDURE — 93306 TTE W/DOPPLER COMPLETE: CPT | Performed by: INTERNAL MEDICINE

## 2024-12-13 PROCEDURE — 93306 TTE W/DOPPLER COMPLETE: CPT

## 2024-12-13 PROCEDURE — 99152 MOD SED SAME PHYS/QHP 5/>YRS: CPT | Performed by: INTERNAL MEDICINE

## 2024-12-13 PROCEDURE — 86901 BLOOD TYPING SEROLOGIC RH(D): CPT

## 2024-12-13 PROCEDURE — 2709999900 HC NON-CHARGEABLE SUPPLY: Performed by: INTERNAL MEDICINE

## 2024-12-13 PROCEDURE — 6360000002 HC RX W HCPCS: Performed by: INTERNAL MEDICINE

## 2024-12-13 PROCEDURE — 93458 L HRT ARTERY/VENTRICLE ANGIO: CPT | Performed by: INTERNAL MEDICINE

## 2024-12-13 PROCEDURE — 7100000011 HC PHASE II RECOVERY - ADDTL 15 MIN: Performed by: INTERNAL MEDICINE

## 2024-12-13 PROCEDURE — 7100000010 HC PHASE II RECOVERY - FIRST 15 MIN: Performed by: INTERNAL MEDICINE

## 2024-12-13 PROCEDURE — 85610 PROTHROMBIN TIME: CPT

## 2024-12-13 PROCEDURE — 93010 ELECTROCARDIOGRAM REPORT: CPT | Performed by: NUCLEAR MEDICINE

## 2024-12-13 PROCEDURE — 85027 COMPLETE CBC AUTOMATED: CPT

## 2024-12-13 PROCEDURE — 2500000003 HC RX 250 WO HCPCS: Performed by: INTERNAL MEDICINE

## 2024-12-13 PROCEDURE — 86850 RBC ANTIBODY SCREEN: CPT

## 2024-12-13 PROCEDURE — C1769 GUIDE WIRE: HCPCS | Performed by: INTERNAL MEDICINE

## 2024-12-13 PROCEDURE — C1894 INTRO/SHEATH, NON-LASER: HCPCS | Performed by: INTERNAL MEDICINE

## 2024-12-13 PROCEDURE — 6360000004 HC RX CONTRAST MEDICATION: Performed by: INTERNAL MEDICINE

## 2024-12-13 PROCEDURE — 93005 ELECTROCARDIOGRAM TRACING: CPT | Performed by: INTERNAL MEDICINE

## 2024-12-13 PROCEDURE — 36415 COLL VENOUS BLD VENIPUNCTURE: CPT

## 2024-12-13 PROCEDURE — 80048 BASIC METABOLIC PNL TOTAL CA: CPT

## 2024-12-13 PROCEDURE — 85730 THROMBOPLASTIN TIME PARTIAL: CPT

## 2024-12-13 PROCEDURE — 86900 BLOOD TYPING SEROLOGIC ABO: CPT

## 2024-12-13 RX ORDER — IOPAMIDOL 755 MG/ML
INJECTION, SOLUTION INTRAVASCULAR PRN
Status: DISCONTINUED | OUTPATIENT
Start: 2024-12-13 | End: 2024-12-13 | Stop reason: HOSPADM

## 2024-12-13 RX ORDER — ACETAMINOPHEN 325 MG/1
650 TABLET ORAL EVERY 4 HOURS PRN
Status: DISCONTINUED | OUTPATIENT
Start: 2024-12-13 | End: 2024-12-13 | Stop reason: HOSPADM

## 2024-12-13 RX ORDER — SODIUM CHLORIDE 9 MG/ML
INJECTION, SOLUTION INTRAVENOUS CONTINUOUS
Status: DISCONTINUED | OUTPATIENT
Start: 2024-12-13 | End: 2024-12-13

## 2024-12-13 RX ORDER — PHENYLEPHRINE HCL IN 0.9% NACL 1 MG/10 ML
VIAL (ML) INTRAVENOUS PRN
Status: DISCONTINUED | OUTPATIENT
Start: 2024-12-13 | End: 2024-12-13 | Stop reason: HOSPADM

## 2024-12-13 RX ORDER — SODIUM CHLORIDE 9 MG/ML
INJECTION, SOLUTION INTRAVENOUS CONTINUOUS
Status: DISCONTINUED | OUTPATIENT
Start: 2024-12-13 | End: 2024-12-13 | Stop reason: HOSPADM

## 2024-12-13 RX ORDER — FENTANYL CITRATE 50 UG/ML
INJECTION, SOLUTION INTRAMUSCULAR; INTRAVENOUS PRN
Status: DISCONTINUED | OUTPATIENT
Start: 2024-12-13 | End: 2024-12-13 | Stop reason: HOSPADM

## 2024-12-13 RX ORDER — MIDAZOLAM HYDROCHLORIDE 1 MG/ML
INJECTION, SOLUTION INTRAMUSCULAR; INTRAVENOUS PRN
Status: DISCONTINUED | OUTPATIENT
Start: 2024-12-13 | End: 2024-12-13 | Stop reason: HOSPADM

## 2024-12-13 RX ORDER — HYDROCORTISONE SODIUM SUCCINATE 100 MG/2ML
200 INJECTION INTRAMUSCULAR; INTRAVENOUS ONCE
Status: COMPLETED | OUTPATIENT
Start: 2024-12-13 | End: 2024-12-13

## 2024-12-13 RX ORDER — DIPHENHYDRAMINE HYDROCHLORIDE 50 MG/ML
50 INJECTION INTRAMUSCULAR; INTRAVENOUS ONCE
Status: COMPLETED | OUTPATIENT
Start: 2024-12-13 | End: 2024-12-13

## 2024-12-13 RX ORDER — ASPIRIN 325 MG
325 TABLET ORAL ONCE
Status: DISCONTINUED | OUTPATIENT
Start: 2024-12-13 | End: 2024-12-13 | Stop reason: HOSPADM

## 2024-12-13 RX ORDER — SODIUM CHLORIDE 9 MG/ML
INJECTION, SOLUTION INTRAVENOUS PRN
Status: DISCONTINUED | OUTPATIENT
Start: 2024-12-13 | End: 2024-12-13 | Stop reason: HOSPADM

## 2024-12-13 RX ORDER — SODIUM CHLORIDE 0.9 % (FLUSH) 0.9 %
5-40 SYRINGE (ML) INJECTION EVERY 12 HOURS SCHEDULED
Status: DISCONTINUED | OUTPATIENT
Start: 2024-12-13 | End: 2024-12-13 | Stop reason: HOSPADM

## 2024-12-13 RX ORDER — SODIUM CHLORIDE 0.9 % (FLUSH) 0.9 %
5-40 SYRINGE (ML) INJECTION PRN
Status: DISCONTINUED | OUTPATIENT
Start: 2024-12-13 | End: 2024-12-13 | Stop reason: HOSPADM

## 2024-12-13 RX ORDER — ATROPINE SULFATE 0.4 MG/ML
0.5 INJECTION, SOLUTION INTRAVENOUS
Status: DISCONTINUED | OUTPATIENT
Start: 2024-12-13 | End: 2024-12-13 | Stop reason: HOSPADM

## 2024-12-13 NOTE — H&P
[facility]  Sedation/Analgesia History & Physical    Pt Name: Flaquita Vyas  Account number: [id number]  MRN: [id number]  YOB: 1970  Provider Performing Procedure: Isis Colindres MD MD  Referring Provider: Isis Colindres MD   Primary Care Physician: Alberto Tang MD  Date: 12/13/2024    PRE-PROCEDURE    Code Status: FULL CODE  Brief History/Pre-Procedure Diagnosis:     Preoperative cardiac risk  assessment   Abnormal EKG  Abnormal stress test  Cardiomyopathy   Relatively higher risk patient with h/o coronary artery disease, STEMI 2016, PCI of RCA/LCX, Peripheral arterial disease      Consent: : I have discussed with the patient risks, benefits, and alternatives (and relevant risks, benefits, and side effects related to alternatives or not receiving care), and likelihood of the success.   The patient and/or representative appear to understand and agree to proceed.  The discussion encompasses risks, benefits, and side effects related to the alternatives and the risks related to not receiving the proposed care, treatment, and services.     The indication, risks and benefits of the procedure and possible therapeutic consequences and alternatives were discussed with the patient. The patient was given the opportunity to ask questions and to have them answered to his/her satisfaction. Risks of the procedure include but are not limited to the following: Bleeding, hematoma including retroperitoneal hemmorhage, infection, pain and discomfort, injury to the aorta and other blood vessels, rhythm disturbance, low blood pressure, myocardial infarction, stroke, kidney damage/failure, myocardial perforation, allergic reactions to sedatives/contrast material, loss of pulse/vascular compromise requiring surgery, aneurysm/pseudoaneurysm formation, possible loss of a limb/hand/leg, needing blood transfusion, requiring emergent open heart surgery or emergent coronary intervention, the need for

## 2024-12-13 NOTE — TELEPHONE ENCOUNTER
PROCEDURE: cardiac cath     DATE OF SERVICE: 12/13/2024    SERVICE LOCATION: [facility]    CPT CODE: 52862    PHYSICIAN: DR VILLEGAS    DATE PRIOR AUTH SUBMITTED: 12/13/2024    STATUS: APPROVED.     CASE NUMBER: [id number]     AUTH NUMBER: [id number]     VALID: 12/13/2024-03/13/2025

## 2024-12-13 NOTE — PROGRESS NOTES
Patient admitted to 2E16  Ambulatory for cardiac cath.  Patient NPO. Patient accompanied by family.  Vital signs obtained.   Assessment and data collection intiated.   Oriented to room.  Policies and procedures for 2E explained.   All questions answered with no further questions at this time.   Fall prevention and safety precautions discussed with patient.

## 2024-12-14 NOTE — FLOWSHEET NOTE
12/13/24 1300   AVS Reviewed   AVS & discharge instructions reviewed with patient and/or representative? Yes   Reviewed instructions with Patient;Other (name and relationship in comment)  (S.O. Gene)   Level of Understanding Questions answered;Teach back completed;Verbalized understanding     Patient very impatient about getting through discharge instructions.  Patient did shake her head in understanding.

## 2024-12-16 ENCOUNTER — CARE COORDINATION (OUTPATIENT)
Dept: CARE COORDINATION | Age: 54
End: 2024-12-16

## 2024-12-16 NOTE — CARE COORDINATION
Ambulatory Care Coordination Note     12/16/2024 4:40 PM     Patient/HIPAA contacts  outreach attempt by this ACM today to perform care management follow up . ACM was unable to reach the  patient/HIPAA contacts  by telephone today;   left voice message requesting a return phone call to this ACM.     ACM: Amy Tolentino RN     Care Summary Note: Attempted to reach patient and her HIPAA contacts for continued Care Coordination follow up and education re: the management of her DM, Hypertension, healthcare needs, and in f/u to recent cardiac cath and prior to upcoming ENT surgery.  Will continue to work to f/u with patient/HIPAA contacts in the future.     PCP/Specialist follow up:   Future Appointments         Provider Specialty Dept Phone    12/23/2024  1:00 PM Sukumar Uribe MD; SCHEDULE, [facility] NURSE Radiation Oncology [phone]    12/30/2024 12:00 PM Colt Coughlin MD Otolaryngology [phone]    11/26/2025 3:45 PM Isis Colindres MD Cardiology [phone]            Follow Up:   Plan for next AC outreach in approximately 1 week to complete:  - Disease specific assessments - DM and Hypertension  - Medication review - Unable to review with patient/HIPAA contact  - Advance care planning - Pending review  - Goal progression  - Education   - RPM - will monitor for possible enrollment  - Monitor for additional needs  - Monitor for readiness to discharge from Care Coordination

## 2024-12-17 RX ORDER — SODIUM CHLORIDE 0.9 % (FLUSH) 0.9 %
5-40 SYRINGE (ML) INJECTION PRN
Status: CANCELLED | OUTPATIENT
Start: 2024-12-17

## 2024-12-17 RX ORDER — IPRATROPIUM BROMIDE AND ALBUTEROL SULFATE 2.5; .5 MG/3ML; MG/3ML
1 SOLUTION RESPIRATORY (INHALATION) EVERY 4 HOURS PRN
Status: CANCELLED | OUTPATIENT
Start: 2024-12-19

## 2024-12-17 RX ORDER — SODIUM CHLORIDE 0.9 % (FLUSH) 0.9 %
5-40 SYRINGE (ML) INJECTION EVERY 12 HOURS SCHEDULED
Status: CANCELLED | OUTPATIENT
Start: 2024-12-17

## 2024-12-17 RX ORDER — DOXYCYCLINE HYCLATE 100 MG
TABLET ORAL
Qty: 42 TABLET | Refills: 0 | OUTPATIENT
Start: 2024-12-17

## 2024-12-17 RX ORDER — SODIUM CHLORIDE 9 MG/ML
INJECTION, SOLUTION INTRAVENOUS PRN
Status: CANCELLED | OUTPATIENT
Start: 2024-12-17

## 2024-12-17 NOTE — H&P
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)     Blood circulation, collateral     CAD (coronary artery disease)     Diabetes mellitus (HCC)     Hyperlipidemia     Hypertension     MDRO (multiple drug resistant organisms) resistance     MRSA wound 2013    MI, old 2007    Obesity (BMI 30-39.9)     Status post skin graft 01/15/2014    Amniox - Dr Maddox     Throat cancer ([facility]) 10/2022    squamous cell carcinoma of the epiglottis       Past Surgical History:   Procedure Laterality Date    ANGIOPLASTY Left 2014    left leg    CARDIAC PROCEDURE Left 2024    Left heart cath / coronary angiography performed by Isis Colindres MD at [facility] CARDIAC CATH LAB     SECTION  1998    CORONARY ANGIOPLASTY WITH STENT PLACEMENT      [facility]    CORONARY ANGIOPLASTY WITH STENT PLACEMENT      3 stents in [place]    CORONARY ANGIOPLASTY WITH STENT PLACEMENT  2016    debbi, 1 stent to RCA    CT NEEDLE BIOPSY LUNG PERCUTANEOUS W IMAGING GUIDANCE  2022    CT NEEDLE BIOPSY LUNG PERCUTANEOUS 2022 [facility] CT SCAN    ESOPHAGOGASTRODUODENOSCOPY  10/06/2022    [facility]    GASTROSTOMY TUBE PLACEMENT N/A 2022    EGD Peg Tube Placement possible Open G Tube performed by Alejandro Harry MD at [facility] OR    OTHER SURGICAL HISTORY  2015    Left Femoral to Peroneal Bypass with Composite Vein Graft and Propaten Graft     OTHER SURGICAL HISTORY Left 2016    Left Leg Above Knee Amputation    PORT SURGERY N/A 2022    Insertion Single Lumen Mediport performed by Alejandro Harry MD at [facility] OR    TOE AMPUTATION Right 2024    RIGHT Transmetatarsal performed by Michael Ann DPM at [facility] OR    VASCULAR SURGERY         Allergies  within it.  I suctioned away the saliva within the pouch and found no other pathology.  There appeared to be a advancement flap of some sort within the anterior neopharynx consistent with either free flap or myocutaneous flap reconstruction technique.  A distinct neopharynx suture line was not seen extending from this pharyngeal pouch distally.      Nasopharyngoscopy images:                                           Procedure: Transnasal diagnostic flexible esophagoscopy  Indication: The patient is being evaluated for a tracheoesophageal puncture prosthesis placement warrants an initial esophagoscopy to rule out pathology of the proximal esophagus as well as to look for disease in the central and distal esophagus that may warrant surgical treatment in an upcoming operation.  Findings: Continuing the endoscopy into the esophagus, there was a second transverse scar abutting the esophagus in the form of a esophageal diverticulum.  It was likewise filled with saliva which I suctioned away and found no suspicious lesions within it.  I entered the tubular esophagus thereafter and found the band of muscular tissue likely containing cricopharyngeus just within this cul-de-sac that appeared relatively tight but not strictured.  I passed through the length of the esophagus and found no suspicious pathology until I reached the distal esophagus where the GEJ was somewhat irregular but free of tongues of salmon-colored mucosa.  There was mild diffuse lower esophagitis and there were superficial history of like cicatricial scars that were completely nonobstructive.  There appeared to be a very short sliding hiatal hernia in the region.  I evacuated the stomach and repeated my exam on the way out finding no new pathology.     Flexible esophagoscopy images:                                                                            Vitals reviewed.     XR FOOT RIGHT (MIN 3 VIEWS)     Result Date: 9/9/2024  No fracture or

## 2024-12-19 ENCOUNTER — HOSPITAL ENCOUNTER (OUTPATIENT)
Age: 54
Discharge: HOME | End: 2024-12-20
Attending: OTOLARYNGOLOGY | Admitting: OTOLARYNGOLOGY
Payer: MEDICARE

## 2024-12-19 ENCOUNTER — ANESTHESIA (OUTPATIENT)
Dept: OPERATING ROOM | Age: 54
End: 2024-12-19
Payer: MEDICARE

## 2024-12-19 DIAGNOSIS — R13.19 DYSPHAGIA DUE TO LARYNGECTOMY: ICD-10-CM

## 2024-12-19 DIAGNOSIS — Q39.6 ESOPHAGEAL DIVERTICULUM: ICD-10-CM

## 2024-12-19 DIAGNOSIS — Q38.7 PHARYNGEAL POUCH: ICD-10-CM

## 2024-12-19 DIAGNOSIS — R49.1 APHONIA: ICD-10-CM

## 2024-12-19 DIAGNOSIS — Z90.02 DYSPHAGIA DUE TO LARYNGECTOMY: ICD-10-CM

## 2024-12-19 LAB
GLUCOSE BLD STRIP.AUTO-MCNC: 154 MG/DL (ref 70–108)
GLUCOSE BLD STRIP.AUTO-MCNC: 306 MG/DL (ref 70–108)
GLUCOSE BLD STRIP.AUTO-MCNC: 84 MG/DL (ref 70–108)

## 2024-12-19 PROCEDURE — 2709999900 HC NON-CHARGEABLE SUPPLY: Performed by: OTOLARYNGOLOGY

## 2024-12-19 PROCEDURE — 42950 RECONSTRUCTION OF THROAT: CPT | Performed by: OTOLARYNGOLOGY

## 2024-12-19 PROCEDURE — 3600000009 HC SURGERY ROBOT BASE: Performed by: OTOLARYNGOLOGY

## 2024-12-19 PROCEDURE — 6360000002 HC RX W HCPCS: Performed by: NURSE ANESTHETIST, CERTIFIED REGISTERED

## 2024-12-19 PROCEDURE — APPNB45 APP NON BILLABLE 31-45 MINUTES: Performed by: NURSE PRACTITIONER

## 2024-12-19 PROCEDURE — 94640 AIRWAY INHALATION TREATMENT: CPT

## 2024-12-19 PROCEDURE — 88305 TISSUE EXAM BY PATHOLOGIST: CPT

## 2024-12-19 PROCEDURE — 2580000003 HC RX 258: Performed by: NURSE PRACTITIONER

## 2024-12-19 PROCEDURE — 2500000003 HC RX 250 WO HCPCS: Performed by: NURSE ANESTHETIST, CERTIFIED REGISTERED

## 2024-12-19 PROCEDURE — 7100000001 HC PACU RECOVERY - ADDTL 15 MIN: Performed by: OTOLARYNGOLOGY

## 2024-12-19 PROCEDURE — 6370000000 HC RX 637 (ALT 250 FOR IP): Performed by: NURSE PRACTITIONER

## 2024-12-19 PROCEDURE — 6360000002 HC RX W HCPCS: Performed by: NURSE PRACTITIONER

## 2024-12-19 PROCEDURE — S2900 ROBOTIC SURGICAL SYSTEM: HCPCS | Performed by: OTOLARYNGOLOGY

## 2024-12-19 PROCEDURE — 7100000000 HC PACU RECOVERY - FIRST 15 MIN: Performed by: OTOLARYNGOLOGY

## 2024-12-19 PROCEDURE — 3700000001 HC ADD 15 MINUTES (ANESTHESIA): Performed by: OTOLARYNGOLOGY

## 2024-12-19 PROCEDURE — 3700000000 HC ANESTHESIA ATTENDED CARE: Performed by: OTOLARYNGOLOGY

## 2024-12-19 PROCEDURE — 3600000019 HC SURGERY ROBOT ADDTL 15MIN: Performed by: OTOLARYNGOLOGY

## 2024-12-19 PROCEDURE — 2720000010 HC SURG SUPPLY STERILE: Performed by: OTOLARYNGOLOGY

## 2024-12-19 PROCEDURE — 82948 REAGENT STRIP/BLOOD GLUCOSE: CPT

## 2024-12-19 PROCEDURE — 43030 CRICOPHARYNGEAL MYOTOMY: CPT | Performed by: OTOLARYNGOLOGY

## 2024-12-19 PROCEDURE — 6360000002 HC RX W HCPCS: Performed by: ANESTHESIOLOGY

## 2024-12-19 PROCEDURE — 88304 TISSUE EXAM BY PATHOLOGIST: CPT

## 2024-12-19 PROCEDURE — 94761 N-INVAS EAR/PLS OXIMETRY MLT: CPT

## 2024-12-19 RX ORDER — PIOGLITAZONE 45 MG/1
45 TABLET ORAL DAILY
Status: DISCONTINUED | OUTPATIENT
Start: 2024-12-19 | End: 2024-12-20 | Stop reason: HOSPADM

## 2024-12-19 RX ORDER — FENTANYL CITRATE 50 UG/ML
INJECTION, SOLUTION INTRAMUSCULAR; INTRAVENOUS
Status: DISCONTINUED | OUTPATIENT
Start: 2024-12-19 | End: 2024-12-19 | Stop reason: SDUPTHER

## 2024-12-19 RX ORDER — FENTANYL CITRATE 50 UG/ML
25 INJECTION, SOLUTION INTRAMUSCULAR; INTRAVENOUS EVERY 5 MIN PRN
Status: DISCONTINUED | OUTPATIENT
Start: 2024-12-19 | End: 2024-12-19 | Stop reason: HOSPADM

## 2024-12-19 RX ORDER — NITROGLYCERIN 0.4 MG/1
0.4 TABLET SUBLINGUAL EVERY 5 MIN PRN
Status: DISCONTINUED | OUTPATIENT
Start: 2024-12-19 | End: 2024-12-20 | Stop reason: HOSPADM

## 2024-12-19 RX ORDER — SODIUM CHLORIDE 0.9 % (FLUSH) 0.9 %
5-40 SYRINGE (ML) INJECTION EVERY 12 HOURS SCHEDULED
Status: DISCONTINUED | OUTPATIENT
Start: 2024-12-19 | End: 2024-12-19 | Stop reason: HOSPADM

## 2024-12-19 RX ORDER — EPHEDRINE SULFATE/0.9% NACL/PF 25 MG/5 ML
SYRINGE (ML) INTRAVENOUS
Status: DISCONTINUED | OUTPATIENT
Start: 2024-12-19 | End: 2024-12-19 | Stop reason: SDUPTHER

## 2024-12-19 RX ORDER — SODIUM CHLORIDE 0.9 % (FLUSH) 0.9 %
5-40 SYRINGE (ML) INJECTION EVERY 12 HOURS SCHEDULED
Status: DISCONTINUED | OUTPATIENT
Start: 2024-12-19 | End: 2024-12-20 | Stop reason: HOSPADM

## 2024-12-19 RX ORDER — LISINOPRIL 2.5 MG/1
2.5 TABLET ORAL DAILY
Status: DISCONTINUED | OUTPATIENT
Start: 2024-12-19 | End: 2024-12-19

## 2024-12-19 RX ORDER — SODIUM CHLORIDE 9 MG/ML
INJECTION, SOLUTION INTRAVENOUS CONTINUOUS
Status: DISCONTINUED | OUTPATIENT
Start: 2024-12-19 | End: 2024-12-20 | Stop reason: HOSPADM

## 2024-12-19 RX ORDER — ASPIRIN 81 MG/1
81 TABLET, CHEWABLE ORAL DAILY
Status: DISCONTINUED | OUTPATIENT
Start: 2024-12-20 | End: 2024-12-20 | Stop reason: HOSPADM

## 2024-12-19 RX ORDER — SODIUM CHLORIDE 9 MG/ML
INJECTION, SOLUTION INTRAVENOUS PRN
Status: DISCONTINUED | OUTPATIENT
Start: 2024-12-19 | End: 2024-12-19 | Stop reason: HOSPADM

## 2024-12-19 RX ORDER — FENTANYL CITRATE 50 UG/ML
50 INJECTION, SOLUTION INTRAMUSCULAR; INTRAVENOUS EVERY 5 MIN PRN
Status: DISCONTINUED | OUTPATIENT
Start: 2024-12-19 | End: 2024-12-19 | Stop reason: HOSPADM

## 2024-12-19 RX ORDER — CLOPIDOGREL BISULFATE 75 MG/1
75 TABLET ORAL DAILY
Status: DISCONTINUED | OUTPATIENT
Start: 2024-12-20 | End: 2024-12-20 | Stop reason: HOSPADM

## 2024-12-19 RX ORDER — SODIUM CHLORIDE 0.9 % (FLUSH) 0.9 %
5-40 SYRINGE (ML) INJECTION PRN
Status: DISCONTINUED | OUTPATIENT
Start: 2024-12-19 | End: 2024-12-19 | Stop reason: HOSPADM

## 2024-12-19 RX ORDER — ZOLPIDEM TARTRATE 5 MG/1
10 TABLET ORAL NIGHTLY
Status: DISCONTINUED | OUTPATIENT
Start: 2024-12-19 | End: 2024-12-20 | Stop reason: HOSPADM

## 2024-12-19 RX ORDER — ALBUTEROL SULFATE 90 UG/1
2 INHALANT RESPIRATORY (INHALATION) EVERY 4 HOURS PRN
Status: DISCONTINUED | OUTPATIENT
Start: 2024-12-19 | End: 2024-12-20 | Stop reason: HOSPADM

## 2024-12-19 RX ORDER — PROPOFOL 10 MG/ML
INJECTION, EMULSION INTRAVENOUS
Status: DISCONTINUED | OUTPATIENT
Start: 2024-12-19 | End: 2024-12-19 | Stop reason: SDUPTHER

## 2024-12-19 RX ORDER — ALPRAZOLAM 0.5 MG
1 TABLET ORAL 3 TIMES DAILY PRN
Status: DISCONTINUED | OUTPATIENT
Start: 2024-12-19 | End: 2024-12-20 | Stop reason: HOSPADM

## 2024-12-19 RX ORDER — IPRATROPIUM BROMIDE AND ALBUTEROL SULFATE 2.5; .5 MG/3ML; MG/3ML
1 SOLUTION RESPIRATORY (INHALATION) EVERY 4 HOURS PRN
Status: DISCONTINUED | OUTPATIENT
Start: 2024-12-19 | End: 2024-12-19 | Stop reason: HOSPADM

## 2024-12-19 RX ORDER — AMITRIPTYLINE HYDROCHLORIDE 100 MG/1
100 TABLET ORAL NIGHTLY
Status: DISCONTINUED | OUTPATIENT
Start: 2024-12-19 | End: 2024-12-20 | Stop reason: HOSPADM

## 2024-12-19 RX ORDER — MIDAZOLAM HYDROCHLORIDE 1 MG/ML
INJECTION, SOLUTION INTRAMUSCULAR; INTRAVENOUS
Status: DISCONTINUED | OUTPATIENT
Start: 2024-12-19 | End: 2024-12-19 | Stop reason: SDUPTHER

## 2024-12-19 RX ORDER — PREGABALIN 75 MG/1
150 CAPSULE ORAL 2 TIMES DAILY
Status: DISCONTINUED | OUTPATIENT
Start: 2024-12-19 | End: 2024-12-20 | Stop reason: HOSPADM

## 2024-12-19 RX ORDER — BUDESONIDE AND FORMOTEROL FUMARATE DIHYDRATE 160; 4.5 UG/1; UG/1
2 AEROSOL RESPIRATORY (INHALATION) 2 TIMES DAILY
Status: DISCONTINUED | OUTPATIENT
Start: 2024-12-19 | End: 2024-12-20 | Stop reason: HOSPADM

## 2024-12-19 RX ORDER — ONDANSETRON 4 MG/1
4 TABLET, ORALLY DISINTEGRATING ORAL EVERY 8 HOURS PRN
Status: DISCONTINUED | OUTPATIENT
Start: 2024-12-19 | End: 2024-12-20 | Stop reason: HOSPADM

## 2024-12-19 RX ORDER — CARBIDOPA/LEVODOPA 10MG-100MG
1 TABLET ORAL 3 TIMES DAILY
Status: DISCONTINUED | OUTPATIENT
Start: 2024-12-19 | End: 2024-12-20 | Stop reason: HOSPADM

## 2024-12-19 RX ORDER — ACETAMINOPHEN 325 MG/1
650 TABLET ORAL EVERY 4 HOURS PRN
Status: DISCONTINUED | OUTPATIENT
Start: 2024-12-19 | End: 2024-12-20 | Stop reason: HOSPADM

## 2024-12-19 RX ORDER — LIDOCAINE HYDROCHLORIDE 20 MG/ML
10 SOLUTION OROPHARYNGEAL
Status: DISCONTINUED | OUTPATIENT
Start: 2024-12-19 | End: 2024-12-20 | Stop reason: HOSPADM

## 2024-12-19 RX ORDER — ONDANSETRON 2 MG/ML
INJECTION INTRAMUSCULAR; INTRAVENOUS
Status: DISCONTINUED | OUTPATIENT
Start: 2024-12-19 | End: 2024-12-19 | Stop reason: SDUPTHER

## 2024-12-19 RX ORDER — METOPROLOL TARTRATE 25 MG/1
25 TABLET, FILM COATED ORAL 2 TIMES DAILY
Status: DISCONTINUED | OUTPATIENT
Start: 2024-12-19 | End: 2024-12-19

## 2024-12-19 RX ORDER — ATORVASTATIN CALCIUM 40 MG/1
40 TABLET, FILM COATED ORAL NIGHTLY
Status: DISCONTINUED | OUTPATIENT
Start: 2024-12-19 | End: 2024-12-19

## 2024-12-19 RX ORDER — NALOXONE HYDROCHLORIDE 0.4 MG/ML
INJECTION, SOLUTION INTRAMUSCULAR; INTRAVENOUS; SUBCUTANEOUS PRN
Status: DISCONTINUED | OUTPATIENT
Start: 2024-12-19 | End: 2024-12-19 | Stop reason: HOSPADM

## 2024-12-19 RX ORDER — LIDOCAINE HYDROCHLORIDE 20 MG/ML
INJECTION, SOLUTION EPIDURAL; INFILTRATION; INTRACAUDAL; PERINEURAL
Status: DISCONTINUED | OUTPATIENT
Start: 2024-12-19 | End: 2024-12-19 | Stop reason: SDUPTHER

## 2024-12-19 RX ORDER — POLYETHYLENE GLYCOL 3350 17 G/17G
17 POWDER, FOR SOLUTION ORAL DAILY PRN
Status: DISCONTINUED | OUTPATIENT
Start: 2024-12-19 | End: 2024-12-20 | Stop reason: HOSPADM

## 2024-12-19 RX ORDER — TRANEXAMIC ACID 100 MG/ML
INJECTION, SOLUTION INTRAVENOUS
Status: DISCONTINUED | OUTPATIENT
Start: 2024-12-19 | End: 2024-12-19 | Stop reason: SDUPTHER

## 2024-12-19 RX ORDER — DEXAMETHASONE SODIUM PHOSPHATE 10 MG/ML
10 INJECTION, EMULSION INTRAMUSCULAR; INTRAVENOUS ONCE
Status: COMPLETED | OUTPATIENT
Start: 2024-12-19 | End: 2024-12-19

## 2024-12-19 RX ORDER — GLUCAGON 1 MG/ML
1 KIT INJECTION PRN
Status: DISCONTINUED | OUTPATIENT
Start: 2024-12-19 | End: 2024-12-20 | Stop reason: HOSPADM

## 2024-12-19 RX ORDER — OXYCODONE AND ACETAMINOPHEN 5; 325 MG/1; MG/1
2 TABLET ORAL EVERY 8 HOURS PRN
Status: DISCONTINUED | OUTPATIENT
Start: 2024-12-19 | End: 2024-12-20 | Stop reason: HOSPADM

## 2024-12-19 RX ORDER — ONDANSETRON 2 MG/ML
4 INJECTION INTRAMUSCULAR; INTRAVENOUS EVERY 6 HOURS PRN
Status: DISCONTINUED | OUTPATIENT
Start: 2024-12-19 | End: 2024-12-20 | Stop reason: HOSPADM

## 2024-12-19 RX ORDER — LORAZEPAM 2 MG/ML
0.5 INJECTION INTRAMUSCULAR EVERY 6 HOURS PRN
Status: DISCONTINUED | OUTPATIENT
Start: 2024-12-19 | End: 2024-12-20 | Stop reason: HOSPADM

## 2024-12-19 RX ORDER — PROCHLORPERAZINE MALEATE 10 MG
10 TABLET ORAL EVERY 6 HOURS PRN
Status: DISCONTINUED | OUTPATIENT
Start: 2024-12-19 | End: 2024-12-20 | Stop reason: HOSPADM

## 2024-12-19 RX ORDER — DEXTROSE MONOHYDRATE 100 MG/ML
INJECTION, SOLUTION INTRAVENOUS CONTINUOUS PRN
Status: DISCONTINUED | OUTPATIENT
Start: 2024-12-19 | End: 2024-12-20 | Stop reason: HOSPADM

## 2024-12-19 RX ORDER — ROCURONIUM BROMIDE 10 MG/ML
INJECTION, SOLUTION INTRAVENOUS
Status: DISCONTINUED | OUTPATIENT
Start: 2024-12-19 | End: 2024-12-19 | Stop reason: SDUPTHER

## 2024-12-19 RX ORDER — SODIUM CHLORIDE 0.9 % (FLUSH) 0.9 %
5-40 SYRINGE (ML) INJECTION PRN
Status: DISCONTINUED | OUTPATIENT
Start: 2024-12-19 | End: 2024-12-20 | Stop reason: HOSPADM

## 2024-12-19 RX ORDER — SODIUM CHLORIDE 9 MG/ML
INJECTION, SOLUTION INTRAVENOUS PRN
Status: DISCONTINUED | OUTPATIENT
Start: 2024-12-19 | End: 2024-12-20 | Stop reason: HOSPADM

## 2024-12-19 RX ADMIN — EPHEDRINE SULFATE 5 MG: 5 INJECTION INTRAVENOUS at 13:09

## 2024-12-19 RX ADMIN — ROCURONIUM BROMIDE 20 MG: 10 INJECTION INTRAVENOUS at 15:26

## 2024-12-19 RX ADMIN — FENTANYL CITRATE 50 MCG: 50 INJECTION, SOLUTION INTRAMUSCULAR; INTRAVENOUS at 13:37

## 2024-12-19 RX ADMIN — EPHEDRINE SULFATE 10 MG: 5 INJECTION INTRAVENOUS at 13:00

## 2024-12-19 RX ADMIN — ONDANSETRON 4 MG: 2 INJECTION INTRAMUSCULAR; INTRAVENOUS at 12:46

## 2024-12-19 RX ADMIN — PREGABALIN 150 MG: 75 CAPSULE ORAL at 20:05

## 2024-12-19 RX ADMIN — HYDROMORPHONE HYDROCHLORIDE 0.5 MG: 1 INJECTION, SOLUTION INTRAMUSCULAR; INTRAVENOUS; SUBCUTANEOUS at 21:00

## 2024-12-19 RX ADMIN — TRANEXAMIC ACID 1000 MG: 100 INJECTION, SOLUTION INTRAVENOUS at 13:09

## 2024-12-19 RX ADMIN — CARBIDOPA AND LEVODOPA 1 TABLET: 10; 100 TABLET ORAL at 20:05

## 2024-12-19 RX ADMIN — PHENYLEPHRINE HYDROCHLORIDE 200 MCG: 10 INJECTION INTRAVENOUS at 13:08

## 2024-12-19 RX ADMIN — MIDAZOLAM 2 MG: 1 INJECTION INTRAMUSCULAR; INTRAVENOUS at 12:44

## 2024-12-19 RX ADMIN — ACETAMINOPHEN 650 MG: 325 TABLET ORAL at 23:20

## 2024-12-19 RX ADMIN — ROCURONIUM BROMIDE 50 MG: 10 INJECTION INTRAVENOUS at 12:46

## 2024-12-19 RX ADMIN — PROPOFOL 120 MCG/KG/MIN: 10 INJECTION, EMULSION INTRAVENOUS at 13:26

## 2024-12-19 RX ADMIN — SODIUM CHLORIDE: 9 INJECTION, SOLUTION INTRAVENOUS at 13:41

## 2024-12-19 RX ADMIN — LIDOCAINE HYDROCHLORIDE 50 MG: 20 INJECTION, SOLUTION EPIDURAL; INFILTRATION; INTRACAUDAL; PERINEURAL at 12:46

## 2024-12-19 RX ADMIN — Medication 2 PUFF: at 20:53

## 2024-12-19 RX ADMIN — AMITRIPTYLINE HYDROCHLORIDE 100 MG: 100 TABLET, FILM COATED ORAL at 20:05

## 2024-12-19 RX ADMIN — FENTANYL CITRATE 50 MCG: 50 INJECTION, SOLUTION INTRAMUSCULAR; INTRAVENOUS at 13:49

## 2024-12-19 RX ADMIN — LORAZEPAM 0.5 MG: 2 INJECTION INTRAMUSCULAR; INTRAVENOUS at 11:03

## 2024-12-19 RX ADMIN — SODIUM CHLORIDE: 9 INJECTION, SOLUTION INTRAVENOUS at 10:30

## 2024-12-19 RX ADMIN — PIPERACILLIN SODIUM AND TAZOBACTAM SODIUM 3375 MG: 3; .375 INJECTION, POWDER, LYOPHILIZED, FOR SOLUTION INTRAVENOUS at 12:57

## 2024-12-19 RX ADMIN — SUGAMMADEX 200 MG: 100 INJECTION, SOLUTION INTRAVENOUS at 16:07

## 2024-12-19 RX ADMIN — OXYCODONE HYDROCHLORIDE AND ACETAMINOPHEN 2 TABLET: 5; 325 TABLET ORAL at 18:00

## 2024-12-19 RX ADMIN — PHENYLEPHRINE HYDROCHLORIDE 200 MCG: 10 INJECTION INTRAVENOUS at 12:59

## 2024-12-19 RX ADMIN — PIPERACILLIN AND TAZOBACTAM 3375 MG: 3; .375 INJECTION, POWDER, FOR SOLUTION INTRAVENOUS at 21:26

## 2024-12-19 RX ADMIN — EPHEDRINE SULFATE 10 MG: 5 INJECTION INTRAVENOUS at 13:06

## 2024-12-19 RX ADMIN — PHENYLEPHRINE HYDROCHLORIDE 200 MCG: 10 INJECTION INTRAVENOUS at 13:04

## 2024-12-19 RX ADMIN — SODIUM CHLORIDE: 9 INJECTION, SOLUTION INTRAVENOUS at 21:12

## 2024-12-19 RX ADMIN — ZOLPIDEM TARTRATE 10 MG: 5 TABLET ORAL at 20:05

## 2024-12-19 RX ADMIN — PROPOFOL 150 MG: 10 INJECTION, EMULSION INTRAVENOUS at 12:46

## 2024-12-19 RX ADMIN — FENTANYL CITRATE 100 MCG: 50 INJECTION, SOLUTION INTRAMUSCULAR; INTRAVENOUS at 12:46

## 2024-12-19 RX ADMIN — ALPRAZOLAM 1 MG: 0.5 TABLET ORAL at 23:20

## 2024-12-19 RX ADMIN — PHENYLEPHRINE HYDROCHLORIDE 200 MCG: 10 INJECTION INTRAVENOUS at 12:54

## 2024-12-19 RX ADMIN — ROCURONIUM BROMIDE 30 MG: 10 INJECTION INTRAVENOUS at 13:56

## 2024-12-19 RX ADMIN — PROPOFOL 50 MG: 10 INJECTION, EMULSION INTRAVENOUS at 13:20

## 2024-12-19 RX ADMIN — DEXAMETHASONE SODIUM PHOSPHATE 10 MG: 10 INJECTION, EMULSION INTRAMUSCULAR; INTRAVENOUS at 10:42

## 2024-12-19 RX ADMIN — PHENYLEPHRINE HYDROCHLORIDE 200 MCG: 10 INJECTION INTRAVENOUS at 13:14

## 2024-12-19 ASSESSMENT — PAIN SCALES - WONG BAKER
WONGBAKER_NUMERICALRESPONSE: NO HURT

## 2024-12-19 ASSESSMENT — PAIN DESCRIPTION - LOCATION
LOCATION: FOOT
LOCATION: INCISION
LOCATION: INCISION
LOCATION: FOOT

## 2024-12-19 ASSESSMENT — PAIN DESCRIPTION - DESCRIPTORS
DESCRIPTORS: ACHING
DESCRIPTORS: ACHING

## 2024-12-19 ASSESSMENT — PAIN DESCRIPTION - FREQUENCY: FREQUENCY: CONTINUOUS

## 2024-12-19 ASSESSMENT — PAIN DESCRIPTION - PAIN TYPE: TYPE: SURGICAL PAIN

## 2024-12-19 ASSESSMENT — PAIN SCALES - GENERAL
PAINLEVEL_OUTOF10: 7
PAINLEVEL_OUTOF10: 9
PAINLEVEL_OUTOF10: 7
PAINLEVEL_OUTOF10: 0
PAINLEVEL_OUTOF10: 9

## 2024-12-19 ASSESSMENT — PAIN - FUNCTIONAL ASSESSMENT
PAIN_FUNCTIONAL_ASSESSMENT: ACTIVITIES ARE NOT PREVENTED
PAIN_FUNCTIONAL_ASSESSMENT: ACTIVITIES ARE NOT PREVENTED
PAIN_FUNCTIONAL_ASSESSMENT: WONG-BAKER FACES

## 2024-12-19 ASSESSMENT — PAIN DESCRIPTION - ONSET: ONSET: ON-GOING

## 2024-12-19 ASSESSMENT — PAIN DESCRIPTION - ORIENTATION
ORIENTATION: RIGHT
ORIENTATION: INNER
ORIENTATION: MID
ORIENTATION: RIGHT

## 2024-12-19 NOTE — FLOWSHEET NOTE
Pt refusing pregnancy test,  called and is ok with not performing urine pregnancy test at this time.

## 2024-12-19 NOTE — PROGRESS NOTES
1611 Patient arrived to PACU. Patient arouses to voice and follows commands. Throat incision CDI with no drainage present. Cannula in place and patent. Blood sugar 154. Respirations even and unlabored. VSS.    1620 Dr. Coughlin at bedside to assess the patient. No new orders at this time. Patient arouses to voice and follows commands. Patient shook head 'no' when asked if she was in pain. Respirations even and unlabored. VSS.    1630 Report called to Catherine MONTENEGRO RN.    1641 Patient meets criteria to discharge from PACU at this time. Spoke with KULWINDER Armstrong. Patient's family and patient's belongings sent to [facility] at this time.    1655 Patient resting in bed with eyes closed, but arouses to voice. Respirations even and unlabored. VSS.    1710 Patient transported to [facility] in stable condition with all belongings. Clear bag with patient's speaking valve in a container and undergarments sent with the patient.

## 2024-12-19 NOTE — ANESTHESIA POSTPROCEDURE EVALUATION
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    Sena Phase I: Sena Score: 9    Sena Phase II:      Anesthesia Post Evaluation    Patient location during evaluation: PACU  Patient participation: complete - patient participated  Level of consciousness: awake and alert  Airway patency: patent  Nausea & Vomiting: no vomiting and no nausea  Cardiovascular status: hemodynamically stable  Respiratory status: acceptable  Hydration status: stable  Pain management: adequate    No notable events documented.

## 2024-12-19 NOTE — PROGRESS NOTES
Pharmacy Note - Extended Infusion Beta-Lactam Dose Adjustment    Piperacillin/Tazobactam 3375 mg q6h intermittent infusion ordered for the treatment of Surgical prophylaxis. Per [facility] Extended Infusion Beta-Lactam Policy, this will be changed to 3375 mg q8h extended infusion    Estimated Creatinine Clearance: Estimated Creatinine Clearance: 102 mL/min (based on SCr of 0.6 mg/dL).    Dialysis Status, BILL, CKD: Normal    BMI: Body mass index is 32.16 kg/m².    Rationale for Adjustment: Dose adjusted per [facility] Extended Infusion Policy based on renal function and indication. The above medication is renally eliminated and demonstrates time-dependent effects on bacterial eradication. Extended-infusion dosing strategy aims to enhance microbiologic and clinical efficacy.     Pharmacy will monitor renal function daily and adjust dose as necessary.      Please call with any questions.    Thank you,  Amy Kim, PharmD  PGY1 Pharmacy Resident  12/19/2024 5:46 PM

## 2024-12-19 NOTE — ANESTHESIA PRE PROCEDURE
plan and risks discussed with patient (so).      Plan discussed with CRNA.                Ahsan Gallegos,    12/19/2024

## 2024-12-19 NOTE — OP NOTE
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 [R13.19, Z90.02]     * Pharyngeal pouch [Q38.7]     * Esophageal diverticulum [Q39.6]     * Aphonia [R49.1]    Post-Op Diagnosis: Same       Procedure(s):  Pharyngoplasty/esophagoplasty, cricropharyngeous myotomy    Surgeon(s):  Colt Coughlin MD    Assistant:   First Assistant: rKistina Khan APRN - CNP    Anesthesia: General    Estimated Blood Loss (mL): Minimal    Complications: None    Specimens:   ID Type Source Tests Collected by Time Destination   A : Tracheal Stoma Granulation Tissue Tissue Larynx SURGICAL PATHOLOGY Colt Coughlin MD 12/19/2024 1340    B : Cricopharyngeous muscle Tissue Pharynx SURGICAL PATHOLOGY Colt Coughlin MD 12/19/2024 1514        Implants:  * No implants in log *      Drains:   Gastrostomy/Enterostomy/Jejunostomy Tube Percutaneous Endoscopic Gastrostomy (PEG) LUQ (Active)       [REMOVED] Urinary Catheter 12/19/24 Calvillo-Temperature (Removed)       Findings:  Infection Present At Time Of Surgery (PATOS) (choose all levels that have infection present):  No infection present  Other Findings: 1.  Extensive soft tissue redundancy in the region of the posterior wall of the trachea and the esophageal lumen consistent with free flap reconstruction of the post laryngectomy soft tissue defect.  2.  Clinically hypertrophic CP muscle consistent with history of obstructive dysphagia symptoms.  Muscle resected as part of a CP myotomy myectomy in the distal pharynx proximal esophagus widened by \"pyloroplasty\" mode of closure to the myotomy mucosal incision.  3.  TEP procedure not performed as prosthesis of appropriate length needs to be ordered for the unusual thickness the patient presents with.    Detailed Description of Procedure:   The patient was taken to the operating awake and placed in supine

## 2024-12-19 NOTE — PROGRESS NOTES
Admitted to Same Day Surgery and oriented to the unit. Patient verbalized approval for first name, last initial and physician name on the unit white board. Fall and allergy band on patient. Boyfriend at bedside.

## 2024-12-20 VITALS
TEMPERATURE: 97.5 F | OXYGEN SATURATION: 99 % | HEART RATE: 70 BPM | SYSTOLIC BLOOD PRESSURE: 148 MMHG | HEIGHT: 61 IN | RESPIRATION RATE: 16 BRPM | WEIGHT: 170.2 LBS | BODY MASS INDEX: 32.13 KG/M2 | DIASTOLIC BLOOD PRESSURE: 81 MMHG

## 2024-12-20 LAB — GLUCOSE BLD STRIP.AUTO-MCNC: 104 MG/DL (ref 70–108)

## 2024-12-20 PROCEDURE — 99024 POSTOP FOLLOW-UP VISIT: CPT | Performed by: REGISTERED NURSE

## 2024-12-20 PROCEDURE — 6370000000 HC RX 637 (ALT 250 FOR IP): Performed by: NURSE PRACTITIONER

## 2024-12-20 PROCEDURE — 94640 AIRWAY INHALATION TREATMENT: CPT

## 2024-12-20 PROCEDURE — 2580000003 HC RX 258: Performed by: NURSE PRACTITIONER

## 2024-12-20 PROCEDURE — 82948 REAGENT STRIP/BLOOD GLUCOSE: CPT

## 2024-12-20 PROCEDURE — 6360000002 HC RX W HCPCS: Performed by: NURSE PRACTITIONER

## 2024-12-20 PROCEDURE — 94761 N-INVAS EAR/PLS OXIMETRY MLT: CPT

## 2024-12-20 PROCEDURE — 6360000002 HC RX W HCPCS: Performed by: OTOLARYNGOLOGY

## 2024-12-20 RX ORDER — ALBUTEROL SULFATE 90 UG/1
2 INHALANT RESPIRATORY (INHALATION)
Status: DISCONTINUED | OUTPATIENT
Start: 2024-12-20 | End: 2024-12-20 | Stop reason: HOSPADM

## 2024-12-20 RX ORDER — HEPARIN 100 UNIT/ML
500 SYRINGE INTRAVENOUS PRN
Status: COMPLETED | OUTPATIENT
Start: 2024-12-20 | End: 2024-12-20

## 2024-12-20 RX ORDER — INSULIN GLARGINE 100 [IU]/ML
80 INJECTION, SOLUTION SUBCUTANEOUS 2 TIMES DAILY
Status: DISCONTINUED | OUTPATIENT
Start: 2024-12-20 | End: 2024-12-20 | Stop reason: HOSPADM

## 2024-12-20 RX ORDER — INSULIN LISPRO 100 [IU]/ML
15 INJECTION, SOLUTION INTRAVENOUS; SUBCUTANEOUS
Status: DISCONTINUED | OUTPATIENT
Start: 2024-12-20 | End: 2024-12-20 | Stop reason: HOSPADM

## 2024-12-20 RX ORDER — CIPROFLOXACIN 500 MG/1
500 TABLET, FILM COATED ORAL 2 TIMES DAILY
Qty: 20 TABLET | Refills: 0 | Status: SHIPPED | OUTPATIENT
Start: 2024-12-20 | End: 2024-12-30

## 2024-12-20 RX ADMIN — HEPARIN 500 UNITS: 100 SYRINGE at 12:36

## 2024-12-20 RX ADMIN — PREGABALIN 150 MG: 75 CAPSULE ORAL at 12:05

## 2024-12-20 RX ADMIN — PIOGLITAZONE 45 MG: 45 TABLET ORAL at 12:05

## 2024-12-20 RX ADMIN — Medication 2 PUFF: at 07:38

## 2024-12-20 RX ADMIN — METFORMIN HYDROCHLORIDE 1000 MG: 500 TABLET ORAL at 12:05

## 2024-12-20 RX ADMIN — ASPIRIN 81 MG: 81 TABLET, CHEWABLE ORAL at 12:05

## 2024-12-20 RX ADMIN — CARBIDOPA AND LEVODOPA 1 TABLET: 10; 100 TABLET ORAL at 12:05

## 2024-12-20 RX ADMIN — CLOPIDOGREL BISULFATE 75 MG: 75 TABLET ORAL at 12:05

## 2024-12-20 RX ADMIN — PIPERACILLIN AND TAZOBACTAM 3375 MG: 3; .375 INJECTION, POWDER, FOR SOLUTION INTRAVENOUS at 05:10

## 2024-12-20 NOTE — PROGRESS NOTES
Patient became very agitated when this RN walked in the room. Patient wrote down \"why am I here? I want to leave. This is a joke.\" Patient states she is upset that surgery was not successful and that she does not have a voice yet. It was explained to her by this RN that we did not have the right sized prosthetic to place. She writes that she is upset because of how long it took her to get into this surgery and that she does not want to wait again. Patient writes that she wants to leave now. Boyfriend is room is upset that they were told she would go home today, but now they have been admitted to the hospital. Patient begins throwing things in room. This RN left room to allow patient to calm down.     This RN contacted Dr. Block who spoke to patient via telephone. Patient upset during conversation and writes that she wants to go home. Dr. Block explained the dangers of leaving AMA - swelling, bleeding, etc. Dr. Block also explained that it would have been dangerous to place a wrong sized prosthetic. Patient agrees to stay overnight for observation, but does not want to speak to Coughlin in the morning. Dr. Block states patient needs to be seen prior to discharge, but understands she can leave AMA if she wants.     Nightshift nurse given full report on all conversation between this RN, patient, and Dr. Block.

## 2024-12-20 NOTE — PLAN OF CARE
Problem: Respiratory - Adult  Goal: Clear lung sounds  12/20/2024 0746 by Sandra Serra RCP  Outcome: Progressing     Problem: Chronic Conditions and Co-morbidities  Goal: Patient's chronic conditions and co-morbidity symptoms are monitored and maintained or improved  Outcome: Adequate for Discharge     Problem: Discharge Planning  Goal: Discharge to home or other facility with appropriate resources  12/20/2024 1140 by Jazmin Garcia RN  Outcome: Adequate for Discharge  12/20/2024 0118 by Maryse Levi RN  Outcome: Progressing     Problem: Pain  Goal: Verbalizes/displays adequate comfort level or baseline comfort level  12/20/2024 1140 by Jazmin Garcia RN  Outcome: Adequate for Discharge  12/20/2024 0118 by Maryse Levi RN  Outcome: Progressing     Problem: Skin/Tissue Integrity  Goal: Absence of new skin breakdown  Description: 1.  Monitor for areas of redness and/or skin breakdown  2.  Assess vascular access sites hourly  3.  Every 4-6 hours minimum:  Change oxygen saturation probe site  4.  Every 4-6 hours:  If on nasal continuous positive airway pressure, respiratory therapy assess nares and determine need for appliance change or resting period.  Outcome: Adequate for Discharge     Problem: Safety - Adult  Goal: Free from fall injury  Outcome: Adequate for Discharge

## 2024-12-20 NOTE — PROGRESS NOTES
I was contacted by the patient's nurse that she was interested in signing out AMA.  She is upset because she could not talk after her surgery today.  I reviewed the note and it was impossible to place the speech prosthesis because of the thickness of the tissue and the lack of the appropriate length speech prosthesis being in stock.    She then settle why cannot I go home and I explained that with the surgery she had there could be significant complications such as bleeding, leakage or pneumothorax from the CO2 laser cricopharyngeal myotomy etc.    The nurse contacted me a couple of minutes later and said that the patient stated she did not want to see any providers in the morning.  She agreed to stay the night but would leave first thing in the morning.  I have communicated this to Dr. Coughlin.

## 2024-12-20 NOTE — PROGRESS NOTES
Pharmacy Consult      Flaquita Vyas is a 54 y.o. female for whom pharmacy has been consulted to dose :Insulin - resume home dosing tomorrow with hospital equivalent please .    Patient Active Problem List   Diagnosis    Cellulitis of leg, left    Gangrene of foot (HCC)    Heel ulcer due to DM (HCC)    DM type 2 (diabetes mellitus, type 2) (HCC)    Cellulitis and abscess of leg, except foot    Attention to G-tube (HCC)    Coronary artery disease involving native coronary artery of native heart with angina pectoris (HCC)    PVD (peripheral vascular disease) ([facility])    Essential hypertension    Multiple open wounds of lower leg  left lower leg    Cellulitis of left lower extremity  seroma or abscess left medial thigh    Coronary artery disease involving native coronary artery without angina pectoris  triple vessel severe  stenosis    Obesity (BMI 30-39.9)    NSTEMI (non-ST elevated myocardial infarction) (HCC)    Angina, class II (HCC)    Cancer, epiglottis (HCC)    Malignant neoplasm of epiglottis (HCC)    Dehydration    Hypoglycemia    Electrolyte imbalance    Generalized abdominal pain    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  Ht/Wt:   Ht Readings from Last 1 Encounters:   12/19/24 1.549 m (5' 1\")        Wt Readings from Last 1 Encounters:   12/19/24 77.2 kg (170 lb 3.2 oz)       Estimated Creatinine Clearance: 102 mL/min (based on SCr of 0.6 mg/dL).    Assessment/Plan:  Based on fill history, patient, and pharmacy confirmation, patient takes the following:   Tresiba U-200 - 100 units BID  Humalog 15 units with before meals TID    Based

## 2024-12-20 NOTE — DISCHARGE SUMMARY
DISCHARGE SUMMARY    Pt Name: Flaquita Vyas  MRN: [id number]  YOB: 1970  Primary Care Physician: Alberto Tang MD    Admit date:  12/19/2024  9:32 AM  Discharge date:  12/20/24  Disposition: Home in stable condition  Admitting Diagnosis:   1. 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 Pharyngeal pouch    3. Esophageal diverticulum    4. Aphonia      Discharge Diagnosis:   Patient Active Problem List   Diagnosis Code    Cellulitis of leg, left L03.116    Gangrene of foot ([facility]) I96    Heel ulcer due to DM ([facility]) E11.621, L97.409    DM type 2 (diabetes mellitus, type 2) ([facility]) E11.9    Cellulitis and abscess of leg, except foot L03.119, L02.419    Attention to G-tube ([facility]) Z43.1    Coronary artery disease involving native coronary artery of native heart with angina pectoris ([facility]) I25.119    PVD (peripheral vascular disease) ([facility]) I73.9    Essential hypertension I10    Multiple open wounds of lower leg  left lower leg S81.809A    Cellulitis of left lower extremity  seroma or abscess left medial thigh L03.116    Coronary artery disease involving native coronary artery without angina pectoris  triple vessel severe  stenosis I25.10    Obesity (BMI 30-39.9) E66.9    NSTEMI (non-ST elevated myocardial infarction) ([facility]) I21.4    Angina, class II ([facility]) I20.9    Cancer, epiglottis ([facility]) C32.1    Malignant neoplasm of epiglottis ([facility]) C32.1    Dehydration E86.0    Hypoglycemia E16.2    Electrolyte imbalance E87.8    Generalized abdominal pain R10.84    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 R13.19, Z90.02    Skin lesion of left ear H61.92    Skin ulcer of third toe of right foot ([facility]) L97.519    Gangrene of toe of right foot ([facility]) I96    Moderate malnutrition ([facility]) E44.0    Pharyngeal pouch Q38.7    Esophageal diverticulum Q39.6    Aphonia R49.1    Abnormal stress test R94.39    Esophageal dysphagia R13.19     Consultants:  none  Procedures/Diagnostic Test:  Pharyngoplasty/esophagoplasty, cricropharyngeous myotomy     [facility]    Repatha SureClick 140 MG/ML Soaj  Generic drug: Evolocumab     TRESIBA FLEXTOUCH SC     zolpidem 10 MG tablet  Commonly known as: AMBIEN            ASK your doctor about these medications      nitroGLYCERIN 0.4 MG SL tablet  Commonly known as: Nitrostat  Place 1 tablet under the tongue every 5 minutes as needed for Chest pain Take 1 tablet for chest pain and repeat after 5 min if no relief, call 911 and take another dose 5 min later. Max of 3 doses in 15 min.               Where to Get Your Medications        These medications were sent to [facility] [address] - P [phone] - F [phone]  [address] [address]      Phone: [phone]   Benzocaine-Menthol 15-2.6 MG Lozg lozenge  ciprofloxacin 500 MG tablet       Diet: diet as tolerated; avoid acidic/carbonated/spicy  Activity: activity as tolerated  Follow-up:  Follow up with Dr. Coughlin as scheduled on [facility]  Time Spent for discharge: 20 minutes    Patient's history/symptoms, exam, labs/imaging were reviewed with Dr Coughlin and treatment plan was made in collaboration with him.        Electronically signed by EVELIO Basurto CNP on 12/20/2024 at 11:19 AM

## 2024-12-20 NOTE — PLAN OF CARE
Problem: Respiratory - Adult  Goal: Clear lung sounds  Outcome: Progressing   Continue therapy as ordered to achieve and maintain clear breath sounds and improve aeration.   SW informed by pt mother, pt will d.c to [facility] [address] Mother will provide transportation.

## 2024-12-20 NOTE — PROGRESS NOTES
Discharge paperwork gone over with patient and significant other, including follow-up appointments.  All questions answered.  Patient informed of prescriptions waiting in the pharmacy.

## 2024-12-23 ENCOUNTER — CARE COORDINATION (OUTPATIENT)
Dept: CARE COORDINATION | Age: 54
End: 2024-12-23

## 2024-12-23 NOTE — CARE COORDINATION
Ambulatory Care Coordination Note     12/23/2024 12:36 PM     Patient outreach attempt by this ACM today to perform hospital follow up. ACM was unable to reach the spouse/partner  by telephone today;   left voice message requesting a return phone call to this ACM.     ACM: Jayne Dc RN     Care Summary Note: Flaquita is being followed   Care coordination for education and assistance in managing her chronic conditions and healthcare needs.  Pt is s/p hospitalization for pharyngoplasty/esophagoplasty 12/19.  D/c home on 12/20.   PCP/Specialist follow up:   Future Appointments         Provider Specialty Dept Phone    12/23/2024  1:00 PM Sukumar Uribe MD; SCHEDULE, [facility] NURSE Radiation Oncology [phone]    12/30/2024 12:00 PM Colt Coughlin MD Otolaryngology [phone]    11/26/2025 3:45 PM Isis Colindres MD Cardiology [phone]            Follow Up:   Plan for next ACM outreach in approximately 1-2 days  to complete:  - hospital follow up.

## 2024-12-24 ENCOUNTER — CARE COORDINATION (OUTPATIENT)
Dept: CARE COORDINATION | Age: 54
End: 2024-12-24

## 2024-12-24 NOTE — CARE COORDINATION
Ambulatory Care Coordination Note     12/24/2024 9:44 AM     Patient outreach attempt by this ACM today to perform hospital follow up. ACM was unable to reach the spouse/partner  by telephone today;   left voice message requesting a return phone call to this ACM.     ACM: Jayne Dc RN     Care Summary Note: Flaquita is being followed   Care coordination for education and assistance in managing her chronic conditions and healthcare needs.  Pt is s/p hospitalization for pharyngoplasty/esophagoplasty 12/19.  D/c home on 12/20.     PCP/Specialist follow up:   Future Appointments         Provider Specialty Dept Phone    12/30/2024 12:00 PM Colt Coughlin MD Otolaryngology [phone]    11/26/2025 3:45 PM Isis Colindres MD Cardiology [phone]            Follow Up:   Plan for next AC outreach in approximately 1 week to complete:  - CC Protocol assessments  - disease specific assessments  - goal progression  - education .

## 2024-12-24 NOTE — CARE COORDINATION
being followed   Care coordination for education and assistance in managing her chronic conditions and healthcare needs.  Pt is s/p hospitalization for pharyngoplasty/esophagoplasty 12/19.  D/c home on 12/20.  Ladarius reports that pt is doing ok.  Continues to have pain.  States that she is taking her Cipro but unsure if she is using Cepacol lozenges.  He was not with pt at time of call but will be seeing pt later today.    Informed Ladarius that pt missed appt with Dr. Uribe yesterday.  Ladarius shares that there was some miscommunication with scheduling dept when they called to verify the appt.  He will assist pt in getting it rescheduled.   Denies pt having any trouble swallowing at this time.    Offered patient enrollment in the Remote Patient Monitoring (RPM) program for in-home monitoring: pt was not present at time of call  Assessments Completed:   General Assessment    Do you have any symptoms that are causing concern?: Yes  Reported Symptoms: Other (Comment: pt s/p pharyngoplasty/esophagoplasty.has f/u with Dr. Coughlin 12/30)          Medications Reviewed:   1111F entered: no significant other/HIPAA contact-Ladarius was not able to review meds at time of call as he was not with pt.  Encouraged him to have pt bring to her f/u appt.     Advance Care Planning:   Not reviewed during this call     Care Planning:   Not completed during this call    PCP/Specialist follow up:   Future Appointments         Provider Specialty Dept Phone    12/30/2024 12:00 PM Colt Coughlin MD Otolaryngology [phone]    11/26/2025 3:45 PM Isis Colindres MD Cardiology [phone]            Follow Up:   Plan for next ACM outreach in approximately 1 week to complete:  - CC Protocol assessments  - disease specific assessments  - goal progression  - education   - follow up appointment with Dr. Coughlin 12/30 .   Pt's significant other will help pt reschedule appt with Dr. Uribe  Caregiver is agreeable to this plan.

## 2024-12-30 ENCOUNTER — OFFICE VISIT (OUTPATIENT)
Dept: ENT CLINIC | Age: 54
End: 2024-12-30

## 2024-12-30 ENCOUNTER — CARE COORDINATION (OUTPATIENT)
Dept: CARE COORDINATION | Age: 54
End: 2024-12-30

## 2024-12-30 VITALS
BODY MASS INDEX: 32.16 KG/M2 | TEMPERATURE: 97 F | RESPIRATION RATE: 18 BRPM | DIASTOLIC BLOOD PRESSURE: 80 MMHG | HEIGHT: 61 IN | SYSTOLIC BLOOD PRESSURE: 118 MMHG | OXYGEN SATURATION: 99 % | HEART RATE: 82 BPM

## 2024-12-30 DIAGNOSIS — Z90.02 DYSPHAGIA DUE TO LARYNGECTOMY: Primary | ICD-10-CM

## 2024-12-30 DIAGNOSIS — R49.1 APHONIA: ICD-10-CM

## 2024-12-30 DIAGNOSIS — R13.19 DYSPHAGIA DUE TO LARYNGECTOMY: Primary | ICD-10-CM

## 2024-12-30 RX ORDER — EMPAGLIFLOZIN 25 MG/1
25 TABLET, FILM COATED ORAL EVERY MORNING
COMMUNITY
Start: 2024-11-24

## 2024-12-30 RX ORDER — FENTANYL 50 UG/1
PATCH TRANSDERMAL
COMMUNITY
Start: 2024-12-04

## 2024-12-30 RX ORDER — CARIPRAZINE 6 MG/1
CAPSULE, GELATIN COATED ORAL
COMMUNITY
Start: 2024-12-17

## 2024-12-30 RX ORDER — PROCHLORPERAZINE 25 MG/1
SUPPOSITORY RECTAL
COMMUNITY
Start: 2024-12-19

## 2024-12-30 RX ORDER — OXYCODONE HCL 5 MG/5 ML
SOLUTION, ORAL ORAL
COMMUNITY
Start: 2024-11-27

## 2024-12-30 RX ORDER — ONDANSETRON 8 MG/1
TABLET, ORALLY DISINTEGRATING ORAL
COMMUNITY
Start: 2024-12-17

## 2024-12-30 RX ORDER — METOLAZONE 2.5 MG/1
TABLET ORAL
COMMUNITY
Start: 2024-12-17

## 2024-12-30 RX ORDER — INSULIN PMP CART,AUT,G6/7,CNTR
EACH SUBCUTANEOUS
COMMUNITY
Start: 2024-10-29

## 2024-12-30 RX ORDER — LEVOTHYROXINE SODIUM 200 UG/1
TABLET ORAL
COMMUNITY
Start: 2024-12-17

## 2024-12-30 RX ORDER — FERROUS GLUCONATE 324(38)MG
1 TABLET ORAL DAILY
COMMUNITY
Start: 2024-12-17

## 2024-12-30 RX ORDER — TIOCONAZOLE 6.5 %
OINTMENT WITH PREFILLED APPLICATOR VAGINAL
COMMUNITY
Start: 2024-12-18

## 2024-12-30 RX ORDER — ROPINIROLE 0.5 MG/1
TABLET, FILM COATED ORAL
COMMUNITY
Start: 2024-12-17

## 2024-12-30 RX ORDER — GABAPENTIN 300 MG/1
CAPSULE ORAL
COMMUNITY
Start: 2024-12-26

## 2024-12-30 RX ORDER — INSULIN LISPRO 100 [IU]/ML
INJECTION, SOLUTION INTRAVENOUS; SUBCUTANEOUS
COMMUNITY
Start: 2024-12-19

## 2024-12-30 RX ORDER — CLINDAMYCIN HYDROCHLORIDE 300 MG/1
300 CAPSULE ORAL 3 TIMES DAILY
COMMUNITY
Start: 2024-12-17

## 2024-12-30 RX ORDER — PHENTERMINE HYDROCHLORIDE 37.5 MG/1
37.5 CAPSULE ORAL DAILY
COMMUNITY
Start: 2024-11-23

## 2024-12-30 RX ORDER — CETIRIZINE HYDROCHLORIDE 10 MG/1
TABLET ORAL
COMMUNITY
Start: 2024-12-17

## 2024-12-30 RX ORDER — PSEUDOEPHEDRINE HCL 30 MG
2 TABLET ORAL DAILY
COMMUNITY
Start: 2024-12-18

## 2024-12-30 RX ORDER — SEMAGLUTIDE 0.68 MG/ML
INJECTION, SOLUTION SUBCUTANEOUS
COMMUNITY
Start: 2024-12-17

## 2024-12-30 NOTE — CARE COORDINATION
Ambulatory Care Coordination Note     12/30/2024 3:17 PM     Patient and HIPAA contacts  outreached in attempt by this ACM today to perform care management follow up . ACM was unable to reach the  patient or her HIPAA contacts  by telephone today;   left voice message requesting a return phone call to this ACM.     ACM: Amy Tolentino RN     Care Summary Note: Attempted to reach patient for continued Care Coordination follow up and education re: the management of her DM, Hypertension, and healthcare needs.  Per chart patient was able to f/u with ENT earlier today.  Will continue to work to f/u with patient in the future.     PCP/Specialist follow up:   Future Appointments         Provider Specialty Dept Phone    1/6/2025 1:15 PM Sukumar Uribe MD; SCHEDULE, TAD FERNANDEZ NURSE Radiation Oncology [phone]    11/26/2025 3:45 PM Isis Colindres MD Cardiology [phone]            Follow Up:   Plan for next AC outreach in approximately 1 week to complete:  - Disease specific assessments - DM and Hypertension  - Medication review - Unable to review with patient/HIPAA contact  - Advance care planning - Pending review  - Goal progression  - Education   - RPM - will monitor for possible enrollment  - Monitor for additional needs  - Monitor for readiness to discharge from Care Coordination

## 2024-12-31 NOTE — PROGRESS NOTES
pouch.       Return for 1 week postop following tracheoesophageal puncture prosthesis placement.         The patient (or guardian, if applicable) and other individuals in attendance with the patient were advised that Artificial Intelligence will be utilized during this visit to record, process the conversation to generate a clinical note, and support improvement of the AI technology. The patient (or guardian, if applicable) and other individuals in attendance at the appointment consented to the use of AI, including the recording.         **This report has been created using voice recognition software. It may contain minor errors which are inherent in voice recognition technology.**

## 2025-01-03 ENCOUNTER — PREP FOR PROCEDURE (OUTPATIENT)
Dept: ENT CLINIC | Age: 55
End: 2025-01-03

## 2025-01-03 DIAGNOSIS — R13.13 PHARYNGEAL DYSPHAGIA: ICD-10-CM

## 2025-01-06 ENCOUNTER — HOSPITAL ENCOUNTER (OUTPATIENT)
Dept: RADIATION ONCOLOGY | Age: 55
Discharge: HOME | End: 2025-01-06
Payer: MEDICARE

## 2025-01-06 ENCOUNTER — CARE COORDINATION (OUTPATIENT)
Dept: CARE COORDINATION | Age: 55
End: 2025-01-06

## 2025-01-06 VITALS
RESPIRATION RATE: 16 BRPM | SYSTOLIC BLOOD PRESSURE: 131 MMHG | WEIGHT: 170 LBS | HEART RATE: 93 BPM | OXYGEN SATURATION: 98 % | TEMPERATURE: 98.2 F | DIASTOLIC BLOOD PRESSURE: 71 MMHG | BODY MASS INDEX: 32.12 KG/M2

## 2025-01-06 PROCEDURE — 99212 OFFICE O/P EST SF 10 MIN: CPT | Performed by: RADIOLOGY

## 2025-01-06 ASSESSMENT — ENCOUNTER SYMPTOMS
ABDOMINAL PAIN: 1
RECTAL PAIN: 0
DIARRHEA: 1
FACIAL SWELLING: 0
SHORTNESS OF BREATH: 0
COUGH: 1
SINUS PRESSURE: 0
NAUSEA: 1
BACK PAIN: 1
SORE THROAT: 1
SINUS PAIN: 0
VOICE CHANGE: 0
TROUBLE SWALLOWING: 0
BLOOD IN STOOL: 0

## 2025-01-06 ASSESSMENT — PAIN DESCRIPTION - LOCATION: LOCATION: GENERALIZED

## 2025-01-06 ASSESSMENT — PAIN SCALES - GENERAL: PAINLEVEL_OUTOF10: 10

## 2025-01-06 NOTE — PROGRESS NOTES
times daily      aspirin 81 MG tablet Take 1 tablet by mouth daily      albuterol (PROVENTIL HFA) 108 (90 BASE) MCG/ACT inhaler Inhale 2 puffs into the lungs every 4 hours as needed for Wheezing or Shortness of Breath (Space out to every 6 hours as symptoms improve). Space out to every 6 hours as symptoms improve. 1 Inhaler 10     No current facility-administered medications for this encounter.       All portions of this note that were completed during the initial nursing assessment were discussed and reviewed in detail with the nursing staff member who completed this portion of the note and I agree with the information and assessment as written. A complete review of systems was performed and found to be negative except as presented above.    PHYSICAL EXAMINATION:  VITAL SIGNS: /71   Pulse 93   Temp 98.2 °F (36.8 °C) (Infrared)   Resp 16   Wt 77.1 kg (170 lb)   SpO2 98%   BMI 32.12 kg/m²     ECO - Symptomatic but completely ambulatory (Restricted in physically strenuous activity but ambulatory and able to carry out work of a light or sedentary nature. For example, light housework, office work)    Physical Exam  Constitutional:       Appearance: Normal appearance.   HENT:      Head:      Comments: Stoma, clean dry and intact, no issues with respiration.  Pulmonary:      Effort: Pulmonary effort is normal. No respiratory distress.   Abdominal:      General: Abdomen is flat. There is no distension.   Lymphadenopathy:      Cervical:      Right cervical: No superficial cervical adenopathy.     Left cervical: No superficial cervical adenopathy.   Skin:     Coloration: Skin is not jaundiced or pale.   Neurological:      General: No focal deficit present.      Mental Status: She is alert and oriented to person, place, and time.   Psychiatric:         Mood and Affect: Mood normal.         ATTESTATION: 15 minutes were spent with the patient at today's visit reviewing pertinent information related to their

## 2025-01-06 NOTE — CARE COORDINATION
Ambulatory Care Coordination Note     2025 11:48 AM     Patient Current Location:  [place]     Patient and HIPAA contacts, Ladarius and Maliha,  contacted the ACM by telephone. Verified name and  with  Gene, significant other/HIPAA contact,  as identifiers.         ACM: Amy Tolentino RN     Challenges to be reviewed by the provider   Additional needs identified to be addressed with provider No    none         Method of communication with provider: none.    Utilization: Patient has not had any utilization since our last call.     Care Summary Note: Patient was called for continued Care Coordination follow up and education re: the management of her DM, Hypertension, and healthcare needs.  Patient or her son, HIPAA contact, Maliha, were unavailable at the time of ACM call, but ACM was able to briefly speak with patient's significant other/HIPAA contact, Ladarius.  Gene shared patient has been doing \"ok\" and she has transportation arranged for her hem/onc appointment later this afternoon.  Gene reported patient is also scheduled to f/u with podiatry tomorrow and her foot wound continues to improve.  Gene denied any questions re: recent ENT visit and shared patient's swallowing concerns remain unchanged from her visit.  Gene denied any concerns with increased/worsening SOB or choking being present.  Gene shared patient is eager to have TEP procedure in a couple weeks to improve swallowing and \"hopefully allow her to do some speaking.\"  Gene denied any questions at this time.  ACM briefly reviewed signs and symptoms they should report and the importance of early symptom recognition and follow up.  Ladarius acknowledged understanding and stated he needed to end our call.  Will continue to work to f/u with patient/HIPAA contacts in the future.      Offered patient enrollment in the Remote Patient Monitoring (RPM) program for in-home monitoring: Yes, but did not enroll at this time: controlled chronic disease management and patient

## 2025-01-14 DIAGNOSIS — C32.1 MALIGNANT NEOPLASM OF EPIGLOTTIS (HCC): Primary | ICD-10-CM

## 2025-01-14 NOTE — PROGRESS NOTES
Attempted to complete PAT call, patient's boyfriend Gene answered phone and states patient unable to talk \"has no voicebox\". Gene states he doesn't know patients medications and they don't live together and he is unsure when he will see her next. Called and left message for Chanel at Dr Coughlin's office.

## 2025-01-14 NOTE — PROGRESS NOTES
Chanel from Dr Coughlin's office called and states she gave medication instructions sheet to patient and will try to reach out to patient also.

## 2025-01-15 ENCOUNTER — HOSPITAL ENCOUNTER (OUTPATIENT)
Dept: INFUSION THERAPY | Age: 55
End: 2025-01-15

## 2025-01-16 ENCOUNTER — TELEPHONE (OUTPATIENT)
Dept: ENT CLINIC | Age: 55
End: 2025-01-16

## 2025-01-16 NOTE — TELEPHONE ENCOUNTER
Prescription from [employer] for her supplies ran out so she is trying to get a refill from the provider in [place].  They haven't heard anything back. They are asking if Dr Coughlin or Vane Burnson will take care of her supplies now that she is being treated in [place]?? Pretty sure these come from therapy.    Ladarius is also trying to see what company is best to use for Flaquita's supplies. He is asking about [employer]??    Any thoughts on this?      Scheduled for TEP on Thursday 1/23/25.

## 2025-01-17 ENCOUNTER — PREP FOR PROCEDURE (OUTPATIENT)
Dept: ENT CLINIC | Age: 55
End: 2025-01-17

## 2025-01-20 ENCOUNTER — CARE COORDINATION (OUTPATIENT)
Dept: CARE COORDINATION | Age: 55
End: 2025-01-20

## 2025-01-20 NOTE — CARE COORDINATION
Ambulatory Care Coordination Note     1/20/2025 12:12 PM     Patient outreach attempt by this ACM today to perform care management follow up . ACM was unable to reach the patient by telephone today;   left voice message requesting a return phone call to this ACM.  [employer] message sent requesting patient to contact this ACM.     ACM: Amy Tolentino RN     Care Summary Note: Attempted to reach patient for continued Care Coordination follow up and education re: the management of her DM, Hypertension, healthcare needs, and in preparation for her upcoming TEP procedure.  Will continue to work to f/u with patient in the future.     Update:  Follow up My Chart message received and patient reports she is doing fine.  Patient instructed to follow up with any questions or concerns.     PCP/Specialist follow up:   Future Appointments         Provider Specialty Dept Phone    2/10/2025 11:30 AM Colt Coughlin MD Otolaryngology [phone]    7/7/2025 1:30 PM Sukumar Uribe MD; SCHEDULE, TAD BUNCH [facility] NURSE Radiation Oncology [phone]    11/26/2025 3:45 PM Isis Colindres MD Cardiology [phone]            Follow Up:   Plan for next AC outreach in approximately 1 week to complete:  - Disease specific assessments - DM and Hypertension  - Medication review - Unable to review with patient/HIPAA contact  - Advance care planning - Pending review  - Goal progression  - Education   - RPM - Stable - Will monitor for possible enrollment  - F/u s/p TEP procedure  - Monitor for additional needs  - Monitor for readiness to discharge from Care Coordination

## 2025-01-21 NOTE — TELEPHONE ENCOUNTER
Vane from Speech therapy called me back today. She states they have not seen the patient in Speech Therapy. Patient has had several cancelled appointments and some no shows, including for her initial evaluation. Vane was going to make a call to Radha in the OR and also send DANIELLE Acevedo a message to see if the OR has everything they need for the patient's surgery on Thursday or not. Vane said she would need to see the patient first in order to see exactly what she would need to order. Vane said once she has gotten in touch with Radha and/or Kristina she would be back in touch with me for an update.

## 2025-01-22 PROCEDURE — APPNB45 APP NON BILLABLE 31-45 MINUTES: Performed by: NURSE PRACTITIONER

## 2025-01-22 NOTE — PROGRESS NOTES
Speak to mirian jain caregiver said doesn't know aware of what she takes for medication. She isn't around him and she doesn't have phone.   Informed pt on medication to hold and take am of surgery surgery   He said pt has it wrote down.   Unable to confirm medication she takes  Or do PAT visit  He said whatever in computer is correct

## 2025-01-22 NOTE — TELEPHONE ENCOUNTER
2017  EMPLOYEE INFORMATION: EMPLOYER INFORMATION:   NAME: Faby Anderson [employer]   : 1994 [phone]   DATE OF INJURY/EVENT: 2017           Location: [facility]   Treating Provider: CHARLENE Bermudez  Time In:  11:17 AM Time Out:  11:43 AM      DIAGNOSIS:   1. Tendinitis of both wrists      STATUS: This injury is determined to be WORK RELATED.    RETURN TO WORK:  Employee may return to work with restrictions.  Return Date: 10/5/2017      RESTRICTIONS:   Restrictions are to be followed at work and at home.  Restrictions are in effect until next follow-up visit.  20 pound limit of lifting with both arms. No rotation of both wrists. Pincher grasp of both hands up to 4 hours per day which should be divided throughout the 8 hour shift. May use wrist braces for both wrists as needed.      TREATMENT PLAN:  Medications for this injury/condition:   None  Referral/Consult:  Occupational Therapy:  Continue       Diagnostic Testing:   None         Instructions:   Continue wearing wrist braces as needed. Continue using biofreeze as needed.  Continue with daily exercises and stretches.      NEXT RETURN VISIT: Monday 10/16/2017 @ 9:30 am  Thank you for the privilege of providing medical care for this injury/condition.  If there are any questions, please call the occupational health clinic at Dept: [phone].    Electronically signed on 10/5/2017 at 11:43 AM by:   CHARLENE Bermudez   [facility]    The physician below agrees with the plan and restrictions placed on the patient by the provider above.  Linnette Car DO     Checked with Vane again today 1/22/25.    \"I got in touch with Cherry yesterday and she said that she checked with Dr. Coughlin and he told her that everything they have is correct\"      This patient was previously scheduled for this and had to be rescheduled due to not having the right size TEP in OR.  He did tell me that the OR was getting the correct size in and to please get her rescheduled.      This was also confirmed with Veronica Morton and she confirmed the product was there.

## 2025-01-22 NOTE — H&P
in the distal pharynx or proximal esophagus.  The esophageal inlet was easily instrumented and opened with insufflation.  No ulceration raised lesions signs of recent bleeding or submucosal tumor was seen.  I removed the air in the stomach and repeated my exam on the way out finding no new pathology.  The patient tolerated the procedure well.     Esophagoscopy images:                                              Vitals reviewed.  Results  Imaging  Esophagoscopy shows the channel is open, but there may be some scar underneath the mucous membrane.        No results found.         Lab Results   Component Value Date/Time      12/13/2024 01:59 PM      11/19/2024 11:41 AM      09/13/2024 07:45 AM     K 4.1 12/13/2024 01:59 PM     K 4.3 11/19/2024 11:41 AM     K 4.2 09/13/2024 07:45 AM     K 3.9 09/11/2024 06:57 AM     K 4.8 11/26/2022 02:58 AM     CL 99 12/13/2024 01:59 PM     CL 97 11/19/2024 11:41 AM      09/13/2024 07:45 AM     CO2 27 12/13/2024 01:59 PM     CO2 25 11/19/2024 11:41 AM     CO2 25 09/13/2024 07:45 AM     BUN 11 12/13/2024 01:59 PM     BUN 23 11/19/2024 11:41 AM     BUN 10 09/13/2024 07:45 AM     CREATININE 0.6 12/13/2024 01:59 PM     CREATININE 0.6 11/19/2024 11:41 AM     CREATININE 0.7 09/13/2024 07:45 AM     CALCIUM 8.1 12/13/2024 01:59 PM     CALCIUM 9.4 11/19/2024 11:41 AM     CALCIUM 8.6 09/13/2024 07:45 AM     BILITOT 0.2 11/19/2024 11:41 AM     BILITOT 0.2 09/09/2024 01:56 PM     BILITOT 0.2 08/26/2024 02:00 PM     ALKPHOS 114 11/19/2024 11:41 AM     ALKPHOS 139 09/09/2024 01:56 PM     ALKPHOS 174 08/26/2024 02:00 PM     ALKPHOS 99 06/28/2024 11:40 AM     AST 14 11/19/2024 11:41 AM     AST 12 09/09/2024 01:56 PM     AST 15 08/26/2024 02:00 PM     ALT 15 11/19/2024 11:41 AM     ALT 11 09/09/2024 01:56 PM     ALT 13 08/26/2024 02:00 PM         All of the past medical history, past surgical history, family history,social history, allergies and current medications were reviewed

## 2025-01-23 ENCOUNTER — ANESTHESIA EVENT (OUTPATIENT)
Dept: OPERATING ROOM | Age: 55
End: 2025-01-23
Payer: MEDICARE

## 2025-01-23 ENCOUNTER — ANESTHESIA (OUTPATIENT)
Dept: OPERATING ROOM | Age: 55
End: 2025-01-23
Payer: MEDICARE

## 2025-01-23 ENCOUNTER — HOSPITAL ENCOUNTER (OUTPATIENT)
Age: 55
Setting detail: OUTPATIENT SURGERY
Discharge: HOME | End: 2025-01-23
Attending: OTOLARYNGOLOGY | Admitting: OTOLARYNGOLOGY
Payer: MEDICARE

## 2025-01-23 VITALS
SYSTOLIC BLOOD PRESSURE: 129 MMHG | RESPIRATION RATE: 16 BRPM | HEIGHT: 61 IN | HEART RATE: 83 BPM | WEIGHT: 172.8 LBS | TEMPERATURE: 96.8 F | OXYGEN SATURATION: 97 % | BODY MASS INDEX: 32.62 KG/M2 | DIASTOLIC BLOOD PRESSURE: 85 MMHG

## 2025-01-23 LAB
GLUCOSE BLD STRIP.AUTO-MCNC: 123 MG/DL (ref 70–108)
POTASSIUM SERPL-SCNC: 4.2 MEQ/L (ref 3.5–5.2)

## 2025-01-23 PROCEDURE — 2709999900 HC NON-CHARGEABLE SUPPLY: Performed by: OTOLARYNGOLOGY

## 2025-01-23 PROCEDURE — 7100000011 HC PHASE II RECOVERY - ADDTL 15 MIN: Performed by: OTOLARYNGOLOGY

## 2025-01-23 PROCEDURE — 7100000001 HC PACU RECOVERY - ADDTL 15 MIN: Performed by: OTOLARYNGOLOGY

## 2025-01-23 PROCEDURE — 6360000002 HC RX W HCPCS

## 2025-01-23 PROCEDURE — 7100000000 HC PACU RECOVERY - FIRST 15 MIN: Performed by: OTOLARYNGOLOGY

## 2025-01-23 PROCEDURE — 84132 ASSAY OF SERUM POTASSIUM: CPT

## 2025-01-23 PROCEDURE — 3600000014 HC SURGERY LEVEL 4 ADDTL 15MIN: Performed by: OTOLARYNGOLOGY

## 2025-01-23 PROCEDURE — 3600000004 HC SURGERY LEVEL 4 BASE: Performed by: OTOLARYNGOLOGY

## 2025-01-23 PROCEDURE — 7100000010 HC PHASE II RECOVERY - FIRST 15 MIN: Performed by: OTOLARYNGOLOGY

## 2025-01-23 PROCEDURE — 3700000001 HC ADD 15 MINUTES (ANESTHESIA): Performed by: OTOLARYNGOLOGY

## 2025-01-23 PROCEDURE — 82948 REAGENT STRIP/BLOOD GLUCOSE: CPT

## 2025-01-23 PROCEDURE — 2780000010 HC IMPLANT OTHER: Performed by: OTOLARYNGOLOGY

## 2025-01-23 PROCEDURE — 3700000000 HC ANESTHESIA ATTENDED CARE: Performed by: OTOLARYNGOLOGY

## 2025-01-23 PROCEDURE — 6360000002 HC RX W HCPCS: Performed by: NURSE ANESTHETIST, CERTIFIED REGISTERED

## 2025-01-23 PROCEDURE — 2500000003 HC RX 250 WO HCPCS: Performed by: NURSE ANESTHETIST, CERTIFIED REGISTERED

## 2025-01-23 PROCEDURE — 2720000010 HC SURG SUPPLY STERILE: Performed by: OTOLARYNGOLOGY

## 2025-01-23 PROCEDURE — 2580000003 HC RX 258: Performed by: NURSE PRACTITIONER

## 2025-01-23 PROCEDURE — 36415 COLL VENOUS BLD VENIPUNCTURE: CPT

## 2025-01-23 PROCEDURE — 2500000003 HC RX 250 WO HCPCS

## 2025-01-23 PROCEDURE — 6360000002 HC RX W HCPCS: Performed by: NURSE PRACTITIONER

## 2025-01-23 RX ORDER — PROPOFOL 10 MG/ML
INJECTION, EMULSION INTRAVENOUS
Status: DISCONTINUED | OUTPATIENT
Start: 2025-01-23 | End: 2025-01-23 | Stop reason: SDUPTHER

## 2025-01-23 RX ORDER — SODIUM CHLORIDE 0.9 % (FLUSH) 0.9 %
5-40 SYRINGE (ML) INJECTION EVERY 12 HOURS SCHEDULED
Status: DISCONTINUED | OUTPATIENT
Start: 2025-01-23 | End: 2025-01-23 | Stop reason: HOSPADM

## 2025-01-23 RX ORDER — SODIUM CHLORIDE 0.9 % (FLUSH) 0.9 %
5-40 SYRINGE (ML) INJECTION PRN
Status: DISCONTINUED | OUTPATIENT
Start: 2025-01-23 | End: 2025-01-23 | Stop reason: HOSPADM

## 2025-01-23 RX ORDER — HYDRALAZINE HYDROCHLORIDE 20 MG/ML
INJECTION INTRAMUSCULAR; INTRAVENOUS
Status: COMPLETED
Start: 2025-01-23 | End: 2025-01-23

## 2025-01-23 RX ORDER — SODIUM CHLORIDE 0.9 % (FLUSH) 0.9 %
5-40 SYRINGE (ML) INJECTION PRN
Status: CANCELLED | OUTPATIENT
Start: 2025-01-23

## 2025-01-23 RX ORDER — NALOXONE HYDROCHLORIDE 0.4 MG/ML
INJECTION, SOLUTION INTRAMUSCULAR; INTRAVENOUS; SUBCUTANEOUS PRN
Status: DISCONTINUED | OUTPATIENT
Start: 2025-01-23 | End: 2025-01-23 | Stop reason: HOSPADM

## 2025-01-23 RX ORDER — LIDOCAINE HCL/PF 100 MG/5ML
SYRINGE (ML) INJECTION
Status: DISCONTINUED | OUTPATIENT
Start: 2025-01-23 | End: 2025-01-23 | Stop reason: SDUPTHER

## 2025-01-23 RX ORDER — IPRATROPIUM BROMIDE AND ALBUTEROL SULFATE 2.5; .5 MG/3ML; MG/3ML
1 SOLUTION RESPIRATORY (INHALATION) EVERY 4 HOURS PRN
Status: CANCELLED | OUTPATIENT
Start: 2025-01-23

## 2025-01-23 RX ORDER — ROCURONIUM BROMIDE 10 MG/ML
INJECTION, SOLUTION INTRAVENOUS
Status: DISCONTINUED | OUTPATIENT
Start: 2025-01-23 | End: 2025-01-23 | Stop reason: SDUPTHER

## 2025-01-23 RX ORDER — FENTANYL CITRATE 50 UG/ML
50 INJECTION, SOLUTION INTRAMUSCULAR; INTRAVENOUS EVERY 5 MIN PRN
Status: DISCONTINUED | OUTPATIENT
Start: 2025-01-23 | End: 2025-01-23 | Stop reason: HOSPADM

## 2025-01-23 RX ORDER — SODIUM CHLORIDE 9 MG/ML
INJECTION, SOLUTION INTRAVENOUS PRN
Status: DISCONTINUED | OUTPATIENT
Start: 2025-01-23 | End: 2025-01-23 | Stop reason: HOSPADM

## 2025-01-23 RX ORDER — HYDRALAZINE HYDROCHLORIDE 20 MG/ML
10 INJECTION INTRAMUSCULAR; INTRAVENOUS EVERY 10 MIN PRN
Status: DISCONTINUED | OUTPATIENT
Start: 2025-01-23 | End: 2025-01-23 | Stop reason: HOSPADM

## 2025-01-23 RX ORDER — TRANEXAMIC ACID 100 MG/ML
INJECTION, SOLUTION INTRAVENOUS
Status: DISCONTINUED | OUTPATIENT
Start: 2025-01-23 | End: 2025-01-23 | Stop reason: SDUPTHER

## 2025-01-23 RX ORDER — SODIUM CHLORIDE 9 MG/ML
INJECTION, SOLUTION INTRAVENOUS PRN
Status: CANCELLED | OUTPATIENT
Start: 2025-01-23

## 2025-01-23 RX ORDER — METOPROLOL TARTRATE 1 MG/ML
5 INJECTION, SOLUTION INTRAVENOUS ONCE
Status: COMPLETED | OUTPATIENT
Start: 2025-01-23 | End: 2025-01-23

## 2025-01-23 RX ORDER — SODIUM CHLORIDE 0.9 % (FLUSH) 0.9 %
5-40 SYRINGE (ML) INJECTION EVERY 12 HOURS SCHEDULED
Status: CANCELLED | OUTPATIENT
Start: 2025-01-23

## 2025-01-23 RX ORDER — FENTANYL CITRATE 50 UG/ML
INJECTION, SOLUTION INTRAMUSCULAR; INTRAVENOUS
Status: DISCONTINUED | OUTPATIENT
Start: 2025-01-23 | End: 2025-01-23 | Stop reason: SDUPTHER

## 2025-01-23 RX ORDER — ONDANSETRON 2 MG/ML
INJECTION INTRAMUSCULAR; INTRAVENOUS
Status: DISCONTINUED | OUTPATIENT
Start: 2025-01-23 | End: 2025-01-23 | Stop reason: SDUPTHER

## 2025-01-23 RX ORDER — DIPHENHYDRAMINE HYDROCHLORIDE 50 MG/ML
12.5 INJECTION INTRAMUSCULAR; INTRAVENOUS
Status: DISCONTINUED | OUTPATIENT
Start: 2025-01-23 | End: 2025-01-23 | Stop reason: HOSPADM

## 2025-01-23 RX ORDER — PHENYLEPHRINE HCL IN 0.9% NACL 1 MG/10 ML
SYRINGE (ML) INTRAVENOUS
Status: DISCONTINUED | OUTPATIENT
Start: 2025-01-23 | End: 2025-01-23 | Stop reason: SDUPTHER

## 2025-01-23 RX ORDER — DEXAMETHASONE SODIUM PHOSPHATE 10 MG/ML
10 INJECTION, EMULSION INTRAMUSCULAR; INTRAVENOUS ONCE
Status: COMPLETED | OUTPATIENT
Start: 2025-01-23 | End: 2025-01-23

## 2025-01-23 RX ORDER — IPRATROPIUM BROMIDE AND ALBUTEROL SULFATE 2.5; .5 MG/3ML; MG/3ML
1 SOLUTION RESPIRATORY (INHALATION) EVERY 4 HOURS PRN
Status: DISCONTINUED | OUTPATIENT
Start: 2025-01-23 | End: 2025-01-23 | Stop reason: HOSPADM

## 2025-01-23 RX ORDER — ONDANSETRON 2 MG/ML
4 INJECTION INTRAMUSCULAR; INTRAVENOUS
Status: DISCONTINUED | OUTPATIENT
Start: 2025-01-23 | End: 2025-01-23 | Stop reason: HOSPADM

## 2025-01-23 RX ORDER — METOPROLOL TARTRATE 1 MG/ML
INJECTION, SOLUTION INTRAVENOUS
Status: COMPLETED
Start: 2025-01-23 | End: 2025-01-23

## 2025-01-23 RX ADMIN — TRANEXAMIC ACID 1000 MG: 100 INJECTION, SOLUTION INTRAVENOUS at 13:18

## 2025-01-23 RX ADMIN — ROCURONIUM BROMIDE 40 MG: 10 INJECTION INTRAVENOUS at 12:22

## 2025-01-23 RX ADMIN — PROPOFOL 50 MG: 10 INJECTION, EMULSION INTRAVENOUS at 12:41

## 2025-01-23 RX ADMIN — PROPOFOL 50 MG: 10 INJECTION, EMULSION INTRAVENOUS at 12:37

## 2025-01-23 RX ADMIN — SUGAMMADEX 200 MG: 100 INJECTION, SOLUTION INTRAVENOUS at 13:42

## 2025-01-23 RX ADMIN — METOPROLOL TARTRATE 5 MG: 5 INJECTION INTRAVENOUS at 14:09

## 2025-01-23 RX ADMIN — METOPROLOL TARTRATE 5 MG: 1 INJECTION, SOLUTION INTRAVENOUS at 14:09

## 2025-01-23 RX ADMIN — HYDRALAZINE HYDROCHLORIDE 10 MG: 20 INJECTION INTRAMUSCULAR; INTRAVENOUS at 14:28

## 2025-01-23 RX ADMIN — SODIUM CHLORIDE: 9 INJECTION, SOLUTION INTRAVENOUS at 10:58

## 2025-01-23 RX ADMIN — FENTANYL CITRATE 50 MCG: 50 INJECTION, SOLUTION INTRAMUSCULAR; INTRAVENOUS at 12:22

## 2025-01-23 RX ADMIN — DEXAMETHASONE SODIUM PHOSPHATE 10 MG: 10 INJECTION, EMULSION INTRAMUSCULAR; INTRAVENOUS at 10:56

## 2025-01-23 RX ADMIN — Medication 150 MCG: at 12:34

## 2025-01-23 RX ADMIN — HYDRALAZINE HYDROCHLORIDE 10 MG: 20 INJECTION, SOLUTION INTRAMUSCULAR; INTRAVENOUS at 14:28

## 2025-01-23 RX ADMIN — FENTANYL CITRATE 50 MCG: 50 INJECTION, SOLUTION INTRAMUSCULAR; INTRAVENOUS at 12:40

## 2025-01-23 RX ADMIN — ONDANSETRON 4 MG: 2 INJECTION INTRAMUSCULAR; INTRAVENOUS at 13:42

## 2025-01-23 RX ADMIN — PROPOFOL 100 MG: 10 INJECTION, EMULSION INTRAVENOUS at 12:22

## 2025-01-23 RX ADMIN — Medication 50 MG: at 12:22

## 2025-01-23 ASSESSMENT — PAIN - FUNCTIONAL ASSESSMENT
PAIN_FUNCTIONAL_ASSESSMENT: ACTIVITIES ARE NOT PREVENTED
PAIN_FUNCTIONAL_ASSESSMENT: 0-10

## 2025-01-23 ASSESSMENT — PAIN DESCRIPTION - DESCRIPTORS: DESCRIPTORS: ACHING

## 2025-01-23 NOTE — PROGRESS NOTES
Pt has dry dressing to fight foot. Pt refused for RN to take dressing off to assess wound.  present in room and updated on situation. Pt showed RN picture of wound where previous toes have been amputated. Signs of possible infection from picture.  and RN educated importance of taking care of the foot. Pt says she has a doctor who looks at her foot.   Per  okay to proceed with surgery.

## 2025-01-23 NOTE — PROGRESS NOTES
Pt admitted to [facility] room 8 and oriented to unit. . Nares swabbed. Pt okay'd permission for first name, last initial and physicians name on white board. SDS board and discharge criteria explained, family verbalized understanding. Pt denies thoughts of harming self or others. Call light in reach. Family at the bedside. Pt communications through typing on her phone and mouths words, pt nods yes and no to questions.  Pts boyfriend at bedside  Amanda Scott for contact [phone]

## 2025-01-23 NOTE — OP NOTE
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 Item Serial No.  Lot No. LRB No. Used Action   [employer] Puncture Set 22.5 Fr 12.5 mm     [id number] N/A 1 Implanted         Drains:   Gastrostomy/Enterostomy/Jejunostomy Tube Percutaneous Endoscopic Gastrostomy (PEG) LUQ (Active)       Findings:  Infection Present At Time Of Surgery (PATOS) (choose all levels that have infection present):  No infection present  Other Findings: 1.  Esophageal inlet stenosed and dilated to 19 mm with minimal bleeding.  This enabled endoscopic review of the inlet for the placement of the TP.  2.  The anterior pharyngeal and esophageal inlet free flap reconstruction produced a very challenging through which to place this TEP particularly in the way of thickness but also in the way of abnormal anatomy.  3.  Primary effort through the interface of the flap and the mucosa of the esophagus was extremely dense in terms of the subcutaneous and submucosal scarring preventing the placement of the original 10 mm prosthesis.  4.  There is separate more distal entry point I was able to replace the draw suture and pull in a 12 mm prosthesis albeit with a very deep indentation to the mucosa making it hard to see the full circumference of the internal aspect of the prosthesis postplacement.    Detailed Description of Procedure:   The

## 2025-01-23 NOTE — PROGRESS NOTES
1354- pt to pacu. Pt responds appropriately, denies pain, nausea. Pt deep suctioned by anesthesia. VSS. Pt placed on trach mask. Pt appears in no acute distress.     1406- pt continues to deny complaint. BP elevated, spoke to ENT regarding which medication would be acceptable, orders received.    1417 pt continues to deny complaint, BP remains elevated.    1427- pt continues to deny complaint, medicated for BP    1438- pt responding to medication for bp. VSS    1448- pt meets criteria for discharge from pacu.     1451- Pt returned to sds in stable condition. Report given to Michell

## 2025-01-23 NOTE — ANESTHESIA POSTPROCEDURE EVALUATION
Department of Anesthesiology  Postprocedure Note    Patient: Flaquita Vyas  MRN: [id number]  YOB: 1970  Date of evaluation: 1/23/2025    Procedure Summary       Date: 01/23/25 Room / Location: [facility] OR 01 / [facility] OR    Anesthesia Start: 1220 Anesthesia Stop: 1405    Procedure: TEP placement, with balloon dilation of esophageal inlet Diagnosis:       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      Pharyngeal dysphagia      Esophageal diverticulum      Aphonia      (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 [R13.19, Z90.02])      (Pharyngeal dysphagia [R13.13])      (Esophageal diverticulum [Q39.6])      (Aphonia [R49.1])    Surgeons: Colt Coughlin MD Responsible Provider: Antwan Ordonez DO    Anesthesia Type: general ASA Status: 3            Anesthesia Type: No value filed.    Sena Phase I: Sena Score: 6    Sena Phase II:      Anesthesia Post Evaluation    Patient location during evaluation: PACU  Patient participation: complete - patient participated  Level of consciousness: awake and alert  Airway patency: patent  Nausea & Vomiting: no vomiting and no nausea  Cardiovascular status: hemodynamically stable  Respiratory status: acceptable  Hydration status: stable  Pain management: adequate    No notable events documented.

## 2025-01-23 NOTE — PROGRESS NOTES
Pt returned to SDS room 8. Vitals and assessment as charted. 0.9 infusing, to count from PACU. Pt has crackers and water. Family at the bedside. Pt and family verbalized understanding of discharge criteria and call light use. Call light in reach.

## 2025-01-23 NOTE — DISCHARGE INSTRUCTIONS
- May resume diet as tolerated  - Resume regular activities, no straining/pulling/lifting for the next week  - Hold the Aspirin and Plavix for 3 days.  If no bleeding, may resume.    - You may have a little blood from the stoma around the prosthesis for the next few days.  If there is a lot of bleeding or continuous bleeding, apply pressure without blocking the stoma (so that you can still breathe) and come to the emergency department.  - You will likely have some soreness in your neck and throat for the next few days.  You may take a regular tylenol in addition to your chronic pain medication and/or apply ice as needed.

## 2025-01-23 NOTE — PROGRESS NOTES

## 2025-01-23 NOTE — ANESTHESIA PRE PROCEDURE
Department of Anesthesiology  Preprocedure Note       Name:  Flaquita Vyas   Age:  54 y.o.  :  1970                                          MRN:  [id number]         Date:  2025      Surgeon: Surgeon(s):  Colt Coughlin MD    Procedure: Procedure(s):  TEP placement    Medications prior to admission:   Prior to Admission medications    Medication Sig Start Date End Date Taking? Authorizing Provider   calcium citrate (CALCITRATE) 250 MG TABS tablet Take 2 tablets by mouth daily 24   Melvin Padron MD   VRAYLAR 6 MG CAPS capsule  24   Melvin Padron MD   cetirizine (ZYRTEC) 10 MG tablet  24   Melvin Padron MD   clindamycin (CLEOCIN) 300 MG capsule Take 1 capsule by mouth 3 times daily 24   Melvin Padron MD   Continuous Glucose Transmitter (DEXCOM G6 TRANSMITTER) MISC USE DAILY TO CHECK BLOOD SUGAR 24   Melvin Padron MD   JARDIANCE 25 MG tablet Take 1 tablet by mouth every morning 24   Melvin Padron MD   ferrous gluconate (FERGON) 324 (38 Fe) MG tablet Take 1 tablet by mouth daily 24   Melvin Padron MD   gabapentin (NEURONTIN) 300 MG capsule  24   Melvin Padron MD   Gauze Pads & Dressings (GAUZE PADS 2\"X2\") 2\"X2\" PADS USE 3 PADS ON TRACT EVERY DAY AND AS NEEDED 24   Melvin Padron MD   Insulin Disposable Pump (OMNIPOD 5 [id number] PODS GEN 5) MISC USE AS DIRECTED 10/29/24   Melvin Padron MD   levothyroxine (SYNTHROID) 200 MCG tablet  24   Melvin Padron MD   metOLazone (ZAROXOLYN) 2.5 MG tablet TAKE 1 TABLET BY MOUTH DAILY AS NEEDED FOR SWELLING 24   Melvin Padron MD   ondansetron (ZOFRAN-ODT) 8 MG TBDP disintegrating tablet DISSOLVE 1 TABLET UNDER THE TONGUE EVERY 8 HOURS AS NEEDED FOR NAUSEA OR VOMITING 24   Melvin Padron MD   oxyCODONE (ROXICODONE) 5 MG/5ML solution  24   Melvin Padron MD   phentermine 37.5 MG capsule Take

## 2025-01-27 ENCOUNTER — CARE COORDINATION (OUTPATIENT)
Dept: CARE COORDINATION | Age: 55
End: 2025-01-27

## 2025-01-27 ENCOUNTER — TELEPHONE (OUTPATIENT)
Dept: ENT CLINIC | Age: 55
End: 2025-01-27

## 2025-01-27 DIAGNOSIS — Z90.02 HX OF LARYNGECTOMY: ICD-10-CM

## 2025-01-27 DIAGNOSIS — R49.1 APHONIA: Primary | ICD-10-CM

## 2025-01-27 NOTE — CARE COORDINATION
Ambulatory Care Coordination Note     1/27/2025 10:27 AM     Patient outreach attempt by this ACM today to perform care management follow up . ACM was unable to reach the patient by telephone today;   left voice message requesting a return phone call to this ACM.     ACM: Amy Tolentino RN     Care Summary Note: Attempted to reach patient/HIPAA contacts for continued Care Coordination follow up and education re: the management of her DM, Hypertension, healthcare needs, and in f/u to recent TEP Placement with balloon dilation of esophageal.  Patient and HIPAA contacts were not available at the time of calls.  Will continue to work to f/u with patient in the future.     PCP/Specialist follow up:   Future Appointments         Provider Specialty Dept Phone    2/10/2025 11:30 AM Colt Coughlin MD Otolaryngology [phone]    7/7/2025 1:30 PM Sukumar Uribe MD; SCHEDULE, TAD BUNCH [facility] NURSE Radiation Oncology [phone]    11/26/2025 3:45 PM Isis Colindres MD Cardiology [phone]            Follow Up:   Plan for next ACM outreach in approximately 1 week to complete:  - Disease specific assessments - DM and Hypertension  - Medication review - Unable to review with patient/HIPAA contact  - Advance care planning - Pending review  - Goal progression  - Education   - RPM - Stable - Will monitor for possible enrollment  - F/u s/p TEP procedure  - Monitor for additional needs  - Monitor for readiness to discharge from Care Coordination

## 2025-01-27 NOTE — TELEPHONE ENCOUNTER
Legs and distal arms stable. Bilat c5 motor pareisis. Pain controlled.   Able to swallow Order placed

## 2025-01-27 NOTE — TELEPHONE ENCOUNTER
Patients boyfriend called stating that Dr Coughlin wanted patient to see speech  therapy but she needs and order placed. Please advise

## 2025-01-31 ENCOUNTER — HOSPITAL ENCOUNTER (OUTPATIENT)
Dept: SPEECH THERAPY | Age: 55
Setting detail: THERAPIES SERIES
Discharge: HOME | End: 2025-01-31
Payer: MEDICARE

## 2025-01-31 PROCEDURE — 92597 ORAL SPEECH DEVICE EVAL: CPT | Performed by: SPEECH-LANGUAGE PATHOLOGIST

## 2025-01-31 NOTE — PROGRESS NOTES
tablet, Rfl: 3    lisinopril (PRINIVIL;ZESTRIL) 2.5 MG tablet, Take 1 tablet by mouth daily, Disp: 30 tablet, Rfl: 3    metoprolol tartrate (LOPRESSOR) 25 MG tablet, Take 1 tablet by mouth 2 times daily, Disp: 60 tablet, Rfl: 3    isosorbide dinitrate (ISORDIL) 20 MG tablet, Take 1.5 tablets by mouth daily, Disp: , Rfl:     budesonide-formoterol (SYMBICORT) 160-4.5 MCG/ACT AERO, Inhale 2 puffs into the lungs 2 times daily, Disp: , Rfl:     aspirin 81 MG tablet, Take 1 tablet by mouth daily, Disp: , Rfl:     albuterol (PROVENTIL HFA) 108 (90 BASE) MCG/ACT inhaler, Inhale 2 puffs into the lungs every 4 hours as needed for Wheezing or Shortness of Breath (Space out to every 6 hours as symptoms improve). Space out to every 6 hours as symptoms improve., Disp: 1 Inhaler, Rfl: 10      Functional Outcome Measure Used [facility] NOMS: Alaryngeal Communication   Functional Outcome Score level1 (1/31/25)       Insurance: Primary: Payor: [employer] MEDICARE /  /  / ,   Secondary: MEDICAID [place]   Authorization Information PRE CERTIFICATION REQUIRED: AUTHORIZATION REQUIRED AFTER EVALUATION.  INSURANCE THERAPY BENEFIT: Visits approved based on medical necessity.   Initial CPT Codes Requested 86208 - Speech/Language Therapy   Progress Note Counter 1/10 for progress note (Reporting Period: 1/31/25 to )   Recertification Date 03/28/25   Physician Follow-Up Dr. Coughlin - 2/10/25   Physician Orders Total laryngectomy s/p recent TEP placement   History of Present Illness Patient developed a sore throat, difficulty swallowing and had progressive weight loss over 2 years ago.  Patient had a CT of the soft tissue of the neck on in January of 2022 with no masses noted at that time. Chest x-ray in February of 2022 was also negative.  Patient continued to have pain in her throat and went to see ENT in [place] in September of 2022 and she completed a flexible laryngoscopy that demonstrated an edematous epiglottis and she ordered a repeat CT of the

## 2025-02-04 ENCOUNTER — CARE COORDINATION (OUTPATIENT)
Dept: CARE COORDINATION | Age: 55
End: 2025-02-04

## 2025-02-04 NOTE — CARE COORDINATION
Ambulatory Care Coordination Note     2/4/2025 2:22 PM     Patient Current Location:  [place]     ACM contacted the  patient's significant other/HIPAA contact, Ladarius,  by telephone.       ACM: Amy Tolentino RN     Challenges to be reviewed by the provider   Additional needs identified to be addressed with provider No    none         Method of communication with provider: none.    Utilization: Patient has not had any utilization since our last call.     Care Summary Note: Patient's significant other/HIPAA contact, Ladarius, was called for continued Care Coordination follow up and education re: the management of her DM, Hypertension, and healthcare needs.  Gene shared patient is doing \"well\" but he is unable to talk at this time.  Ladarius denied any current questions, concerns, or needs and he was encouraged to call with any that may develop.  Patient has been able to f/u with OP ST as recently scheduled and has ENT f/u scheduled for next week.  Will continue to f/u with patient/family in the near future.     Offered patient enrollment in the Remote Patient Monitoring (RPM) program for in-home monitoring: Yes, but did not enroll at this time: Unable to speak with patient or HIPAA contact for any length of time to discuss program .     Assessments Completed:   Care Coordination Interventions    Referral from Primary Care Provider: No  Suggested Interventions and Community Resources  Diabetes Education: Completed (Comment: Oct. 2024)  Fall Risk Prevention: Completed (Comment: Oct.2024)  Disease Specific Clinic: Not Started  Home Health Services:  (Comment: [facility] discharged Sept. 2024- Oct. 2024)  Medication Assistance Program: Declined (Comment: Oct.2024)  Medi Set or Pill Pack: Completed (Comment: Pt manages own meds - Oct. 2024)  Pharmacist: Declined (Comment: Oct. 2024)  Registered Dietician: Not Started  Social Work: Not Started  Specialty Services Referral: Completed (Comment: STR OP ST - Feb. 2025)  Other Therapy Services:

## 2025-02-05 ENCOUNTER — TELEPHONE (OUTPATIENT)
Dept: ENT CLINIC | Age: 55
End: 2025-02-05

## 2025-02-05 NOTE — TELEPHONE ENCOUNTER
Patient boyfriend called in stating that patient is having some chest discomfort and SOB. Considering Dr. Coughlin is done running patient today at 11am I recommended that they go to the ER if her SOB and chest discomfort is out of the normal.  Patient verbalized understanding and thanked me.

## 2025-02-06 ENCOUNTER — PREP FOR PROCEDURE (OUTPATIENT)
Dept: ENT CLINIC | Age: 55
End: 2025-02-06

## 2025-02-06 ENCOUNTER — TELEPHONE (OUTPATIENT)
Dept: ENT CLINIC | Age: 55
End: 2025-02-06

## 2025-02-06 NOTE — TELEPHONE ENCOUNTER
Vane Brunson from Speech Therapy calls today stating the patient is having trouble voicing with current TEP. Discussion between Vane and Dr Coughlin determined that she needs a longer TEP. Vane ordered this and has received it. Plan for surgery to replace this. We have tentatively scheduled her for 2/19/25 @ 2:30 to work with Dr Coughlin and Vane's schedules to both be present. Flaquita has an appointment with Dr Coughlin on 2/10/25 and will discuss further.

## 2025-02-10 NOTE — PROGRESS NOTES
Pat attempted phone call, Ladarius does not know what medications the patient takes and is not around patient. Ladarius states Flaquita has prior surgery instructions and they are getting information from Chanel at Dr Coughlin's office.

## 2025-02-11 ENCOUNTER — CARE COORDINATION (OUTPATIENT)
Dept: CARE COORDINATION | Age: 55
End: 2025-02-11

## 2025-02-11 ENCOUNTER — PREP FOR PROCEDURE (OUTPATIENT)
Dept: ENT CLINIC | Age: 55
End: 2025-02-11

## 2025-02-11 NOTE — CARE COORDINATION
Ambulatory Care Coordination Note     2025 1:23 PM     Patient Current Location:  [place]     ACM contacted the  patient and HIPAA contact, Gene,  by telephone. Verified name and  with  Gene  as identifiers.         ACM: Amy Tolentino RN     Challenges to be reviewed by the provider   Additional needs identified to be addressed with provider No    none         Method of communication with provider: none.    Utilization: Patient has not had any utilization since our last call.     Care Summary Note: Patient was called for continued Care Coordination follow up and education re: the management of her DM, Hypertension, and healthcare needs.  Patient was not available at the time of ACM call, but ACM was able to briefly speak with her significant other/HIPAA contact, Gene.  Gene shared patient has been doing well, but is starting to develop some cold symptoms with nasal congestion and SOB.  ACM reviewed signs and symptoms they should report as well as the need to call for PCP appointment with any ongoing/worsening symptoms, especially with upcoming TEP surgical revision plans.  Gene acknowledged understanding.  Gene shared BS remain stable, as patient monitors routinely,but he does not have any readings available for review.  DM education and zone information was briefly reviewed with Gene along with the importance of keeping BS controlled and DM managed to prevent the development/worsening of complications.  Gene verbalized understanding.  Gene denied any other questions, concerns, or needs and he was encouraged to call with any that may develop.     Offered patient enrollment in the Remote Patient Monitoring (RPM) program for in-home monitoring: Yes, but did not enroll at this time: - Pt stable and unable to directly speak with patient .     Assessments Completed:   No changes since last call    Medications Reviewed:   Unable to review with patient and significant other is not aware of medications.     Advance

## 2025-02-12 ENCOUNTER — HOSPITAL ENCOUNTER (OUTPATIENT)
Dept: INFUSION THERAPY | Age: 55
End: 2025-02-12

## 2025-02-17 RX ORDER — SODIUM CHLORIDE 0.9 % (FLUSH) 0.9 %
5-40 SYRINGE (ML) INJECTION EVERY 12 HOURS SCHEDULED
Status: CANCELLED | OUTPATIENT
Start: 2025-02-17

## 2025-02-17 RX ORDER — IPRATROPIUM BROMIDE AND ALBUTEROL SULFATE 2.5; .5 MG/3ML; MG/3ML
1 SOLUTION RESPIRATORY (INHALATION) EVERY 4 HOURS PRN
Status: CANCELLED | OUTPATIENT
Start: 2025-02-19

## 2025-02-17 RX ORDER — SODIUM CHLORIDE 9 MG/ML
INJECTION, SOLUTION INTRAVENOUS PRN
Status: CANCELLED | OUTPATIENT
Start: 2025-02-17

## 2025-02-17 RX ORDER — SODIUM CHLORIDE 0.9 % (FLUSH) 0.9 %
5-40 SYRINGE (ML) INJECTION PRN
Status: CANCELLED | OUTPATIENT
Start: 2025-02-17

## 2025-02-18 NOTE — H&P
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       Past Surgical History:   Procedure Laterality Date    ANGIOPLASTY Left 2014    left leg    CARDIAC PROCEDURE Left 2024    Left heart cath / coronary angiography performed by Isis Colindres MD at [facility] CARDIAC CATH LAB     SECTION  1998    CORONARY ANGIOPLASTY WITH STENT PLACEMENT      [facility]    CORONARY ANGIOPLASTY WITH STENT PLACEMENT      3 stents in [place]    CORONARY ANGIOPLASTY WITH STENT PLACEMENT  2016    debbi, 1 stent to RCA    CT NEEDLE BIOPSY LUNG PERCUTANEOUS W IMAGING GUIDANCE  2022    CT NEEDLE BIOPSY LUNG PERCUTANEOUS 2022 [facility] CT SCAN    ESOPHAGOGASTRODUODENOSCOPY  10/06/2022    [place]    GASTROSTOMY TUBE PLACEMENT N/A 2022    EGD Peg Tube Placement possible Open G Tube performed by Alejandro Harry MD at [facility] OR    LARYNGOSCOPY N/A 2025    TEP placement, with balloon dilation of esophageal inlet performed by Colt Coughlin MD at [facility] OR    MOUTH SURGERY N/A 2024    Pharyngoplasty/esophagoplasty, cricropharyngeous myotomy performed by Colt Coughlin MD at [facility] OR    OTHER SURGICAL HISTORY  2015    Left Femoral to Peroneal Bypass with Composite Vein Graft and Propaten Graft     OTHER SURGICAL HISTORY Left 2016    Left Leg Above Knee Amputation    PORT SURGERY N/A 2022    Insertion Single Lumen Mediport performed by Alejandro Harry MD at [facility]

## 2025-02-19 ENCOUNTER — ANESTHESIA EVENT (OUTPATIENT)
Dept: OPERATING ROOM | Age: 55
End: 2025-02-19
Payer: MEDICARE

## 2025-02-19 ENCOUNTER — ANESTHESIA (OUTPATIENT)
Dept: OPERATING ROOM | Age: 55
End: 2025-02-19
Payer: MEDICARE

## 2025-02-19 ENCOUNTER — HOSPITAL ENCOUNTER (OUTPATIENT)
Age: 55
Setting detail: OUTPATIENT SURGERY
Discharge: HOME | End: 2025-02-19
Attending: OTOLARYNGOLOGY | Admitting: OTOLARYNGOLOGY
Payer: MEDICARE

## 2025-02-19 VITALS
DIASTOLIC BLOOD PRESSURE: 70 MMHG | RESPIRATION RATE: 15 BRPM | TEMPERATURE: 97 F | HEIGHT: 61 IN | OXYGEN SATURATION: 95 % | HEART RATE: 83 BPM | SYSTOLIC BLOOD PRESSURE: 156 MMHG | WEIGHT: 154 LBS | BODY MASS INDEX: 29.07 KG/M2

## 2025-02-19 PROBLEM — Z90.02 STATUS POST LARYNGECTOMY: Status: ACTIVE | Noted: 2025-02-19

## 2025-02-19 LAB
GLUCOSE BLD STRIP.AUTO-MCNC: 107 MG/DL (ref 70–108)
POTASSIUM SERPL-SCNC: 4 MEQ/L (ref 3.5–5.2)

## 2025-02-19 PROCEDURE — 6360000002 HC RX W HCPCS: Performed by: ANESTHESIOLOGY

## 2025-02-19 PROCEDURE — 7100000001 HC PACU RECOVERY - ADDTL 15 MIN: Performed by: OTOLARYNGOLOGY

## 2025-02-19 PROCEDURE — 6360000002 HC RX W HCPCS: Performed by: OTOLARYNGOLOGY

## 2025-02-19 PROCEDURE — APPNB45 APP NON BILLABLE 31-45 MINUTES: Performed by: NURSE PRACTITIONER

## 2025-02-19 PROCEDURE — 7100000011 HC PHASE II RECOVERY - ADDTL 15 MIN: Performed by: OTOLARYNGOLOGY

## 2025-02-19 PROCEDURE — 3700000001 HC ADD 15 MINUTES (ANESTHESIA): Performed by: OTOLARYNGOLOGY

## 2025-02-19 PROCEDURE — 2500000003 HC RX 250 WO HCPCS: Performed by: NURSE ANESTHETIST, CERTIFIED REGISTERED

## 2025-02-19 PROCEDURE — 7100000000 HC PACU RECOVERY - FIRST 15 MIN: Performed by: OTOLARYNGOLOGY

## 2025-02-19 PROCEDURE — 92597 ORAL SPEECH DEVICE EVAL: CPT | Performed by: SPEECH-LANGUAGE PATHOLOGIST

## 2025-02-19 PROCEDURE — 36415 COLL VENOUS BLD VENIPUNCTURE: CPT

## 2025-02-19 PROCEDURE — 2580000003 HC RX 258: Performed by: NURSE PRACTITIONER

## 2025-02-19 PROCEDURE — 6360000002 HC RX W HCPCS: Performed by: NURSE PRACTITIONER

## 2025-02-19 PROCEDURE — 7100000010 HC PHASE II RECOVERY - FIRST 15 MIN: Performed by: OTOLARYNGOLOGY

## 2025-02-19 PROCEDURE — 84132 ASSAY OF SERUM POTASSIUM: CPT

## 2025-02-19 PROCEDURE — 6370000000 HC RX 637 (ALT 250 FOR IP): Performed by: OTOLARYNGOLOGY

## 2025-02-19 PROCEDURE — L8501 TRACHEOSTOMY SPEAKING VALVE: HCPCS | Performed by: OTOLARYNGOLOGY

## 2025-02-19 PROCEDURE — 3600000014 HC SURGERY LEVEL 4 ADDTL 15MIN: Performed by: OTOLARYNGOLOGY

## 2025-02-19 PROCEDURE — 82948 REAGENT STRIP/BLOOD GLUCOSE: CPT

## 2025-02-19 PROCEDURE — 3700000000 HC ANESTHESIA ATTENDED CARE: Performed by: OTOLARYNGOLOGY

## 2025-02-19 PROCEDURE — 2709999900 HC NON-CHARGEABLE SUPPLY: Performed by: OTOLARYNGOLOGY

## 2025-02-19 PROCEDURE — 6360000002 HC RX W HCPCS

## 2025-02-19 PROCEDURE — 3600000004 HC SURGERY LEVEL 4 BASE: Performed by: OTOLARYNGOLOGY

## 2025-02-19 PROCEDURE — 43197 ESOPHAGOSCOPY FLEX DX BRUSH: CPT | Performed by: OTOLARYNGOLOGY

## 2025-02-19 PROCEDURE — 6360000002 HC RX W HCPCS: Performed by: NURSE ANESTHETIST, CERTIFIED REGISTERED

## 2025-02-19 RX ORDER — DEXAMETHASONE SODIUM PHOSPHATE 10 MG/ML
10 INJECTION, SOLUTION INTRAMUSCULAR; INTRAVENOUS ONCE
Status: COMPLETED | OUTPATIENT
Start: 2025-02-19 | End: 2025-02-19

## 2025-02-19 RX ORDER — OXYCODONE AND ACETAMINOPHEN 5; 325 MG/1; MG/1
1 TABLET ORAL ONCE
Status: COMPLETED | OUTPATIENT
Start: 2025-02-19 | End: 2025-02-19

## 2025-02-19 RX ORDER — FENTANYL CITRATE 50 UG/ML
INJECTION, SOLUTION INTRAMUSCULAR; INTRAVENOUS
Status: COMPLETED
Start: 2025-02-19 | End: 2025-02-19

## 2025-02-19 RX ORDER — EPINEPHRINE 1 MG/ML
INJECTION, SOLUTION, CONCENTRATE INTRAVENOUS PRN
Status: DISCONTINUED | OUTPATIENT
Start: 2025-02-19 | End: 2025-02-19 | Stop reason: ALTCHOICE

## 2025-02-19 RX ORDER — DEXAMETHASONE SODIUM PHOSPHATE 10 MG/ML
INJECTION, EMULSION INTRAMUSCULAR; INTRAVENOUS
Status: DISCONTINUED | OUTPATIENT
Start: 2025-02-19 | End: 2025-02-19 | Stop reason: SDUPTHER

## 2025-02-19 RX ORDER — SODIUM CHLORIDE 0.9 % (FLUSH) 0.9 %
5-40 SYRINGE (ML) INJECTION EVERY 12 HOURS SCHEDULED
Status: DISCONTINUED | OUTPATIENT
Start: 2025-02-19 | End: 2025-02-19 | Stop reason: HOSPADM

## 2025-02-19 RX ORDER — HYDRALAZINE HYDROCHLORIDE 20 MG/ML
INJECTION INTRAMUSCULAR; INTRAVENOUS
Status: DISCONTINUED
Start: 2025-02-19 | End: 2025-02-19 | Stop reason: WASHOUT

## 2025-02-19 RX ORDER — FENTANYL CITRATE 50 UG/ML
50 INJECTION, SOLUTION INTRAMUSCULAR; INTRAVENOUS EVERY 5 MIN PRN
Status: DISCONTINUED | OUTPATIENT
Start: 2025-02-19 | End: 2025-02-19 | Stop reason: HOSPADM

## 2025-02-19 RX ORDER — FENTANYL CITRATE 50 UG/ML
INJECTION, SOLUTION INTRAMUSCULAR; INTRAVENOUS
Status: DISCONTINUED | OUTPATIENT
Start: 2025-02-19 | End: 2025-02-19 | Stop reason: SDUPTHER

## 2025-02-19 RX ORDER — ONDANSETRON 2 MG/ML
INJECTION INTRAMUSCULAR; INTRAVENOUS
Status: DISCONTINUED | OUTPATIENT
Start: 2025-02-19 | End: 2025-02-19 | Stop reason: SDUPTHER

## 2025-02-19 RX ORDER — HEPARIN 100 UNIT/ML
500 SYRINGE INTRAVENOUS ONCE
Status: COMPLETED | OUTPATIENT
Start: 2025-02-19 | End: 2025-02-19

## 2025-02-19 RX ORDER — ROCURONIUM BROMIDE 10 MG/ML
INJECTION, SOLUTION INTRAVENOUS
Status: DISCONTINUED | OUTPATIENT
Start: 2025-02-19 | End: 2025-02-19 | Stop reason: SDUPTHER

## 2025-02-19 RX ORDER — PROPOFOL 10 MG/ML
INJECTION, EMULSION INTRAVENOUS
Status: DISCONTINUED | OUTPATIENT
Start: 2025-02-19 | End: 2025-02-19 | Stop reason: SDUPTHER

## 2025-02-19 RX ORDER — PHENYLEPHRINE HCL IN 0.9% NACL 1 MG/10 ML
SYRINGE (ML) INTRAVENOUS
Status: DISCONTINUED | OUTPATIENT
Start: 2025-02-19 | End: 2025-02-19 | Stop reason: SDUPTHER

## 2025-02-19 RX ORDER — BACITRACIN 500 [USP'U]/G
OINTMENT OPHTHALMIC
Qty: 3.5 G | Refills: 2 | Status: SHIPPED | OUTPATIENT
Start: 2025-02-19

## 2025-02-19 RX ORDER — SODIUM CHLORIDE 9 MG/ML
INJECTION, SOLUTION INTRAVENOUS PRN
Status: DISCONTINUED | OUTPATIENT
Start: 2025-02-19 | End: 2025-02-19 | Stop reason: HOSPADM

## 2025-02-19 RX ORDER — IPRATROPIUM BROMIDE AND ALBUTEROL SULFATE 2.5; .5 MG/3ML; MG/3ML
1 SOLUTION RESPIRATORY (INHALATION) EVERY 4 HOURS PRN
Status: DISCONTINUED | OUTPATIENT
Start: 2025-02-19 | End: 2025-02-19 | Stop reason: HOSPADM

## 2025-02-19 RX ORDER — SODIUM CHLORIDE 0.9 % (FLUSH) 0.9 %
5-40 SYRINGE (ML) INJECTION PRN
Status: DISCONTINUED | OUTPATIENT
Start: 2025-02-19 | End: 2025-02-19 | Stop reason: HOSPADM

## 2025-02-19 RX ORDER — LORAZEPAM 2 MG/ML
0.5 INJECTION INTRAMUSCULAR ONCE
Status: COMPLETED | OUTPATIENT
Start: 2025-02-19 | End: 2025-02-19

## 2025-02-19 RX ADMIN — FENTANYL CITRATE 50 MCG: 50 INJECTION, SOLUTION INTRAMUSCULAR; INTRAVENOUS at 17:34

## 2025-02-19 RX ADMIN — SODIUM CHLORIDE 20 ML/HR: 0.9 INJECTION, SOLUTION INTRAVENOUS at 14:04

## 2025-02-19 RX ADMIN — DEXAMETHASONE SODIUM PHOSPHATE 5 MG: 10 INJECTION, EMULSION INTRAMUSCULAR; INTRAVENOUS at 16:43

## 2025-02-19 RX ADMIN — FENTANYL CITRATE 50 MCG: 50 INJECTION, SOLUTION INTRAMUSCULAR; INTRAVENOUS at 17:38

## 2025-02-19 RX ADMIN — FENTANYL CITRATE 50 MCG: 50 INJECTION, SOLUTION INTRAMUSCULAR; INTRAVENOUS at 17:04

## 2025-02-19 RX ADMIN — ROCURONIUM BROMIDE 50 MG: 10 INJECTION, SOLUTION INTRAVENOUS at 16:31

## 2025-02-19 RX ADMIN — PROPOFOL 100 MG: 10 INJECTION, EMULSION INTRAVENOUS at 16:31

## 2025-02-19 RX ADMIN — OXYCODONE HYDROCHLORIDE AND ACETAMINOPHEN 1 TABLET: 5; 325 TABLET ORAL at 19:01

## 2025-02-19 RX ADMIN — FENTANYL CITRATE 50 MCG: 50 INJECTION, SOLUTION INTRAMUSCULAR; INTRAVENOUS at 16:30

## 2025-02-19 RX ADMIN — FENTANYL CITRATE 50 MCG: 50 INJECTION, SOLUTION INTRAMUSCULAR; INTRAVENOUS at 16:51

## 2025-02-19 RX ADMIN — Medication 200 MCG: at 16:38

## 2025-02-19 RX ADMIN — PROPOFOL 100 MG: 10 INJECTION, EMULSION INTRAVENOUS at 16:30

## 2025-02-19 RX ADMIN — FENTANYL CITRATE 50 MCG: 50 INJECTION, SOLUTION INTRAMUSCULAR; INTRAVENOUS at 18:17

## 2025-02-19 RX ADMIN — DEXAMETHASONE SODIUM PHOSPHATE 10 MG: 10 INJECTION, SOLUTION INTRAMUSCULAR; INTRAVENOUS at 14:44

## 2025-02-19 RX ADMIN — Medication 100 MCG: at 16:37

## 2025-02-19 RX ADMIN — FENTANYL CITRATE 50 MCG: 50 INJECTION, SOLUTION INTRAMUSCULAR; INTRAVENOUS at 16:47

## 2025-02-19 RX ADMIN — SUGAMMADEX 200 MG: 100 INJECTION, SOLUTION INTRAVENOUS at 17:48

## 2025-02-19 RX ADMIN — LORAZEPAM 0.5 MG: 2 INJECTION INTRAMUSCULAR; INTRAVENOUS at 14:39

## 2025-02-19 RX ADMIN — HEPARIN 500 UNITS: 100 SYRINGE at 19:44

## 2025-02-19 RX ADMIN — PIPERACILLIN SODIUM AND TAZOBACTAM SODIUM 3375 MG: 3; .375 INJECTION, POWDER, LYOPHILIZED, FOR SOLUTION INTRAVENOUS at 16:26

## 2025-02-19 RX ADMIN — ONDANSETRON 4 MG: 2 INJECTION, SOLUTION INTRAMUSCULAR; INTRAVENOUS at 16:45

## 2025-02-19 ASSESSMENT — PAIN DESCRIPTION - ONSET
ONSET: ON-GOING

## 2025-02-19 ASSESSMENT — PAIN DESCRIPTION - ORIENTATION
ORIENTATION: INNER
ORIENTATION: INNER
ORIENTATION: RIGHT;OTHER (COMMENT)
ORIENTATION: INNER

## 2025-02-19 ASSESSMENT — PAIN DESCRIPTION - FREQUENCY
FREQUENCY: CONTINUOUS

## 2025-02-19 ASSESSMENT — PAIN DESCRIPTION - LOCATION
LOCATION: THROAT

## 2025-02-19 ASSESSMENT — PAIN DESCRIPTION - DESCRIPTORS
DESCRIPTORS: ACHING

## 2025-02-19 ASSESSMENT — PAIN DESCRIPTION - PAIN TYPE
TYPE: CHRONIC PAIN
TYPE: SURGICAL PAIN

## 2025-02-19 ASSESSMENT — PAIN SCALES - GENERAL
PAINLEVEL_OUTOF10: 7
PAINLEVEL_OUTOF10: 6
PAINLEVEL_OUTOF10: 7
PAINLEVEL_OUTOF10: 6
PAINLEVEL_OUTOF10: 5
PAINLEVEL_OUTOF10: 10

## 2025-02-19 ASSESSMENT — PAIN - FUNCTIONAL ASSESSMENT
PAIN_FUNCTIONAL_ASSESSMENT: PREVENTS OR INTERFERES SOME ACTIVE ACTIVITIES AND ADLS

## 2025-02-19 ASSESSMENT — COPD QUESTIONNAIRES: CAT_SEVERITY: MODERATE

## 2025-02-19 ASSESSMENT — PAIN SCALES - WONG BAKER
WONGBAKER_NUMERICALRESPONSE: NO HURT

## 2025-02-19 NOTE — DISCHARGE INSTRUCTIONS
- Nothing by mouth for the next 24 hours.  After that may start taking clear liquids and slowly work up.    - You have a laryngectomy tube in place right now.  You make take it out tonight after you get home and settled.  If you have some bleeding from your stoma, place the tube back in your stoma and apply pressure back and upward to apply pressure.  If bleeding does not stop, call EMS.    - Apply ointment to inside stoma with Qtip twice daily.  - Resume regular activities, no straining/pulling/lifting for the next week  - Hold the Aspirin and Plavix for 3 days.  If no bleeding, may resume.    - You may have a little blood from the stoma around the prosthesis for the next few days.  If there is a lot of bleeding or continuous bleeding, apply pressure without blocking the stoma (so that you can still breathe) and come to the emergency department.  - You will likely have some soreness in your neck and throat for the next few days.  You may take a regular tylenol in addition to your chronic pain medication and/or apply ice as needed.

## 2025-02-19 NOTE — ANESTHESIA PRE PROCEDURE
Department of Anesthesiology  Preprocedure Note       Name:  Flaquita Vyas   Age:  54 y.o.  :  1970                                          MRN:  [id number]         Date:  2025      Surgeon: Surgeon(s):  Colt Coughlin MD    Procedure: Procedure(s):  TEP placement    Medications prior to admission:   Prior to Admission medications    Medication Sig Start Date End Date Taking? Authorizing Provider   calcium citrate (CALCITRATE) 250 MG TABS tablet Take 2 tablets by mouth daily 24  Yes Provider, Historical, MD   VRAYLAR 6 MG CAPS capsule  24  Yes Provider, Historical, MD   cetirizine (ZYRTEC) 10 MG tablet  24  Yes Provider, MD Melvin   clindamycin (CLEOCIN) 300 MG capsule Take 1 capsule by mouth 3 times daily 24  Yes Provider, MD Melvin   ferrous gluconate (FERGON) 324 (38 Fe) MG tablet Take 1 tablet by mouth daily 24  Yes Provider, Historical, MD   gabapentin (NEURONTIN) 300 MG capsule  24  Yes Provider, Historical, MD   levothyroxine (SYNTHROID) 200 MCG tablet  24  Yes Provider, Historical, MD   metOLazone (ZAROXOLYN) 2.5 MG tablet TAKE 1 TABLET BY MOUTH DAILY AS NEEDED FOR SWELLING 24  Yes Provider, Historical, MD   oxyCODONE (ROXICODONE) 5 MG/5ML solution  24  Yes Provider, Historical, MD   phentermine 37.5 MG capsule Take 1 capsule by mouth daily. 24  Yes Provider, Historical, MD   rOPINIRole (REQUIP) 0.5 MG tablet  24  Yes Provider, Historical, MD   sertraline (ZOLOFT) 50 MG tablet  24  Yes Provider, Historical, MD   HUMALOG KWIKPEN 100 UNIT/ML SOPN INJECT 15 UNITS INTO THE SKIN THREE TIMES DAILY BEFORE MEALS 24  Yes Provider, Historical, MD   Insulin Degludec (TRESIBA FLEXTOUCH SC) Inject 100 Units into the skin in the morning and at bedtime   Yes Provider, Historical, MD   pioglitazone (ACTOS) 45 MG tablet Take 1 tablet by mouth daily   Yes Provider, Historical, MD   amitriptyline (ELAVIL) 100 MG tablet Take

## 2025-02-19 NOTE — PROGRESS NOTES
[facility]  SPEECH THERAPY  [facility] OR  Larygengectomy Evaluation    SLP Individual Minutes  Time In: 1625  Time Out: 1745  Minutes: 80  Timed Code Treatment Minutes: 0 Minutes       Date: 2025  Patient Name: Flaquita Vyas      CSN: [id number]   : 1970  (54 y.o.)  Gender: female   Referring Physician:  PJ Lakhani  Diagnosis: <principal problem not specified>  Precautions: PATIENT IS A NECK BREATHER - S/P TOTAL LARYNGECTOMY  History of Present Illness/Injury: Flaquita Vyas is a 54 y.o. female with a history of a total laryngectomy. Patient had a secondary puncture completed on 25 with a Provox [place] 22.5 Fr, 12.5 mm voice prosthesis placed at that time.  The patient was then seen in the OP clinic by this SLP on 25 and the tracheal flange of the voice prosthesis was quite snug against the tracheal wall.  When attempting to pass through the center of the prosthesis to open the valve, there was significant resistance noted and patient was only able to achieve very little voicing with significant strain.  It was determined that the voice prosthesis was likely too short for the length of the tracheoesophageal tract.  After consultation with ENT, it was decided to replace the patient's current voice prosthesis with a longer voice prosthesis under anesthesia.      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    Subjective:  Patient arrived to the OR suite awake with an open stoma.      OBJECTIVE:  The below procedure

## 2025-02-19 NOTE — PROGRESS NOTES
Pt having c/o anxiety, this RN informed she will speak with provider. Per Karuna RN mediport did not pull back blood, port flushed ok, infusing no issues. Pt also reports she took her lisinopril this am, /66. No new orders given regarding port, new orders ativan 0.5 mg IV once.

## 2025-02-19 NOTE — PROGRESS NOTES
1814- pt to pacu, tracheostomy present, VSS, pt appears in no acute distress, pt states throat is sore  1817- fentanyl given  1820- pt placed on trach mask per pt request  1830- pt resting in bed with eyes closed, resp easy and unlabored, VSS, pt appears in no acute distress  1840- pt remains in stable condition, resting with eyes closed  1847- pt meets criteria for discharge from pacu, pt transported to [facility] in stable condition

## 2025-02-19 NOTE — ANESTHESIA POSTPROCEDURE EVALUATION
Department of Anesthesiology  Postprocedure Note    Patient: Flaquita Vyas  MRN: [id number]  YOB: 1970  Date of evaluation: 2/19/2025    Procedure Summary       Date: 02/19/25 Room / Location: [facility] OR  / [facility] OR    Anesthesia Start: 1625 Anesthesia Stop: 1820    Procedure: TEP placement Diagnosis:       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      Aphonia      Pharyngeal dysphagia      Esophageal diverticulum      (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 [R13.19, Z90.02])      (Aphonia [R49.1])      (Pharyngeal dysphagia [R13.13])      (Esophageal diverticulum [Q39.6])    Surgeons: Colt Coughlin MD Responsible Provider: Venkatesh Nino MD    Anesthesia Type: general ASA Status: 3            Anesthesia Type: No value filed.    Sena Phase I: Sena Score: 10    Sena Phase II:      Anesthesia Post Evaluation    Patient location during evaluation: PACU  Patient participation: complete - patient participated  Level of consciousness: awake  Airway patency: patent  Nausea & Vomiting: no vomiting and no nausea  Cardiovascular status: hemodynamically stable  Respiratory status: acceptable  Hydration status: stable  Comments: Trach collar in place  Pain management: adequate    No notable events documented.

## 2025-02-19 NOTE — PROGRESS NOTES
Patient oriented to Same Day department and admitted to Same Day Surgery room 1.   Patient verbalized approval for first name, last initial with physician name on unit whiteboard.     Plan of care reviewed with patient.   Patient room whiteboard filled out and discussed with patient and responsible adult.   Patient and responsible adult offered Same Day Welcome Packet to review.    Call light in reach.   Bed in lowest position, locked, with one bed rail up.   SCDs and warming blanket in place.  Appropriate arm bands on patient.   Bathroom offered.   All questions and concerns of patient addressed.        Meds to Beds:   Patient informed of [facility] Meds to Beds program during admission. Patient has declined use of program.

## 2025-02-19 NOTE — PROGRESS NOTES
Pt requesting mediport be accessed. Dr. Gallegos notified, ok with provider. Mediport accessed by Karuna MEZA RN.

## 2025-02-19 NOTE — PROGRESS NOTES
Patient has open sore to top of right foot where toes used to be (pt reports all 5 toes amputated). Pt reports she has been managing at home. [facility] podiatrist Dr Ann aware and ok with patient managing. This RN unable to assess due to ACE wrap. Dr. Coughlin notified, no new orders given.

## 2025-02-20 ENCOUNTER — CARE COORDINATION (OUTPATIENT)
Dept: CARE COORDINATION | Age: 55
End: 2025-02-20

## 2025-02-20 NOTE — OP NOTE
Operative Note      Patient: Flaquita Vyas  YOB: 1970  MRN: [id number]    Date of Procedure: 2/19/2025    Pre-Op Diagnosis Codes:      * 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 [R13.19, Z90.02]     * Aphonia [R49.1]     * Pharyngeal dysphagia [R13.13]     * Esophageal diverticulum [Q39.6]    Post-Op Diagnosis: Same       Procedure(s):  TEP placement    Surgeon(s):  Colt Coughlin MD    Assistant:   * No surgical staff found *    Anesthesia: General    Estimated Blood Loss (mL): less than 50     Complications: None    Specimens:   * No specimens in log *    Implants:  Implant Name Type Inv. Item Serial No.  Lot No. LRB No. Used Action   PROSTHESIS VOICE 22.5FR 15MM PROVOX POWELL - [id number]  PROSTHESIS VOICE 22.5FR 15MM PROVOX POWELL  [employer] [id number] N/A 1 Implanted         Drains:   [REMOVED] Gastrostomy/Enterostomy/Jejunostomy Tube Percutaneous Endoscopic Gastrostomy (PEG) LUQ (Removed)       Findings:  Infection Present At Time Of Surgery (PATOS) (choose all levels that have infection present):  No infection present  Other Findings: 1.  The anterior aspect of the TEP initially placed was subsumed into the anterior wall of the trachea making it difficult to get access to the flange  2.  It was unclear as to how much of the mucosa was intact on the initial removal of the indwelling TEP  3.  A retrograde passage of the TEP by the replacement TEP catheter was not possible so a retrograde passage of the suture pass device for initial placement was used and then a Seldinger back passage of the TP introducer for a replacement flange was passed over the suture and back through the initial penetration point which was a new penetration point on the esophageal side  4.  This allowed for the drawing through of the new TEP replacement in an antegrade manner which was very difficult to make Because of the thick tissue that was on the esophageal side beyond the penetration point of the guide

## 2025-02-20 NOTE — CARE COORDINATION
Ambulatory Care Coordination Note     2/20/2025 11:02 AM     Patient and HIPAA contacts x 2 (Gene and Maliha)  outreach attempts by this ACM today to perform care management follow up . ACM was unable to reach the  patient or HIPAA contacts  by telephone today;   hospital follow up call within 2 business days of discharge: Yes  left voice message requesting a return phone call to this ACM.     ACM: Amy Tolentino RN     Care Summary Note: Attempted to reach patient/family for continued Care Coordination follow up and education re: the management of her DM, Hypertension, healthcare needs, and in f/u to recent TEP placement.  Patient/Family were  unavailable at the time of ACM call, and generic voicemail message was left asking patient/family to please return call to AC direct number.  Will continue to work to f/u with patient/family in the future.     PCP/Specialist follow up:   Future Appointments         Provider Specialty Dept Phone    2/27/2025  1:00 PM [facility] OUT PT ONC INJ RM 11 Infusion Therapy [phone]    2/27/2025 1:30 PM STR OUT PT ONC [facility] Infusion Therapy [phone]    2/27/2025 1:30 PM Yrn Cisneros MD Oncology [phone]    7/7/2025 1:30 PM Sukumar Uribe MD; SCHEDULE, TAD FERNANDEZ NURSE Radiation Oncology [phone]    11/26/2025 3:45 PM Isis Colindres MD Cardiology [phone]            Follow Up:   Plan for next AC outreach in approximately 1-2 days  to complete:  - Disease specific assessments - DM and Hypertension  - Medication review - Unable to review with patient   - Advance care planning - Pending review  - Goal progression  - Education   - RPM - Stable at this time  - F/u s/p TEP procedure   - Monitor for additional needs  - Monitor for readiness to discharge from Care Coordination

## 2025-02-20 NOTE — PROGRESS NOTES

## 2025-02-21 ENCOUNTER — CARE COORDINATION (OUTPATIENT)
Dept: CARE COORDINATION | Age: 55
End: 2025-02-21

## 2025-02-21 NOTE — CARE COORDINATION
Ambulatory Care Coordination Note     2/21/2025 11:34 AM     Patient and family/HIPAA contacts  outreach attempts by this AC today to perform care management follow up . [facility] was unable to reach the  patient or HIPAA contacts  by telephone today;   left voice message requesting a return phone call to this AC.     ACM: Amy Tolentino RN     Care Summary Note: Attempted to reach patient/family for continued Care Coordination follow up and education re: the management of her DM, Hypertension, healthcare needs, and in f/u to recent TEP placement.  Patient/Family were  unavailable at the time of ACM call, and generic voicemail message was left asking patient/family to please return call to [facility] direct number.  Will continue to work to f/u with patient/family in the future.     PCP/Specialist follow up:   Future Appointments         Provider Specialty Dept Phone    2/27/2025  1:00 PM [facility] PT ONC INJ RM 11 Infusion Therapy [phone]    2/27/2025 1:30 PM [facility] PT ONC [facility] Infusion Therapy [phone]    2/27/2025 1:30 PM Yrn Cisneros MD Oncology [phone]    7/7/2025 1:30 PM Sukumar Uribe MD; SCHEDULE, TAD FERNANDEZ NURSE Radiation Oncology [phone]    11/26/2025 3:45 PM Isis Colindres MD Cardiology [phone]            Follow Up:   Plan for next AC outreach in approximately 1 week to complete:  - Disease specific assessments - DM and Hypertension  - Medication review - Unable to review with patient   - Advance care planning - Pending review  - Goal progression  - Education   - RPM - Stable at this time  - F/u s/p TEP procedure   - Monitor for additional needs  - Monitor for readiness to discharge from Care Coordination

## 2025-02-21 NOTE — CARE COORDINATION
Ambulatory Care Coordination Note     2025 11:51 AM     Patient Current Location:  [place]     Patient's significant other/HIPAA contact, Ladarius,  contacted the ACM by telephone. Verified name and  with spouse/partner as identifiers.         ACM: Amy Tolentino RN     Challenges to be reviewed by the provider   Additional needs identified to be addressed with provider No    none         Method of communication with provider: none.    Utilization: Has the patient been discharged from the hospital since your last call? yes -   Call within 2 business days of discharge: Yes    Patient: Flaquita Vyas    Patient : 1970   MRN: [id number]    Reason for Admission: OP Surgery - TEP procedure   Discharge Date: 25  RURS: Readmission Risk Score: 17.6      Last Discharge Facility       Date Complaint Diagnosis Description Type Department Provider    25   Admission (Discharged) [facility] OR Colt Coughlin MD            Was this an external facility discharge? No    Ambulatory Care Manager reviewed discharge instructions with  Ladarius . The caregiver was given an opportunity to ask questions; all questions answered at this time.. The caregiver verbalized understanding.   Were discharge instructions available to patient? No.   Reviewed appropriate site of care based on symptoms and resources available to patient including: Specialist  When to call 911. The caregiver agrees to contact the primary care provider and/or specialist office for questions related to their healthcare.     Patients top risk factors for readmission: multiple health system providers and diagnosis.    Hospital follow up appointment: Patient does not have a follow up appointment scheduled at time of call.  Ladarius unable to discuss f/u appointments at this time.      Care Summary Note: Patient's significant other/HIPAA contact, Ladarius, returned call from earlier today for continued Care Coordination follow up and education re: the management of her DM,

## 2025-02-26 ENCOUNTER — CARE COORDINATION (OUTPATIENT)
Dept: CARE COORDINATION | Age: 55
End: 2025-02-26

## 2025-02-26 NOTE — CARE COORDINATION
Ambulatory Care Coordination Note     2/26/2025 11:11 AM     Patient/HIPAA contact, Gene,  outreach attempt by this ACM today to perform care management follow up . ACM was unable to reach the  patient or Gene  by telephone today;   left voice message requesting a return phone call to this ACM.     ACM: Amy Tolentino RN     Care Summary Note: Attempted to reach patient/HIPAA contact, Gene, for continued Care Coordination follow up and education re: the management of her DM, Hypertension, and healthcare needs.  Will continue to work to f/u with patient/Gene in the future.     PCP/Specialist follow up:   Future Appointments         Provider Specialty Dept Phone    2/27/2025  1:00 PM STR OUT PT ONC INJ RM 11 Infusion Therapy [phone]    2/27/2025 1:30 PM STR OUT PT ONC CMA Infusion Therapy [phone]    2/27/2025 1:30 PM Yrn Cisneros MD Oncology [phone]    7/7/2025 1:30 PM Sukumar Uribe MD; SCHEDULE, [facility] NURSE Radiation Oncology [phone]    11/26/2025 3:45 PM Isis Colindres MD Cardiology [phone]            Follow Up:   Plan for next AC outreach in approximately 1 week to complete:  - Disease specific assessments - DM and Hypertension  - Medication review - Unable to review with patient   - Advance care planning - Pending review  - Goal progression  - Education   - RPM - Stable at this time  - F/u on poss rescheduling of TEP procedure  - Monitor for additional needs  - Monitor for readiness to discharge from Care Coordination

## 2025-02-27 ENCOUNTER — HOSPITAL ENCOUNTER (OUTPATIENT)
Dept: INFUSION THERAPY | Age: 55
Discharge: HOME | End: 2025-02-27
Payer: MEDICARE

## 2025-02-27 ENCOUNTER — OFFICE VISIT (OUTPATIENT)
Dept: ONCOLOGY | Age: 55
End: 2025-02-27

## 2025-02-27 VITALS — RESPIRATION RATE: 16 BRPM | TEMPERATURE: 98.6 F

## 2025-02-27 DIAGNOSIS — C32.1 MALIGNANT NEOPLASM OF EPIGLOTTIS (HCC): Primary | ICD-10-CM

## 2025-02-27 DIAGNOSIS — R13.19 DYSPHAGIA DUE TO LARYNGECTOMY: ICD-10-CM

## 2025-02-27 DIAGNOSIS — C32.1 MALIGNANT NEOPLASM OF EPIGLOTTIS (HCC): ICD-10-CM

## 2025-02-27 DIAGNOSIS — Z95.828 PORT-A-CATH IN PLACE: Primary | ICD-10-CM

## 2025-02-27 DIAGNOSIS — Z90.02 DYSPHAGIA DUE TO LARYNGECTOMY: ICD-10-CM

## 2025-02-27 LAB
ALBUMIN SERPL BCG-MCNC: 3.6 G/DL (ref 3.4–4.9)
ALP SERPL-CCNC: 133 U/L (ref 35–104)
ALT SERPL W/O P-5'-P-CCNC: 27 U/L (ref 10–35)
AST SERPL-CCNC: 28 U/L (ref 10–35)
BASOPHILS ABSOLUTE: 0 THOU/MM3 (ref 0–0.1)
BASOPHILS NFR BLD AUTO: 0 % (ref 0–3)
BILIRUB CONJ SERPL-MCNC: 0.2 MG/DL (ref 0–0.2)
BILIRUB SERPL-MCNC: 0.3 MG/DL (ref 0.3–1.2)
BUN BLDP-MCNC: 34 MG/DL (ref 8–26)
CHLORIDE BLD-SCNC: 105 MEQ/L (ref 98–109)
CREAT BLD-MCNC: 1 MG/DL (ref 0.5–1.2)
EOSINOPHIL NFR BLD AUTO: 2 % (ref 0–4)
EOSINOPHILS ABSOLUTE: 0.1 THOU/MM3 (ref 0–0.4)
ERYTHROCYTE [DISTWIDTH] IN BLOOD BY AUTOMATED COUNT: 14.3 % (ref 11.5–14.5)
FOLATE SERPL-MCNC: 10.4 NG/ML (ref 4.6–34.8)
GFR SERPL CREATININE-BSD FRML MDRD: 67 ML/MIN/1.73M2
GLUCOSE BLD-MCNC: 282 MG/DL (ref 70–108)
HCT VFR BLD AUTO: 35.4 % (ref 37–47)
HGB BLD-MCNC: 12 GM/DL (ref 12–16)
HGB RETIC QN AUTO: 37.3 PG (ref 28.2–35.7)
IMM RETICS NFR: 12.7 % (ref 3–15.9)
IMMATURE GRANULOCYTES %: 0 %
IMMATURE GRANULOCYTES ABSOLUTE: 0.01 THOU/MM3 (ref 0–0.07)
IONIZED CALCIUM, WHOLE BLOOD: 1.08 MMOL/L (ref 1.12–1.32)
IRON SATN MFR SERPL: 20 % (ref 20–50)
IRON SERPL-MCNC: 59 UG/DL (ref 37–145)
LYMPHOCYTES ABSOLUTE: 1.4 THOU/MM3 (ref 1–4.8)
LYMPHOCYTES NFR BLD AUTO: 18 % (ref 15–47)
MCH RBC QN AUTO: 31.6 PG (ref 26–33)
MCHC RBC AUTO-ENTMCNC: 33.9 GM/DL (ref 32.2–35.5)
MCV RBC AUTO: 93 FL (ref 81–99)
MONOCYTES ABSOLUTE: 0.5 THOU/MM3 (ref 0.4–1.3)
MONOCYTES NFR BLD AUTO: 6 % (ref 0–12)
NEUTROPHILS ABSOLUTE: 5.8 THOU/MM3 (ref 1.8–7.7)
NEUTROPHILS NFR BLD AUTO: 74 % (ref 43–75)
PLATELET # BLD AUTO: 396 THOU/MM3 (ref 130–400)
PMV BLD AUTO: 10.6 FL (ref 9.4–12.4)
POTASSIUM BLD-SCNC: 3.4 MEQ/L (ref 3.5–4.9)
PROT SERPL-MCNC: 6.9 G/DL (ref 6.4–8.3)
RBC # BLD AUTO: 3.8 MILL/MM3 (ref 4.2–5.4)
RETICS # AUTO: 76 THOU/MM3 (ref 20–115)
RETICS/RBC NFR AUTO: 2 % (ref 0.5–2)
SODIUM BLD-SCNC: 137 MEQ/L (ref 138–146)
T4 FREE SERPL-MCNC: 0.8 NG/DL (ref 0.92–1.68)
TIBC SERPL-MCNC: 288 UG/DL (ref 171–450)
TOTAL CO2, WHOLE BLOOD: 21 MEQ/L (ref 23–33)
TSH SERPL DL<=0.05 MIU/L-ACNC: 43.7 UIU/ML (ref 0.27–4.2)
VIT B12 SERPL-MCNC: 1083 PG/ML (ref 232–1245)
WBC # BLD AUTO: 7.8 THOU/MM3 (ref 4.8–10.8)

## 2025-02-27 PROCEDURE — 85046 RETICYTE/HGB CONCENTRATE: CPT

## 2025-02-27 PROCEDURE — 83550 IRON BINDING TEST: CPT

## 2025-02-27 PROCEDURE — 80076 HEPATIC FUNCTION PANEL: CPT

## 2025-02-27 PROCEDURE — 85025 COMPLETE CBC W/AUTO DIFF WBC: CPT

## 2025-02-27 PROCEDURE — 80047 BASIC METABLC PNL IONIZED CA: CPT

## 2025-02-27 PROCEDURE — 82746 ASSAY OF FOLIC ACID SERUM: CPT

## 2025-02-27 PROCEDURE — 84443 ASSAY THYROID STIM HORMONE: CPT

## 2025-02-27 PROCEDURE — 82607 VITAMIN B-12: CPT

## 2025-02-27 PROCEDURE — 99211 OFF/OP EST MAY X REQ PHY/QHP: CPT

## 2025-02-27 PROCEDURE — 2500000003 HC RX 250 WO HCPCS: Performed by: INTERNAL MEDICINE

## 2025-02-27 PROCEDURE — 84439 ASSAY OF FREE THYROXINE: CPT

## 2025-02-27 PROCEDURE — 36591 DRAW BLOOD OFF VENOUS DEVICE: CPT

## 2025-02-27 PROCEDURE — 6360000002 HC RX W HCPCS: Performed by: INTERNAL MEDICINE

## 2025-02-27 PROCEDURE — 83540 ASSAY OF IRON: CPT

## 2025-02-27 RX ORDER — HEPARIN 100 UNIT/ML
500 SYRINGE INTRAVENOUS PRN
OUTPATIENT
Start: 2025-02-27

## 2025-02-27 RX ORDER — SODIUM CHLORIDE 0.9 % (FLUSH) 0.9 %
5-40 SYRINGE (ML) INJECTION PRN
OUTPATIENT
Start: 2025-02-27

## 2025-02-27 RX ORDER — SODIUM CHLORIDE 0.9 % (FLUSH) 0.9 %
5-40 SYRINGE (ML) INJECTION PRN
Status: DISCONTINUED | OUTPATIENT
Start: 2025-02-27 | End: 2025-02-28 | Stop reason: HOSPADM

## 2025-02-27 RX ORDER — HEPARIN 100 UNIT/ML
500 SYRINGE INTRAVENOUS PRN
Status: DISCONTINUED | OUTPATIENT
Start: 2025-02-27 | End: 2025-02-28 | Stop reason: HOSPADM

## 2025-02-27 RX ORDER — SODIUM CHLORIDE 9 MG/ML
25 INJECTION, SOLUTION INTRAVENOUS PRN
OUTPATIENT
Start: 2025-02-27

## 2025-02-27 RX ADMIN — HEPARIN 500 UNITS: 100 SYRINGE at 13:58

## 2025-02-27 RX ADMIN — SODIUM CHLORIDE, PRESERVATIVE FREE 30 ML: 5 INJECTION INTRAVENOUS at 13:58

## 2025-02-27 NOTE — PROGRESS NOTES
dark discharge it appears to be old blood but it is not tested.  There is no red to it.  There is some bright pink fluid in the PEG tube line which appears to have been from Snapple ingested the day before.  She has rebound tenderness throughout the left side of her abdomen.  Even though the study did not show any leaking, I fear that it had occurred and has caused irritation of the peritoneum causing this rebound.  I have encouraged the patient to lie flat as much as possible letting the fluids drain internally rather than into her peritoneum.  We will continue on her antibiotic.  We have another CAT scan scheduled for Friday morning and we will see her on Friday after that.  Her wound was inspected and redressed.  We increased her pain medication.  Her concerns about her treatment in the emergency room were also addressed.  I urged her son to talk to the [facility] about her concerns about her treatment.     -12/21/2022.  Flaquita is here today with her significant other Gene to discuss going forward with her radiation therapy and chemotherapy.  It is still not clear to me when radiation will begin.  We have a plan in place for carboplatin and Taxol to be given concurrently weekly.  She is pleased to have her PEG tube out and has much less pain although she still has pain at the site.  Dr. Harry's note said that she drained so much fluid he had to apply an ostomy bag.  She also has pain at her port site but there is minimal inflammation and no obvious infection and there is no pus.  She was instructed to call if there is any change in this finding.  Flaquita says that she is swallowing fine at this time.  Her current weight is 145 pounds which is up 3 pounds since last time she was weighed here about 2 weeks ago.  She denies fevers, chills, sweats, bleeding, constipation or diarrhea.  She says that she has her usual nausea but no vomiting.  She continues to smoke.  The patient had simulation with her mask

## 2025-02-27 NOTE — PLAN OF CARE
Problem: Safety - Adult  Goal: Free from fall injury  Outcome: Adequate for Discharge  Flowsheets (Taken 2/27/2025 1600)  Free From Fall Injury:   Instruct family/caregiver on patient safety   Based on caregiver fall risk screen, instruct family/caregiver to ask for assistance with transferring infant if caregiver noted to have fall risk factors  Note: Free from falls while in O.P. Oncology.Discussed the need to use the call light for assistance when getting up to ambulate.        Problem: Discharge Planning  Goal: Discharge to home or other facility with appropriate resources  Outcome: Adequate for Discharge  Flowsheets (Taken 2/27/2025 1600)  Discharge to home or other facility with appropriate resources:   Identify barriers to discharge with patient and caregiver   Identify discharge learning needs (meds, wound care, etc)  Note: Verbalize understanding of discharge instructions, follow up appointments, and when to call Physician.Discuss understanding of discharge instructions, follow up appointments and when to call Physician.        Problem: Chronic Conditions and Co-morbidities  Goal: Patient's chronic conditions and co-morbidity symptoms are monitored and maintained or improved  Outcome: Adequate for Discharge  Flowsheets (Taken 2/27/2025 1600)  Care Plan - Patient's Chronic Conditions and Co-Morbidity Symptoms are Monitored and Maintained or Improved:   Collaborate with multidisciplinary team to address chronic and comorbid conditions and prevent exacerbation or deterioration   Monitor and assess patient's chronic conditions and comorbid symptoms for stability, deterioration, or improvement  Note: Patient verbalizes understanding to verbal information given on lab draw from mediport,action and possible side effects. Aware to call MD if develop complications.      Care plan reviewed with patient. Patient verbalizes understanding of the plan of care and contributes to goal setting.

## 2025-02-27 NOTE — PROGRESS NOTES
Patient tolerated lab draw from [facility] without any complications. Discharge instructions given to patient-verbalizes understanding. Ambulated off unit per self with belongings.

## 2025-02-28 ENCOUNTER — TELEPHONE (OUTPATIENT)
Dept: ENT CLINIC | Age: 55
End: 2025-02-28

## 2025-02-28 NOTE — TELEPHONE ENCOUNTER
Thank you Daniella,  Please let the patient know that I have every intention to get her back to speaking but that it will take a revision of the upper part of the stoma in order to send that tissue to enable the TEP to be placed.  I may therefore do it as part of an open approach so that her future care regarding her speaking fistula needs will also be made easier than it has been so far.  We will succeed.  I am confident of it.  Thank you  PFC

## 2025-02-28 NOTE — TELEPHONE ENCOUNTER
Patients friend Ladarius calling to ask if there any plans for surgery for Flaquita.  He said Dr. Coughlin was unable to repair voice box at last surgery and he wanted to know what were next steps.    Per op note:  Disposition: The patient be discharged home assuming she does acceptably well in the first and second phases of recovery. The patient will be seen in the clinic to make sure that the wound is healing well on the tracheal side as well as on the esophageal side. A CT scan of her neck will be obtained if 1 has not been done recently to look at the anatomy of the free flap with an angio version of that study. Depending on where the flap vessels are located and may be possible to dissect the flap out from between the trachea and esophagus and suture the 2 structures together more definitively in preparation for a replacement of the TEP in the next several months.     I did not see post op appt, CT orders, or procedure orders.   Please advise.

## 2025-02-28 NOTE — TELEPHONE ENCOUNTER
Chanel,  Please see if you can find me 2 hours to do a revision tracheal stoma reconstruction and TEP placement through an open approach combined with rigid esophagoscopy for TEP placement.  Thank you  PFC

## 2025-03-04 ENCOUNTER — CARE COORDINATION (OUTPATIENT)
Dept: CARE COORDINATION | Age: 55
End: 2025-03-04

## 2025-03-04 NOTE — CARE COORDINATION
Ambulatory Care Coordination Note     3/4/2025 3:26 PM     Patient Current Location:  [place]     Patient's significant other/HIPAA contact, Ladarius,  contacted the ACM by telephone. Verified name and  with  HIPAA contact  as identifiers.     Patient graduated from the High Risk Care Management program on 3/4/2025.  Ladarius   has the ability to self manage at this time..  Care management goals have been completed. No further Ambulatory Care Manager follow up scheduled.      ACM: Amy Tolentino RN     Challenges to be reviewed by the provider   Additional needs identified to be addressed with provider No    none         Method of communication with provider: none.    Utilization: Patient has not had any utilization since our last call.     Care Summary Note: Ladarius returned call for continued Care Coordination follow up and education re: the management of her DM, Hypertension, and healthcare needs.  Gene reported patient continues to do well, but is currently taking an antibiotic for a \"boil\" on her head.  Gene reported antibiotic has caused some diarrhea and ACM encouraged Gene to have patient take OTC probiotic to assist with symptoms, and to f/u with PCP with any ongoing/worsening of symptoms.  Gene verbalized understanding.  Gene shared patient otherwise remains stable and she continues to await f/u from ENT re: scheduling of new TEP device.  Gene denied any other symptoms or concerns being present.  ACM reviewed with Gene signs and symptoms they should report and the importance of early symptom recognition and follow up.  Ladarius was also educated on the need to call for an earlier appointment with any new or worsening of her symptoms. Gene acknowledged understanding.  Gene denied any other questions, concerns, or resource needs and he was encouraged to call with any that may develop.  Patient has remained stable and will graduate from Care Coordination at this time and Gene verbalized understanding and agreement to this

## 2025-03-04 NOTE — CARE COORDINATION
Ambulatory Care Coordination Note     3/4/2025 11:50 AM     Patient's HIPAA contact, Gene,  outreach attempt by this ACM today to perform care management follow up . ACM was unable to reach the  Gene  by telephone today;   left voice message requesting a return phone call to this ACM.     ACM: Amy Tolentino RN     Care Summary Note: Attempted to reach patient/HIPAA contact for continued Care Coordination follow up and education re: the management of her DM, Hypertension, ongoing cancer follow up and ongoing healthcare needs.  Will continue to work to f/u with patient in the future.     PCP/Specialist follow up:   Future Appointments         Provider Specialty Dept Phone    3/18/2025 1:45 PM Loi Sood DO; SCHEDULE, [facility] General Surgery [phone]    7/7/2025 1:30 PM Sukumar Uribe MD; SCHEDULE, [facility] NURSE Radiation Oncology [phone]    8/27/2025 2:00 PM Aparna Curtis MD Oncology [phone]    11/26/2025 3:45 PM Isis Colindres MD Cardiology [phone]            Follow Up:   Plan for next ACM outreach in approximately 1 week to complete:  - Disease specific assessments - DM and Hypertension  - Medication review - Unable to review with patient   - Advance care planning - Pending review  - Goal progression  - Education   - RPM - Stable at this time  - F/u on poss rescheduling of TEP procedure - rescheduling pending  - Monitor for additional needs  - Monitor for readiness to discharge from Care Coordination

## 2025-03-13 ENCOUNTER — TELEPHONE (OUTPATIENT)
Dept: CARDIOLOGY CLINIC | Age: 55
End: 2025-03-13

## 2025-03-13 ENCOUNTER — PREP FOR PROCEDURE (OUTPATIENT)
Dept: ENT CLINIC | Age: 55
End: 2025-03-13

## 2025-03-13 DIAGNOSIS — Z90.02 DYSPHAGIA DUE TO LARYNGECTOMY: ICD-10-CM

## 2025-03-13 DIAGNOSIS — R13.13 PHARYNGEAL DYSPHAGIA: ICD-10-CM

## 2025-03-13 DIAGNOSIS — Q39.6 ESOPHAGEAL DIVERTICULUM: ICD-10-CM

## 2025-03-13 DIAGNOSIS — R49.1 APHONIA: Primary | ICD-10-CM

## 2025-03-13 DIAGNOSIS — R13.19 DYSPHAGIA DUE TO LARYNGECTOMY: ICD-10-CM

## 2025-03-13 NOTE — TELEPHONE ENCOUNTER
I sent a message to Dr Coughlin to find out if he needs Vane Brunson from speech available for this surgery and/or the CT scan he mentions.     I was able to speak to Vane today and she said she does not need to be there for this surgery.   If the CT scan is needed, orders need placed. (Nothing is scheduled).    I then spoke to Ladarius (on HIPAA) and we scheduled surgery for 4/3/25.     I told Ladarius I would call him if there was an opportunity to get her in sooner than 3 weeks. (However, Dr Coughlin will be out of town next week also so it limits the opportunity unless he felt it was urgent which is not mentioned.)

## 2025-03-13 NOTE — TELEPHONE ENCOUNTER
Pre op Risk Assessment    Procedure tracheal stoma revision  Physician Dr. Coughlin  Date of surgery/procedure 4-3-25    Last OV 12-3-24  Last Stress December 2024  Last Echo 12-13-24  Last Cath 12-13-24  Last Stent 9-6-16  Is patient on blood thinners Plavix and ASA  Hold Meds/how many days ?

## 2025-03-13 NOTE — TELEPHONE ENCOUNTER
Flaquita is scheduled for a revision surgery with Dr Coughlin on 4/3/25. Dr Colindres cleared her in December for the TEP placement a couple weeks ago but she is going to need a tracheal stoma revision. Okay to proceed from cardiology standpoint?    Please advise.    Thank you!

## 2025-03-18 ENCOUNTER — PROCEDURE VISIT (OUTPATIENT)
Dept: SURGERY | Age: 55
End: 2025-03-18
Payer: MEDICARE

## 2025-03-18 VITALS
TEMPERATURE: 97 F | HEART RATE: 97 BPM | SYSTOLIC BLOOD PRESSURE: 112 MMHG | DIASTOLIC BLOOD PRESSURE: 72 MMHG | OXYGEN SATURATION: 96 %

## 2025-03-18 DIAGNOSIS — Z45.2 FITTING AND ADJUSTMENT OF VASCULAR CATHETER: Primary | ICD-10-CM

## 2025-03-18 PROCEDURE — 36590 REMOVAL TUNNELED CV CATH: CPT | Performed by: SURGERY

## 2025-03-18 NOTE — PROGRESS NOTES
OFFICE PROCEDURE NOTE  Pt Name: Flaquita Vyas  Date of Birth 1970   Procedure Performed: 3/18/2025 in the office    PREOPERATIVE DIAGNOSIS: Fitting/adjustment of implantable port for IV ascess  POSTOPERATIVE DIAGNOSIS: Same  PROCEDURE PERFORMED:  Mediport/powerport removal  SURGEON:  Dr. Loi Sood.  ANESTHESIA:  Local  ESTIMATED BLOOD LOSS:  1 ml  SPECIMEN:  None  COMPLICATIONS:  None immediately appreciated.     DISCUSSION: Flaquita is a 54 y.o. year old female who presented to the office for port removal. After history and physical examination was performed potential diagnostic and therapeutic modalities discussed with the patient Operative and non operative management was discussed.  She was given opportunity to ask questions. Once answered informed consent was obtained and pt was prepared for the procedure.     PROCEDURE:  The patient was taken to the office procedure room and positioned appropriately. The skin overlying the lesion was prepped and draped in sterile fashion. Skin edges was infiltrated with local anesthetic. A transverse incision made with a #15 scalpel blade and carried down to subcutaneous tissues. The securing sutures were identified and removed freeing the port up for removal. The port was removed in its entirety. Hemostasis obtained by direct pressure and was appreciated. The deep tissues were approximated with 3-0 Vicryl suture and skin closed using 4-0 Vicryl suture in running fashion. The wound was then cleansed and sterile dressings were applied. Pt tolerated the procedure well with no immediate complications evident. All sponge, instrument and needle counts were correct at the completion of procedure.      Electronically signed by Tamiko Guillen DO on 3/18/2025 at 2:11 PM

## 2025-03-26 NOTE — PROGRESS NOTES
PAT call attempted, patient unavailable, left message to please call us back at your earliest convenience; [phone]

## 2025-03-28 ENCOUNTER — PREP FOR PROCEDURE (OUTPATIENT)
Dept: ENT CLINIC | Age: 55
End: 2025-03-28

## 2025-04-01 NOTE — H&P
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    squamous cell carcinoma of the epiglottis       Past Surgical History:   Procedure Laterality Date    ANGIOPLASTY Left 2014    left leg    CARDIAC PROCEDURE Left 2024    Left heart cath / coronary angiography performed by Isis Colindres MD at [facility] CARDIAC CATH LAB     SECTION  1998    CORONARY ANGIOPLASTY WITH STENT PLACEMENT      [facility]    CORONARY ANGIOPLASTY WITH STENT PLACEMENT      3 stents in [place]    CORONARY ANGIOPLASTY WITH STENT PLACEMENT  2016    debbi, 1 stent to RCA    CT NEEDLE BIOPSY LUNG PERCUTANEOUS W IMAGING GUIDANCE  2022    CT NEEDLE BIOPSY LUNG PERCUTANEOUS 2022 [facility] CT SCAN    ESOPHAGOGASTRODUODENOSCOPY  10/06/2022    [facility]    GASTROSTOMY TUBE PLACEMENT N/A 2022    EGD Peg Tube Placement possible Open G Tube performed by Alejandro Harry MD at [facility] OR    LARYNGOSCOPY N/A 2025    TEP placement, with balloon dilation of esophageal inlet performed by Colt Coughlin MD at [facility] OR    LARYNGOSCOPY N/A 2025    TEP placement performed by Colt Coughlin MD at [facility] OR    MOUTH SURGERY N/A 2024    Pharyngoplasty/esophagoplasty, cricropharyngeous myotomy performed by Colt Coughlin MD at [facility] OR    OTHER SURGICAL HISTORY  2015    Left Femoral to Peroneal Bypass with Composite Vein Graft and Propaten Graft     OTHER SURGICAL HISTORY Left 2016    Left Leg Above  widened the mucosal aperture on the mucosal side in this manner.  Subsequently I was able to pass the retrograde replacement catheter pull-through device into the trachea and then attached the 15 mm TEP prosthesis to this device and iraida it in from the esophageal side to the tracheal side.  This was difficult but ultimately successful without the inadvertent displacement of the interphalange to within the wound or out of the trachea.  At this point however it was obvious that the anterior wall of the TP pathway which was the posterior membranous trachea was actually mostly made up of granulation tissue where the most recent TEP had dissolved the mucosa and sunk into this granulation tissue out of the pressure was producing being attached to the very deep and thick esophageal side.  Considering the options and the risks to the patient's safety, I opted to remove the just placed TEP device so as to allow these tissues to fully heal and possibly to allow for a revision of the end stoma of the trachea by a procedure to thin the 2 sides of the TEP pathway for a future placement of a TEP that would be significantly safer for the patient.  If the patient were to use this TEP and blue air into the retrotracheal space, this could be accompanied with oral and tracheal secretions and produce a far bigger problem for the patient in the form of a abscess formation or even mediastinitis.  As such after removing the TP catheter the wound was dry of bleeding some of which had been very brisk during the placement of the primary guidewire has had been the case on the very first placement of this prosthesis several months ago.  No bleeding was noted following the removal.     Given the potential for bleeding postop I did examine the stomal area at the superior part of the membranous trachea very carefully and used suction cautery for the minimal oozing that was noted.  I then passed a laryngectomy polyethylene tube into the stoma

## 2025-04-03 ENCOUNTER — HOSPITAL ENCOUNTER (OUTPATIENT)
Age: 55
Setting detail: OUTPATIENT SURGERY
Discharge: HOME | End: 2025-04-03
Attending: OTOLARYNGOLOGY | Admitting: OTOLARYNGOLOGY
Payer: MEDICARE

## 2025-04-03 ENCOUNTER — ANESTHESIA (OUTPATIENT)
Dept: OPERATING ROOM | Age: 55
End: 2025-04-03
Payer: MEDICARE

## 2025-04-03 ENCOUNTER — ANESTHESIA EVENT (OUTPATIENT)
Dept: OPERATING ROOM | Age: 55
End: 2025-04-03
Payer: MEDICARE

## 2025-04-03 VITALS
HEART RATE: 78 BPM | DIASTOLIC BLOOD PRESSURE: 87 MMHG | TEMPERATURE: 97.7 F | WEIGHT: 173.6 LBS | BODY MASS INDEX: 32.77 KG/M2 | HEIGHT: 61 IN | SYSTOLIC BLOOD PRESSURE: 138 MMHG | OXYGEN SATURATION: 96 % | RESPIRATION RATE: 14 BRPM

## 2025-04-03 DIAGNOSIS — R13.13 PHARYNGEAL DYSPHAGIA: ICD-10-CM

## 2025-04-03 DIAGNOSIS — R13.19 DYSPHAGIA DUE TO LARYNGECTOMY: ICD-10-CM

## 2025-04-03 DIAGNOSIS — Z90.02 DYSPHAGIA DUE TO LARYNGECTOMY: ICD-10-CM

## 2025-04-03 DIAGNOSIS — R49.1 APHONIA: ICD-10-CM

## 2025-04-03 PROBLEM — T86.821 FAILED FLAP: Status: ACTIVE | Noted: 2025-04-03

## 2025-04-03 LAB
GLUCOSE BLD STRIP.AUTO-MCNC: 186 MG/DL (ref 70–108)
GLUCOSE BLD STRIP.AUTO-MCNC: 333 MG/DL (ref 70–108)
POTASSIUM SERPL-SCNC: 4.1 MEQ/L (ref 3.5–5.2)

## 2025-04-03 PROCEDURE — 6360000002 HC RX W HCPCS: Performed by: NURSE PRACTITIONER

## 2025-04-03 PROCEDURE — 88304 TISSUE EXAM BY PATHOLOGIST: CPT

## 2025-04-03 PROCEDURE — 2500000003 HC RX 250 WO HCPCS: Performed by: NURSE ANESTHETIST, CERTIFIED REGISTERED

## 2025-04-03 PROCEDURE — 6360000002 HC RX W HCPCS: Performed by: OTOLARYNGOLOGY

## 2025-04-03 PROCEDURE — 3600000013 HC SURGERY LEVEL 3 ADDTL 15MIN: Performed by: OTOLARYNGOLOGY

## 2025-04-03 PROCEDURE — APPNB45 APP NON BILLABLE 31-45 MINUTES: Performed by: NURSE PRACTITIONER

## 2025-04-03 PROCEDURE — 7100000011 HC PHASE II RECOVERY - ADDTL 15 MIN: Performed by: OTOLARYNGOLOGY

## 2025-04-03 PROCEDURE — 3700000000 HC ANESTHESIA ATTENDED CARE: Performed by: OTOLARYNGOLOGY

## 2025-04-03 PROCEDURE — 36415 COLL VENOUS BLD VENIPUNCTURE: CPT

## 2025-04-03 PROCEDURE — 2580000003 HC RX 258: Performed by: OTOLARYNGOLOGY

## 2025-04-03 PROCEDURE — 6370000000 HC RX 637 (ALT 250 FOR IP): Performed by: OTOLARYNGOLOGY

## 2025-04-03 PROCEDURE — C1726 CATH, BAL DIL, NON-VASCULAR: HCPCS | Performed by: OTOLARYNGOLOGY

## 2025-04-03 PROCEDURE — 84132 ASSAY OF SERUM POTASSIUM: CPT

## 2025-04-03 PROCEDURE — 3600000003 HC SURGERY LEVEL 3 BASE: Performed by: OTOLARYNGOLOGY

## 2025-04-03 PROCEDURE — 2580000003 HC RX 258: Performed by: NURSE PRACTITIONER

## 2025-04-03 PROCEDURE — 2709999900 HC NON-CHARGEABLE SUPPLY: Performed by: OTOLARYNGOLOGY

## 2025-04-03 PROCEDURE — 7100000000 HC PACU RECOVERY - FIRST 15 MIN: Performed by: OTOLARYNGOLOGY

## 2025-04-03 PROCEDURE — 7100000001 HC PACU RECOVERY - ADDTL 15 MIN: Performed by: OTOLARYNGOLOGY

## 2025-04-03 PROCEDURE — 2500000003 HC RX 250 WO HCPCS: Performed by: OTOLARYNGOLOGY

## 2025-04-03 PROCEDURE — 82948 REAGENT STRIP/BLOOD GLUCOSE: CPT

## 2025-04-03 PROCEDURE — 6360000002 HC RX W HCPCS: Performed by: NURSE ANESTHETIST, CERTIFIED REGISTERED

## 2025-04-03 PROCEDURE — 3700000001 HC ADD 15 MINUTES (ANESTHESIA): Performed by: OTOLARYNGOLOGY

## 2025-04-03 PROCEDURE — 7100000010 HC PHASE II RECOVERY - FIRST 15 MIN: Performed by: OTOLARYNGOLOGY

## 2025-04-03 RX ORDER — EPINEPHRINE 1 MG/ML(1)
AMPUL (ML) INJECTION PRN
Status: DISCONTINUED | OUTPATIENT
Start: 2025-04-03 | End: 2025-04-03 | Stop reason: ALTCHOICE

## 2025-04-03 RX ORDER — FENTANYL CITRATE 50 UG/ML
INJECTION, SOLUTION INTRAMUSCULAR; INTRAVENOUS
Status: DISCONTINUED | OUTPATIENT
Start: 2025-04-03 | End: 2025-04-03 | Stop reason: SDUPTHER

## 2025-04-03 RX ORDER — MUPIROCIN 20 MG/G
OINTMENT TOPICAL
Qty: 15 G | Refills: 1 | Status: SHIPPED | OUTPATIENT
Start: 2025-04-03

## 2025-04-03 RX ORDER — MUPIROCIN 20 MG/G
OINTMENT TOPICAL PRN
Status: DISCONTINUED | OUTPATIENT
Start: 2025-04-03 | End: 2025-04-03 | Stop reason: ALTCHOICE

## 2025-04-03 RX ORDER — PHENYLEPHRINE HCL IN 0.9% NACL 1 MG/10 ML
SYRINGE (ML) INTRAVENOUS
Status: DISCONTINUED | OUTPATIENT
Start: 2025-04-03 | End: 2025-04-03 | Stop reason: SDUPTHER

## 2025-04-03 RX ORDER — MIDAZOLAM HYDROCHLORIDE 1 MG/ML
INJECTION, SOLUTION INTRAMUSCULAR; INTRAVENOUS
Status: DISCONTINUED | OUTPATIENT
Start: 2025-04-03 | End: 2025-04-03 | Stop reason: SDUPTHER

## 2025-04-03 RX ORDER — PROPOFOL 10 MG/ML
INJECTION, EMULSION INTRAVENOUS
Status: DISCONTINUED | OUTPATIENT
Start: 2025-04-03 | End: 2025-04-03 | Stop reason: SDUPTHER

## 2025-04-03 RX ORDER — SODIUM CHLORIDE 9 MG/ML
INJECTION, SOLUTION INTRAVENOUS CONTINUOUS
Status: DISCONTINUED | OUTPATIENT
Start: 2025-04-03 | End: 2025-04-03 | Stop reason: HOSPADM

## 2025-04-03 RX ORDER — SODIUM CHLORIDE 9 MG/ML
INJECTION, SOLUTION INTRAVENOUS PRN
Status: CANCELLED | OUTPATIENT
Start: 2025-04-03

## 2025-04-03 RX ORDER — LIDOCAINE HCL/PF 100 MG/5ML
SYRINGE (ML) INJECTION
Status: DISCONTINUED | OUTPATIENT
Start: 2025-04-03 | End: 2025-04-03 | Stop reason: SDUPTHER

## 2025-04-03 RX ORDER — SODIUM CHLORIDE 0.9 % (FLUSH) 0.9 %
5-40 SYRINGE (ML) INJECTION EVERY 12 HOURS SCHEDULED
Status: CANCELLED | OUTPATIENT
Start: 2025-04-03

## 2025-04-03 RX ORDER — IPRATROPIUM BROMIDE AND ALBUTEROL SULFATE 2.5; .5 MG/3ML; MG/3ML
1 SOLUTION RESPIRATORY (INHALATION) EVERY 4 HOURS PRN
Status: CANCELLED | OUTPATIENT
Start: 2025-04-03

## 2025-04-03 RX ORDER — OXYCODONE AND ACETAMINOPHEN 5; 325 MG/1; MG/1
1 TABLET ORAL ONCE
Refills: 0 | Status: COMPLETED | OUTPATIENT
Start: 2025-04-03 | End: 2025-04-03

## 2025-04-03 RX ORDER — GINSENG 100 MG
CAPSULE ORAL PRN
Status: DISCONTINUED | OUTPATIENT
Start: 2025-04-03 | End: 2025-04-03 | Stop reason: ALTCHOICE

## 2025-04-03 RX ORDER — EPINEPHRINE 1 MG/ML
INJECTION, SOLUTION, CONCENTRATE INTRAVENOUS PRN
Status: DISCONTINUED | OUTPATIENT
Start: 2025-04-03 | End: 2025-04-03 | Stop reason: ALTCHOICE

## 2025-04-03 RX ORDER — ROCURONIUM BROMIDE 10 MG/ML
INJECTION, SOLUTION INTRAVENOUS
Status: DISCONTINUED | OUTPATIENT
Start: 2025-04-03 | End: 2025-04-03 | Stop reason: SDUPTHER

## 2025-04-03 RX ORDER — DEXAMETHASONE SODIUM PHOSPHATE 10 MG/ML
10 INJECTION, SOLUTION INTRAMUSCULAR; INTRAVENOUS ONCE
Status: COMPLETED | OUTPATIENT
Start: 2025-04-03 | End: 2025-04-03

## 2025-04-03 RX ORDER — ONDANSETRON 2 MG/ML
INJECTION INTRAMUSCULAR; INTRAVENOUS
Status: DISCONTINUED | OUTPATIENT
Start: 2025-04-03 | End: 2025-04-03 | Stop reason: SDUPTHER

## 2025-04-03 RX ORDER — KETOROLAC TROMETHAMINE 10 MG/1
10 TABLET, FILM COATED ORAL EVERY 6 HOURS PRN
Qty: 12 TABLET | Refills: 0 | Status: SHIPPED | OUTPATIENT
Start: 2025-04-03 | End: 2025-04-06

## 2025-04-03 RX ORDER — SODIUM CHLORIDE 0.9 % (FLUSH) 0.9 %
5-40 SYRINGE (ML) INJECTION PRN
Status: CANCELLED | OUTPATIENT
Start: 2025-04-03

## 2025-04-03 RX ORDER — MIDAZOLAM HYDROCHLORIDE 1 MG/ML
2 INJECTION, SOLUTION INTRAMUSCULAR; INTRAVENOUS ONCE
Status: COMPLETED | OUTPATIENT
Start: 2025-04-03 | End: 2025-04-03

## 2025-04-03 RX ADMIN — Medication 100 MG: at 13:39

## 2025-04-03 RX ADMIN — ROCURONIUM BROMIDE 20 MG: 10 INJECTION, SOLUTION INTRAVENOUS at 14:21

## 2025-04-03 RX ADMIN — Medication 100 MCG: at 14:14

## 2025-04-03 RX ADMIN — SUGAMMADEX 200 MG: 100 INJECTION, SOLUTION INTRAVENOUS at 15:18

## 2025-04-03 RX ADMIN — MIDAZOLAM 2 MG: 1 INJECTION INTRAMUSCULAR; INTRAVENOUS at 13:36

## 2025-04-03 RX ADMIN — DEXAMETHASONE SODIUM PHOSPHATE 10 MG: 10 INJECTION, SOLUTION INTRAMUSCULAR; INTRAVENOUS at 10:56

## 2025-04-03 RX ADMIN — MIDAZOLAM 2 MG: 1 INJECTION INTRAMUSCULAR; INTRAVENOUS at 11:48

## 2025-04-03 RX ADMIN — PROPOFOL 150 MG: 10 INJECTION, EMULSION INTRAVENOUS at 13:39

## 2025-04-03 RX ADMIN — Medication 8 UNITS: at 11:47

## 2025-04-03 RX ADMIN — SODIUM CHLORIDE: 0.9 INJECTION, SOLUTION INTRAVENOUS at 10:47

## 2025-04-03 RX ADMIN — ONDANSETRON 4 MG: 2 INJECTION, SOLUTION INTRAMUSCULAR; INTRAVENOUS at 15:06

## 2025-04-03 RX ADMIN — Medication 100 MCG: at 14:06

## 2025-04-03 RX ADMIN — Medication 100 MCG: at 13:54

## 2025-04-03 RX ADMIN — OXYCODONE AND ACETAMINOPHEN 1 TABLET: 325; 5 TABLET ORAL at 16:29

## 2025-04-03 RX ADMIN — ROCURONIUM BROMIDE 50 MG: 10 INJECTION, SOLUTION INTRAVENOUS at 13:50

## 2025-04-03 RX ADMIN — PIPERACILLIN SODIUM AND TAZOBACTAM SODIUM 3375 MG: 3; .375 INJECTION, POWDER, LYOPHILIZED, FOR SOLUTION INTRAVENOUS at 13:33

## 2025-04-03 RX ADMIN — FENTANYL CITRATE 100 MCG: 50 INJECTION, SOLUTION INTRAMUSCULAR; INTRAVENOUS at 13:36

## 2025-04-03 RX ADMIN — Medication 100 MCG: at 14:24

## 2025-04-03 ASSESSMENT — PAIN DESCRIPTION - PAIN TYPE
TYPE: SURGICAL PAIN
TYPE: CHRONIC PAIN

## 2025-04-03 ASSESSMENT — PAIN DESCRIPTION - ONSET
ONSET: ON-GOING
ONSET: ON-GOING

## 2025-04-03 ASSESSMENT — PAIN - FUNCTIONAL ASSESSMENT
PAIN_FUNCTIONAL_ASSESSMENT: PREVENTS OR INTERFERES SOME ACTIVE ACTIVITIES AND ADLS
PAIN_FUNCTIONAL_ASSESSMENT: WONG-BAKER FACES
PAIN_FUNCTIONAL_ASSESSMENT: PREVENTS OR INTERFERES SOME ACTIVE ACTIVITIES AND ADLS

## 2025-04-03 ASSESSMENT — PAIN DESCRIPTION - ORIENTATION
ORIENTATION: RIGHT
ORIENTATION: INNER

## 2025-04-03 ASSESSMENT — PAIN DESCRIPTION - FREQUENCY
FREQUENCY: CONTINUOUS
FREQUENCY: CONTINUOUS

## 2025-04-03 ASSESSMENT — PAIN DESCRIPTION - DESCRIPTORS: DESCRIPTORS: CRAMPING

## 2025-04-03 ASSESSMENT — PAIN SCALES - WONG BAKER: WONGBAKER_NUMERICALRESPONSE: NO HURT

## 2025-04-03 ASSESSMENT — PAIN DESCRIPTION - LOCATION: LOCATION: NECK

## 2025-04-03 ASSESSMENT — PAIN SCALES - GENERAL
PAINLEVEL_OUTOF10: 7
PAINLEVEL_OUTOF10: 6

## 2025-04-03 NOTE — DISCHARGE INSTRUCTIONS
Resume all medications, diet and activity as prior to surgery    Apply ointment to wound as prescribed    Toradol as prescribed for breakthrough pain

## 2025-04-03 NOTE — PROGRESS NOTES
Dr Mcintyre notified patients blood sugar 333 new orders insulin per sliding scale. Pt also having anxiety prior to surgery. New orders 2 mg versed IV.

## 2025-04-03 NOTE — PROGRESS NOTES
1532: Pt arrives to pacu, awakens to verbal stimuli and quickly drifts back to sleep. Pt on room air, respirations easy and unlabored. VSS    1542: Glucose 186, no treatment needed    1555: Pt resting off and on with eyes closed, easily awakens to voice. Continues to deny pain    1602: Pt meets criteria for discharge from pacu, transported back to \[facility]\"" in stable condition. VSS

## 2025-04-03 NOTE — PROGRESS NOTES
Patient oriented to Same Day department and admitted to Same Day Surgery room 19.   Patient verbalized approval for first name, last initial with physician name on unit whiteboard.     Plan of care reviewed with patient.   Patient room whiteboard filled out and discussed with patient and responsible adult.   Patient and responsible adult offered Same Day Welcome Packet to review.    Call light in reach.   Bed in lowest position, locked, with one bed rail up.   SCDs and warming blanket in place.  Appropriate arm bands on patient.   Bathroom offered.   All questions and concerns of patient addressed.        Meds to Beds:   Patient informed of [facility] Meds to Beds program during admission. Patient is agreeable to program.   Contact information for the pharmacy and the Meds to Beds program:   Name: Gene   Relationship to patient:spouse/significant other   Phone number: [phone]

## 2025-04-03 NOTE — ANESTHESIA PRE PROCEDURE
129/72   Pulse: 94   Resp: 15   Temp: 97.4 °F (36.3 °C)   TempSrc: Temporal   SpO2: 97%   Weight: 78.7 kg (173 lb 9.6 oz)   Height: 1.549 m (5' 1\")                                              BP Readings from Last 3 Encounters:   04/03/25 129/72   03/18/25 112/72   02/19/25 (!) 156/70       NPO Status: Time of last liquid consumption: 2300                        Time of last solid consumption: 2300                        Date of last liquid consumption: 04/02/25                        Date of last solid food consumption: 04/02/25    BMI:   Wt Readings from Last 3 Encounters:   04/03/25 78.7 kg (173 lb 9.6 oz)   02/19/25 69.9 kg (154 lb)   01/23/25 78.4 kg (172 lb 12.8 oz)     Body mass index is 32.8 kg/m².    CBC:   Lab Results   Component Value Date/Time    WBC 7.8 02/27/2025 02:10 PM    RBC 3.80 02/27/2025 02:10 PM    HGB 12.0 02/27/2025 02:10 PM    HCT 35.4 02/27/2025 02:10 PM    MCV 93 02/27/2025 02:10 PM    RDW 14.3 02/27/2025 02:10 PM     02/27/2025 02:10 PM       CMP:   Lab Results   Component Value Date/Time     02/27/2025 02:10 PM     12/13/2024 01:59 PM    K 4.1 04/03/2025 10:22 AM    K 4.0 02/19/2025 02:10 PM    CL 99 12/13/2024 01:59 PM    CO2 27 12/13/2024 01:59 PM    BUN 11 12/13/2024 01:59 PM    CREATININE 1.0 02/27/2025 02:10 PM    CREATININE 0.6 12/13/2024 01:59 PM    LABGLOM 67 02/27/2025 02:10 PM    LABGLOM >60 03/20/2024 03:50 PM    LABGLOM > 60 07/11/2023 02:35 PM    GLUCOSE 203 12/13/2024 01:59 PM    GLUCOSE 291 08/26/2024 02:00 PM    CALCIUM 8.1 12/13/2024 01:59 PM    BILITOT 0.3 02/27/2025 02:10 PM    ALKPHOS 133 02/27/2025 02:10 PM    AST 28 02/27/2025 02:10 PM    ALT 27 02/27/2025 02:10 PM       POC Tests:   Recent Labs     04/03/25  1054   POCGLU 333*       Coags:   Lab Results   Component Value Date/Time    INR 1.14 12/13/2024 01:59 PM    APTT 29.3 12/13/2024 01:59 PM       HCG (If Applicable):   Lab Results   Component Value Date    PREGTESTUR NEGATIVE 11/19/2015

## 2025-04-03 NOTE — ANESTHESIA POSTPROCEDURE EVALUATION
Level of Consciousness:  Awake    Respiratory:  Stable    Oxygen Saturation:  Stable    Cardiovascular:  Stable    Hydration:  Adequate    PONV:  Stable    Post-op Pain:  Adequate analgesia    Post-op Assessment:  No apparent anesthetic complications    Additional Follow-Up / Treatment / Comment:  None    CHARLEE CHAPIN MD  April 3, 2025   5:06 PM      No notable events documented.

## 2025-04-03 NOTE — OP NOTE
01Operative Note      Patient: Flaquita Vyas  YOB: 1970  MRN: [id number]    Date of Procedure: 4/3/2025    Pre-Op Diagnosis Codes:      * Pharyngeal dysphagia [R13.13]     * Aphonia [R49.1]     * 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 [R13.19, Z90.02]    Post-Op Diagnosis: Same       Procedure(s):  Open approach Revision tracheal stoma reconstruction  Resection excess free flap tissue    Surgeon(s):  Colt Coughlin MD    Assistant:   * No surgical staff found *    Anesthesia: General    Estimated Blood Loss (mL): Minimal    Complications: None    Specimens:   ID Type Source Tests Collected by Time Destination   A : Excess free flap soft tissue Tissue Neck SURGICAL PATHOLOGY Colt Coughlin MD 4/3/2025 1436        Implants:  * No implants in log *      Drains: * No LDAs found *    Findings:  Infection Present At Time Of Surgery (PATOS) (choose all levels that have infection present):  No infection present  Other Findings: 1.  Very large muscular and fatty free flap bulging inferiorly into the party wall of the esophagus and trachea.  Resected broadly and closed with plicating sutures.    Detailed Description of Procedure:   The patient was taken to the operating awake and placed in supine position.  General anesthesia was induced and the patient was intubated through her and stoma with a #5 cuffed endotracheal tube without difficulty or vital sign instability.  The table was turned 90 degrees and the patient was prepped and draped in the usual fashion for an aseptic approach to the upper trachea and lower neck.  A timeout was employed verifying the patient's identity and planned procedure.    Marking off the interface between the neck skin and the trachea with a short dogleg up onto the neck skin on both sides, I injected it with 1.5 cc of 1 50,000 saline epinephrine using a 27-gauge tuberculin needle and a 10 cc control syringe.  This was for hemostasis and to perform some hydraulic dissection to reduce  permanent section.    After the removal of this excessive tissue tuft I turned my attention to irrigating out the posterior tracheal space and the paraesophageal parapharyngeal space with copious amounts of sterile saline.  I also checked for hemostasis and used bipolar cautery to achieve it.  I then brought on the field a series of 4-0 PDS sutures on a P3 cutting needle which I placed along the membranous tracheal edge with the deep pass across the deep tissues described above and then the second pass through the neck skin repeating the the path in the other direction for a figure-of-eight configuration that included the deep tissues for imbrication.  I placed 4 such stitches through the skin of the party wall of the trachea and the skin of the neck collapsing the deep space with each 1 and placing the suture on hemostat for tying down once all the stitches were placed.  I placed another 2 stitches on either side of the midline for total of 6 stitches in a figure-of-eight configuration across the top of the trachea.  Once all of them were in position I irrigated out the post-tracheal space again and then tied each 1 down from the center out to the periphery.  The excess suture material was cut away.  I assessed the dissection of the flaps in the risk to the patient of placing the tracheoesophageal puncture prosthesis through this newly dissected tissue with the potential that there may be small punctures into the pharynx or esophagus that were not visible to me through the dissection process.  If that were to occur then in all likelihood the skin would breakdown and a pharyngocutaneous fistula significant size could develop.  As such I opted not to construct the speaking fistula today but rather allow the tissues to declare themselves and to heal hopefully into an even thinner conduit between the trachea and esophagus that would make a TEP placement substantially easier.  This was explained to the patient has a

## 2025-04-10 ENCOUNTER — RESULTS FOLLOW-UP (OUTPATIENT)
Dept: ENT CLINIC | Age: 55
End: 2025-04-10

## 2025-04-10 DIAGNOSIS — R49.1 APHONIA: Primary | ICD-10-CM

## 2025-04-10 DIAGNOSIS — Z90.02 HX OF LARYNGECTOMY: ICD-10-CM

## 2025-04-10 NOTE — RESULT ENCOUNTER NOTE
To the MA pool:  Please let the patient know that the pathology was completely benign of the tissue I removed from under her flap.  On the next round of an effort to put her TPN, I am confident we will be successful.    Chanel,  Please add onto her TEP order that we will need a  14 and a 16mm TEP prosthesis.  Thank you  PFC

## 2025-04-14 DIAGNOSIS — C32.1 MALIGNANT NEOPLASM OF EPIGLOTTIS (HCC): ICD-10-CM

## 2025-04-14 RX ORDER — ONDANSETRON 8 MG/1
8 TABLET, ORALLY DISINTEGRATING ORAL EVERY 8 HOURS PRN
Qty: 90 TABLET | Refills: 3 | Status: SHIPPED | OUTPATIENT
Start: 2025-04-14 | End: 2025-08-12

## 2025-04-16 ENCOUNTER — OFFICE VISIT (OUTPATIENT)
Dept: ENT CLINIC | Age: 55
End: 2025-04-16
Payer: MEDICARE

## 2025-04-16 VITALS
HEIGHT: 61 IN | OXYGEN SATURATION: 99 % | BODY MASS INDEX: 32.77 KG/M2 | WEIGHT: 173.6 LBS | DIASTOLIC BLOOD PRESSURE: 70 MMHG | TEMPERATURE: 96.8 F | RESPIRATION RATE: 16 BRPM | HEART RATE: 84 BPM | SYSTOLIC BLOOD PRESSURE: 126 MMHG

## 2025-04-16 DIAGNOSIS — Z90.02 HX OF LARYNGECTOMY: ICD-10-CM

## 2025-04-16 DIAGNOSIS — R49.1 APHONIA: Primary | ICD-10-CM

## 2025-04-16 PROCEDURE — 3074F SYST BP LT 130 MM HG: CPT | Performed by: OTOLARYNGOLOGY

## 2025-04-16 PROCEDURE — 1036F TOBACCO NON-USER: CPT | Performed by: OTOLARYNGOLOGY

## 2025-04-16 PROCEDURE — 3078F DIAST BP <80 MM HG: CPT | Performed by: OTOLARYNGOLOGY

## 2025-04-16 PROCEDURE — G8417 CALC BMI ABV UP PARAM F/U: HCPCS | Performed by: OTOLARYNGOLOGY

## 2025-04-16 PROCEDURE — 3017F COLORECTAL CA SCREEN DOC REV: CPT | Performed by: OTOLARYNGOLOGY

## 2025-04-16 PROCEDURE — G8427 DOCREV CUR MEDS BY ELIG CLIN: HCPCS | Performed by: OTOLARYNGOLOGY

## 2025-04-16 PROCEDURE — 99212 OFFICE O/P EST SF 10 MIN: CPT | Performed by: OTOLARYNGOLOGY

## 2025-04-16 NOTE — PROGRESS NOTES
[facility] [place] [facility]  [facility] EAR, NOSE AND THROAT  [address]  [address]  [facility] [address]  Dept: [phone]  Dept Fax: [phone]  Loc: [phone]    Flaquita Vyas is a 54 y.o. female who was referred by No ref. provider found for:  Chief Complaint   Patient presents with    Post-Op Check     Patient is here today post op rev trach stoma recon, TEP 4/3. Patient states things have been a little iffy since surgery.Pt states she was having trouble getting food down and that it helped some but that food will just go a little further now and still sticks.       HPI:       History of Present Illness  Flaquita Vyas is a 54 y.o. female who returns today for follow-up evaluation post free vascular free flap reconstruction of the anterior pharyngeal anastomosis post laryngectomy.  Her flap was found to be taking up a large volume between the mucosa of the party wall of the trachea and the esophagus.  The patient reports doing well since that operation and in fact been able to breathe more comfortably and safely now that she does not have to keep her head held up her back in order to breathe secondary to the pannus like protrusion of the reconstructed skin above her stoma.  She is very eager to have her TEP placed so that she can speak for the first time in almost a year.  She was very understanding of the fact that I could not place it at the same time as I performed the other procedure out of the concern that the 2 sides of the dissected space might have their vascularity impaired to the point that it may not heal properly.  She was accompanied by her friend who has been with her each visit.        History:     Allergies   Allergen Reactions    Shellfish-Derived Products Anaphylaxis     \"Throat closes up\"    Shrimp Flavor Agent (Non-Screening) Swelling     Current Outpatient Medications   Medication Sig Dispense Refill    ondansetron (ZOFRAN-ODT) 8 MG TBDP disintegrating tablet

## 2025-05-05 ENCOUNTER — PREP FOR PROCEDURE (OUTPATIENT)
Dept: ENT CLINIC | Age: 55
End: 2025-05-05

## 2025-05-05 DIAGNOSIS — Z90.02 HX OF LARYNGECTOMY: ICD-10-CM

## 2025-05-06 ENCOUNTER — PREP FOR PROCEDURE (OUTPATIENT)
Dept: ENT CLINIC | Age: 55
End: 2025-05-06

## 2025-05-07 NOTE — PROGRESS NOTES
PAT call attempted, patient unavailable, left message to please call us back at your earliest convenience; [phone]

## 2025-05-14 ENCOUNTER — ANESTHESIA EVENT (OUTPATIENT)
Dept: OPERATING ROOM | Age: 55
End: 2025-05-14
Payer: MEDICARE

## 2025-05-14 ENCOUNTER — ANESTHESIA (OUTPATIENT)
Dept: OPERATING ROOM | Age: 55
End: 2025-05-14
Payer: MEDICARE

## 2025-05-14 ENCOUNTER — HOSPITAL ENCOUNTER (OUTPATIENT)
Age: 55
Setting detail: OUTPATIENT SURGERY
Discharge: HOME | End: 2025-05-14
Attending: OTOLARYNGOLOGY | Admitting: OTOLARYNGOLOGY
Payer: MEDICARE

## 2025-05-14 VITALS
WEIGHT: 165 LBS | OXYGEN SATURATION: 96 % | TEMPERATURE: 97.4 F | HEART RATE: 76 BPM | RESPIRATION RATE: 16 BRPM | HEIGHT: 61 IN | BODY MASS INDEX: 31.15 KG/M2 | DIASTOLIC BLOOD PRESSURE: 70 MMHG | SYSTOLIC BLOOD PRESSURE: 132 MMHG

## 2025-05-14 DIAGNOSIS — G89.18 ACUTE POSTOPERATIVE PAIN: Primary | ICD-10-CM

## 2025-05-14 LAB
GLUCOSE BLD STRIP.AUTO-MCNC: 105 MG/DL (ref 70–108)
GLUCOSE BLD STRIP.AUTO-MCNC: 166 MG/DL (ref 70–108)
GLUCOSE BLD STRIP.AUTO-MCNC: 49 MG/DL (ref 70–108)
GLUCOSE BLD STRIP.AUTO-MCNC: 52 MG/DL (ref 70–108)
GLUCOSE BLD STRIP.AUTO-MCNC: 84 MG/DL (ref 70–108)
GLUCOSE BLD STRIP.AUTO-MCNC: 86 MG/DL (ref 70–108)
GLUCOSE BLD STRIP.AUTO-MCNC: 94 MG/DL (ref 70–108)
POTASSIUM SERPL-SCNC: 3.6 MEQ/L (ref 3.5–5.2)

## 2025-05-14 PROCEDURE — 2580000003 HC RX 258: Performed by: OTOLARYNGOLOGY

## 2025-05-14 PROCEDURE — 7100000000 HC PACU RECOVERY - FIRST 15 MIN: Performed by: OTOLARYNGOLOGY

## 2025-05-14 PROCEDURE — 6360000002 HC RX W HCPCS: Performed by: OTOLARYNGOLOGY

## 2025-05-14 PROCEDURE — 6360000002 HC RX W HCPCS: Performed by: ANESTHESIOLOGY

## 2025-05-14 PROCEDURE — 82948 REAGENT STRIP/BLOOD GLUCOSE: CPT

## 2025-05-14 PROCEDURE — 2780000010 HC IMPLANT OTHER: Performed by: OTOLARYNGOLOGY

## 2025-05-14 PROCEDURE — 3700000000 HC ANESTHESIA ATTENDED CARE: Performed by: OTOLARYNGOLOGY

## 2025-05-14 PROCEDURE — 6360000002 HC RX W HCPCS: Performed by: NURSE ANESTHETIST, CERTIFIED REGISTERED

## 2025-05-14 PROCEDURE — 3700000001 HC ADD 15 MINUTES (ANESTHESIA): Performed by: OTOLARYNGOLOGY

## 2025-05-14 PROCEDURE — 7100000011 HC PHASE II RECOVERY - ADDTL 15 MIN: Performed by: OTOLARYNGOLOGY

## 2025-05-14 PROCEDURE — 2709999900 HC NON-CHARGEABLE SUPPLY: Performed by: OTOLARYNGOLOGY

## 2025-05-14 PROCEDURE — 7100000001 HC PACU RECOVERY - ADDTL 15 MIN: Performed by: OTOLARYNGOLOGY

## 2025-05-14 PROCEDURE — 2500000003 HC RX 250 WO HCPCS: Performed by: ANESTHESIOLOGY

## 2025-05-14 PROCEDURE — 36415 COLL VENOUS BLD VENIPUNCTURE: CPT

## 2025-05-14 PROCEDURE — 2500000003 HC RX 250 WO HCPCS: Performed by: NURSE ANESTHETIST, CERTIFIED REGISTERED

## 2025-05-14 PROCEDURE — 84132 ASSAY OF SERUM POTASSIUM: CPT

## 2025-05-14 PROCEDURE — 3600000013 HC SURGERY LEVEL 3 ADDTL 15MIN: Performed by: OTOLARYNGOLOGY

## 2025-05-14 PROCEDURE — 6360000002 HC RX W HCPCS

## 2025-05-14 PROCEDURE — 3600000003 HC SURGERY LEVEL 3 BASE: Performed by: OTOLARYNGOLOGY

## 2025-05-14 PROCEDURE — 7100000010 HC PHASE II RECOVERY - FIRST 15 MIN: Performed by: OTOLARYNGOLOGY

## 2025-05-14 RX ORDER — LIDOCAINE HYDROCHLORIDE 20 MG/ML
INJECTION, SOLUTION INTRAVENOUS
Status: DISCONTINUED | OUTPATIENT
Start: 2025-05-14 | End: 2025-05-14 | Stop reason: SDUPTHER

## 2025-05-14 RX ORDER — DROPERIDOL 2.5 MG/ML
0.62 INJECTION, SOLUTION INTRAMUSCULAR; INTRAVENOUS ONCE
Status: COMPLETED | OUTPATIENT
Start: 2025-05-14 | End: 2025-05-14

## 2025-05-14 RX ORDER — PROPOFOL 10 MG/ML
INJECTION, EMULSION INTRAVENOUS
Status: DISCONTINUED | OUTPATIENT
Start: 2025-05-14 | End: 2025-05-14 | Stop reason: SDUPTHER

## 2025-05-14 RX ORDER — ROCURONIUM BROMIDE 10 MG/ML
INJECTION, SOLUTION INTRAVENOUS
Status: DISCONTINUED | OUTPATIENT
Start: 2025-05-14 | End: 2025-05-14 | Stop reason: SDUPTHER

## 2025-05-14 RX ORDER — IPRATROPIUM BROMIDE AND ALBUTEROL SULFATE 2.5; .5 MG/3ML; MG/3ML
1 SOLUTION RESPIRATORY (INHALATION) EVERY 4 HOURS PRN
Status: DISCONTINUED | OUTPATIENT
Start: 2025-05-14 | End: 2025-05-14 | Stop reason: HOSPADM

## 2025-05-14 RX ORDER — FENTANYL CITRATE 50 UG/ML
INJECTION, SOLUTION INTRAMUSCULAR; INTRAVENOUS
Status: DISCONTINUED | OUTPATIENT
Start: 2025-05-14 | End: 2025-05-14 | Stop reason: SDUPTHER

## 2025-05-14 RX ORDER — ONDANSETRON 2 MG/ML
INJECTION INTRAMUSCULAR; INTRAVENOUS
Status: DISCONTINUED | OUTPATIENT
Start: 2025-05-14 | End: 2025-05-14 | Stop reason: SDUPTHER

## 2025-05-14 RX ORDER — SODIUM CHLORIDE 9 MG/ML
INJECTION, SOLUTION INTRAVENOUS PRN
Status: DISCONTINUED | OUTPATIENT
Start: 2025-05-14 | End: 2025-05-14 | Stop reason: HOSPADM

## 2025-05-14 RX ORDER — SODIUM CHLORIDE 0.9 % (FLUSH) 0.9 %
5-40 SYRINGE (ML) INJECTION EVERY 12 HOURS SCHEDULED
Status: DISCONTINUED | OUTPATIENT
Start: 2025-05-14 | End: 2025-05-14 | Stop reason: HOSPADM

## 2025-05-14 RX ORDER — SODIUM CHLORIDE 0.9 % (FLUSH) 0.9 %
5-40 SYRINGE (ML) INJECTION PRN
Status: DISCONTINUED | OUTPATIENT
Start: 2025-05-14 | End: 2025-05-14 | Stop reason: HOSPADM

## 2025-05-14 RX ORDER — DEXTROSE MONOHYDRATE 25 G/50ML
12.5 INJECTION, SOLUTION INTRAVENOUS ONCE
Status: DISCONTINUED | OUTPATIENT
Start: 2025-05-14 | End: 2025-05-14

## 2025-05-14 RX ORDER — SODIUM CHLORIDE 0.9 % (FLUSH) 0.9 %
5-40 SYRINGE (ML) INJECTION PRN
Status: CANCELLED | OUTPATIENT
Start: 2025-05-14

## 2025-05-14 RX ORDER — SODIUM CHLORIDE 9 MG/ML
INJECTION, SOLUTION INTRAVENOUS PRN
Status: CANCELLED | OUTPATIENT
Start: 2025-05-14

## 2025-05-14 RX ORDER — TRAMADOL HYDROCHLORIDE 50 MG/1
50 TABLET ORAL EVERY 4 HOURS PRN
Qty: 18 TABLET | Refills: 0 | Status: SHIPPED | OUTPATIENT
Start: 2025-05-14 | End: 2025-05-17

## 2025-05-14 RX ORDER — LABETALOL HYDROCHLORIDE 5 MG/ML
INJECTION, SOLUTION INTRAVENOUS
Status: COMPLETED
Start: 2025-05-14 | End: 2025-05-14

## 2025-05-14 RX ORDER — DROPERIDOL 2.5 MG/ML
INJECTION, SOLUTION INTRAMUSCULAR; INTRAVENOUS
Status: COMPLETED
Start: 2025-05-14 | End: 2025-05-14

## 2025-05-14 RX ORDER — AMOXICILLIN 250 MG/5ML
250 POWDER, FOR SUSPENSION ORAL 3 TIMES DAILY
Qty: 150 ML | Refills: 0 | Status: SHIPPED | OUTPATIENT
Start: 2025-05-14 | End: 2025-05-24

## 2025-05-14 RX ORDER — MIDAZOLAM HYDROCHLORIDE 1 MG/ML
INJECTION, SOLUTION INTRAMUSCULAR; INTRAVENOUS
Status: DISCONTINUED | OUTPATIENT
Start: 2025-05-14 | End: 2025-05-14 | Stop reason: SDUPTHER

## 2025-05-14 RX ORDER — LABETALOL HYDROCHLORIDE 5 MG/ML
10 INJECTION, SOLUTION INTRAVENOUS ONCE
Status: COMPLETED | OUTPATIENT
Start: 2025-05-14 | End: 2025-05-14

## 2025-05-14 RX ORDER — EPINEPHRINE 1 MG/ML
INJECTION, SOLUTION, CONCENTRATE INTRAVENOUS PRN
Status: DISCONTINUED | OUTPATIENT
Start: 2025-05-14 | End: 2025-05-14 | Stop reason: ALTCHOICE

## 2025-05-14 RX ORDER — DEXAMETHASONE SODIUM PHOSPHATE 10 MG/ML
8 INJECTION, SOLUTION INTRAMUSCULAR; INTRAVENOUS ONCE
Status: COMPLETED | OUTPATIENT
Start: 2025-05-14 | End: 2025-05-14

## 2025-05-14 RX ORDER — ALPRAZOLAM 1 MG/1
1 TABLET ORAL AS NEEDED
COMMUNITY

## 2025-05-14 RX ORDER — SODIUM CHLORIDE 0.9 % (FLUSH) 0.9 %
5-40 SYRINGE (ML) INJECTION EVERY 12 HOURS SCHEDULED
Status: CANCELLED | OUTPATIENT
Start: 2025-05-14

## 2025-05-14 RX ORDER — IPRATROPIUM BROMIDE AND ALBUTEROL SULFATE 2.5; .5 MG/3ML; MG/3ML
1 SOLUTION RESPIRATORY (INHALATION) EVERY 4 HOURS PRN
Status: CANCELLED | OUTPATIENT
Start: 2025-05-14

## 2025-05-14 RX ORDER — DEXTROSE MONOHYDRATE 25 G/50ML
12.5 INJECTION, SOLUTION INTRAVENOUS ONCE
Status: COMPLETED | OUTPATIENT
Start: 2025-05-14 | End: 2025-05-14

## 2025-05-14 RX ADMIN — ONDANSETRON 4 MG: 2 INJECTION, SOLUTION INTRAMUSCULAR; INTRAVENOUS at 12:50

## 2025-05-14 RX ADMIN — DROPERIDOL 0.62 MG: 2.5 INJECTION, SOLUTION INTRAMUSCULAR; INTRAVENOUS at 13:30

## 2025-05-14 RX ADMIN — FENTANYL CITRATE 50 MCG: 50 INJECTION, SOLUTION INTRAMUSCULAR; INTRAVENOUS at 12:48

## 2025-05-14 RX ADMIN — HYDROMORPHONE HYDROCHLORIDE 0.5 MG: 1 INJECTION, SOLUTION INTRAMUSCULAR; INTRAVENOUS; SUBCUTANEOUS at 13:30

## 2025-05-14 RX ADMIN — FENTANYL CITRATE 50 MCG: 50 INJECTION, SOLUTION INTRAMUSCULAR; INTRAVENOUS at 12:46

## 2025-05-14 RX ADMIN — DEXAMETHASONE SODIUM PHOSPHATE 8 MG: 10 INJECTION, SOLUTION INTRAMUSCULAR; INTRAVENOUS at 11:31

## 2025-05-14 RX ADMIN — PROPOFOL 150 MG: 10 INJECTION, EMULSION INTRAVENOUS at 12:26

## 2025-05-14 RX ADMIN — HYDROMORPHONE HYDROCHLORIDE 0.25 MG: 1 INJECTION, SOLUTION INTRAMUSCULAR; INTRAVENOUS; SUBCUTANEOUS at 13:45

## 2025-05-14 RX ADMIN — Medication 10 MG: at 13:55

## 2025-05-14 RX ADMIN — FENTANYL CITRATE 50 MCG: 50 INJECTION, SOLUTION INTRAMUSCULAR; INTRAVENOUS at 12:26

## 2025-05-14 RX ADMIN — SUGAMMADEX 400 MG: 100 INJECTION, SOLUTION INTRAVENOUS at 13:03

## 2025-05-14 RX ADMIN — ONDANSETRON 4 MG: 2 INJECTION, SOLUTION INTRAMUSCULAR; INTRAVENOUS at 13:22

## 2025-05-14 RX ADMIN — ROCURONIUM BROMIDE 50 MG: 10 INJECTION, SOLUTION INTRAVENOUS at 12:26

## 2025-05-14 RX ADMIN — SODIUM CHLORIDE 3000 MG: 9 INJECTION, SOLUTION INTRAVENOUS at 12:37

## 2025-05-14 RX ADMIN — MIDAZOLAM 2 MG: 1 INJECTION INTRAMUSCULAR; INTRAVENOUS at 12:24

## 2025-05-14 RX ADMIN — FENTANYL CITRATE 50 MCG: 50 INJECTION, SOLUTION INTRAMUSCULAR; INTRAVENOUS at 12:44

## 2025-05-14 RX ADMIN — DEXTROSE MONOHYDRATE 12.5 G: 25 INJECTION, SOLUTION INTRAVENOUS at 14:25

## 2025-05-14 RX ADMIN — HYDROMORPHONE HYDROCHLORIDE 0.25 MG: 1 INJECTION, SOLUTION INTRAMUSCULAR; INTRAVENOUS; SUBCUTANEOUS at 13:55

## 2025-05-14 RX ADMIN — LABETALOL HYDROCHLORIDE 10 MG: 5 INJECTION, SOLUTION INTRAVENOUS at 13:55

## 2025-05-14 RX ADMIN — PROPOFOL 50 MG: 10 INJECTION, EMULSION INTRAVENOUS at 12:46

## 2025-05-14 RX ADMIN — Medication 50 MG: at 12:26

## 2025-05-14 RX ADMIN — SODIUM CHLORIDE: 0.9 INJECTION, SOLUTION INTRAVENOUS at 11:26

## 2025-05-14 ASSESSMENT — PAIN DESCRIPTION - ORIENTATION
ORIENTATION: LOWER
ORIENTATION: MID;RIGHT

## 2025-05-14 ASSESSMENT — PAIN DESCRIPTION - ONSET: ONSET: ON-GOING

## 2025-05-14 ASSESSMENT — PAIN DESCRIPTION - FREQUENCY: FREQUENCY: INTERMITTENT

## 2025-05-14 ASSESSMENT — PAIN SCALES - GENERAL
PAINLEVEL_OUTOF10: 4
PAINLEVEL_OUTOF10: 0
PAINLEVEL_OUTOF10: 3
PAINLEVEL_OUTOF10: 0
PAINLEVEL_OUTOF10: 3
PAINLEVEL_OUTOF10: 7
PAINLEVEL_OUTOF10: 7
PAINLEVEL_OUTOF10: 5
PAINLEVEL_OUTOF10: 0

## 2025-05-14 ASSESSMENT — PAIN - FUNCTIONAL ASSESSMENT
PAIN_FUNCTIONAL_ASSESSMENT: PREVENTS OR INTERFERES SOME ACTIVE ACTIVITIES AND ADLS
PAIN_FUNCTIONAL_ASSESSMENT: PREVENTS OR INTERFERES SOME ACTIVE ACTIVITIES AND ADLS

## 2025-05-14 ASSESSMENT — PAIN DESCRIPTION - LOCATION: LOCATION: BACK

## 2025-05-14 ASSESSMENT — PAIN DESCRIPTION - DESCRIPTORS: DESCRIPTORS: SHARP;TINGLING;SHOOTING

## 2025-05-14 ASSESSMENT — PAIN DESCRIPTION - PAIN TYPE: TYPE: CHRONIC PAIN

## 2025-05-14 NOTE — PROGRESS NOTES
1325 Pt arrives to pacu on room air. Pt appears to be in no acute distress at this time, just slightly restless/anxious. Respirations easy and unlabored. Pt following commands. Pt unable to verbalize, but can write down requests, questions etc. Pt pain 7/10, more so chronic in back per pt. Pt desating high 80s, pt placed on 3L NC. VSS.   1330 0.5mg Dilaudid given for pain. Pt feeling nauseous. 0.625mg Droperidol given.   1333 BS 49. Pt A&Ox4 with some tremor. Dr Michael notified. D50 amp ordered.   1340 Nausea starting to resolve. Pt OK to have soft foods with liquids, no carbonation per Dr Coughlin. Pt tolerating ice chips well.  1345 Pt pain improving, 5/10. 0.25mg Dilaudid given.   1355 Pt has had 2 consecutive readings of SBP >180s. 10mg Labetalol given for BP. Pain 4/10, becoming more tolerable. 0.25mg Dilaudid given. Pt denies any nausea at this time.   1415 Pt pain 3/10. Pt states pain is tolerable at this time. Pt denies any nausea at this time.   1425 12.5g of D50% given for BS.   1440 Repeat . Pt pain 3/10. Pt satisfied at this time. Pt denies any nausea at this time.   1455 Repeat BS 84. Pt states BS fluctuates frequently and is why she must check BS q4. Dr Michael notified. Pt is OK to return to [facility]. Pt tolerating liquids.   1500 Pt meets criteria for discharge from pacu. Pt transported to [facility] in stable condition.   1505 Report given to Autumn RINALDI. All questions and concerns addressed at this time.

## 2025-05-14 NOTE — H&P
EGD Peg Tube Placement possible Open G Tube performed by Alejandro Harry MD at [facility] OR    LARYNGOSCOPY N/A 1/23/2025    TEP placement, with balloon dilation of esophageal inlet performed by Colt Coughlin MD at [facility] OR    LARYNGOSCOPY N/A 2/19/2025    TEP placement performed by Colt Coughlin MD at [facility] OR    MOUTH SURGERY N/A 12/19/2024    Pharyngoplasty/esophagoplasty, cricropharyngeous myotomy performed by Colt Coughlin MD at [facility] OR    OTHER SURGICAL HISTORY  11/19/2015    Left Femoral to Peroneal Bypass with Composite Vein Graft and Propaten Graft     OTHER SURGICAL HISTORY Left 02/25/2016    Left Leg Above Knee Amputation    PORT SURGERY N/A 11/21/2022    Insertion Single Lumen Mediport performed by Alejandro Harry MD at [facility] OR    TOE AMPUTATION Right 9/11/2024    RIGHT Transmetatarsal performed by Michael Ann DPM at [facility] OR    TRACHEAL SURGERY N/A 4/3/2025    Open approach Revision tracheal stoma reconstruction performed by Colt Coughlin MD at [facility] OR    VASCULAR SURGERY          Medications Prior to Admission     Prior to Admission medications    Medication Sig Start Date End Date Taking? Authorizing Provider   ondansetron (ZOFRAN-ODT) 8 MG TBDP disintegrating tablet Take 1 tablet by mouth every 8 hours as needed for Nausea or Vomiting 4/14/25 8/12/25 Yes Katia Rodríguez PA-C   mupirocin (BACTROBAN) 2 % ointment Apply to stomal wound with Qtip gently 3 times daily. 4/3/25  Yes Kristina Khan APRN - CNP   ketorolac (TORADOL) 10 MG tablet Take 1 tablet by mouth every 6 hours as needed for Pain 4/3/25 5/14/25 Yes Kristina Khan APRN - CNP   calcium citrate (CALCITRATE) 250 MG TABS tablet Take 2 tablets by mouth daily 12/18/24  Yes Provider, MD Melvin   VRAYLAR 6 MG CAPS capsule  12/17/24  Yes Provider, MD Melvin   cetirizine (ZYRTEC) 10 MG tablet  12/17/24  Yes Provider, MD Melvin   levothyroxine (SYNTHROID) 200 MCG tablet  12/17/24  Yes Provider,

## 2025-05-14 NOTE — ANESTHESIA PRE PROCEDURE
Department of Anesthesiology  Preprocedure Note       Name:  Flaquita Vyas   Age:  54 y.o.  :  1970                                          MRN:  [id number]         Date:  2025      Surgeon: Surgeon(s):  Colt Coughlin MD    Procedure: Procedure(s):  Construction of TEP prosthesis    Medications prior to admission:   Prior to Admission medications    Medication Sig Start Date End Date Taking? Authorizing Provider   ALPRAZolam (XANAX) 1 MG tablet Take 1 tablet by mouth as needed for Anxiety.   Yes Provider, MD Melvin   ondansetron (ZOFRAN-ODT) 8 MG TBDP disintegrating tablet Take 1 tablet by mouth every 8 hours as needed for Nausea or Vomiting 25 Yes Katia Rodríguez PA-C   mupirocin (BACTROBAN) 2 % ointment Apply to stomal wound with Qtip gently 3 times daily. 4/3/25  Yes Kristina Khan APRN - CNP   ketorolac (TORADOL) 10 MG tablet Take 1 tablet by mouth every 6 hours as needed for Pain 4/3/25 5/14/25 Yes Kristina Khan APRN - CNP   calcium citrate (CALCITRATE) 250 MG TABS tablet Take 2 tablets by mouth daily 24  Yes Provider, MD Melvin   VRAYLAR 6 MG CAPS capsule  24  Yes Provider, MD Melvin   cetirizine (ZYRTEC) 10 MG tablet  24  Yes Provider, MD Melvin   levothyroxine (SYNTHROID) 200 MCG tablet  24  Yes Provider, MD Melvin   metOLazone (ZAROXOLYN) 2.5 MG tablet TAKE 1 TABLET BY MOUTH DAILY AS NEEDED FOR SWELLING 24  Yes Provider, Historical, MD   phentermine 37.5 MG capsule Take 1 capsule by mouth daily. 24  Yes ProviderMelvin MD   rOPINIRole (REQUIP) 0.5 MG tablet  24  Yes Provider, MD Melvin   sertraline (ZOLOFT) 50 MG tablet  24  Yes Provider, MD Melvin   HUMALOG KWIKPEN 100 UNIT/ML SOPN INJECT 15 UNITS INTO THE SKIN THREE TIMES DAILY BEFORE MEALS 24  Yes Provider, MD Melvin   Insulin Degludec (TRESIBA FLEXTOUCH SC) Inject 100 Units into the skin in the morning and at

## 2025-05-14 NOTE — PROGRESS NOTES
Pt returned to [facility] room 14. Vitals and assessment as charted. 0.9 infusing, @150ml to count from PACU. Pt has applesauce, ice cream and water. Family at the bedside. Pt and family verbalized understanding of discharge criteria and call light use. Call light in reach.

## 2025-05-14 NOTE — ANESTHESIA POSTPROCEDURE EVALUATION
Department of Anesthesiology  Postprocedure Note    Patient: Flaquita Vyas  MRN: [id number]  YOB: 1970  Date of evaluation: 5/14/2025    Procedure Summary       Date: 05/14/25 Room / Location: [facility] OR  / [facility] OR    Anesthesia Start: 1224 Anesthesia Stop: 1326    Procedure: Construction of TEP prosthesis Diagnosis:       Aphonia      Hx of laryngectomy      (Aphonia [R49.1])      (Hx of laryngectomy [Z90.02])    Surgeons: Colt Coughlin MD Responsible Provider: Octavio Michael MD    Anesthesia Type: General ASA Status: 4            Anesthesia Type: General    Sena Phase I: Sena Score: 9    Sena Phase II: Sena Score: 10    Anesthesia Post Evaluation    Patient location during evaluation: PACU  Patient participation: complete - patient participated  Level of consciousness: awake and alert  Airway patency: patent  Nausea & Vomiting: no nausea  Cardiovascular status: blood pressure returned to baseline and hemodynamically stable  Respiratory status: acceptable and spontaneous ventilation  Hydration status: euvolemic  Pain management: adequate    No notable events documented.

## 2025-05-14 NOTE — DISCHARGE INSTRUCTIONS
Do not try to speak with prosthesis till Saturday or Sunday.  Need to suction more, may have more bloody discharge.  If having excessive bleeding make sure to call office. If having excessive bleeding go to Emergency room.   Regular diet

## 2025-05-14 NOTE — OP NOTE
Operative Note      Patient: Flaquita Vyas  YOB: 1970  MRN: [id number]    Date of Procedure: 5/14/2025    Pre-Op Diagnosis Codes:      * Aphonia [R49.1]     * Hx of laryngectomy [Z90.02]    Post-Op Diagnosis: Same       Procedure(s):  Construction of TEP prosthesis fistula and prosthesis placement    Surgeon(s):  Colt Coughlin MD    Assistant:   THERESE Grady    Anesthesia: General    Estimated Blood Loss (mL): Minimal    Complications: None    Specimens:   * No specimens in log *    Implants:  * No implants in log *      Drains: * No LDAs found *    Findings:  Infection Present At Time Of Surgery (PATOS) (choose all levels that have infection present):  No infection present  Other Findings: 1.  Acceptably thick tracheoesophageal party wall  2.  Passed through wire pull-through technique worked appropriately using a 12.5 mm prosthesis 22.5 [place]    Detailed Description of Procedure:   The patient was taken the operating waken placed in supine position.  General anesthesia was induced and the patient was intubated with a 5.0 endotracheal tube through the stoma without difficulty or vital sign instability.  The table was turned 90 degrees and the patient was draped in usual fashion for a combined transoral and transabdominal procedure.  A timeout was employed verifying the patient's identity and planned procedure.    With the patient asleep I placed gauze in her maxilla and passed the Joni laryngoscope into the pharynx and extended the tongue anteriorly to provide direct line of sight passage into the esophageal inlet.  I then passed a rigid esophagoscope and a 30 degree rigid telescope to visualize the posterior wall.  I turned on the room lights so I could see the illumination of the trachea.  I then passed the needle through the illuminated spot.  Once I could see the needle through the superior scope I passed the wire and iraida it out through the mouth.    Once to the mouth I attached it to 
I will STOP taking the medications listed below when I get home from the hospital:    simvastatin 10 mg oral tablet  -- 1 tab(s) by mouth once a day (at bedtime)    Augmentin 500 mg-125 mg oral tablet  -- 500 milligram(s) by mouth 2 times a day x 5 days   -- Finish all this medication unless otherwise directed by prescriber.  Take with food or milk.

## 2025-05-14 NOTE — PROGRESS NOTES
Patient oriented to Same Day department and admitted to Same Day Surgery room 14.   Patient verbalized approval for first name, last initial with physician name on unit whiteboard.     Plan of care reviewed with patient.   Patient room whiteboard filled out and discussed with patient and responsible adult.       Call light in reach.   Bed in lowest position, locked, with one bed rail up.   SCDs and warming blanket in place.  Appropriate arm bands on patient.   Bathroom offered.   All questions and concerns of patient addressed.        Meds to Beds:   Patient informed of [facility] Meds to Beds program during admission. Patient has declined use of program.

## 2025-05-28 ENCOUNTER — OFFICE VISIT (OUTPATIENT)
Dept: ENT CLINIC | Age: 55
End: 2025-05-28
Payer: MEDICARE

## 2025-05-28 VITALS
HEART RATE: 91 BPM | BODY MASS INDEX: 32.66 KG/M2 | TEMPERATURE: 98.2 F | HEIGHT: 61 IN | DIASTOLIC BLOOD PRESSURE: 74 MMHG | RESPIRATION RATE: 16 BRPM | OXYGEN SATURATION: 100 % | WEIGHT: 173 LBS | SYSTOLIC BLOOD PRESSURE: 118 MMHG

## 2025-05-28 DIAGNOSIS — C32.1 CANCER, EPIGLOTTIS (HCC): ICD-10-CM

## 2025-05-28 DIAGNOSIS — R49.1 APHONIA: ICD-10-CM

## 2025-05-28 DIAGNOSIS — Z90.02 HX OF LARYNGECTOMY: Primary | ICD-10-CM

## 2025-05-28 PROCEDURE — 3017F COLORECTAL CA SCREEN DOC REV: CPT | Performed by: OTOLARYNGOLOGY

## 2025-05-28 PROCEDURE — 99212 OFFICE O/P EST SF 10 MIN: CPT | Performed by: OTOLARYNGOLOGY

## 2025-05-28 PROCEDURE — G8417 CALC BMI ABV UP PARAM F/U: HCPCS | Performed by: OTOLARYNGOLOGY

## 2025-05-28 PROCEDURE — G8427 DOCREV CUR MEDS BY ELIG CLIN: HCPCS | Performed by: OTOLARYNGOLOGY

## 2025-05-28 PROCEDURE — 3074F SYST BP LT 130 MM HG: CPT | Performed by: OTOLARYNGOLOGY

## 2025-05-28 PROCEDURE — 1036F TOBACCO NON-USER: CPT | Performed by: OTOLARYNGOLOGY

## 2025-05-28 PROCEDURE — 3078F DIAST BP <80 MM HG: CPT | Performed by: OTOLARYNGOLOGY

## 2025-05-29 NOTE — PROGRESS NOTES
[facility] [place] [facility]  [facility] EAR, NOSE AND THROAT  [address]  [address]  [facility] [address]  Dept: [phone]  Dept Fax: [phone]  Loc: [phone]    Flaquita Vyas is a 54 y.o. female who was referred by No ref. provider found for:  Chief Complaint   Patient presents with    Post-Op Check     post op TEP construction. Patient states it is really sore when she tries to talk. Also would like a refill of Toradol, states it works well with he other meds.         HPI:       History of Present Illness  Flaquita Vyas is a 54 y.o. female who presents for evaluation of her stoma and her recently placed TEP for speech.  She is currently having her cancer follow-up care provided by clinicians at [facility] in the division of Head and Neck Surgery.    She reports that the stoma will not fully clear of mucus but is manageable with the use of salt water to help with suction, especially in dry conditions. She inquires about the next steps for care and is advised to see a specialist for further aftercare. The patient is informed that the extra tissue removal was necessary to prevent breathing difficulties and that the current state of the stoma looks good.    Additionally, she expresses a desire to continue the use of her TEPs for speech.  She is in need of DME supplies which are currently prescribed for her tube, and seeks a prescription for these items. She mentions that obtaining supplies from her current provider has been challenging due to insurance issues but acknowledges that the supplies are of good quality.        History:     Allergies   Allergen Reactions    Shellfish-Derived Products Anaphylaxis     \"Throat closes up\"    Shrimp Flavor Agent (Non-Screening) Swelling    Aspirin Rash     Current Outpatient Medications   Medication Sig Dispense Refill    ALPRAZolam (XANAX) 1 MG tablet Take 1 tablet by mouth as needed for Anxiety.      ondansetron (ZOFRAN-ODT) 8 MG TBDP

## 2025-06-20 ENCOUNTER — HOSPITAL ENCOUNTER (OUTPATIENT)
Dept: SPEECH THERAPY | Age: 55
Setting detail: THERAPIES SERIES
Discharge: HOME | End: 2025-06-20
Payer: MEDICARE

## 2025-06-20 PROCEDURE — 92597 ORAL SPEECH DEVICE EVAL: CPT | Performed by: SPEECH-LANGUAGE PATHOLOGIST

## 2025-06-20 NOTE — PROGRESS NOTES
Chief Complaint   Patient presents with     New Patient      New Afib, referred by Jess Sanchez PA-C (oncology) after zio patch resulted     Vitals were taken and medications were reconciled. EKG was performed.    Sonia Clark  9:57 AM     8/22/23 by Dr. Perez in [place].  Patient then had a MBS completed as an inpatient at this facility on 9/9/23 and the recommendation was for NPO with PEG tube (pleasure feeds puree and multiple swallows).  ENT at [facility] decided that they should proceed with completing a total laryngectomy and this was completed in October of 2023 (no TEP completed).  Patient had education regarding use of an electrolarynx, but continued to have some difficulty communicating with family members.  Patient then had a consultation with ENT and ST in January of 2024 to evaluate for the possibility of a tracheoesophageal puncture and placement of a voice prosthesis. It was determined that the patient would be a good candidate for this.  Patient had a secondary tracheoesophageal puncture by Dr. Coughlin on 1/23/25.  The voice prosthesis was too short and eventually became embedded within the mucosa of the tracheoesophageal wall. The patient was taken to the operating room to have a longer voice prosthesis placed on 2/19/25, however, it was unsuccessful due to the tissue being mostly granulated. It was then decided to allow the puncture to close and heal up before completing another TE puncture.  On 5/14/25, Dr. Coughlin completed \"construction of TEP prosthesis fistula and prosthesis placement.      Patient arrives to this appointment reporting that she has been able to use her voice prosthesis to communicate well until yesterday when she started to have to use a lot of force to talk with it.       SUBJECTIVE: Patient cooperative. Patient reports she was able to talk well using her TEP until yesterday.  Since then it is has been very strained/forced to communicate. Patient's significant other, Gene, present throughout.    OBJECTIVE:  Patient had a secondary tracheoesophageal puncture completed by Dr. Coughlin on 1/23/25 and was seen by this SLP on 1/31/25.  At that time the patient's TEP appeared to be too short so it was decided to

## 2025-06-23 ENCOUNTER — TELEPHONE (OUTPATIENT)
Dept: ENT CLINIC | Age: 55
End: 2025-06-23

## 2025-06-23 NOTE — TELEPHONE ENCOUNTER
Per Dr. BERRIOS patient does not need seen in office, needs worked into surgery schedule. I called Gene back to inform him that Chanel will be reaching out. He voiced understanding.    Esophagoscopy with TEP.

## 2025-06-23 NOTE — TELEPHONE ENCOUNTER
Per SLP note \"...patient's TEP appeared to be too short as this SLP was only able to visualize the tracheal flange from the 6 o'clock to the 8 o'clock position. The rest of the tracheal flange was embedded in the mucosa of the tracheal wall. This SLP attempted to utilize a hemastat to pull the tracheal flange around the outside of the mucosa, however, the patient did not tolerate it well and reported a pain of 6/10. The patient would not allow this SLP to continue trying due to the increased pain level. \"      \"This SLP called Dr. Coughlin during this evaluation to update him on the status of the TEP and he recommended that the patient be scheduled in the operating room to have a longer TEP placed in the near future. This SLP will collaborate with Chanel, surgery scheduler for Dr. Coughlin, to find a time to place a longer TEP. \"    Please schedule pt with Dr. Coughlin in the next couple of weeks and if abele attempt to hold OR time shortly after appt for TEP replacement.

## 2025-06-23 NOTE — TELEPHONE ENCOUNTER
Patients friend, Ladarius, is calling. He states patients valve is turned sideways and they saw Vane Brunson in speech who tried to adjust it but could not. They are asking for appt asap. Please advise.

## 2025-06-24 ENCOUNTER — PREP FOR PROCEDURE (OUTPATIENT)
Dept: ENT CLINIC | Age: 55
End: 2025-06-24

## 2025-06-24 DIAGNOSIS — Z90.02 HISTORY OF LARYNGECTOMY: Primary | ICD-10-CM

## 2025-06-24 DIAGNOSIS — C32.1 CANCER, EPIGLOTTIS (HCC): ICD-10-CM

## 2025-06-24 DIAGNOSIS — Z90.02 HISTORY OF LARYNGECTOMY: ICD-10-CM

## 2025-06-24 DIAGNOSIS — Z01.818 PRE-OP TESTING: ICD-10-CM

## 2025-06-24 DIAGNOSIS — R49.1 APHONIA: ICD-10-CM

## 2025-06-24 LAB
A/G RATIO: 1.1 (ref 1.5–2.5)
ALBUMIN: 3.8 G/DL (ref 3.5–5)
ALP BLD-CCNC: 142 IU/L (ref 39–118)
ALT SERPL-CCNC: 17 IU/L (ref 10–40)
ANION GAP SERPL CALCULATED.3IONS-SCNC: 10 MMOL/L (ref 4–12)
AST SERPL-CCNC: 16 IU/L (ref 15–41)
BASOPHILS ABSOLUTE: 100 /CMM (ref 0–200)
BASOPHILS RELATIVE PERCENT: 0.9 % (ref 0–2)
BILIRUB SERPL-MCNC: 0.3 MG/DL (ref 0.2–1)
BUN BLDV-MCNC: 22 MG/DL (ref 7–20)
CALCIUM SERPL-MCNC: 8.5 MG/DL (ref 8.8–10.5)
CHLORIDE BLD-SCNC: 93 MEQ/L (ref 101–111)
CO2: 26 MEQ/L (ref 21–32)
CREAT SERPL-MCNC: 1.05 MG/DL (ref 0.6–1.3)
CREATININE CLEARANCE: 55
EOSINOPHILS ABSOLUTE: 100 /CMM (ref 0–500)
EOSINOPHILS RELATIVE PERCENT: 2.1 % (ref 0–6)
GLUCOSE: 518 MG/DL (ref 70–110)
HCT VFR BLD CALC: 38.2 % (ref 35–44)
HEMOGLOBIN: 12.9 GM/DL (ref 12–15)
LYMPHOCYTES ABSOLUTE: 1000 /CMM (ref 1000–4800)
LYMPHOCYTES RELATIVE PERCENT: 15.3 % (ref 15–45)
MCH RBC QN AUTO: 31.1 PG (ref 27.5–33)
MCHC RBC AUTO-ENTMCNC: 33.6 GM/DL (ref 33–36)
MCV RBC AUTO: 92.3 CU MIC (ref 80–97)
MONOCYTES ABSOLUTE: 300 /CMM (ref 0–800)
MONOCYTES RELATIVE PERCENT: 4.9 % (ref 2–10)
NEUTROPHILS ABSOLUTE: 5300 /CMM (ref 1800–7700)
NEUTROPHILS RELATIVE PERCENT: 76.8 % (ref 40–70)
NUCLEATED RBCS: 0.1 /100 WBC
PDW BLD-RTO: 15.8 % (ref 12–16)
PLATELET # BLD: 328 TH/CMM (ref 150–400)
POTASSIUM SERPL-SCNC: 4.1 MEQ/L (ref 3.6–5)
RBC # BLD: 4.14 MIL/CMM (ref 4–5.1)
SODIUM BLD-SCNC: 129 MEQ/L (ref 135–145)
TOTAL PROTEIN: 7.4 G/DL (ref 6.2–8)
WBC # BLD: 6.8 TH/CMM (ref 4.4–10.5)

## 2025-06-24 NOTE — TELEPHONE ENCOUNTER
I spoke to Dr Coughlin this morning. He said he did speak to Vane about this patient.  Flaquita is now at a point that she is able to have this done by Vane in her office.     Vane, please contact [employer] to schedule her TEP change.     Thank you!

## 2025-06-25 NOTE — TELEPHONE ENCOUNTER
Vane called stating this patient had too much pain last week when attempted. Dr Coughlin agreed to proceed in surgery. She is scheduled this Friday 6/27/25. Ladarius and Flaquita aware. She did get her labs [facility] at [facility] today but she was not fasting. Patient states she stopped her medications starting 6/19/25 that she held for the last procedure on her own.

## 2025-06-25 NOTE — PROGRESS NOTES
PAT call attempt to reconcile and update medication list; Patient cannot talk on phone, I reached Ladarius, her significant other and he stated he does not handle her medications. I asked Ladarius to please have Flaquita bring an updated medication list with updated doses. Thank you.    I notified Dr. Coughlin' office of glucose on 6/24 of 518 ( not sure if it was fasting) and A1C from 9/9/24 of 11/6.

## 2025-06-26 ENCOUNTER — RESULTS FOLLOW-UP (OUTPATIENT)
Dept: ENT CLINIC | Age: 55
End: 2025-06-26

## 2025-06-26 PROCEDURE — APPNB45 APP NON BILLABLE 31-45 MINUTES: Performed by: NURSE PRACTITIONER

## 2025-06-26 RX ORDER — SODIUM CHLORIDE 0.9 % (FLUSH) 0.9 %
5-40 SYRINGE (ML) INJECTION EVERY 12 HOURS SCHEDULED
Status: CANCELLED | OUTPATIENT
Start: 2025-06-26

## 2025-06-26 RX ORDER — SODIUM CHLORIDE 9 MG/ML
INJECTION, SOLUTION INTRAVENOUS PRN
Status: CANCELLED | OUTPATIENT
Start: 2025-06-26

## 2025-06-26 RX ORDER — IPRATROPIUM BROMIDE AND ALBUTEROL SULFATE 2.5; .5 MG/3ML; MG/3ML
1 SOLUTION RESPIRATORY (INHALATION) EVERY 4 HOURS PRN
Status: CANCELLED | OUTPATIENT
Start: 2025-06-27

## 2025-06-26 RX ORDER — SODIUM CHLORIDE 0.9 % (FLUSH) 0.9 %
5-40 SYRINGE (ML) INJECTION PRN
Status: CANCELLED | OUTPATIENT
Start: 2025-06-26

## 2025-06-27 ENCOUNTER — HOSPITAL ENCOUNTER (OUTPATIENT)
Age: 55
Setting detail: OUTPATIENT SURGERY
Discharge: HOME | End: 2025-06-27
Attending: OTOLARYNGOLOGY | Admitting: OTOLARYNGOLOGY
Payer: MEDICARE

## 2025-06-27 VITALS
WEIGHT: 170 LBS | DIASTOLIC BLOOD PRESSURE: 114 MMHG | HEART RATE: 91 BPM | OXYGEN SATURATION: 98 % | TEMPERATURE: 96.9 F | RESPIRATION RATE: 20 BRPM | HEIGHT: 61 IN | SYSTOLIC BLOOD PRESSURE: 129 MMHG | BODY MASS INDEX: 32.1 KG/M2

## 2025-06-27 LAB — GLUCOSE BLD STRIP.AUTO-MCNC: 427 MG/DL (ref 70–108)

## 2025-06-27 PROCEDURE — 82948 REAGENT STRIP/BLOOD GLUCOSE: CPT

## 2025-06-27 RX ORDER — SODIUM CHLORIDE 0.9 % (FLUSH) 0.9 %
5-40 SYRINGE (ML) INJECTION PRN
Status: DISCONTINUED | OUTPATIENT
Start: 2025-06-27 | End: 2025-07-07 | Stop reason: HOSPADM

## 2025-06-27 RX ORDER — SODIUM CHLORIDE 0.9 % (FLUSH) 0.9 %
5-40 SYRINGE (ML) INJECTION EVERY 12 HOURS SCHEDULED
Status: DISCONTINUED | OUTPATIENT
Start: 2025-06-27 | End: 2025-07-07 | Stop reason: HOSPADM

## 2025-06-27 RX ORDER — SODIUM CHLORIDE 9 MG/ML
INJECTION, SOLUTION INTRAVENOUS PRN
Status: DISCONTINUED | OUTPATIENT
Start: 2025-06-27 | End: 2025-07-07 | Stop reason: HOSPADM

## 2025-06-27 RX ORDER — IPRATROPIUM BROMIDE AND ALBUTEROL SULFATE 2.5; .5 MG/3ML; MG/3ML
1 SOLUTION RESPIRATORY (INHALATION) EVERY 4 HOURS PRN
Status: DISCONTINUED | OUTPATIENT
Start: 2025-06-27 | End: 2025-07-07 | Stop reason: HOSPADM

## 2025-06-27 ASSESSMENT — PAIN - FUNCTIONAL ASSESSMENT: PAIN_FUNCTIONAL_ASSESSMENT: 0-10

## 2025-06-27 NOTE — PROGRESS NOTES
1115-Dr Coughlin and Kristina Khan CNP ordered for the patient to go to the ER for elevated Blood sugars. Pt refused to go to the ER and stated she will call her family doctor and that she is fine. The IV was removed by Tori RINALDI and the patient and her boyfriend left. The patient was dressed and in her motorized scooter.     1125-This RN called Kristina RINALDI and updated that the patient refused to go to the ER and is contacting her family

## 2025-06-27 NOTE — PROGRESS NOTES
Patient admitted to [facility]. Pt oriented to the room and call light provided. Family at bedside. Pt nonverbal and uses written communication

## 2025-06-27 NOTE — PROGRESS NOTES
1005-Dr Velasquez updated on blood sugar 427. Waiting on a response for plan of care.     1015-per Radha RN, Dr Coughlin ordered for hospitalist to the see the patient to manage blood sugar at this time and does not plan to proceed with the scheduled surgery at this time.     1018-Pt and her family at the bedside updated on the plan of care.

## 2025-07-01 ENCOUNTER — TELEPHONE (OUTPATIENT)
Dept: ENT CLINIC | Age: 55
End: 2025-07-01

## 2025-07-01 NOTE — TELEPHONE ENCOUNTER
I called Ladarius to relay. However, he was walking into the hospital to get a CT scan himself. He said he would check in and call me right back. If I am unavailable, please relay this message. I will discuss further with Dr Coughlin in the office on Wednesday 7/2/25.    Ladarius did call me back but we had a bad connection and he couldn't understand me. He was getting called back for his CT scan also so he said he would call back again.

## 2025-07-01 NOTE — TELEPHONE ENCOUNTER
Discussed with Dr. Coughlin.    1) Recommend using long q tips or normal qtips if they are not klaudia to make it to the office to get some of the long time ones, to plug TEP when drinking to avoid fluids from leaking through TEP.    2. She needs general anesthesia given her hx of difficult TEP changes and would not be safe for speech outpt.

## 2025-07-01 NOTE — TELEPHONE ENCOUNTER
Ladarius (on HIPAA) called again today.      She is now having problems with when she takes a drink it is coming out the hole too.    2.  He is asking if there is any way Flaquita can have this TEP change done without anesthesia in Out Patient or Speech?    She did previously try in speech but did it without having any pain medication. If she can take something for that prior can she have this done with Vane or in Out patient?    She did go to see her PCP, Dr Tang, on Monday 6/30/25 and Ladarius said he increased one of her diabetic medications but that is also the problem because she stopped those medications prior to surgery. They are faxing the office notes and will be scanned into Media tab.    Please advise?

## 2025-07-02 ENCOUNTER — PREP FOR PROCEDURE (OUTPATIENT)
Dept: ENT CLINIC | Age: 55
End: 2025-07-02

## 2025-07-02 NOTE — TELEPHONE ENCOUNTER
Let's hold everything per usual guidelines.  If her glucose is high, we will be able to regulate with IV insulin.

## 2025-07-02 NOTE — TELEPHONE ENCOUNTER
I spoke to Ladarius and Flaquita was there with him. They agreed to medication instructions and will work on getting transportation to the hospital on Tuesday at 10:00 am.  They did ask if her glucose #'s were okay if there was any chance she could have her surgery that same day. I did inform them to plan on Wednesday but that would be up to Dr Coughlin if she got added that day. They understood and were very thankful for all of the help to get her added back onto the schedule.

## 2025-07-02 NOTE — TELEPHONE ENCOUNTER
Dr Coughlin reached out to me for having patient admitted to hospital pre-op to ensure glucose levels are appropriate for surgery and start insulin IV if necessary.  Spoke with [phone] and arranged pre-surgery admission.  Patient to arrive on 7/8/25 (day before surgery) at 10:00 for admission.  Hospitalist team will be contacted that day to admit and treat pre operatively.

## 2025-07-02 NOTE — TELEPHONE ENCOUNTER
I spoke to Dr Coughlin. He is in contact with Dr Leary from Anesthesia to see what type of steps need taken to get this patient in surgery asa safe to do so.   We tentatively have her scheduled for 7/9/25. Patients  Gene is aware and we will see what Dr Leary recommends as well.

## 2025-07-07 RX ORDER — MUPIROCIN 2 %
OINTMENT (GRAM) TOPICAL
Qty: 15 G | Refills: 1 | Status: SHIPPED | OUTPATIENT
Start: 2025-07-07

## 2025-07-08 ENCOUNTER — HOSPITAL ENCOUNTER (OUTPATIENT)
Age: 55
Setting detail: OBSERVATION
Discharge: LEFT BEFORE DISCHARGE | End: 2025-07-09
Attending: STUDENT IN AN ORGANIZED HEALTH CARE EDUCATION/TRAINING PROGRAM | Admitting: PHYSICIAN ASSISTANT
Payer: MEDICARE

## 2025-07-08 ENCOUNTER — ANESTHESIA EVENT (OUTPATIENT)
Dept: OPERATING ROOM | Age: 55
End: 2025-07-08
Payer: MEDICARE

## 2025-07-08 DIAGNOSIS — I50.32 CHRONIC DIASTOLIC CONGESTIVE HEART FAILURE (HCC): Primary | ICD-10-CM

## 2025-07-08 PROBLEM — E13.65 UNCONTROLLED OTHER SPECIFIED DIABETES MELLITUS WITH HYPERGLYCEMIA (HCC): Status: ACTIVE | Noted: 2025-07-08

## 2025-07-08 LAB
ANION GAP SERPL CALC-SCNC: 13 MEQ/L (ref 8–16)
ANION GAP SERPL CALC-SCNC: 13 MEQ/L (ref 8–16)
BUN SERPL-MCNC: 19 MG/DL (ref 8–23)
BUN SERPL-MCNC: 20 MG/DL (ref 8–23)
CALCIUM SERPL-MCNC: 8.3 MG/DL (ref 8.6–10)
CALCIUM SERPL-MCNC: 8.6 MG/DL (ref 8.6–10)
CHLORIDE SERPL-SCNC: 103 MEQ/L (ref 98–111)
CHLORIDE SERPL-SCNC: 103 MEQ/L (ref 98–111)
CO2 SERPL-SCNC: 22 MEQ/L (ref 22–29)
CO2 SERPL-SCNC: 24 MEQ/L (ref 22–29)
CREAT SERPL-MCNC: 1.1 MG/DL (ref 0.5–0.9)
CREAT SERPL-MCNC: 1.1 MG/DL (ref 0.5–0.9)
DEPRECATED MEAN GLUCOSE BLD GHB EST-ACNC: 258 MG/DL (ref 70–126)
GFR SERPL CREATININE-BSD FRML MDRD: 60 ML/MIN/1.73M2
GFR SERPL CREATININE-BSD FRML MDRD: 60 ML/MIN/1.73M2
GLUCOSE BLD STRIP.AUTO-MCNC: 110 MG/DL (ref 70–108)
GLUCOSE BLD STRIP.AUTO-MCNC: 119 MG/DL (ref 70–108)
GLUCOSE BLD STRIP.AUTO-MCNC: 152 MG/DL (ref 70–108)
GLUCOSE BLD STRIP.AUTO-MCNC: 159 MG/DL (ref 70–108)
GLUCOSE BLD STRIP.AUTO-MCNC: 181 MG/DL (ref 70–108)
GLUCOSE BLD STRIP.AUTO-MCNC: 191 MG/DL (ref 70–108)
GLUCOSE BLD STRIP.AUTO-MCNC: 219 MG/DL (ref 70–108)
GLUCOSE BLD STRIP.AUTO-MCNC: 249 MG/DL (ref 70–108)
GLUCOSE BLD STRIP.AUTO-MCNC: 287 MG/DL (ref 70–108)
GLUCOSE BLD STRIP.AUTO-MCNC: 73 MG/DL (ref 70–108)
GLUCOSE SERPL-MCNC: 164 MG/DL (ref 74–109)
GLUCOSE SERPL-MCNC: 276 MG/DL (ref 74–109)
HBA1C MFR BLD HPLC: 10.6 % (ref 4–6)
MAGNESIUM SERPL-MCNC: 1.9 MG/DL (ref 1.6–2.6)
PHOSPHATE SERPL-MCNC: 3.2 MG/DL (ref 2.5–4.5)
POTASSIUM SERPL-SCNC: 4 MEQ/L (ref 3.5–5.2)
POTASSIUM SERPL-SCNC: 4.1 MEQ/L (ref 3.5–5.2)
SODIUM SERPL-SCNC: 138 MEQ/L (ref 135–145)
SODIUM SERPL-SCNC: 140 MEQ/L (ref 135–145)

## 2025-07-08 PROCEDURE — 84100 ASSAY OF PHOSPHORUS: CPT

## 2025-07-08 PROCEDURE — 6370000000 HC RX 637 (ALT 250 FOR IP)

## 2025-07-08 PROCEDURE — 83036 HEMOGLOBIN GLYCOSYLATED A1C: CPT

## 2025-07-08 PROCEDURE — 83735 ASSAY OF MAGNESIUM: CPT

## 2025-07-08 PROCEDURE — 2500000003 HC RX 250 WO HCPCS

## 2025-07-08 PROCEDURE — 2580000003 HC RX 258

## 2025-07-08 PROCEDURE — 94640 AIRWAY INHALATION TREATMENT: CPT

## 2025-07-08 PROCEDURE — G0379 DIRECT REFER HOSPITAL OBSERV: HCPCS

## 2025-07-08 PROCEDURE — 36415 COLL VENOUS BLD VENIPUNCTURE: CPT

## 2025-07-08 PROCEDURE — 80048 BASIC METABOLIC PNL TOTAL CA: CPT

## 2025-07-08 PROCEDURE — 99223 1ST HOSP IP/OBS HIGH 75: CPT | Performed by: STUDENT IN AN ORGANIZED HEALTH CARE EDUCATION/TRAINING PROGRAM

## 2025-07-08 PROCEDURE — 96374 THER/PROPH/DIAG INJ IV PUSH: CPT

## 2025-07-08 PROCEDURE — G0378 HOSPITAL OBSERVATION PER HR: HCPCS

## 2025-07-08 PROCEDURE — 82948 REAGENT STRIP/BLOOD GLUCOSE: CPT

## 2025-07-08 RX ORDER — FAMOTIDINE 20 MG/1
20 TABLET, FILM COATED ORAL PRN
Status: DISCONTINUED | OUTPATIENT
Start: 2025-07-08 | End: 2025-07-09 | Stop reason: HOSPADM

## 2025-07-08 RX ORDER — MAGNESIUM SULFATE IN WATER 40 MG/ML
2000 INJECTION, SOLUTION INTRAVENOUS PRN
Status: DISCONTINUED | OUTPATIENT
Start: 2025-07-08 | End: 2025-07-08 | Stop reason: SDUPTHER

## 2025-07-08 RX ORDER — ACETAMINOPHEN 650 MG/1
650 SUPPOSITORY RECTAL EVERY 6 HOURS PRN
Status: DISCONTINUED | OUTPATIENT
Start: 2025-07-08 | End: 2025-07-09 | Stop reason: HOSPADM

## 2025-07-08 RX ORDER — POTASSIUM CHLORIDE 7.45 MG/ML
10 INJECTION INTRAVENOUS PRN
Status: DISCONTINUED | OUTPATIENT
Start: 2025-07-08 | End: 2025-07-08 | Stop reason: SDUPTHER

## 2025-07-08 RX ORDER — OXYCODONE AND ACETAMINOPHEN 5; 325 MG/1; MG/1
2 TABLET ORAL EVERY 8 HOURS PRN
Refills: 0 | Status: DISCONTINUED | OUTPATIENT
Start: 2025-07-08 | End: 2025-07-09 | Stop reason: HOSPADM

## 2025-07-08 RX ORDER — METOPROLOL TARTRATE 25 MG/1
25 TABLET, FILM COATED ORAL 2 TIMES DAILY
Status: DISCONTINUED | OUTPATIENT
Start: 2025-07-08 | End: 2025-07-09 | Stop reason: HOSPADM

## 2025-07-08 RX ORDER — AMITRIPTYLINE HYDROCHLORIDE 100 MG/1
100 TABLET ORAL NIGHTLY
Status: DISCONTINUED | OUTPATIENT
Start: 2025-07-08 | End: 2025-07-09 | Stop reason: HOSPADM

## 2025-07-08 RX ORDER — POTASSIUM CHLORIDE 7.45 MG/ML
10 INJECTION INTRAVENOUS PRN
Status: DISCONTINUED | OUTPATIENT
Start: 2025-07-08 | End: 2025-07-09 | Stop reason: HOSPADM

## 2025-07-08 RX ORDER — BUDESONIDE AND FORMOTEROL FUMARATE DIHYDRATE 160; 4.5 UG/1; UG/1
2 AEROSOL RESPIRATORY (INHALATION) 2 TIMES DAILY
Status: DISCONTINUED | OUTPATIENT
Start: 2025-07-08 | End: 2025-07-09 | Stop reason: HOSPADM

## 2025-07-08 RX ORDER — METOLAZONE 5 MG/1
2.5 TABLET ORAL DAILY PRN
Status: DISCONTINUED | OUTPATIENT
Start: 2025-07-08 | End: 2025-07-08

## 2025-07-08 RX ORDER — DEXTROSE MONOHYDRATE, SODIUM CHLORIDE, AND POTASSIUM CHLORIDE 50; 1.49; 4.5 G/1000ML; G/1000ML; G/1000ML
INJECTION, SOLUTION INTRAVENOUS CONTINUOUS PRN
Status: DISCONTINUED | OUTPATIENT
Start: 2025-07-08 | End: 2025-07-09 | Stop reason: HOSPADM

## 2025-07-08 RX ORDER — ZOLPIDEM TARTRATE 5 MG/1
10 TABLET ORAL NIGHTLY
Status: DISCONTINUED | OUTPATIENT
Start: 2025-07-08 | End: 2025-07-09 | Stop reason: HOSPADM

## 2025-07-08 RX ORDER — ATORVASTATIN CALCIUM 40 MG/1
40 TABLET, FILM COATED ORAL NIGHTLY
Status: DISCONTINUED | OUTPATIENT
Start: 2025-07-08 | End: 2025-07-09 | Stop reason: HOSPADM

## 2025-07-08 RX ORDER — ALPRAZOLAM 0.5 MG
1 TABLET ORAL 3 TIMES DAILY PRN
Status: DISCONTINUED | OUTPATIENT
Start: 2025-07-08 | End: 2025-07-09 | Stop reason: HOSPADM

## 2025-07-08 RX ORDER — GABAPENTIN 100 MG/1
100 CAPSULE ORAL 3 TIMES DAILY
Status: DISCONTINUED | OUTPATIENT
Start: 2025-07-08 | End: 2025-07-09 | Stop reason: HOSPADM

## 2025-07-08 RX ORDER — SENNOSIDES 8.8 MG/5ML
5 LIQUID ORAL 2 TIMES DAILY PRN
Status: DISCONTINUED | OUTPATIENT
Start: 2025-07-08 | End: 2025-07-09 | Stop reason: HOSPADM

## 2025-07-08 RX ORDER — SODIUM CHLORIDE 0.9 % (FLUSH) 0.9 %
5-40 SYRINGE (ML) INJECTION EVERY 12 HOURS SCHEDULED
Status: DISCONTINUED | OUTPATIENT
Start: 2025-07-08 | End: 2025-07-09 | Stop reason: HOSPADM

## 2025-07-08 RX ORDER — BISACODYL 5 MG/1
5 TABLET, DELAYED RELEASE ORAL DAILY PRN
Status: DISCONTINUED | OUTPATIENT
Start: 2025-07-08 | End: 2025-07-09 | Stop reason: HOSPADM

## 2025-07-08 RX ORDER — ROPINIROLE 0.5 MG/1
0.5 TABLET, FILM COATED ORAL 3 TIMES DAILY
Status: DISCONTINUED | OUTPATIENT
Start: 2025-07-08 | End: 2025-07-09 | Stop reason: HOSPADM

## 2025-07-08 RX ORDER — GLUCAGON 1 MG/ML
1 KIT INJECTION PRN
Status: DISCONTINUED | OUTPATIENT
Start: 2025-07-08 | End: 2025-07-09 | Stop reason: HOSPADM

## 2025-07-08 RX ORDER — SODIUM CHLORIDE 9 MG/ML
INJECTION, SOLUTION INTRAVENOUS CONTINUOUS
Status: DISCONTINUED | OUTPATIENT
Start: 2025-07-08 | End: 2025-07-09 | Stop reason: HOSPADM

## 2025-07-08 RX ORDER — POTASSIUM CHLORIDE 1500 MG/1
40 TABLET, EXTENDED RELEASE ORAL PRN
Status: DISCONTINUED | OUTPATIENT
Start: 2025-07-08 | End: 2025-07-09 | Stop reason: HOSPADM

## 2025-07-08 RX ORDER — MAGNESIUM SULFATE IN WATER 40 MG/ML
2000 INJECTION, SOLUTION INTRAVENOUS PRN
Status: DISCONTINUED | OUTPATIENT
Start: 2025-07-08 | End: 2025-07-09 | Stop reason: HOSPADM

## 2025-07-08 RX ORDER — SODIUM CHLORIDE 0.9 % (FLUSH) 0.9 %
5-40 SYRINGE (ML) INJECTION PRN
Status: DISCONTINUED | OUTPATIENT
Start: 2025-07-08 | End: 2025-07-09 | Stop reason: HOSPADM

## 2025-07-08 RX ORDER — ONDANSETRON 2 MG/ML
4 INJECTION INTRAMUSCULAR; INTRAVENOUS EVERY 6 HOURS PRN
Status: DISCONTINUED | OUTPATIENT
Start: 2025-07-08 | End: 2025-07-09 | Stop reason: HOSPADM

## 2025-07-08 RX ORDER — LEVOTHYROXINE SODIUM 100 UG/1
200 TABLET ORAL DAILY
Status: DISCONTINUED | OUTPATIENT
Start: 2025-07-09 | End: 2025-07-09 | Stop reason: HOSPADM

## 2025-07-08 RX ORDER — SODIUM CHLORIDE 9 MG/ML
INJECTION, SOLUTION INTRAVENOUS PRN
Status: DISCONTINUED | OUTPATIENT
Start: 2025-07-08 | End: 2025-07-09 | Stop reason: HOSPADM

## 2025-07-08 RX ORDER — LISINOPRIL 2.5 MG/1
2.5 TABLET ORAL DAILY
Status: DISCONTINUED | OUTPATIENT
Start: 2025-07-09 | End: 2025-07-09 | Stop reason: HOSPADM

## 2025-07-08 RX ORDER — ACETAMINOPHEN 325 MG/1
650 TABLET ORAL EVERY 6 HOURS PRN
Status: DISCONTINUED | OUTPATIENT
Start: 2025-07-08 | End: 2025-07-09 | Stop reason: HOSPADM

## 2025-07-08 RX ORDER — DEXTROSE MONOHYDRATE 100 MG/ML
INJECTION, SOLUTION INTRAVENOUS CONTINUOUS PRN
Status: DISCONTINUED | OUTPATIENT
Start: 2025-07-08 | End: 2025-07-09 | Stop reason: HOSPADM

## 2025-07-08 RX ORDER — ONDANSETRON 4 MG/1
4 TABLET, ORALLY DISINTEGRATING ORAL EVERY 8 HOURS PRN
Status: DISCONTINUED | OUTPATIENT
Start: 2025-07-08 | End: 2025-07-09 | Stop reason: HOSPADM

## 2025-07-08 RX ADMIN — METOPROLOL TARTRATE 25 MG: 25 TABLET, FILM COATED ORAL at 21:03

## 2025-07-08 RX ADMIN — SODIUM CHLORIDE 3.2 UNITS/HR: 9 INJECTION, SOLUTION INTRAVENOUS at 15:33

## 2025-07-08 RX ADMIN — SODIUM CHLORIDE: 0.9 INJECTION, SOLUTION INTRAVENOUS at 15:23

## 2025-07-08 RX ADMIN — ZOLPIDEM TARTRATE 10 MG: 5 TABLET ORAL at 21:03

## 2025-07-08 RX ADMIN — ATORVASTATIN CALCIUM 40 MG: 40 TABLET, FILM COATED ORAL at 21:03

## 2025-07-08 RX ADMIN — BUDESONIDE AND FORMOTEROL FUMARATE DIHYDRATE 2 PUFF: 160; 4.5 AEROSOL RESPIRATORY (INHALATION) at 22:02

## 2025-07-08 RX ADMIN — SODIUM CHLORIDE, PRESERVATIVE FREE 10 ML: 5 INJECTION INTRAVENOUS at 21:03

## 2025-07-08 RX ADMIN — OXYCODONE AND ACETAMINOPHEN 2 TABLET: 5; 325 TABLET ORAL at 21:08

## 2025-07-08 RX ADMIN — POTASSIUM CHLORIDE, DEXTROSE MONOHYDRATE AND SODIUM CHLORIDE: 150; 5; 450 INJECTION, SOLUTION INTRAVENOUS at 22:44

## 2025-07-08 RX ADMIN — GABAPENTIN 100 MG: 100 CAPSULE ORAL at 20:53

## 2025-07-08 RX ADMIN — ROPINIROLE HYDROCHLORIDE 0.5 MG: 0.5 TABLET, FILM COATED ORAL at 14:51

## 2025-07-08 RX ADMIN — ROPINIROLE HYDROCHLORIDE 0.5 MG: 0.5 TABLET, FILM COATED ORAL at 20:53

## 2025-07-08 RX ADMIN — ALPRAZOLAM 1 MG: 0.5 TABLET ORAL at 21:03

## 2025-07-08 RX ADMIN — GABAPENTIN 100 MG: 100 CAPSULE ORAL at 14:51

## 2025-07-08 RX ADMIN — POTASSIUM CHLORIDE, DEXTROSE MONOHYDRATE AND SODIUM CHLORIDE: 150; 5; 450 INJECTION, SOLUTION INTRAVENOUS at 15:27

## 2025-07-08 RX ADMIN — AMITRIPTYLINE HYDROCHLORIDE 100 MG: 100 TABLET, FILM COATED ORAL at 20:53

## 2025-07-08 ASSESSMENT — PAIN SCALES - GENERAL
PAINLEVEL_OUTOF10: 7
PAINLEVEL_OUTOF10: 3
PAINLEVEL_OUTOF10: 3
PAINLEVEL_OUTOF10: 8

## 2025-07-08 ASSESSMENT — PAIN DESCRIPTION - LOCATION: LOCATION: HIP;GENERALIZED

## 2025-07-08 ASSESSMENT — PAIN DESCRIPTION - DESCRIPTORS: DESCRIPTORS: ACHING

## 2025-07-08 ASSESSMENT — PAIN DESCRIPTION - ORIENTATION: ORIENTATION: RIGHT;LEFT

## 2025-07-08 NOTE — PROGRESS NOTES
Patient is a direct admit. Arrived via ED in her wheelchair. Vitals taken. Telemetry initiated. Home medications reviewed. Blood sugar checked. IV access requested via IV Therapy Team, patient  in room. Patient with trach - cannot speak - utilizes white board to communicate. Patient denies need at this time.

## 2025-07-08 NOTE — H&P
Patient:  Flaquita Vyas  YOB: 1970  Date of Service: 7/8/2025  Admission Date:7/8/2025  Code Status: Full Code  NIHSS:      Assessment / Plan     Insulin-dependent type 2 diabetes: Elevated glucose levels due to high resistant diabetes.  A1c 11.6 on 9/9/2024.  Repeat A1c pending.  Started on insulin drip per ENT recommendations to prep for stoma revision surgery tomorrow.  BMPs every 4 with magnesium and phosphorus every 6.  Home med: Lantus 100 units twice daily, Humalog 15 units 3 times a day, metformin, Ozempic, Actos.  Resume home medications postsurgery when appropriate.  Dysphagia: Secondary to stoma and laryngectomy, history of TEP.  ENT following, plan for surgery tomorrow.  Holding aspirin and Plavix.  HFrEF/ICM/CAD status post MI: STEMI in 2016.  PCI to the RCA and LCx.  Last echo done on 12/13/2024 shows an EF of 40 to 45% with mild global hypokinesis.  Mild regurg in the mitral and tricuspid valve with mild to moderate stenosis of the aortic valve.  Takes Lasix and metolazone with GDMT including ACE, beta-blocker, SGLT2 inhibitor.  Repeat echo pending.  PAD: Left AKA in the past, on aspirin Plavix.  Holding in the setting of surgery.  Hyperlipidemia: Continue statin, continue eculizumab  Hypothyroidism: On Synthroid 200 mg daily, continued  MDD/insomnia: Continue amitriptyline and Ambien nightly with sertraline daily.  Xanax as needed  Diabetic neuropathy/nephropathy: On gabapentin 100 mg 3 times daily.  Holding Kerendia 20 mg daily.  Resume on discharge  RLS: Continue ropinirole  Pre-operative Cardiac Risk Assessment:   Reactions to anesthesia: none  History of excessive bleeding: none  History of blood clots: none  History of blood transfusions: none  Reactions to blood transfusion: none  Mallampati score: NA   I have reviewed recent diagnostic testing including labs, EKG and Chest Xray.  Please see EKG for interpretation.  The patient has a RCRI 4 risk of adverse outcomes with  (ZOLOFT) 50 MG tablet  12/26/24   Melvin Padron MD   HUMALOG KWIKPEN 100 UNIT/ML SOPN INJECT 15 UNITS INTO THE SKIN THREE TIMES DAILY BEFORE MEALS 12/19/24   Melvin Padron MD   Insulin Degludec (TRESIBA FLEXTOUCH SC) Inject 100 Units into the skin in the morning and at bedtime    Melvin Padron MD   pioglitazone (ACTOS) 45 MG tablet Take 1 tablet by mouth daily    Melvin Padron MD   amitriptyline (ELAVIL) 100 MG tablet Take 1 tablet by mouth nightly    Melvin Padron MD   prochlorperazine (COMPAZINE) 10 MG tablet Take 1 tablet by mouth every 6 hours as needed (as needed for nausea) 6/28/24   Katia Rodríguez PA-C   pregabalin (LYRICA) 150 MG capsule Take 1 capsule by mouth 2 times daily for 180 days. Max Daily Amount: 300 mg 3/20/24 5/14/25  Jazmin Gresham MD PhD   REPATHA SURECLICK 140 MG/ML SOAJ Inject 2 mLs as directed every 14 days 9/5/23   Melvin Padron MD   KERENDIA 20 MG TABS Take 20 mg by mouth daily 8/16/23   Melvin Padron MD   zolpidem (AMBIEN) 10 MG tablet TAKE 1 TABLET BY MOUTH AT NIGHT 11/8/22   Melvin Padron MD   oxyCODONE-acetaminophen (PERCOCET) 5-325 MG per tablet Take 2 tablets by mouth every 8 hours as needed for Pain.    Melvin Padron MD   clopidogrel (PLAVIX) 75 MG tablet Take 1 tablet by mouth daily 7/28/22   Melvin Padron MD   metFORMIN (GLUCOPHAGE) 500 MG tablet Take 2 tablets by mouth 2 times daily Resume 8/30  Patient taking differently: Take 2 tablets by mouth 2 times daily 8/29/16   Teddy Fischer MD   atorvastatin (LIPITOR) 20 MG tablet Take 2 tablets by mouth nightly 8/29/16   Teddy Fischer MD   lisinopril (PRINIVIL;ZESTRIL) 2.5 MG tablet Take 1 tablet by mouth daily 8/29/16   Teddy Fischer MD   metoprolol tartrate (LOPRESSOR) 25 MG tablet Take 1 tablet by mouth 2 times daily 8/29/16   Teddy Fischer MD   isosorbide dinitrate (ISORDIL) 20 MG tablet Take 1.5 tablets by mouth daily    Provider,

## 2025-07-09 ENCOUNTER — ANESTHESIA (OUTPATIENT)
Dept: OPERATING ROOM | Age: 55
End: 2025-07-09
Payer: MEDICARE

## 2025-07-09 ENCOUNTER — APPOINTMENT (OUTPATIENT)
Age: 55
End: 2025-07-09
Attending: STUDENT IN AN ORGANIZED HEALTH CARE EDUCATION/TRAINING PROGRAM
Payer: MEDICARE

## 2025-07-09 VITALS
DIASTOLIC BLOOD PRESSURE: 89 MMHG | BODY MASS INDEX: 38.07 KG/M2 | TEMPERATURE: 97 F | HEART RATE: 64 BPM | WEIGHT: 201.5 LBS | SYSTOLIC BLOOD PRESSURE: 152 MMHG | OXYGEN SATURATION: 100 % | RESPIRATION RATE: 20 BRPM

## 2025-07-09 LAB
ANION GAP SERPL CALC-SCNC: 12 MEQ/L (ref 8–16)
ANION GAP SERPL CALC-SCNC: 14 MEQ/L (ref 8–16)
ANION GAP SERPL CALC-SCNC: 15 MEQ/L (ref 8–16)
ANION GAP SERPL CALC-SCNC: 9 MEQ/L (ref 8–16)
BUN SERPL-MCNC: 16 MG/DL (ref 8–23)
BUN SERPL-MCNC: 18 MG/DL (ref 8–23)
BUN SERPL-MCNC: 18 MG/DL (ref 8–23)
BUN SERPL-MCNC: 19 MG/DL (ref 8–23)
CALCIUM SERPL-MCNC: 7.7 MG/DL (ref 8.6–10)
CALCIUM SERPL-MCNC: 7.9 MG/DL (ref 8.6–10)
CALCIUM SERPL-MCNC: 8.2 MG/DL (ref 8.6–10)
CALCIUM SERPL-MCNC: 8.2 MG/DL (ref 8.6–10)
CHLORIDE SERPL-SCNC: 103 MEQ/L (ref 98–111)
CHLORIDE SERPL-SCNC: 104 MEQ/L (ref 98–111)
CO2 SERPL-SCNC: 17 MEQ/L (ref 22–29)
CO2 SERPL-SCNC: 20 MEQ/L (ref 22–29)
CO2 SERPL-SCNC: 22 MEQ/L (ref 22–29)
CO2 SERPL-SCNC: 23 MEQ/L (ref 22–29)
CREAT SERPL-MCNC: 1 MG/DL (ref 0.5–0.9)
CREAT SERPL-MCNC: 1.1 MG/DL (ref 0.5–0.9)
DEPRECATED RDW RBC AUTO: 60.6 FL (ref 35–45)
ECHO AV CUSP MM: 1.5 CM
ECHO LA AREA 2C: 20.8 CM2
ECHO LA AREA 4C: 20.1 CM2
ECHO LA DIAMETER: 4.5 CM
ECHO LA MAJOR AXIS: 5.4 CM
ECHO LA MINOR AXIS: 5.3 CM
ECHO LA VOL BP: 62 ML (ref 22–52)
ECHO LA VOL MOD A2C: 64 ML (ref 22–52)
ECHO LA VOL MOD A4C: 60 ML (ref 22–52)
ECHO LV EF PHYSICIAN: 40 %
ECHO LV FRACTIONAL SHORTENING: 23 % (ref 28–44)
ECHO LV INTERNAL DIMENSION DIASTOLIC: 5.2 CM (ref 3.9–5.3)
ECHO LV INTERNAL DIMENSION SYSTOLIC: 4 CM
ECHO LV IVSD: 1 CM (ref 0.6–0.9)
ECHO LV MASS 2D: 194.2 G (ref 67–162)
ECHO LV POSTERIOR WALL DIASTOLIC: 1 CM (ref 0.6–0.9)
ECHO LV RELATIVE WALL THICKNESS RATIO: 0.38
ECHO RV INTERNAL DIMENSION: 2.7 CM
ERYTHROCYTE [DISTWIDTH] IN BLOOD BY AUTOMATED COUNT: 16.4 % (ref 11.5–14.5)
GFR SERPL CREATININE-BSD FRML MDRD: 60 ML/MIN/1.73M2
GFR SERPL CREATININE-BSD FRML MDRD: 67 ML/MIN/1.73M2
GLUCOSE BLD STRIP.AUTO-MCNC: 108 MG/DL (ref 70–108)
GLUCOSE BLD STRIP.AUTO-MCNC: 115 MG/DL (ref 70–108)
GLUCOSE BLD STRIP.AUTO-MCNC: 125 MG/DL (ref 70–108)
GLUCOSE BLD STRIP.AUTO-MCNC: 128 MG/DL (ref 70–108)
GLUCOSE BLD STRIP.AUTO-MCNC: 155 MG/DL (ref 70–108)
GLUCOSE BLD STRIP.AUTO-MCNC: 157 MG/DL (ref 70–108)
GLUCOSE BLD STRIP.AUTO-MCNC: 160 MG/DL (ref 70–108)
GLUCOSE BLD STRIP.AUTO-MCNC: 160 MG/DL (ref 70–108)
GLUCOSE BLD STRIP.AUTO-MCNC: 164 MG/DL (ref 70–108)
GLUCOSE BLD STRIP.AUTO-MCNC: 169 MG/DL (ref 70–108)
GLUCOSE BLD STRIP.AUTO-MCNC: 170 MG/DL (ref 70–108)
GLUCOSE BLD STRIP.AUTO-MCNC: 214 MG/DL (ref 70–108)
GLUCOSE BLD STRIP.AUTO-MCNC: 217 MG/DL (ref 70–108)
GLUCOSE BLD STRIP.AUTO-MCNC: 278 MG/DL (ref 70–108)
GLUCOSE SERPL-MCNC: 137 MG/DL (ref 74–109)
GLUCOSE SERPL-MCNC: 154 MG/DL (ref 74–109)
GLUCOSE SERPL-MCNC: 166 MG/DL (ref 74–109)
GLUCOSE SERPL-MCNC: 191 MG/DL (ref 74–109)
HCT VFR BLD AUTO: 36.5 % (ref 37–47)
HGB BLD-MCNC: 11.5 GM/DL (ref 12–16)
MAGNESIUM SERPL-MCNC: 1.9 MG/DL (ref 1.6–2.6)
MAGNESIUM SERPL-MCNC: 1.9 MG/DL (ref 1.6–2.6)
MAGNESIUM SERPL-MCNC: 2.1 MG/DL (ref 1.6–2.6)
MCH RBC QN AUTO: 31.2 PG (ref 26–33)
MCHC RBC AUTO-ENTMCNC: 31.5 GM/DL (ref 32.2–35.5)
MCV RBC AUTO: 98.9 FL (ref 81–99)
PHOSPHATE SERPL-MCNC: 3.1 MG/DL (ref 2.5–4.5)
PHOSPHATE SERPL-MCNC: 3.4 MG/DL (ref 2.5–4.5)
PHOSPHATE SERPL-MCNC: 3.5 MG/DL (ref 2.5–4.5)
PLATELET # BLD AUTO: 287 THOU/MM3 (ref 130–400)
PMV BLD AUTO: 10.8 FL (ref 9.4–12.4)
POTASSIUM SERPL-SCNC: 4 MEQ/L (ref 3.5–5.2)
POTASSIUM SERPL-SCNC: 4.2 MEQ/L (ref 3.5–5.2)
POTASSIUM SERPL-SCNC: 4.6 MEQ/L (ref 3.5–5.2)
POTASSIUM SERPL-SCNC: 4.9 MEQ/L (ref 3.5–5.2)
RBC # BLD AUTO: 3.69 MILL/MM3 (ref 4.2–5.4)
REASON FOR REJECTION: NORMAL
REJECTED TEST: NORMAL
SODIUM SERPL-SCNC: 135 MEQ/L (ref 135–145)
SODIUM SERPL-SCNC: 135 MEQ/L (ref 135–145)
SODIUM SERPL-SCNC: 137 MEQ/L (ref 135–145)
SODIUM SERPL-SCNC: 138 MEQ/L (ref 135–145)
WBC # BLD AUTO: 6.8 THOU/MM3 (ref 4.8–10.8)

## 2025-07-09 PROCEDURE — 6360000002 HC RX W HCPCS: Performed by: REGISTERED NURSE

## 2025-07-09 PROCEDURE — 3700000001 HC ADD 15 MINUTES (ANESTHESIA): Performed by: OTOLARYNGOLOGY

## 2025-07-09 PROCEDURE — G0378 HOSPITAL OBSERVATION PER HR: HCPCS

## 2025-07-09 PROCEDURE — 2580000003 HC RX 258: Performed by: REGISTERED NURSE

## 2025-07-09 PROCEDURE — APPNB45 APP NON BILLABLE 31-45 MINUTES: Performed by: NURSE PRACTITIONER

## 2025-07-09 PROCEDURE — 93307 TTE W/O DOPPLER COMPLETE: CPT | Performed by: INTERNAL MEDICINE

## 2025-07-09 PROCEDURE — C8923 2D TTE W OR W/O FOL W/CON,CO: HCPCS

## 2025-07-09 PROCEDURE — 7100000001 HC PACU RECOVERY - ADDTL 15 MIN: Performed by: OTOLARYNGOLOGY

## 2025-07-09 PROCEDURE — L8509 TRACH-ESOPH VOICE PROS MD IN: HCPCS | Performed by: OTOLARYNGOLOGY

## 2025-07-09 PROCEDURE — 3600000004 HC SURGERY LEVEL 4 BASE: Performed by: OTOLARYNGOLOGY

## 2025-07-09 PROCEDURE — 3600000014 HC SURGERY LEVEL 4 ADDTL 15MIN: Performed by: OTOLARYNGOLOGY

## 2025-07-09 PROCEDURE — 6360000002 HC RX W HCPCS: Performed by: OTOLARYNGOLOGY

## 2025-07-09 PROCEDURE — 6370000000 HC RX 637 (ALT 250 FOR IP)

## 2025-07-09 PROCEDURE — C1726 CATH, BAL DIL, NON-VASCULAR: HCPCS | Performed by: OTOLARYNGOLOGY

## 2025-07-09 PROCEDURE — 6360000004 HC RX CONTRAST MEDICATION: Performed by: INTERNAL MEDICINE

## 2025-07-09 PROCEDURE — 31611 CONSTJ TRACHESOPHGL FSTL: CPT | Performed by: OTOLARYNGOLOGY

## 2025-07-09 PROCEDURE — 3700000000 HC ANESTHESIA ATTENDED CARE: Performed by: OTOLARYNGOLOGY

## 2025-07-09 PROCEDURE — 2709999900 HC NON-CHARGEABLE SUPPLY: Performed by: OTOLARYNGOLOGY

## 2025-07-09 PROCEDURE — 36415 COLL VENOUS BLD VENIPUNCTURE: CPT

## 2025-07-09 PROCEDURE — 84100 ASSAY OF PHOSPHORUS: CPT

## 2025-07-09 PROCEDURE — 2500000003 HC RX 250 WO HCPCS

## 2025-07-09 PROCEDURE — 82948 REAGENT STRIP/BLOOD GLUCOSE: CPT

## 2025-07-09 PROCEDURE — 43191 ESOPHAGOSCOPY RIGID TRNSO DX: CPT | Performed by: OTOLARYNGOLOGY

## 2025-07-09 PROCEDURE — 94669 MECHANICAL CHEST WALL OSCILL: CPT

## 2025-07-09 PROCEDURE — 99233 SBSQ HOSP IP/OBS HIGH 50: CPT | Performed by: PHYSICIAN ASSISTANT

## 2025-07-09 PROCEDURE — 85027 COMPLETE CBC AUTOMATED: CPT

## 2025-07-09 PROCEDURE — 83735 ASSAY OF MAGNESIUM: CPT

## 2025-07-09 PROCEDURE — 80048 BASIC METABOLIC PNL TOTAL CA: CPT

## 2025-07-09 PROCEDURE — 7100000000 HC PACU RECOVERY - FIRST 15 MIN: Performed by: OTOLARYNGOLOGY

## 2025-07-09 DEVICE — PROVOX VEGA VOICE PROSTHESIS IS AN INDWELLING, LOW RESISTANCE VOICE PROSTHESIS INTENDED FOR VOICE RESTORATION AFTER SURGICAL REMOVAL OF THE LARYNX.THE PROVOX INSERTION SYSTEM IS A SINGLE-USE REPLACEMENT DEVICE INTENDED FOR ANTEROGRADE INSERTION ONLY.
Type: IMPLANTABLE DEVICE | Status: FUNCTIONAL
Brand: PROVOX VEGA 22.5FR 15MM

## 2025-07-09 RX ORDER — CEFAZOLIN SODIUM 1 G/3ML
INJECTION, POWDER, FOR SOLUTION INTRAMUSCULAR; INTRAVENOUS
Status: DISCONTINUED | OUTPATIENT
Start: 2025-07-09 | End: 2025-07-09 | Stop reason: SDUPTHER

## 2025-07-09 RX ORDER — ONDANSETRON 2 MG/ML
INJECTION INTRAMUSCULAR; INTRAVENOUS
Status: DISCONTINUED | OUTPATIENT
Start: 2025-07-09 | End: 2025-07-09 | Stop reason: SDUPTHER

## 2025-07-09 RX ORDER — SODIUM CHLORIDE 9 MG/ML
INJECTION, SOLUTION INTRAVENOUS
Status: DISCONTINUED | OUTPATIENT
Start: 2025-07-09 | End: 2025-07-09 | Stop reason: SDUPTHER

## 2025-07-09 RX ORDER — INSULIN GLARGINE 100 [IU]/ML
100 INJECTION, SOLUTION SUBCUTANEOUS 2 TIMES DAILY
Status: DISCONTINUED | OUTPATIENT
Start: 2025-07-09 | End: 2025-07-09 | Stop reason: HOSPADM

## 2025-07-09 RX ORDER — FENTANYL CITRATE 50 UG/ML
INJECTION, SOLUTION INTRAMUSCULAR; INTRAVENOUS
Status: DISCONTINUED | OUTPATIENT
Start: 2025-07-09 | End: 2025-07-09 | Stop reason: SDUPTHER

## 2025-07-09 RX ORDER — LIDOCAINE HYDROCHLORIDE 20 MG/ML
INJECTION, SOLUTION INTRAVENOUS
Status: DISCONTINUED | OUTPATIENT
Start: 2025-07-09 | End: 2025-07-09 | Stop reason: SDUPTHER

## 2025-07-09 RX ORDER — INSULIN LISPRO 100 [IU]/ML
0-16 INJECTION, SOLUTION INTRAVENOUS; SUBCUTANEOUS
Status: DISCONTINUED | OUTPATIENT
Start: 2025-07-09 | End: 2025-07-09 | Stop reason: HOSPADM

## 2025-07-09 RX ORDER — PHENYLEPHRINE HCL IN 0.9% NACL 1 MG/10 ML
SYRINGE (ML) INTRAVENOUS
Status: DISCONTINUED | OUTPATIENT
Start: 2025-07-09 | End: 2025-07-09 | Stop reason: SDUPTHER

## 2025-07-09 RX ORDER — EPINEPHRINE 1 MG/ML(1)
AMPUL (ML) INJECTION PRN
Status: DISCONTINUED | OUTPATIENT
Start: 2025-07-09 | End: 2025-07-09 | Stop reason: ALTCHOICE

## 2025-07-09 RX ORDER — PROPOFOL 10 MG/ML
INJECTION, EMULSION INTRAVENOUS
Status: DISCONTINUED | OUTPATIENT
Start: 2025-07-09 | End: 2025-07-09 | Stop reason: SDUPTHER

## 2025-07-09 RX ADMIN — Medication 60 MG: at 14:48

## 2025-07-09 RX ADMIN — LEVOTHYROXINE SODIUM 200 MCG: 0.1 TABLET ORAL at 05:09

## 2025-07-09 RX ADMIN — ONDANSETRON 4 MG: 2 INJECTION, SOLUTION INTRAMUSCULAR; INTRAVENOUS at 14:51

## 2025-07-09 RX ADMIN — Medication 100 MCG: at 14:59

## 2025-07-09 RX ADMIN — SODIUM CHLORIDE: 9 INJECTION, SOLUTION INTRAVENOUS at 14:40

## 2025-07-09 RX ADMIN — SODIUM CHLORIDE, PRESERVATIVE FREE 10 ML: 5 INJECTION INTRAVENOUS at 10:18

## 2025-07-09 RX ADMIN — LISINOPRIL 2.5 MG: 2.5 TABLET ORAL at 10:20

## 2025-07-09 RX ADMIN — POTASSIUM CHLORIDE, DEXTROSE MONOHYDRATE AND SODIUM CHLORIDE: 150; 5; 450 INJECTION, SOLUTION INTRAVENOUS at 05:12

## 2025-07-09 RX ADMIN — POTASSIUM CHLORIDE, DEXTROSE MONOHYDRATE AND SODIUM CHLORIDE: 150; 5; 450 INJECTION, SOLUTION INTRAVENOUS at 13:09

## 2025-07-09 RX ADMIN — CEFAZOLIN 2 G: 1 INJECTION, POWDER, FOR SOLUTION INTRAMUSCULAR; INTRAVENOUS at 14:51

## 2025-07-09 RX ADMIN — PROPOFOL 30 MG: 10 INJECTION, EMULSION INTRAVENOUS at 15:13

## 2025-07-09 RX ADMIN — Medication 200 MCG: at 15:07

## 2025-07-09 RX ADMIN — METOPROLOL TARTRATE 25 MG: 25 TABLET, FILM COATED ORAL at 10:20

## 2025-07-09 RX ADMIN — PROPOFOL 150 MG: 10 INJECTION, EMULSION INTRAVENOUS at 14:48

## 2025-07-09 RX ADMIN — FENTANYL CITRATE 50 MCG: 50 INJECTION, SOLUTION INTRAMUSCULAR; INTRAVENOUS at 15:18

## 2025-07-09 RX ADMIN — SULFUR HEXAFLUORIDE 2 ML: KIT at 09:59

## 2025-07-09 ASSESSMENT — PAIN DESCRIPTION - DESCRIPTORS: DESCRIPTORS: SORE

## 2025-07-09 ASSESSMENT — PAIN SCALES - WONG BAKER
WONGBAKER_NUMERICALRESPONSE: NO HURT
WONGBAKER_NUMERICALRESPONSE: HURTS A LITTLE BIT
WONGBAKER_NUMERICALRESPONSE: NO HURT
WONGBAKER_NUMERICALRESPONSE: HURTS A LITTLE BIT

## 2025-07-09 ASSESSMENT — PAIN DESCRIPTION - FREQUENCY: FREQUENCY: CONTINUOUS

## 2025-07-09 ASSESSMENT — PAIN SCALES - GENERAL
PAINLEVEL_OUTOF10: 2
PAINLEVEL_OUTOF10: 2
PAINLEVEL_OUTOF10: 0
PAINLEVEL_OUTOF10: 2
PAINLEVEL_OUTOF10: 3
PAINLEVEL_OUTOF10: 3
PAINLEVEL_OUTOF10: 2

## 2025-07-09 ASSESSMENT — PAIN - FUNCTIONAL ASSESSMENT: PAIN_FUNCTIONAL_ASSESSMENT: NONE - DENIES PAIN

## 2025-07-09 ASSESSMENT — PAIN DESCRIPTION - PAIN TYPE: TYPE: SURGICAL PAIN

## 2025-07-09 ASSESSMENT — PAIN DESCRIPTION - LOCATION: LOCATION: THROAT

## 2025-07-09 ASSESSMENT — PAIN DESCRIPTION - ONSET: ONSET: ON-GOING

## 2025-07-09 NOTE — CARE COORDINATION
Case Management Assessment Initial Evaluation    Date/Time of Evaluation: 7/9/2025 7:10 AM  Assessment Completed by: Bri Mera RN    If patient is discharged prior to next notation, then this note serves as note for discharge by case management.    Patient Name: Flaquita Vyas                   YOB: 1970  Diagnosis: Uncontrolled other specified diabetes mellitus with hyperglycemia (HCC) [E13.65]                   Date / Time: 7/8/2025 10:08 AM  Location: [address] [place] [address]     Patient Admission Status: Observation   Readmission Risk Low 0-14, Mod 15-19), High > 20: Readmission Risk Score: 17.6    Current PCP: Alberto Tang MD  Health Care Decision Makers:   Primary Decision Maker: Abdirashid Vyas - Child - [phone]    Additional Case Management Notes: Here as direct admit in observation for OR today. ENT consulted and planning OR today. Admitted for glucose control prior to OR. Blood sugar high of 278, this am 160. Acapella. IS. IVF with KCL, insulin gtt.     Procedures:   7/9 OR with Dr. Coughlin: Esophagoscopy rigid with TEP placement planned.   7/9 Echo: ordered.     Imaging: None    Patient Goals/Plan/Treatment Preferences: Spoke with pt and S.O. Pt does not speak but was able to shake and nod her head to answer questions. Pt lives at home alone. She has trach supplies including suction. She has a power wheelchair and manual wheelchair. Monitor for discharge needs after surgery.        07/09/25 1033   Service Assessment   Patient Orientation Alert and Oriented   Cognition Alert   History Provided By Patient;Friend   Primary Caregiver Self   Accompanied By/Relationship S.O.   Support Systems Spouse/Significant Other;Children   Patient's Healthcare Decision Maker is: Patient Declined (Legal Next of Kin Remains as Decision Maker)   PCP Verified by CM Yes   Last Visit to PCP Within last 3 months   Prior Functional Level Independent in ADLs/IADLs   Current Functional Level Independent in ADLs/IADLs

## 2025-07-09 NOTE — FLOWSHEET NOTE
Patient is leaving AMA.  We explained the risks she & her boyfriend are insisting that she is leaving.  Hospitalist & ENT notified.  AMA paper signed.  IV removed.

## 2025-07-09 NOTE — PLAN OF CARE
Problem: Chronic Conditions and Co-morbidities  Goal: Patient's chronic conditions and co-morbidity symptoms are monitored and maintained or improved  Outcome: Progressing  Flowsheets (Taken 7/9/2025 0110)  Care Plan - Patient's Chronic Conditions and Co-Morbidity Symptoms are Monitored and Maintained or Improved:   Monitor and assess patient's chronic conditions and comorbid symptoms for stability, deterioration, or improvement   Collaborate with multidisciplinary team to address chronic and comorbid conditions and prevent exacerbation or deterioration     Problem: Discharge Planning  Goal: Discharge to home or other facility with appropriate resources  Outcome: Progressing  Flowsheets (Taken 7/9/2025 0110)  Discharge to home or other facility with appropriate resources:   Identify barriers to discharge with patient and caregiver   Arrange for needed discharge resources and transportation as appropriate     Problem: Safety - Adult  Goal: Free from fall injury  Flowsheets (Taken 7/9/2025 0110)  Free From Fall Injury:   Instruct family/caregiver on patient safety   Based on caregiver fall risk screen, instruct family/caregiver to ask for assistance with transferring infant if caregiver noted to have fall risk factors     Problem: Safety - Adult  Goal: Free from fall injury  Flowsheets (Taken 7/9/2025 0110)  Free From Fall Injury:   Instruct family/caregiver on patient safety   Based on caregiver fall risk screen, instruct family/caregiver to ask for assistance with transferring infant if caregiver noted to have fall risk factors     Problem: Pain  Goal: Verbalizes/displays adequate comfort level or baseline comfort level  Flowsheets (Taken 7/9/2025 0110)  Verbalizes/displays adequate comfort level or baseline comfort level:   Encourage patient to monitor pain and request assistance   Assess pain using appropriate pain scale

## 2025-07-09 NOTE — PLAN OF CARE
Problem: Chronic Conditions and Co-morbidities  Goal: Patient's chronic conditions and co-morbidity symptoms are monitored and maintained or improved  7/9/2025 1225 by Sade Starks RN  Outcome: Progressing  Flowsheets (Taken 7/9/2025 0110 by Antnoio Hernandez RN)  Care Plan - Patient's Chronic Conditions and Co-Morbidity Symptoms are Monitored and Maintained or Improved:   Monitor and assess patient's chronic conditions and comorbid symptoms for stability, deterioration, or improvement   Collaborate with multidisciplinary team to address chronic and comorbid conditions and prevent exacerbation or deterioration  Note: Glucose checked & covered per physician's orders.       Problem: Discharge Planning  Goal: Discharge to home or other facility with appropriate resources  7/9/2025 1225 by Sade Starks RN  Outcome: Progressing  Flowsheets (Taken 7/9/2025 0110 by Antonio Hernandez, GENTRY)  Discharge to home or other facility with appropriate resources:   Identify barriers to discharge with patient and caregiver   Arrange for needed discharge resources and transportation as appropriate  Note: She is from home alone and plans on returning there at discharge.       Problem: Safety - Adult  Goal: Free from fall injury  7/9/2025 1225 by Sade Starks RN  Outcome: Progressing  Flowsheets (Taken 7/9/2025 0110 by Antonio Hernadnez, RN)  Free From Fall Injury:   Instruct family/caregiver on patient safety   Based on caregiver fall risk screen, instruct family/caregiver to ask for assistance with transferring infant if caregiver noted to have fall risk factors  Note: Bed locked & in low position, call light in reach, side-rails up x2, bed/chair alarm utilized, non-slip socks on when ambulating, reminded patient to use call light to call for assistance.       Problem: Pain  Goal: Verbalizes/displays adequate comfort level or baseline comfort level  7/9/2025 1225 by Sade Starks RN  Outcome: Progressing  Flowsheets (Taken 7/9/2025 0110 by

## 2025-07-09 NOTE — ANESTHESIA PRE PROCEDURE
Department of Anesthesiology  Preprocedure Note       Name:  Flaquita Vyas   Age:  55 y.o.  :  1970                                          MRN:  [id number]         Date:  2025      Surgeon: Surgeon(s):  Colt Coughlin MD    Procedure: Procedure(s):  ESOPHAGOSCOPY RIGID with TEP placement    Medications prior to admission:   Prior to Admission medications    Medication Sig Start Date End Date Taking? Authorizing Provider   ALPRAZolam (XANAX) 1 MG tablet Take 1 tablet by mouth as needed for Anxiety.   Yes Provider, MD Melvin   calcium citrate (CALCITRATE) 250 MG TABS tablet Take 2 tablets by mouth daily 24  Yes Provider, MD Melvin   VRAYLAR 6 MG CAPS capsule  24  Yes Provider, MD Melvin   cetirizine (ZYRTEC) 10 MG tablet  24  Yes Provider, MD Melvin   Continuous Glucose Transmitter (DEXCOM G6 TRANSMITTER) MISC  24  Yes Provider, MD Melvin   JARDIANCE 25 MG tablet Take 1 tablet by mouth every morning 24  Yes Provider, MD Melvin   ferrous gluconate (FERGON) 324 (38 Fe) MG tablet Take 1 tablet by mouth daily 24  Yes Provider, MD Melvin   gabapentin (NEURONTIN) 300 MG capsule  24  Yes Provider, MD Melvin   Gauze Pads & Dressings (GAUZE PADS 2\"X2\") 2\"X2\" PADS  24  Yes Provider, MD Melvin   levothyroxine (SYNTHROID) 200 MCG tablet  24  Yes Provider, MD Melvin   oxyCODONE (ROXICODONE) 5 MG/5ML solution  24  Yes Provider, MD Melvin   rOPINIRole (REQUIP) 0.5 MG tablet  24  Yes Provider, MD Melvin   OZEMPIC, 0.25 OR 0.5 MG/DOSE, 2 MG/3ML SOPN INJECT 0.5MG SUBCUTANEOUSLY ONCE A WEEK ON THE SAME DAY EACH WEEK 24  Yes Provider, MD Melvni   HUMALOG KWIKPEN 100 UNIT/ML SOPN INJECT 15 UNITS INTO THE SKIN THREE TIMES DAILY BEFORE MEALS 24  Yes Provider, MD Melvin   Insulin Degludec (TRESIBA FLEXTOUCH SC) Inject 100 Units into the skin in the morning and at bedtime   Yes

## 2025-07-09 NOTE — H&P
Adapted from prior ENT note:    Hx of total laryngectomy; Alaryngeal voice   Hx uncontrolled type II DM with recent hyperglycemia 400-500s; admitted yesterday under Hospitalist care for control of glucose levels, which are currently in the mid-100 range on insulin drip.  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       Past Surgical History:   Procedure Laterality Date    ANGIOPLASTY Left 2014    left leg    CARDIAC PROCEDURE Left 2024    Left heart cath / coronary angiography performed by Isis Colindres MD at [facility] CARDIAC CATH LAB     SECTION  1998    CORONARY ANGIOPLASTY WITH STENT PLACEMENT      [facility]    CORONARY ANGIOPLASTY WITH STENT PLACEMENT      3 stents in [place]    CORONARY ANGIOPLASTY WITH STENT PLACEMENT  2016    debbi, 1 stent to RCA    CT NEEDLE BIOPSY LUNG PERCUTANEOUS W IMAGING GUIDANCE  2022    CT NEEDLE BIOPSY LUNG PERCUTANEOUS 2022 [facility] CT SCAN    ESOPHAGOGASTRODUODENOSCOPY  10/06/2022    Annika Laporte    GASTROSTOMY TUBE PLACEMENT N/A 2022    EGD Peg Tube Placement possible Open G Tube performed by Alejandro Harry MD at [facility] OR    LARYNGOSCOPY N/A 2025    TEP placement, with balloon dilation of esophageal inlet performed by Colt Coughlin MD at [facility] OR    LARYNGOSCOPY N/A 2025    TEP placement performed by Colt Coughlin MD at [facility] OR    LARYNGOSCOPY N/A 2025    Construction of TEP prosthesis performed by Colt Coughlin MD at [facility] OR    MOUTH SURGERY N/A

## 2025-07-09 NOTE — OP NOTE
Operative Note      Patient: Flaquita Vyas  YOB: 1970  MRN: [id number]    Date of Procedure: 7/9/2025    Pre-Op Diagnosis Codes:      * History of laryngectomy [Z90.02]     * Aphonia [R49.1]     * Cancer, epiglottis (HCC) [C32.1]    Post-Op Diagnosis: Same       Procedure(s):  ESOPHAGOSCOPY RIGID with TEP placement    Surgeon(s):  Colt Coughlin MD    Assistant:   * No surgical staff found *    Anesthesia: General    Estimated Blood Loss (mL): less than 50     Complications: None    Specimens:   * No specimens in log *    Implants:  Implant Name Type Inv. Item Serial No.  Lot No. LRB No. Used Action   PROSTHESIS VOICE 22.5FR 15MM PROVOX POWELL - [id number]  PROSTHESIS VOICE 22.5FR 15MM PROVOX POWELL  [employer] [id number] N/A 1 Implanted         Drains: * No LDAs found *    Findings:  Infection Present At Time Of Surgery (PATOS) (choose all levels that have infection present):  No infection present  Other Findings: 1.  12 mm TEP was mostly in the esophageal inlet with very little of the flange within the fistula track  2.  Removed it and had a generous amount of bleeding requiring topical epinephrine for hemostasis  3 a 15 mm TEP was placed in its prosthesis insertion device and passed then from the trachea side into the esophagus until the outer flange was mostly within the tract.  I then pulled it back and was able to seat the flange circumferentially.  4.  Blood was suctioned out of the tracheobronchial tree and bleeding appeared to be stopped    Detailed Description of Procedure:   The patient was taken the operating waken placed in supine position.  General anesthesia was induced and the patient was intubated through her stoma (patient neck breather) without difficulty or vital sign instability.  The table was turned 90 degrees and the patient was draped in usual fashion for transoral and transabdominal surgery.  A timeout was employed verifying the patient's identity and planned

## 2025-07-09 NOTE — PROGRESS NOTES
1551 pt arrived to pacu, awakens to voice. O2 sats low, encouraged pt to take deep breaths. Site CDI. Pt denies pain at this time  1554 accucheck 115. Dr Coughlin at bedside  1556 placed pt on 6L trach mask. O2 easily increases to 99%. VSS at this time  1605 pt states a little pain but tolerable. VSS  1615 pt resting, resp easy. VSS  1621 pt meets criteria for discharge from pacu at this time.   1641 Pt transported to [facility] in stable condition

## 2025-07-09 NOTE — ANESTHESIA POSTPROCEDURE EVALUATION
Department of Anesthesiology  Postprocedure Note    Patient: Flaquita Vyas  MRN: [id number]  YOB: 1970  Date of evaluation: 7/9/2025    Procedure Summary       Date: 07/09/25 Room / Location: [facility] OR 03 / [facility] OR    Anesthesia Start: 1440 Anesthesia Stop: 1557    Procedure: ESOPHAGOSCOPY RIGID with TEP placement Diagnosis:       History of laryngectomy      Aphonia      Cancer, epiglottis (HCC)      (History of laryngectomy [Z90.02])      (Aphonia [R49.1])      (Cancer, epiglottis (HCC) [C32.1])    Surgeons: Colt Coughlin MD Responsible Provider: Octavio Michael MD    Anesthesia Type: general ASA Status: 4            Anesthesia Type: No value filed.    Sena Phase I: Snea Score: 8    Sena Phase II:      Anesthesia Post Evaluation    Patient location during evaluation: PACU  Patient participation: complete - patient participated  Level of consciousness: awake and alert  Airway patency: patent  Nausea & Vomiting: no nausea  Cardiovascular status: blood pressure returned to baseline and hemodynamically stable  Respiratory status: acceptable and spontaneous ventilation  Hydration status: euvolemic  Pain management: adequate    No notable events documented.

## 2025-07-09 NOTE — PROGRESS NOTES
Hospitalist Progress Note      Patient:  Flaquita Vyas 55 y.o. 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  Planning for esophagoscopy rigid with TEP placement 2025.  Admitted for perioperative blood sugar management  Hold thinners  Uncontrolled type 2 diabetes  A1c 10.6  Placed on insulin drip on admission due to uncontrolled hyperglycemia.  Discontinue postoperatively and transition to Lantus 100 units twice daily with high dose SSI   Continue to hold metformin, Ozempic, Actos-resume on discharge   POCT ACHS, hypoglycemia protocol      Chronic Conditions (reviewed and stable unless otherwise stated)  Diabetic neuropathy/nephropathy  Gabapentin 100 mg 3 times daily.  Kerendia 20 mg daily held.  Resume on discharge  RLS-continue ropinirole  PAD status post left AKA  Aspirin Plavix on hold.  Resume postoperatively when okay with ENT  Hyperlipidemia-statin, includes MI  Hypothyroidism-Synthroid  MDD/insomnia-Home regimen is amitriptyline and Ambien, sertraline, Xanax as needed  HFrEF secondary to ischemic cardiomyopathy with moderate aortic stenosis  History of PCI to the RCA and LCx in 2016  Echo 2024 with an EF of 40 to 45% with mild regurg in the mitral and tricuspid valves and moderate stenosis in the aortic valve.  Stable findings on echo 2025  GDMT includes Lasix, metolazone, ACE, beta-blocker, SGLT.      Expected discharge date:  1-2 days   Disposition: home?      Code status: Full Code     ===================================================================    Chief Complaint: Hyperglycemia with need for perioperative blood sugar management  Subjective (past 24 hours):   Patient is drowsy today.  However is alert and oriented.  She has aphonia so utilizes dry erase board to communicate.  States she is having throat pain but denies any other

## 2025-07-14 ENCOUNTER — TELEPHONE (OUTPATIENT)
Dept: ENT CLINIC | Age: 55
End: 2025-07-14

## 2025-07-14 NOTE — TELEPHONE ENCOUNTER
Ladarius called today to apologize and explain why Flaquita had to leave the hospital before nurses discharged her. He explains she was under the impression she was being admitted the day before her surgery to help control her sugars via IV. She was still hopeful to have surgery that same day but was informed that it would likely remain to be done the following day.      Flaquita is dealing with a lot of issues other than herself currently. She agreed to come in the day before surgery but expected to be discharged after her TEP placement the next day.  (Per Ladarius, apparently they wanted to keep her and run more testing but she did not stay.)    Both of Flaquita's parents are in the hospital at [facility], her mother with low oxygen and breathing issues and her dad is in critical condition and expected to pass any minute, she was so afraid of being in [facility]'s running tests when her father was expected to pass, so she elected to leave to be with him.    They left [facility] and went to be with her parents at [facility]. Not to be disrespectful to Dr Coughlin or [facility] but because of her parents.  Ladarius just wanted Dr Coughlin to know that.     Also, they went to the pharmacy and there were no prescriptions for her. Ladarius is concerned that she was supposed to be on an antibiotic after surgery last week 7/9/25. Did she still need to be on something?  He said she seems to be doing good as far as the TEP surgery itself.    She has a follow up next Monday 7/21/25.    Please advise.

## 2025-07-16 NOTE — TELEPHONE ENCOUNTER
Spoke to Gene, to be sure he knew Dr Coughlin called in that antibiotic since it did not get to the pharmacy for whatever reason. He will let Flaquita know it is at [facility].

## 2025-07-21 ENCOUNTER — TELEPHONE (OUTPATIENT)
Dept: ENT CLINIC | Age: 55
End: 2025-07-21

## 2025-07-21 ENCOUNTER — HOSPITAL ENCOUNTER (OUTPATIENT)
Dept: RADIATION ONCOLOGY | Age: 55
Discharge: HOME | End: 2025-07-21

## 2025-07-21 NOTE — TELEPHONE ENCOUNTER
Ladarius says that will help with everything they have going on and transportation. They will keep Vane's appointment and see what she says.

## 2025-07-21 NOTE — TELEPHONE ENCOUNTER
Gene called to apologize but they need to cancel Flaquita's appointment today. Her father passed away over the weekend. He said he feels like she is doing well with her prosthesis and will call back in a day or two to get rescheduled. She does have an appointment with Vane in speech next Thursday 7/31 and they will keep that appointment too. He is currently also working on getting the supplies from At that Dr Coughlin ordered.    Please advise if need anything else?

## 2025-07-31 ENCOUNTER — HOSPITAL ENCOUNTER (OUTPATIENT)
Dept: SPEECH THERAPY | Age: 55
Setting detail: THERAPIES SERIES
Discharge: HOME | End: 2025-07-31
Payer: MEDICARE

## 2025-07-31 ENCOUNTER — TELEPHONE (OUTPATIENT)
Dept: ENT CLINIC | Age: 55
End: 2025-07-31

## 2025-07-31 PROCEDURE — 92507 TX SP LANG VOICE COMM INDIV: CPT | Performed by: SPEECH-LANGUAGE PATHOLOGIST

## 2025-07-31 NOTE — PROGRESS NOTES
Techn[employer] (Burt Singer prosthesis) does have longer prostheses (16 mm, 18 mm and 20 mm) to try with the patient. Dr. Coughlin then recommended taking the patient to surgery to remove her current TEP.  Let the puncture heal for a little while and then place a new longer Burt Singer voice prosthesis after she is all healed.  Patient and her significant other were updated on this plan and both verbalized/demonstrated understanding.  This SLP then called and spoke with Chanel HERRERA, surgery scheduler for Dr. Coughlin to let her know that the patient will need to be scheduled to have her current TEP removed.  She will then call patient's significant other, Ladarius, to let him know of the date/time for the surgery.  Will plan to see the patient back for treatment after this whole process is completed.  Will assist Dr. Coughlin with placement of a new TEP as needed/requested.    SHORT TERM GOAL #3: Patient will complete reading tasks or short conversation with mod cues for appropriate coordination of respiration and speech for success with communicating orally.   INTERVENTIONS: did not test this date d/t focus on other goals    Long Term Goals:  Time Frame for Long-Term Goals: 12 weeks    LONG-TERM GOAL #1: Patient's functional outcome measure score will improve by at least 2 levels (currently level 3) for improved success with oral communication.  ONGOING    Activity/Treatment Tolerance:  [x]  Patient tolerated treatment well  []  Patient limited by fatigue  []  Patient limited by pain   []  Patient limited by other medical complications  []  Other:     Patient Education:   [x]  Education Completed: Plan of Care, Goals, TEP appears to short - will need a longer one placed, limit talking via TEP at this time, will collaborate with Chanel surgery scheduler for Dr. Coughlin to schedule OR time to place a longer TEP, see above for more education.  []  No new Education completed  []  Reviewed Prior HEP      [x]  Patient

## 2025-08-01 ENCOUNTER — TELEPHONE (OUTPATIENT)
Dept: ENT CLINIC | Age: 55
End: 2025-08-01

## 2025-08-01 NOTE — TELEPHONE ENCOUNTER
Pt last seen by Dr. Colindres 12-3-24.  Pt was cleared for this back in March.    Okay to clear or does pt need to be seen?

## 2025-08-01 NOTE — TELEPHONE ENCOUNTER
Flaquita is scheduled to have her TEP prosthesis removed (15 minute procedure, bolus of propofol) with Dr Coughlin on 8/13/25. Okay to proceed? (Previously cleared). She will be scheduled to have a longer TEP placed approximately 1 month later.     Please advise.  Thank you!

## 2025-08-13 ENCOUNTER — ANESTHESIA (OUTPATIENT)
Dept: OPERATING ROOM | Age: 55
End: 2025-08-13
Payer: MEDICARE

## 2025-08-13 ENCOUNTER — ANESTHESIA EVENT (OUTPATIENT)
Dept: OPERATING ROOM | Age: 55
End: 2025-08-13
Payer: MEDICARE

## 2025-08-13 ENCOUNTER — HOSPITAL ENCOUNTER (OUTPATIENT)
Age: 55
Setting detail: OUTPATIENT SURGERY
Discharge: HOME | End: 2025-08-13
Attending: OTOLARYNGOLOGY | Admitting: OTOLARYNGOLOGY
Payer: MEDICARE

## 2025-08-13 VITALS
HEIGHT: 61 IN | TEMPERATURE: 96.8 F | SYSTOLIC BLOOD PRESSURE: 136 MMHG | OXYGEN SATURATION: 86 % | HEART RATE: 78 BPM | DIASTOLIC BLOOD PRESSURE: 74 MMHG | BODY MASS INDEX: 36.25 KG/M2 | RESPIRATION RATE: 18 BRPM | WEIGHT: 192 LBS

## 2025-08-13 LAB
GLUCOSE BLD STRIP.AUTO-MCNC: 73 MG/DL (ref 70–108)
GLUCOSE BLD STRIP.AUTO-MCNC: 80 MG/DL (ref 70–108)
POTASSIUM SERPL-SCNC: 4.4 MEQ/L (ref 3.5–5.2)

## 2025-08-13 PROCEDURE — 7100000000 HC PACU RECOVERY - FIRST 15 MIN: Performed by: OTOLARYNGOLOGY

## 2025-08-13 PROCEDURE — 2580000003 HC RX 258: Performed by: OTOLARYNGOLOGY

## 2025-08-13 PROCEDURE — 6370000000 HC RX 637 (ALT 250 FOR IP): Performed by: OTOLARYNGOLOGY

## 2025-08-13 PROCEDURE — 3700000000 HC ANESTHESIA ATTENDED CARE: Performed by: OTOLARYNGOLOGY

## 2025-08-13 PROCEDURE — 2500000003 HC RX 250 WO HCPCS: Performed by: REGISTERED NURSE

## 2025-08-13 PROCEDURE — 2709999900 HC NON-CHARGEABLE SUPPLY: Performed by: OTOLARYNGOLOGY

## 2025-08-13 PROCEDURE — 2500000003 HC RX 250 WO HCPCS

## 2025-08-13 PROCEDURE — 6360000002 HC RX W HCPCS: Performed by: OTOLARYNGOLOGY

## 2025-08-13 PROCEDURE — 3700000001 HC ADD 15 MINUTES (ANESTHESIA): Performed by: OTOLARYNGOLOGY

## 2025-08-13 PROCEDURE — 36415 COLL VENOUS BLD VENIPUNCTURE: CPT

## 2025-08-13 PROCEDURE — 3600000013 HC SURGERY LEVEL 3 ADDTL 15MIN: Performed by: OTOLARYNGOLOGY

## 2025-08-13 PROCEDURE — 6360000002 HC RX W HCPCS: Performed by: REGISTERED NURSE

## 2025-08-13 PROCEDURE — 7100000001 HC PACU RECOVERY - ADDTL 15 MIN: Performed by: OTOLARYNGOLOGY

## 2025-08-13 PROCEDURE — 7100000011 HC PHASE II RECOVERY - ADDTL 15 MIN: Performed by: OTOLARYNGOLOGY

## 2025-08-13 PROCEDURE — 7100000010 HC PHASE II RECOVERY - FIRST 15 MIN: Performed by: OTOLARYNGOLOGY

## 2025-08-13 PROCEDURE — 82948 REAGENT STRIP/BLOOD GLUCOSE: CPT

## 2025-08-13 PROCEDURE — 84132 ASSAY OF SERUM POTASSIUM: CPT

## 2025-08-13 PROCEDURE — 3600000003 HC SURGERY LEVEL 3 BASE: Performed by: OTOLARYNGOLOGY

## 2025-08-13 RX ORDER — CYCLOBENZAPRINE HCL 10 MG
10 TABLET ORAL 3 TIMES DAILY PRN
COMMUNITY
Start: 2025-06-02

## 2025-08-13 RX ORDER — SODIUM CHLORIDE 0.9 % (FLUSH) 0.9 %
5-40 SYRINGE (ML) INJECTION EVERY 12 HOURS SCHEDULED
Status: DISCONTINUED | OUTPATIENT
Start: 2025-08-13 | End: 2025-08-13 | Stop reason: HOSPADM

## 2025-08-13 RX ORDER — INSULIN ASPART 100 [IU]/ML
INJECTION, SOLUTION INTRAVENOUS; SUBCUTANEOUS
COMMUNITY
Start: 2025-06-29

## 2025-08-13 RX ORDER — FENTANYL CITRATE 50 UG/ML
INJECTION, SOLUTION INTRAMUSCULAR; INTRAVENOUS
Status: DISCONTINUED | OUTPATIENT
Start: 2025-08-13 | End: 2025-08-13 | Stop reason: SDUPTHER

## 2025-08-13 RX ORDER — DEXTROSE MONOHYDRATE 25 G/50ML
INJECTION, SOLUTION INTRAVENOUS
Status: COMPLETED
Start: 2025-08-13 | End: 2025-08-13

## 2025-08-13 RX ORDER — PHENTERMINE HYDROCHLORIDE 37.5 MG/1
37.5 CAPSULE ORAL DAILY
COMMUNITY
Start: 2025-07-31

## 2025-08-13 RX ORDER — NALOXONE HYDROCHLORIDE 0.4 MG/ML
INJECTION, SOLUTION INTRAMUSCULAR; INTRAVENOUS; SUBCUTANEOUS
Status: DISCONTINUED | OUTPATIENT
Start: 2025-08-13 | End: 2025-08-13 | Stop reason: SDUPTHER

## 2025-08-13 RX ORDER — DEXTROSE MONOHYDRATE 25 G/50ML
12.5 INJECTION, SOLUTION INTRAVENOUS ONCE
Status: COMPLETED | OUTPATIENT
Start: 2025-08-13 | End: 2025-08-13

## 2025-08-13 RX ORDER — ONDANSETRON HYDROCHLORIDE 4 MG/5ML
4 SOLUTION ORAL 2 TIMES DAILY PRN
COMMUNITY
Start: 2025-05-16

## 2025-08-13 RX ORDER — FENTANYL 50 UG/1
1 PATCH TRANSDERMAL
COMMUNITY
Start: 2025-07-29

## 2025-08-13 RX ORDER — IPRATROPIUM BROMIDE AND ALBUTEROL SULFATE 2.5; .5 MG/3ML; MG/3ML
1 SOLUTION RESPIRATORY (INHALATION) EVERY 4 HOURS PRN
Status: DISCONTINUED | OUTPATIENT
Start: 2025-08-13 | End: 2025-08-13 | Stop reason: HOSPADM

## 2025-08-13 RX ORDER — NEBIVOLOL 5 MG/1
5 TABLET ORAL DAILY
COMMUNITY
Start: 2025-07-22

## 2025-08-13 RX ORDER — PROPOFOL 10 MG/ML
INJECTION, EMULSION INTRAVENOUS
Status: DISCONTINUED | OUTPATIENT
Start: 2025-08-13 | End: 2025-08-13 | Stop reason: SDUPTHER

## 2025-08-13 RX ORDER — FLUMAZENIL 0.1 MG/ML
INJECTION INTRAVENOUS
Status: DISCONTINUED | OUTPATIENT
Start: 2025-08-13 | End: 2025-08-13 | Stop reason: SDUPTHER

## 2025-08-13 RX ORDER — SODIUM CHLORIDE 9 MG/ML
INJECTION, SOLUTION INTRAVENOUS PRN
Status: DISCONTINUED | OUTPATIENT
Start: 2025-08-13 | End: 2025-08-13 | Stop reason: HOSPADM

## 2025-08-13 RX ORDER — ROCURONIUM BROMIDE 10 MG/ML
INJECTION, SOLUTION INTRAVENOUS
Status: DISCONTINUED | OUTPATIENT
Start: 2025-08-13 | End: 2025-08-13 | Stop reason: SDUPTHER

## 2025-08-13 RX ORDER — MIDAZOLAM HYDROCHLORIDE 1 MG/ML
INJECTION, SOLUTION INTRAMUSCULAR; INTRAVENOUS
Status: DISCONTINUED | OUTPATIENT
Start: 2025-08-13 | End: 2025-08-13 | Stop reason: SDUPTHER

## 2025-08-13 RX ORDER — SODIUM CHLORIDE 0.9 % (FLUSH) 0.9 %
5-40 SYRINGE (ML) INJECTION PRN
Status: DISCONTINUED | OUTPATIENT
Start: 2025-08-13 | End: 2025-08-13 | Stop reason: HOSPADM

## 2025-08-13 RX ORDER — POTASSIUM CHLORIDE 750 MG/1
10 CAPSULE, EXTENDED RELEASE ORAL DAILY
COMMUNITY
Start: 2025-07-20

## 2025-08-13 RX ORDER — EPHEDRINE SULFATE/0.9% NACL/PF 25 MG/5 ML
SYRINGE (ML) INTRAVENOUS
Status: DISCONTINUED | OUTPATIENT
Start: 2025-08-13 | End: 2025-08-13 | Stop reason: SDUPTHER

## 2025-08-13 RX ORDER — OXYCODONE HCL 5 MG/5 ML
5 SOLUTION, ORAL ORAL ONCE
Refills: 0 | Status: COMPLETED | OUTPATIENT
Start: 2025-08-13 | End: 2025-08-13

## 2025-08-13 RX ADMIN — ROCURONIUM BROMIDE 25 MG: 10 INJECTION INTRAVENOUS at 13:15

## 2025-08-13 RX ADMIN — NALXONE HYDROCHLORIDE 0.4 MG: 0.4 INJECTION INTRAMUSCULAR; INTRAVENOUS; SUBCUTANEOUS at 14:10

## 2025-08-13 RX ADMIN — FENTANYL CITRATE 50 MCG: 50 INJECTION, SOLUTION INTRAMUSCULAR; INTRAVENOUS at 13:04

## 2025-08-13 RX ADMIN — SODIUM CHLORIDE 3000 MG: 9 INJECTION, SOLUTION INTRAVENOUS at 12:59

## 2025-08-13 RX ADMIN — MIDAZOLAM 2 MG: 1 INJECTION INTRAMUSCULAR; INTRAVENOUS at 13:01

## 2025-08-13 RX ADMIN — FLUMAZENIL 0.2 MG: 0.1 INJECTION, SOLUTION INTRAVENOUS at 14:08

## 2025-08-13 RX ADMIN — SUGAMMADEX 200 MG: 100 INJECTION, SOLUTION INTRAVENOUS at 13:54

## 2025-08-13 RX ADMIN — PROPOFOL 200 MG: 10 INJECTION, EMULSION INTRAVENOUS at 13:04

## 2025-08-13 RX ADMIN — EPHEDRINE SULFATE 25 MG: 5 INJECTION INTRAVENOUS at 13:21

## 2025-08-13 RX ADMIN — DEXTROSE MONOHYDRATE 12.5 G: 25 INJECTION, SOLUTION INTRAVENOUS at 12:34

## 2025-08-13 RX ADMIN — ROCURONIUM BROMIDE 25 MG: 10 INJECTION INTRAVENOUS at 13:04

## 2025-08-13 RX ADMIN — OXYCODONE HYDROCHLORIDE 5 MG: 5 SOLUTION ORAL at 17:15

## 2025-08-13 RX ADMIN — NALXONE HYDROCHLORIDE 0.4 MG: 0.4 INJECTION INTRAMUSCULAR; INTRAVENOUS; SUBCUTANEOUS at 14:12

## 2025-08-13 RX ADMIN — FLUMAZENIL 0.2 MG: 0.1 INJECTION, SOLUTION INTRAVENOUS at 14:03

## 2025-08-13 RX ADMIN — SODIUM CHLORIDE: 0.9 INJECTION, SOLUTION INTRAVENOUS at 12:06

## 2025-08-13 ASSESSMENT — PAIN DESCRIPTION - DESCRIPTORS
DESCRIPTORS: ACHING
DESCRIPTORS: SORE
DESCRIPTORS: ACHING
DESCRIPTORS: ACHING;SORE
DESCRIPTORS: ACHING

## 2025-08-13 ASSESSMENT — PAIN - FUNCTIONAL ASSESSMENT
PAIN_FUNCTIONAL_ASSESSMENT: 0-10
PAIN_FUNCTIONAL_ASSESSMENT: 0-10
PAIN_FUNCTIONAL_ASSESSMENT: PREVENTS OR INTERFERES WITH MANY ACTIVE NOT PASSIVE ACTIVITIES
PAIN_FUNCTIONAL_ASSESSMENT: 0-10
PAIN_FUNCTIONAL_ASSESSMENT: WONG-BAKER FACES

## 2025-08-13 ASSESSMENT — PAIN SCALES - WONG BAKER
WONGBAKER_NUMERICALRESPONSE: NO HURT
WONGBAKER_NUMERICALRESPONSE: NO HURT

## 2025-08-13 ASSESSMENT — PAIN SCALES - GENERAL
PAINLEVEL_OUTOF10: 7
PAINLEVEL_OUTOF10: 0

## 2025-08-13 ASSESSMENT — PAIN DESCRIPTION - LOCATION: LOCATION: THROAT

## 2025-08-18 ENCOUNTER — TELEPHONE (OUTPATIENT)
Dept: ENT CLINIC | Age: 55
End: 2025-08-18

## 2025-08-21 ENCOUNTER — TELEPHONE (OUTPATIENT)
Dept: CARDIOLOGY CLINIC | Age: 55
End: 2025-08-21

## 2025-08-29 ENCOUNTER — HOSPITAL ENCOUNTER (OUTPATIENT)
Age: 55
Setting detail: OUTPATIENT SURGERY
Discharge: HOME | End: 2025-08-29
Attending: OTOLARYNGOLOGY | Admitting: OTOLARYNGOLOGY
Payer: MEDICARE

## 2025-08-29 ENCOUNTER — ANESTHESIA (OUTPATIENT)
Dept: OPERATING ROOM | Age: 55
End: 2025-08-29
Payer: MEDICARE

## 2025-08-29 ENCOUNTER — ANESTHESIA EVENT (OUTPATIENT)
Dept: OPERATING ROOM | Age: 55
End: 2025-08-29
Payer: MEDICARE

## 2025-08-29 VITALS
SYSTOLIC BLOOD PRESSURE: 153 MMHG | HEIGHT: 61 IN | RESPIRATION RATE: 18 BRPM | OXYGEN SATURATION: 95 % | DIASTOLIC BLOOD PRESSURE: 97 MMHG | BODY MASS INDEX: 35.87 KG/M2 | WEIGHT: 190 LBS | TEMPERATURE: 97.3 F | HEART RATE: 78 BPM

## 2025-08-29 LAB — POTASSIUM SERPL-SCNC: 4.2 MEQ/L (ref 3.5–5.2)

## 2025-08-29 PROCEDURE — 3600000013 HC SURGERY LEVEL 3 ADDTL 15MIN: Performed by: OTOLARYNGOLOGY

## 2025-08-29 PROCEDURE — 7100000001 HC PACU RECOVERY - ADDTL 15 MIN: Performed by: OTOLARYNGOLOGY

## 2025-08-29 PROCEDURE — 7100000010 HC PHASE II RECOVERY - FIRST 15 MIN: Performed by: OTOLARYNGOLOGY

## 2025-08-29 PROCEDURE — 3700000000 HC ANESTHESIA ATTENDED CARE: Performed by: OTOLARYNGOLOGY

## 2025-08-29 PROCEDURE — 7100000011 HC PHASE II RECOVERY - ADDTL 15 MIN: Performed by: OTOLARYNGOLOGY

## 2025-08-29 PROCEDURE — 6360000002 HC RX W HCPCS: Performed by: NURSE PRACTITIONER

## 2025-08-29 PROCEDURE — 36415 COLL VENOUS BLD VENIPUNCTURE: CPT

## 2025-08-29 PROCEDURE — 6360000002 HC RX W HCPCS: Performed by: NURSE ANESTHETIST, CERTIFIED REGISTERED

## 2025-08-29 PROCEDURE — 2709999900 HC NON-CHARGEABLE SUPPLY: Performed by: OTOLARYNGOLOGY

## 2025-08-29 PROCEDURE — APPNB45 APP NON BILLABLE 31-45 MINUTES: Performed by: NURSE PRACTITIONER

## 2025-08-29 PROCEDURE — 3600000003 HC SURGERY LEVEL 3 BASE: Performed by: OTOLARYNGOLOGY

## 2025-08-29 PROCEDURE — 2500000003 HC RX 250 WO HCPCS: Performed by: NURSE ANESTHETIST, CERTIFIED REGISTERED

## 2025-08-29 PROCEDURE — 84132 ASSAY OF SERUM POTASSIUM: CPT

## 2025-08-29 PROCEDURE — 3700000001 HC ADD 15 MINUTES (ANESTHESIA): Performed by: OTOLARYNGOLOGY

## 2025-08-29 PROCEDURE — 7100000000 HC PACU RECOVERY - FIRST 15 MIN: Performed by: OTOLARYNGOLOGY

## 2025-08-29 PROCEDURE — 2580000003 HC RX 258: Performed by: NURSE PRACTITIONER

## 2025-08-29 RX ORDER — PROPOFOL 10 MG/ML
INJECTION, EMULSION INTRAVENOUS
Status: DISCONTINUED | OUTPATIENT
Start: 2025-08-29 | End: 2025-08-29 | Stop reason: SDUPTHER

## 2025-08-29 RX ORDER — AZELASTINE 1 MG/ML
1 SPRAY, METERED NASAL 2 TIMES DAILY
COMMUNITY

## 2025-08-29 RX ORDER — SODIUM CHLORIDE 0.9 % (FLUSH) 0.9 %
5-40 SYRINGE (ML) INJECTION PRN
Status: DISCONTINUED | OUTPATIENT
Start: 2025-08-29 | End: 2025-08-29 | Stop reason: HOSPADM

## 2025-08-29 RX ORDER — ONDANSETRON 2 MG/ML
INJECTION INTRAMUSCULAR; INTRAVENOUS
Status: DISCONTINUED | OUTPATIENT
Start: 2025-08-29 | End: 2025-08-29 | Stop reason: SDUPTHER

## 2025-08-29 RX ORDER — FENTANYL CITRATE 50 UG/ML
INJECTION, SOLUTION INTRAMUSCULAR; INTRAVENOUS
Status: DISCONTINUED | OUTPATIENT
Start: 2025-08-29 | End: 2025-08-29 | Stop reason: SDUPTHER

## 2025-08-29 RX ORDER — DEXAMETHASONE SODIUM PHOSPHATE 10 MG/ML
10 INJECTION, SOLUTION INTRAMUSCULAR; INTRAVENOUS ONCE
Status: COMPLETED | OUTPATIENT
Start: 2025-08-29 | End: 2025-08-29

## 2025-08-29 RX ORDER — FENTANYL CITRATE 50 UG/ML
25 INJECTION, SOLUTION INTRAMUSCULAR; INTRAVENOUS EVERY 5 MIN PRN
Status: DISCONTINUED | OUTPATIENT
Start: 2025-08-29 | End: 2025-08-29 | Stop reason: HOSPADM

## 2025-08-29 RX ORDER — MIDAZOLAM HYDROCHLORIDE 1 MG/ML
INJECTION, SOLUTION INTRAMUSCULAR; INTRAVENOUS
Status: DISCONTINUED | OUTPATIENT
Start: 2025-08-29 | End: 2025-08-29 | Stop reason: SDUPTHER

## 2025-08-29 RX ORDER — PHENYLEPHRINE HCL IN 0.9% NACL 1 MG/10 ML
SYRINGE (ML) INTRAVENOUS
Status: DISCONTINUED | OUTPATIENT
Start: 2025-08-29 | End: 2025-08-29 | Stop reason: SDUPTHER

## 2025-08-29 RX ORDER — SODIUM CHLORIDE 0.9 % (FLUSH) 0.9 %
5-40 SYRINGE (ML) INJECTION EVERY 12 HOURS SCHEDULED
Status: DISCONTINUED | OUTPATIENT
Start: 2025-08-29 | End: 2025-08-29 | Stop reason: HOSPADM

## 2025-08-29 RX ORDER — SODIUM CHLORIDE 9 MG/ML
INJECTION, SOLUTION INTRAVENOUS PRN
Status: DISCONTINUED | OUTPATIENT
Start: 2025-08-29 | End: 2025-08-29 | Stop reason: HOSPADM

## 2025-08-29 RX ORDER — ROCURONIUM BROMIDE 10 MG/ML
INJECTION, SOLUTION INTRAVENOUS
Status: DISCONTINUED | OUTPATIENT
Start: 2025-08-29 | End: 2025-08-29 | Stop reason: SDUPTHER

## 2025-08-29 RX ORDER — IPRATROPIUM BROMIDE AND ALBUTEROL SULFATE 2.5; .5 MG/3ML; MG/3ML
1 SOLUTION RESPIRATORY (INHALATION) EVERY 4 HOURS PRN
Status: DISCONTINUED | OUTPATIENT
Start: 2025-08-29 | End: 2025-08-29 | Stop reason: HOSPADM

## 2025-08-29 RX ORDER — MELOXICAM 15 MG/1
15 TABLET ORAL DAILY
COMMUNITY
Start: 2025-08-19

## 2025-08-29 RX ORDER — DEXAMETHASONE SODIUM PHOSPHATE 10 MG/ML
INJECTION, EMULSION INTRAMUSCULAR; INTRAVENOUS
Status: DISCONTINUED | OUTPATIENT
Start: 2025-08-29 | End: 2025-08-29 | Stop reason: SDUPTHER

## 2025-08-29 RX ORDER — LIDOCAINE HYDROCHLORIDE 20 MG/ML
INJECTION, SOLUTION INTRAVENOUS
Status: DISCONTINUED | OUTPATIENT
Start: 2025-08-29 | End: 2025-08-29 | Stop reason: SDUPTHER

## 2025-08-29 RX ADMIN — SUGAMMADEX 200 MG: 100 INJECTION, SOLUTION INTRAVENOUS at 12:00

## 2025-08-29 RX ADMIN — DEXAMETHASONE SODIUM PHOSPHATE 10 MG: 10 INJECTION, SOLUTION INTRAMUSCULAR; INTRAVENOUS at 11:15

## 2025-08-29 RX ADMIN — FENTANYL CITRATE 50 MCG: 50 INJECTION, SOLUTION INTRAMUSCULAR; INTRAVENOUS at 11:44

## 2025-08-29 RX ADMIN — ONDANSETRON 4 MG: 2 INJECTION, SOLUTION INTRAMUSCULAR; INTRAVENOUS at 12:00

## 2025-08-29 RX ADMIN — MIDAZOLAM 2 MG: 1 INJECTION INTRAMUSCULAR; INTRAVENOUS at 11:21

## 2025-08-29 RX ADMIN — Medication 100 MG: at 11:30

## 2025-08-29 RX ADMIN — ROCURONIUM BROMIDE 50 MG: 10 INJECTION, SOLUTION INTRAVENOUS at 11:30

## 2025-08-29 RX ADMIN — SODIUM CHLORIDE 3000 MG: 9 INJECTION, SOLUTION INTRAVENOUS at 11:17

## 2025-08-29 RX ADMIN — SODIUM CHLORIDE: 0.9 INJECTION, SOLUTION INTRAVENOUS at 11:14

## 2025-08-29 RX ADMIN — Medication 200 MCG: at 11:37

## 2025-08-29 RX ADMIN — PROPOFOL 150 MG: 10 INJECTION, EMULSION INTRAVENOUS at 11:30

## 2025-08-29 RX ADMIN — DEXAMETHASONE SODIUM PHOSPHATE 8 MG: 10 INJECTION, EMULSION INTRAMUSCULAR; INTRAVENOUS at 11:35

## 2025-08-29 ASSESSMENT — PAIN SCALES - GENERAL
PAINLEVEL_OUTOF10: 0

## 2025-08-29 ASSESSMENT — PAIN - FUNCTIONAL ASSESSMENT: PAIN_FUNCTIONAL_ASSESSMENT: 0-10

## 2025-08-29 ASSESSMENT — PAIN SCALES - WONG BAKER: WONGBAKER_NUMERICALRESPONSE: NO HURT

## 2025-09-03 PROBLEM — K22.9 LESION OF ESOPHAGUS: Status: ACTIVE | Noted: 2025-09-03

## (undated) DEVICE — UNIVERSAL MONOPOLAR LAPAROSCOPIC CABLE 10FT, 4MM PIN CONNECTOR: Brand: CONMED

## (undated) DEVICE — SUTURE VICRYL + SZ 3-0 L27IN ABSRB UD FS2 3/8 CIR REV CUT

## (undated) DEVICE — ARM DRAPE

## (undated) DEVICE — SYRINGE MED 10ML LUERLOCK TIP W/O SFTY DISP

## (undated) DEVICE — ESOPHAGEAL BALLOON DILATATION CATHETER: Brand: CRE FIXED WIRE

## (undated) DEVICE — SPONGE NEUROSURGICAL W1XL3IN WHT COT HI QUAL FBR FLX PATTY

## (undated) DEVICE — CORD BPLR L12FT NONIRRIGATING FIT ALL STD FRCP

## (undated) DEVICE — SUTURE ETHICON SZ 4-0 PDSII 18IN P-3 PRM MP D10132

## (undated) DEVICE — DRAPE,EENT,SPLIT,STERILE: Brand: MEDLINE

## (undated) DEVICE — GOWN,SIRUS,NON REINFRCD,LARGE,SET IN SL: Brand: MEDLINE

## (undated) DEVICE — SUTURE PROL SZ 4-0 L18IN NONABSORBABLE BLU L19MM PS-2 3/8 8682G

## (undated) DEVICE — INJECTION THERAPY NEEDLE CATHETER: Brand: INTERJECT

## (undated) DEVICE — CLIP LIG M TI 6 SIL HNDL FOR OPN AND ENDOSCP SGL APPL

## (undated) DEVICE — HOLDER TRACH TB AD FOAM PREM SFT STRP HK LOOP CLSR E ADJ

## (undated) DEVICE — MARKER SURG SKIN GENTIAN VLT REG TIP W/ 6IN RUL AND BLNK DYNJSM02

## (undated) DEVICE — LARYNGOSCOPY - BRONCHOSCOPY: Brand: MEDLINE INDUSTRIES, INC.

## (undated) DEVICE — CONTAINER,SPECIMEN,PNEU TUBE,4OZ,OR STRL: Brand: MEDLINE

## (undated) DEVICE — PACK-MAJOR

## (undated) DEVICE — BANDAGE COMPR W6INXL12FT SMOOTH FOR LIMB EXSANG ESMARCH

## (undated) DEVICE — Device

## (undated) DEVICE — SPONGE,NEURO,0.5"X0.5",XR,STRL,10/PK: Brand: MEDLINE

## (undated) DEVICE — SUTURE MONOCRYL + SZ 3-0 L27IN ABSRB UD PS1 L24MM 3/8 CIR REV MCP936H

## (undated) DEVICE — GLOVE SURG SZ 7.5 L11.73IN FNGR THK9.8MIL STRW LTX POLYMER

## (undated) DEVICE — PAD,NON-ADHERENT,3X8,STERILE,LF,1/PK: Brand: MEDLINE

## (undated) DEVICE — THE PROVOX VEGA PUNCTURE SET IS A DEVICE FOR CREATING A PRIMARY OR SECONDARY TE PUNCTURE, WITH SUBSEQUENT DILATATION OF THAT PUNCTURE TO A WIDTH THAT FACILITATES PLACEMENT OF THE INCLUDED PROVOX VEGA VOICE PROSTHESIS. THE PROVOX VEGA VOICE PROSTHESIS IS PRELOADED ON THE PUNCTURE DILATOR, WHICH IS PART OF THE DEVICE. THE PROVOX VEGA PUNCTURE SET IS INTENDED FOR SINGLE USE ONLY.: Brand: PROVOX VEGA PUNCTURE SET 22.5FR 8MM

## (undated) DEVICE — PROVOX VEGA VOICE PROSTHESIS IS AN INDWELLING, LOW RESISTANCE VOICE PROSTHESIS INTENDED FOR VOICE RESTORATION AFTER SURGICAL REMOVAL OF THE LARYNX.THE PROVOX INSERTION SYSTEM IS A SINGLE-USE REPLACEMENT DEVICE INTENDED FOR ANTEROGRADE INSERTION ONLY.
Type: IMPLANTABLE DEVICE | Status: NON-FUNCTIONAL
Brand: PROVOX VEGA 22.5FR 15MM

## (undated) DEVICE — PRECISION (9.0 X 0.51 X 31.0MM)

## (undated) DEVICE — KIT,ANTI FOG,W/SPONGE & FLUID,SOFT PACK: Brand: MEDLINE

## (undated) DEVICE — TUBING, SUCTION, 1/4" X 20', STRAIGHT: Brand: MEDLINE INDUSTRIES, INC.

## (undated) DEVICE — DRAPE INSTR W10XL16IN GRN FOAM MAG NONSLIP CVR DISP

## (undated) DEVICE — DEVICE INFL 60ML 15ATM PRSS COOK SPHR

## (undated) DEVICE — PREMIUM DRY TRAY LF: Brand: MEDLINE INDUSTRIES, INC.

## (undated) DEVICE — SUTURE ETHIBOND EXCEL SZ 0 L30IN NONABSORBABLE GRN CT1 L36MM X424H

## (undated) DEVICE — GLOVE ORANGE PI 8   MSG9080

## (undated) DEVICE — HERCULES 3 STAGE BALLOON ESOPHAGEAL: Brand: HERCULES

## (undated) DEVICE — GAUZE,SPONGE,8"X4",12PLY,XRAY,STRL,LF: Brand: MEDLINE

## (undated) DEVICE — SUTURE ETHILON SZ 3-0 L30IN NONABSORBABLE BLK L30MM PSLX 3/8 1691H

## (undated) DEVICE — PLUG CATH DRNGE PROTCT TB

## (undated) DEVICE — SPONGE GZ W4XL4IN COT 12 PLY TYP VII WVN C FLD DSGN STERILE

## (undated) DEVICE — SUTURE MONOCRYL SZ 4-0 L18IN ABSRB UD P-3 L13MM 3/8 CIR PRIM Y494G

## (undated) DEVICE — SOLUTION PREP PAINT POV IOD FOR SKIN MUCOUS MEM

## (undated) DEVICE — BAND COMPR L24CM REG CLR PLAS HEMSTAT EXT HK AND LOOP RETEN

## (undated) DEVICE — CORD MPLR UNIV 10FT E S SGL USE

## (undated) DEVICE — DRESSING TRNSPAR W5XL4.5IN FLM SHT SEMIPERMEABLE WIND

## (undated) DEVICE — NEEDLE, QUINCKE, 22GX7": Brand: MEDLINE

## (undated) DEVICE — APPLICATOR MEDICATED 10.5 CC SOLUTION CLR STRL CHLORAPREP

## (undated) DEVICE — GUIDEWIRE VASC L260CM DIA0.035IN RAD 3MM J TIP L7CM PTFE

## (undated) DEVICE — TUBING SCTION CONN 1/4X20 RIB

## (undated) DEVICE — COVER,LIGHT HANDLE,FLX,2/PK: Brand: MEDLINE INDUSTRIES, INC.

## (undated) DEVICE — BANDAGE COMPR W4INXL12FT E DISP ESMARCH EVEN

## (undated) DEVICE — SUTURE PDS + SZ 3 0 L27IN ABSRB VLT L17MM RB 1 1 2 CIR PDP305H

## (undated) DEVICE — SYRINGE IRRIG 60ML SFT PLIABLE BLB EZ TO GRP 1 HND USE W/

## (undated) DEVICE — GAUZE,SPONGE,2"X2",8PLY,STERILE,LF,2'S: Brand: MEDLINE

## (undated) DEVICE — PEG KIT STD 20 FR PUSH W/ ENFIT ENDOVIVE

## (undated) DEVICE — PENCIL SMK EVAC ALL IN 1 DSGN ENH VISIBILITY IMPROVED AIR

## (undated) DEVICE — COLUMN DRAPE

## (undated) DEVICE — BANDAGE COMPR E SGL LAYERED CLSR BGE W/ CLP W4INXL15FT

## (undated) DEVICE — SUTURE PROL SZ 2-0 L30IN NONABSORBABLE BLU L26MM CT-1 1/2 8423H

## (undated) DEVICE — DRAPE,INSTRUMENT,MAGNETIC,10X16: Brand: MEDLINE

## (undated) DEVICE — DRAPE EENT W78XL110IN STD SPL ABSRB REINF INSTR PD LN HLDR

## (undated) DEVICE — SPONGE,NEURO,0.5"X3",XR,STRL,LF,10/PK: Brand: MEDLINE

## (undated) DEVICE — SUTURE VCRL SZ 3-0 L27IN ABSRB VLT CT-2 L26MM 1/2 CIR J332H

## (undated) DEVICE — CORD,CAUTERY,BIPOLAR,STERILE: Brand: MEDLINE

## (undated) DEVICE — SUTURE MONOCRYL SZ 4-0 L18IN ABSRB VLT P-3 L13MM 3/8 CIR REV Y464G

## (undated) DEVICE — SEAL

## (undated) DEVICE — GLIDESHEATH SLENDER ACCESS KIT: Brand: GLIDESHEATH SLENDER

## (undated) DEVICE — HYPODERMIC SAFETY NEEDLE: Brand: MAGELLAN

## (undated) DEVICE — SOLUTION SCRB 4OZ 7.5% POVIDONE IOD ANTIMIC BTL

## (undated) DEVICE — CATHETER DIAG 5FR L100CM LUMN ID0.047IN JR4 CRV 0 SIDE H

## (undated) DEVICE — BASIC SINGLE BASIN BTC-LF: Brand: MEDLINE INDUSTRIES, INC.

## (undated) DEVICE — 3M™ STERI-DRAPE™ INSTRUMENT POUCH 1018: Brand: STERI-DRAPE™

## (undated) DEVICE — BAG,BANDED,W/RUBBERBAND,STERILE,30X36: Brand: MEDLINE

## (undated) DEVICE — SUTURE PROL SZ 2-0 L18IN NONABSORBABLE BLU FS L26MM 3/8 CIR 8685H

## (undated) DEVICE — HYPODERMIC SAFETY NEEDLE: Brand: MONOJECT

## (undated) DEVICE — SYRINGE MED 30ML STD CLR PLAS LUERLOCK TIP N CTRL DISP

## (undated) DEVICE — GOWN,SIRUS,NONRNF,SETINSLV,XL,20/CS: Brand: MEDLINE

## (undated) DEVICE — SUTURE N ABSRB MONOFILAMENT 2-0 FSLX 75 CM 36 MM ETHILON

## (undated) DEVICE — DRAPE,EXTREMITY,89X128,STERILE: Brand: MEDLINE

## (undated) DEVICE — CATHETER DIAG 5FR L100CM LUMN ID0.047IN JL3.5 CRV 0 SIDE H

## (undated) DEVICE — DRESSING PETRO W3XL3IN OIL EMUL N ADH GZ KNIT IMPREG CELOS

## (undated) DEVICE — SUTURE VCRL + SZ 4-0 L27IN ABSRB WHT FS-2 3/8 CIR REV CUT VCP422H

## (undated) DEVICE — MARKER,SKIN,WI/RULER AND LABELS: Brand: MEDLINE

## (undated) DEVICE — BANDAGE,GAUZE,4.5"X4.1YD,STERILE,LF: Brand: MEDLINE

## (undated) DEVICE — SET UP: Brand: MEDLINE INDUSTRIES, INC.

## (undated) DEVICE — BREAST HERNIA PACK: Brand: MEDLINE INDUSTRIES, INC.